# Patient Record
Sex: FEMALE | Race: BLACK OR AFRICAN AMERICAN | NOT HISPANIC OR LATINO | Employment: UNEMPLOYED | URBAN - METROPOLITAN AREA
[De-identification: names, ages, dates, MRNs, and addresses within clinical notes are randomized per-mention and may not be internally consistent; named-entity substitution may affect disease eponyms.]

---

## 2018-02-15 ENCOUNTER — CLINICAL SUPPORT (OUTPATIENT)
Dept: INTERNAL MEDICINE | Facility: CLINIC | Age: 63
End: 2018-02-15
Payer: MEDICAID

## 2018-02-15 PROCEDURE — 90662 IIV NO PRSV INCREASED AG IM: CPT | Mod: PBBFAC,PO

## 2022-09-02 ENCOUNTER — OFFICE VISIT (OUTPATIENT)
Dept: INTERNAL MEDICINE | Facility: CLINIC | Age: 67
End: 2022-09-02
Payer: MEDICARE

## 2022-09-02 ENCOUNTER — HOSPITAL ENCOUNTER (OUTPATIENT)
Dept: CARDIOLOGY | Facility: HOSPITAL | Age: 67
Discharge: HOME | End: 2022-09-02
Attending: FAMILY MEDICINE
Payer: MEDICARE

## 2022-09-02 VITALS
WEIGHT: 166.25 LBS | TEMPERATURE: 98 F | BODY MASS INDEX: 27.7 KG/M2 | OXYGEN SATURATION: 98 % | HEIGHT: 65 IN | DIASTOLIC BLOOD PRESSURE: 72 MMHG | SYSTOLIC BLOOD PRESSURE: 104 MMHG | HEART RATE: 56 BPM

## 2022-09-02 DIAGNOSIS — E78.2 MIXED HYPERLIPIDEMIA: ICD-10-CM

## 2022-09-02 DIAGNOSIS — Z12.11 SCREENING FOR COLORECTAL CANCER: ICD-10-CM

## 2022-09-02 DIAGNOSIS — Z23 NEED FOR PNEUMOCOCCAL VACCINE: ICD-10-CM

## 2022-09-02 DIAGNOSIS — Z00.00 PREVENTATIVE HEALTH CARE: ICD-10-CM

## 2022-09-02 DIAGNOSIS — Z23 NEED FOR COVID-19 VACCINE: ICD-10-CM

## 2022-09-02 DIAGNOSIS — Z11.4 SCREENING FOR HIV WITHOUT PRESENCE OF RISK FACTORS: ICD-10-CM

## 2022-09-02 DIAGNOSIS — Z23 NEED FOR SHINGLES VACCINE: ICD-10-CM

## 2022-09-02 DIAGNOSIS — Z01.419 PAP SMEAR, AS PART OF ROUTINE GYNECOLOGICAL EXAMINATION: ICD-10-CM

## 2022-09-02 DIAGNOSIS — Z01.00 DIABETIC EYE EXAM: ICD-10-CM

## 2022-09-02 DIAGNOSIS — Z11.59 ENCOUNTER FOR HEPATITIS C SCREENING TEST FOR LOW RISK PATIENT: ICD-10-CM

## 2022-09-02 DIAGNOSIS — Z12.12 SCREENING FOR COLORECTAL CANCER: ICD-10-CM

## 2022-09-02 DIAGNOSIS — E11.65 TYPE 2 DIABETES MELLITUS WITH HYPERGLYCEMIA, WITHOUT LONG-TERM CURRENT USE OF INSULIN: Primary | ICD-10-CM

## 2022-09-02 DIAGNOSIS — E89.0 POST-SURGICAL HYPOTHYROIDISM: ICD-10-CM

## 2022-09-02 DIAGNOSIS — I10 ESSENTIAL HYPERTENSION: ICD-10-CM

## 2022-09-02 DIAGNOSIS — M85.89 OSTEOPENIA OF MULTIPLE SITES: ICD-10-CM

## 2022-09-02 DIAGNOSIS — Z78.0 ASYMPTOMATIC MENOPAUSAL STATE: ICD-10-CM

## 2022-09-02 DIAGNOSIS — H40.9 GLAUCOMA, UNSPECIFIED GLAUCOMA TYPE, UNSPECIFIED LATERALITY: ICD-10-CM

## 2022-09-02 DIAGNOSIS — Z91.89 AT RISK FOR KNOWLEDGE DEFICIT: Chronic | ICD-10-CM

## 2022-09-02 DIAGNOSIS — Z12.31 BREAST CANCER SCREENING BY MAMMOGRAM: ICD-10-CM

## 2022-09-02 DIAGNOSIS — E11.9 DIABETIC EYE EXAM: ICD-10-CM

## 2022-09-02 DIAGNOSIS — R22.1 MASS OF LEFT SIDE OF NECK: ICD-10-CM

## 2022-09-02 DIAGNOSIS — Z23 NEED FOR TD VACCINE: ICD-10-CM

## 2022-09-02 PROBLEM — E03.9 ACQUIRED HYPOTHYROIDISM: Status: ACTIVE | Noted: 2021-12-01

## 2022-09-02 PROBLEM — K21.9 GASTROESOPHAGEAL REFLUX DISEASE WITHOUT ESOPHAGITIS: Status: ACTIVE | Noted: 2022-09-02

## 2022-09-02 LAB — GLUCOSE SERPL-MCNC: 112 MG/DL (ref 70–110)

## 2022-09-02 PROCEDURE — 3061F NEG MICROALBUMINURIA REV: CPT | Mod: CPTII,S$GLB,, | Performed by: FAMILY MEDICINE

## 2022-09-02 PROCEDURE — G0008 ADMIN INFLUENZA VIRUS VAC: HCPCS | Mod: S$GLB,,, | Performed by: FAMILY MEDICINE

## 2022-09-02 PROCEDURE — 82962 GLUCOSE BLOOD TEST: CPT | Mod: S$GLB,,, | Performed by: FAMILY MEDICINE

## 2022-09-02 PROCEDURE — 99204 OFFICE O/P NEW MOD 45 MIN: CPT | Mod: S$GLB,,, | Performed by: FAMILY MEDICINE

## 2022-09-02 PROCEDURE — 99999 PR PBB SHADOW E&M-NEW PATIENT-LVL V: ICD-10-PCS | Mod: PBBFAC,,, | Performed by: FAMILY MEDICINE

## 2022-09-02 PROCEDURE — 3288F FALL RISK ASSESSMENT DOCD: CPT | Mod: CPTII,S$GLB,, | Performed by: FAMILY MEDICINE

## 2022-09-02 PROCEDURE — 93005 ELECTROCARDIOGRAM TRACING: CPT

## 2022-09-02 PROCEDURE — 3288F PR FALLS RISK ASSESSMENT DOCUMENTED: ICD-10-PCS | Mod: CPTII,S$GLB,, | Performed by: FAMILY MEDICINE

## 2022-09-02 PROCEDURE — 3074F SYST BP LT 130 MM HG: CPT | Mod: CPTII,S$GLB,, | Performed by: FAMILY MEDICINE

## 2022-09-02 PROCEDURE — 1101F PT FALLS ASSESS-DOCD LE1/YR: CPT | Mod: CPTII,S$GLB,, | Performed by: FAMILY MEDICINE

## 2022-09-02 PROCEDURE — 3044F HG A1C LEVEL LT 7.0%: CPT | Mod: CPTII,S$GLB,, | Performed by: FAMILY MEDICINE

## 2022-09-02 PROCEDURE — 93010 ELECTROCARDIOGRAM REPORT: CPT | Mod: ,,, | Performed by: INTERNAL MEDICINE

## 2022-09-02 PROCEDURE — 90694 VACC AIIV4 NO PRSRV 0.5ML IM: CPT | Mod: S$GLB,,, | Performed by: FAMILY MEDICINE

## 2022-09-02 PROCEDURE — 99204 PR OFFICE/OUTPT VISIT, NEW, LEVL IV, 45-59 MIN: ICD-10-PCS | Mod: S$GLB,,, | Performed by: FAMILY MEDICINE

## 2022-09-02 PROCEDURE — 93010 EKG 12-LEAD: ICD-10-PCS | Mod: ,,, | Performed by: INTERNAL MEDICINE

## 2022-09-02 PROCEDURE — 1126F PR PAIN SEVERITY QUANTIFIED, NO PAIN PRESENT: ICD-10-PCS | Mod: CPTII,S$GLB,, | Performed by: FAMILY MEDICINE

## 2022-09-02 PROCEDURE — 82962 POCT GLUCOSE, HAND-HELD DEVICE: ICD-10-PCS | Mod: S$GLB,,, | Performed by: FAMILY MEDICINE

## 2022-09-02 PROCEDURE — 99999 PR PBB SHADOW E&M-NEW PATIENT-LVL V: CPT | Mod: PBBFAC,,, | Performed by: FAMILY MEDICINE

## 2022-09-02 PROCEDURE — 90694 FLU VACCINE - QUADRIVALENT - ADJUVANTED: ICD-10-PCS | Mod: S$GLB,,, | Performed by: FAMILY MEDICINE

## 2022-09-02 PROCEDURE — 3061F PR NEG MICROALBUMINURIA RESULT DOCUMENTED/REVIEW: ICD-10-PCS | Mod: CPTII,S$GLB,, | Performed by: FAMILY MEDICINE

## 2022-09-02 PROCEDURE — 1126F AMNT PAIN NOTED NONE PRSNT: CPT | Mod: CPTII,S$GLB,, | Performed by: FAMILY MEDICINE

## 2022-09-02 PROCEDURE — 3074F PR MOST RECENT SYSTOLIC BLOOD PRESSURE < 130 MM HG: ICD-10-PCS | Mod: CPTII,S$GLB,, | Performed by: FAMILY MEDICINE

## 2022-09-02 PROCEDURE — 1101F PR PT FALLS ASSESS DOC 0-1 FALLS W/OUT INJ PAST YR: ICD-10-PCS | Mod: CPTII,S$GLB,, | Performed by: FAMILY MEDICINE

## 2022-09-02 PROCEDURE — 3066F PR DOCUMENTATION OF TREATMENT FOR NEPHROPATHY: ICD-10-PCS | Mod: CPTII,S$GLB,, | Performed by: FAMILY MEDICINE

## 2022-09-02 PROCEDURE — 3044F PR MOST RECENT HEMOGLOBIN A1C LEVEL <7.0%: ICD-10-PCS | Mod: CPTII,S$GLB,, | Performed by: FAMILY MEDICINE

## 2022-09-02 PROCEDURE — 3078F DIAST BP <80 MM HG: CPT | Mod: CPTII,S$GLB,, | Performed by: FAMILY MEDICINE

## 2022-09-02 PROCEDURE — G0008 FLU VACCINE - QUADRIVALENT - ADJUVANTED: ICD-10-PCS | Mod: S$GLB,,, | Performed by: FAMILY MEDICINE

## 2022-09-02 PROCEDURE — 3078F PR MOST RECENT DIASTOLIC BLOOD PRESSURE < 80 MM HG: ICD-10-PCS | Mod: CPTII,S$GLB,, | Performed by: FAMILY MEDICINE

## 2022-09-02 PROCEDURE — 3066F NEPHROPATHY DOC TX: CPT | Mod: CPTII,S$GLB,, | Performed by: FAMILY MEDICINE

## 2022-09-02 RX ORDER — LOSARTAN POTASSIUM AND HYDROCHLOROTHIAZIDE 12.5; 1 MG/1; MG/1
TABLET ORAL
COMMUNITY
End: 2023-01-04

## 2022-09-02 NOTE — LETTER
1. Type 2 diabetes mellitus with hyperglycemia, without long-term current use of insulin  - POCT Glucose, Hand-Held Device  - Hemoglobin A1C; Standing  - Comprehensive Metabolic Panel; Standing  - Lipid Panel; Standing  - Microalbumin/Creatinine Ratio, Urine; Standing  - Ambulatory referral/consult to Diabetes Education; Future    2. Post-surgical hypothyroidism  - T4, Free; Future  - TSH; Future    3. Preventative health care  - POCT Glucose, Hand-Held Device    4. Essential hypertension  - Comprehensive Metabolic Panel; Standing  - Lipid Panel; Standing  - SCHEDULED EKG 12-LEAD (to Muse); Future    5. Mixed hyperlipidemia  - Comprehensive Metabolic Panel; Standing  - Lipid Panel; Standing    6. Breast cancer screening by mammogram  - Mammo Digital Screening Bilat w/ Martinez; Future    7. Screening for colorectal cancer  - Ambulatory referral/consult to Endo Procedure ; Future    8. Diabetic eye exam  - Ambulatory referral/consult to Ophthalmology; Future    9. Glaucoma, unspecified glaucoma type, unspecified laterality    10. Pap smear, as part of routine gynecological examination  - Ambulatory referral/consult to Gynecology; Future    11. Asymptomatic menopausal state  - DXA Bone Density Spine And Hip; Future    12. Need for pneumococcal vaccine  - pneumoc 20-jose luis conj-dip cr,PF, (PREVNAR-20, PF,) 0.5 mL Syrg injection; Inject 0.5 mLs into the muscle once. for 1 dose  Dispense: 0.5 mL; Refill: 0    13. Need for shingles vaccine  - varicella-zoster gE-AS01B, PF, (SHINGRIX) 50 mcg/0.5 mL injection; Inject 0.5 mL (one dose) into muscle now; schedule second dose  days later  Dispense: 1 each; Refill: 1    14. Need for COVID-19 vaccine    15. Need for Td vaccine  - tetanus and diphther. tox, PF, (TENIVAC, PF,) 5-2 Lf unit/0.5 mL Syrg; Inject 0.5 mLs into the muscle once. for 1 dose  Dispense: 0.5 mL; Refill: 0    16. Encounter for hepatitis C screening test for low risk patient  - Hepatitis C Antibody;  Future    17. Screening for HIV without presence of risk factors  - HIV 1/2 Ag/Ab (4th Gen); Future

## 2022-09-02 NOTE — PROGRESS NOTES
OFFICE VISIT 9/2/22 11:00 AM CDT    CHIEF COMPLAINT: Establish Care    This is my first time treating Lizz. Any problems addressed today are NEW TO ME.  Lizz is requesting that I assume the role of their primary care provider.     She has limited understanding and recollection of her current medications. She agreed to bring all current meds with her to her next appointment. We discussed strategies to help her better keep track of and adhere to her current treatment regimen.    Diagnoses listed are patient-reported, determined by exam, or gleaned from outside records.    DIAGNOSES SPECIFICALLY EVALUATED AND TREATED THIS ENCOUNTER  1. Type 2 diabetes mellitus with hyperglycemia, without long-term current use of insulin  - POCT Glucose, Hand-Held Device  - Hemoglobin A1C; Standing  - Comprehensive Metabolic Panel; Standing  - Lipid Panel; Standing  - Microalbumin/Creatinine Ratio, Urine; Standing  - Ambulatory referral/consult to Diabetes Education; Future    2. Post-surgical hypothyroidism  - T4, Free; Future  - TSH; Future    3. Preventative health care  - POCT Glucose, Hand-Held Device    4. Essential hypertension  - Comprehensive Metabolic Panel; Standing  - Lipid Panel; Standing  - SCHEDULED EKG 12-LEAD (to Muse); Future    5. Mixed hyperlipidemia  - Comprehensive Metabolic Panel; Standing  - Lipid Panel; Standing    6. Breast cancer screening by mammogram  - Mammo Digital Screening Bilat w/ Martinez; Future    7. Screening for colorectal cancer  - Ambulatory referral/consult to Endo Procedure ; Future    8. Diabetic eye exam  - Ambulatory referral/consult to Ophthalmology; Future    9. Glaucoma, unspecified glaucoma type, unspecified laterality    10. Pap smear, as part of routine gynecological examination  - Ambulatory referral/consult to Gynecology; Future    11. Asymptomatic menopausal state  - DXA Bone Density Spine And Hip; Future    12. Need for pneumococcal vaccine  - pneumoc 20-jose luis conj-dip cr,PF,  (PREVNAR-20, PF,) 0.5 mL Syrg injection; Inject 0.5 mLs into the muscle once. for 1 dose  Dispense: 0.5 mL; Refill: 0    13. Need for shingles vaccine  - varicella-zoster gE-AS01B, PF, (SHINGRIX) 50 mcg/0.5 mL injection; Inject 0.5 mL (one dose) into muscle now; schedule second dose  days later (Patient not taking: Reported on 10/14/2022)  Dispense: 1 each; Refill: 1    14. Need for COVID-19 vaccine    15. Need for Td vaccine  - tetanus and diphther. tox, PF, (TENIVAC, PF,) 5-2 Lf unit/0.5 mL Syrg; Inject 0.5 mLs into the muscle once. for 1 dose  Dispense: 0.5 mL; Refill: 0    16. Encounter for hepatitis C screening test for low risk patient  - Hepatitis C Antibody; Future    17. Screening for HIV without presence of risk factors  - HIV 1/2 Ag/Ab (4th Gen); Future    18. Mass of left side of neck  - US Soft Tissue Head Neck Thyroid; Future    19. At risk for knowledge deficit    --------------------------------------------------------------------------------   TED MANZANO (MRN [id number], APPT: 9/2/22 11:00 AM CDT)  --------------------------------  Ready to check out. See orders.  Clinic collect labs if Leonie available. Otherwise, send to lab.  Schedule in-office appointment with ALESIA Urbano about 1 month from now.  Schedule in-office appointment with me about 2-3 months from now.  Schedule other appointments as able.  Thanks.  --------------------------------------------------------------------------------   No follow-ups on file.   Future Appointments   Date Time Provider Department Center   10/24/2022  8:00 AM GENARO May MD [facility] IM [facility]   10/24/2022  8:30 AM Belen Masterson RD, CDE [facility] DIBEDU [facility]   10/24/2022  9:45 AM Earnestine Lara DPM [facility] POD [facility]   10/24/2022 10:50 AM [facility] CT1 LIMIT 500 LBS [facility] CT SCAN [facility]   10/25/2022 10:45 AM [facility] US1 [facility] ULSOUND [facility]   11/3/2022  2:00 PM [facility] MRI [facility] MRI [facility]   11/28/2022  8:30 AM iRcco Gonzalez MD [facility] ENT  [facility]   12/19/2022 10:00 AM Sherice Meyer NP [facility] GASTRO [facility]   12/21/2022  9:00 AM GENARO May MD [facility] IM [facility]   1/11/2023  9:00 AM GENARO May MD [facility]     Problem List Items Addressed This Visit       At risk for knowledge deficit (Chronic)    Essential hypertension (Chronic)    Relevant Orders    Comprehensive Metabolic Panel    Lipid Panel    SCHEDULED EKG 12-LEAD (to Muse) (Completed)    Mass of left side of neck (Chronic)    Current Assessment & Plan     Submandibular, irregularly shaped, size of large lemon. Nontender.         Relevant Orders    US Soft Tissue Head Neck Thyroid (Completed)    Mixed hyperlipidemia (Chronic)    Relevant Orders    Comprehensive Metabolic Panel    Lipid Panel    Post-surgical hypothyroidism (Chronic)    Relevant Orders    T4, Free (Completed)    TSH (Completed)    Type 2 diabetes mellitus with stage 4 chronic kidney disease, without long-term current use of insulin - Primary (Chronic)    Asymptomatic menopausal state    Relevant Orders    DXA Bone Density Spine And Hip (Completed)    Glaucoma     Other Visit Diagnoses       Preventative health care        Relevant Orders    POCT Glucose, Hand-Held Device (Completed)    Breast cancer screening by mammogram        Relevant Orders    Mammo Digital Screening Bilat w/ Martinez    Screening for colorectal cancer        Relevant Orders    Ambulatory referral/consult to Endo Procedure     Diabetic eye exam        Relevant Orders    Ambulatory referral/consult to Ophthalmology    Pap smear, as part of routine gynecological examination        Relevant Orders    Ambulatory referral/consult to Gynecology    Need for pneumococcal vaccine        Need for shingles vaccine        Relevant Medications    varicella-zoster gE-AS01B, PF, (SHINGRIX) 50 mcg/0.5 mL injection    Need for COVID-19 vaccine        Need for Td vaccine        Encounter for hepatitis C screening test for low risk patient         Relevant Orders    Hepatitis C Antibody (Completed)    Screening for HIV without presence of risk factors        Relevant Orders    HIV 1/2 Ag/Ab (4th Gen) (Completed)        Unless noted herein, any chronic conditions are represented as and appear compensated/controlled and stable, and no other significant complaints or concerns were reported.    PRESCRIPTION DRUG MANAGEMENT  Outpatient Medications Prior to Visit   Medication Sig Dispense Refill    amlodipine (NORVASC) 10 MG tablet Take 10 mg by mouth once daily.      diazepam (VALIUM) 5 MG tablet Take 10 mg by mouth every 6 (six) hours as needed for Anxiety.      losartan-hydrochlorothiazide 100-12.5 mg (HYZAAR) 100-12.5 mg Tab losartan 100 mg-hydrochlorothiazide 12.5 mg tablet   TAKE 1 TABLET BY MOUTH ONCE A DAY      omeprazole (PRILOSEC) 40 MG capsule Take 40 mg by mouth once daily.      promethazine (PHENERGAN) 25 MG tablet Take 25 mg by mouth every 6 (six) hours as needed for Nausea.      hydrocodone-acetaminophen 10-325mg (NORCO)  mg Tab Take by mouth.      levothyroxine (SYNTHROID) 88 MCG tablet Take 88 mcg by mouth once daily.      losartan (COZAAR) 25 MG tablet Take 25 mg by mouth once daily.       No facility-administered medications prior to visit.   There are no discontinued medications.  Medications Ordered This Encounter   Medications    pneumoc 20-jose luis conj-dip cr,PF, (PREVNAR-20, PF,) 0.5 mL Syrg injection     Sig: Inject 0.5 mLs into the muscle once. for 1 dose     Dispense:  0.5 mL     Refill:  0    tetanus and diphther. tox, PF, (TENIVAC, PF,) 5-2 Lf unit/0.5 mL Syrg     Sig: Inject 0.5 mLs into the muscle once. for 1 dose     Dispense:  0.5 mL     Refill:  0    varicella-zoster gE-AS01B, PF, (SHINGRIX) 50 mcg/0.5 mL injection     Sig: Inject 0.5 mL (one dose) into muscle now; schedule second dose  days later     Dispense:  1 each     Refill:  1   Physical Exam  Vitals reviewed.   Constitutional:       General: She is not in  "acute distress.     Appearance: Normal appearance. She is not ill-appearing or diaphoretic.   Cardiovascular:      Rate and Rhythm: Normal rate and regular rhythm.   Pulmonary:      Effort: Pulmonary effort is normal.      Breath sounds: Normal breath sounds.   Skin:     General: Skin is warm and dry.   Neurological:      Mental Status: She is alert and oriented to person, place, and time. Mental status is at baseline.   Psychiatric:         Mood and Affect: Mood normal.         Behavior: Behavior normal.         Judgment: Judgment normal.      PHYSICAL EXAM  Vitals:    09/02/22 1032   BP: 104/72   BP Location: Left arm   Patient Position: Sitting   BP Method: Medium (Manual)   Pulse: (!) 56   Temp: 98.1 °F (36.7 °C)   TempSrc: Tympanic   SpO2: 98%   Weight: 75.4 kg (166 lb 3.6 oz)   Height: 5' 4.76" (1.645 m)   CONSTITUTIONAL: Vital signs noted. No apparent distress. Does not appear acutely ill or septic. Appears adequately hydrated.  EYE: Sclerae anicteric. Lids and conjunctiva unremarkable.  ENT: External ENT unremarkable. Hearing grossly intact.  NECK: Trachea midline. Thyroid nontender.  PULM: Lungs clear. Breathing unlabored.  CV: Auscultation reveals regular rate and rhythm. Normal heart sounds. No carotid bruit.  GI: Soft and nontender. Bowel sounds normal.  DERM: Skin warm and moist with normal turgor.  NEURO: There are no gross focal motor deficits or gross deficits of cranial nerves III-XII.  PSYCH: Alert and oriented x 3. Mood is grossly neutral. Affect appropriate. Judgment and insight grossly intact.     Documentation entered by me for this encounter may have been done in part using speech-recognition technology. Although I have made an effort to ensure accuracy, "sound like" errors may exist and should be interpreted in context.       "

## 2022-09-06 ENCOUNTER — TELEPHONE (OUTPATIENT)
Dept: INTERNAL MEDICINE | Facility: CLINIC | Age: 67
End: 2022-09-06
Payer: MEDICARE

## 2022-09-06 NOTE — TELEPHONE ENCOUNTER
----- Message from Ashley Wang sent at 9/6/2022  4:07 PM CDT -----  Contact: self/[phone]  Patient is calling regards to unable to stand on her legs she is in a lot of pain..Please call her back [phone].  Thanks/radha

## 2022-09-06 NOTE — TELEPHONE ENCOUNTER
Spoke with patient and informed her that if she is having severe pain in her legs, she should go to the emergency room. She verbalized understanding.

## 2022-09-10 ENCOUNTER — TELEPHONE (OUTPATIENT)
Dept: INTERNAL MEDICINE | Facility: CLINIC | Age: 67
End: 2022-09-10
Payer: MEDICARE

## 2022-09-10 DIAGNOSIS — N18.4 STAGE 4 CHRONIC KIDNEY DISEASE: Primary | ICD-10-CM

## 2022-09-10 PROBLEM — E11.22 TYPE 2 DIABETES MELLITUS WITH STAGE 4 CHRONIC KIDNEY DISEASE, WITHOUT LONG-TERM CURRENT USE OF INSULIN: Chronic | Status: ACTIVE | Noted: 2022-09-02

## 2022-09-10 NOTE — TELEPHONE ENCOUNTER
TO MY TEAM: Contact Lizz and relay message below and schedule appointments.  Thanks.  --------------------------------------------------------------------------------   Your recent labs show that your kidney function is very low.  This needs further evaluation, so I've ordered an ultrasound of your kidneys, and I have entered a referral order for you to be evaluated by one of our excellent nephrologists (a specialist that diagnoses and treats kidney diseases).  --------------------------------------------------------------------------------  Orders Placed This Encounter   Procedures    US Retroperitoneal Complete (Kidney and    Ambulatory referral/consult to Nephrology

## 2022-09-12 ENCOUNTER — TELEPHONE (OUTPATIENT)
Dept: INTERNAL MEDICINE | Facility: CLINIC | Age: 67
End: 2022-09-12
Payer: MEDICARE

## 2022-09-12 NOTE — TELEPHONE ENCOUNTER
----- Message from Samantha Mcarthur sent at 9/9/2022  3:14 PM CDT -----  Contact: [phone]  Pt would like to consult with a nurse in regards to concerns of her big toe on her right foot. She stated that  pain management doctor informed her that she have  a fungi infection on her toe. Pt is requesting an antibiotic.  Please call her back at [phone]. Thanks KB     
Spoke with patient and was able to schedule her an appointment with Yvette Johnson NP on 9/15/22 at 11:00 am to address problem with her toe.  
No cyanosis, clubbing or edema

## 2022-09-14 ENCOUNTER — TELEPHONE (OUTPATIENT)
Dept: DIABETES | Facility: CLINIC | Age: 67
End: 2022-09-14
Payer: MEDICARE

## 2022-09-15 ENCOUNTER — DOCUMENTATION ONLY (OUTPATIENT)
Dept: INTERNAL MEDICINE | Facility: CLINIC | Age: 67
End: 2022-09-15

## 2022-09-15 ENCOUNTER — OFFICE VISIT (OUTPATIENT)
Dept: INTERNAL MEDICINE | Facility: CLINIC | Age: 67
End: 2022-09-15
Payer: MEDICARE

## 2022-09-15 ENCOUNTER — HOSPITAL ENCOUNTER (OUTPATIENT)
Dept: RADIOLOGY | Facility: HOSPITAL | Age: 67
Discharge: HOME | End: 2022-09-15
Attending: FAMILY MEDICINE
Payer: MEDICARE

## 2022-09-15 VITALS
DIASTOLIC BLOOD PRESSURE: 70 MMHG | TEMPERATURE: 97 F | WEIGHT: 163.13 LBS | HEIGHT: 64 IN | BODY MASS INDEX: 27.85 KG/M2 | SYSTOLIC BLOOD PRESSURE: 118 MMHG

## 2022-09-15 DIAGNOSIS — R22.1 MASS OF LEFT SIDE OF NECK: ICD-10-CM

## 2022-09-15 DIAGNOSIS — Z12.11 SCREENING FOR MALIGNANT NEOPLASM OF COLON: ICD-10-CM

## 2022-09-15 DIAGNOSIS — I10 ESSENTIAL HYPERTENSION: ICD-10-CM

## 2022-09-15 DIAGNOSIS — E11.22 TYPE 2 DIABETES MELLITUS WITH STAGE 4 CHRONIC KIDNEY DISEASE, WITHOUT LONG-TERM CURRENT USE OF INSULIN: Chronic | ICD-10-CM

## 2022-09-15 DIAGNOSIS — N18.4 TYPE 2 DIABETES MELLITUS WITH STAGE 4 CHRONIC KIDNEY DISEASE, WITHOUT LONG-TERM CURRENT USE OF INSULIN: Chronic | ICD-10-CM

## 2022-09-15 DIAGNOSIS — R22.1 MASS OF LEFT SIDE OF NECK: Chronic | ICD-10-CM

## 2022-09-15 DIAGNOSIS — N18.4 STAGE 4 CHRONIC KIDNEY DISEASE: ICD-10-CM

## 2022-09-15 DIAGNOSIS — B35.1 ONYCHOMYCOSIS OF RIGHT GREAT TOE: Primary | ICD-10-CM

## 2022-09-15 PROBLEM — L60.8 ONYCHOMADESIS OF TOENAIL: Status: ACTIVE | Noted: 2022-09-15

## 2022-09-15 PROCEDURE — 76536 US SOFT TISSUE HEAD NECK THYROID: ICD-10-PCS | Mod: 26,,, | Performed by: RADIOLOGY

## 2022-09-15 PROCEDURE — 99999 PR PBB SHADOW E&M-EST. PATIENT-LVL IV: CPT | Mod: PBBFAC,,, | Performed by: NURSE PRACTITIONER

## 2022-09-15 PROCEDURE — 99999 PR PBB SHADOW E&M-EST. PATIENT-LVL IV: ICD-10-PCS | Mod: PBBFAC,,, | Performed by: NURSE PRACTITIONER

## 2022-09-15 PROCEDURE — 76536 US EXAM OF HEAD AND NECK: CPT | Mod: TC

## 2022-09-15 PROCEDURE — 3078F PR MOST RECENT DIASTOLIC BLOOD PRESSURE < 80 MM HG: ICD-10-PCS | Mod: CPTII,S$GLB,, | Performed by: NURSE PRACTITIONER

## 2022-09-15 PROCEDURE — 3066F NEPHROPATHY DOC TX: CPT | Mod: CPTII,S$GLB,, | Performed by: NURSE PRACTITIONER

## 2022-09-15 PROCEDURE — 99214 PR OFFICE/OUTPT VISIT, EST, LEVL IV, 30-39 MIN: ICD-10-PCS | Mod: S$GLB,,, | Performed by: NURSE PRACTITIONER

## 2022-09-15 PROCEDURE — 1160F RVW MEDS BY RX/DR IN RCRD: CPT | Mod: CPTII,S$GLB,, | Performed by: NURSE PRACTITIONER

## 2022-09-15 PROCEDURE — 1101F PT FALLS ASSESS-DOCD LE1/YR: CPT | Mod: CPTII,S$GLB,, | Performed by: NURSE PRACTITIONER

## 2022-09-15 PROCEDURE — 3061F PR NEG MICROALBUMINURIA RESULT DOCUMENTED/REVIEW: ICD-10-PCS | Mod: CPTII,S$GLB,, | Performed by: NURSE PRACTITIONER

## 2022-09-15 PROCEDURE — 3044F HG A1C LEVEL LT 7.0%: CPT | Mod: CPTII,S$GLB,, | Performed by: NURSE PRACTITIONER

## 2022-09-15 PROCEDURE — 3008F PR BODY MASS INDEX (BMI) DOCUMENTED: ICD-10-PCS | Mod: CPTII,S$GLB,, | Performed by: NURSE PRACTITIONER

## 2022-09-15 PROCEDURE — 1160F PR REVIEW ALL MEDS BY PRESCRIBER/CLIN PHARMACIST DOCUMENTED: ICD-10-PCS | Mod: CPTII,S$GLB,, | Performed by: NURSE PRACTITIONER

## 2022-09-15 PROCEDURE — 99214 OFFICE O/P EST MOD 30 MIN: CPT | Mod: S$GLB,,, | Performed by: NURSE PRACTITIONER

## 2022-09-15 PROCEDURE — 3078F DIAST BP <80 MM HG: CPT | Mod: CPTII,S$GLB,, | Performed by: NURSE PRACTITIONER

## 2022-09-15 PROCEDURE — 99214 OFFICE O/P EST MOD 30 MIN: CPT | Mod: PBBFAC | Performed by: NURSE PRACTITIONER

## 2022-09-15 PROCEDURE — 1159F PR MEDICATION LIST DOCUMENTED IN MEDICAL RECORD: ICD-10-PCS | Mod: CPTII,S$GLB,, | Performed by: NURSE PRACTITIONER

## 2022-09-15 PROCEDURE — 3044F PR MOST RECENT HEMOGLOBIN A1C LEVEL <7.0%: ICD-10-PCS | Mod: CPTII,S$GLB,, | Performed by: NURSE PRACTITIONER

## 2022-09-15 PROCEDURE — 76536 US EXAM OF HEAD AND NECK: CPT | Mod: 26,,, | Performed by: RADIOLOGY

## 2022-09-15 PROCEDURE — 1125F AMNT PAIN NOTED PAIN PRSNT: CPT | Mod: CPTII,S$GLB,, | Performed by: NURSE PRACTITIONER

## 2022-09-15 PROCEDURE — 3074F SYST BP LT 130 MM HG: CPT | Mod: CPTII,S$GLB,, | Performed by: NURSE PRACTITIONER

## 2022-09-15 PROCEDURE — 3061F NEG MICROALBUMINURIA REV: CPT | Mod: CPTII,S$GLB,, | Performed by: NURSE PRACTITIONER

## 2022-09-15 PROCEDURE — 1159F MED LIST DOCD IN RCRD: CPT | Mod: CPTII,S$GLB,, | Performed by: NURSE PRACTITIONER

## 2022-09-15 PROCEDURE — 1125F PR PAIN SEVERITY QUANTIFIED, PAIN PRESENT: ICD-10-PCS | Mod: CPTII,S$GLB,, | Performed by: NURSE PRACTITIONER

## 2022-09-15 PROCEDURE — 3288F PR FALLS RISK ASSESSMENT DOCUMENTED: ICD-10-PCS | Mod: CPTII,S$GLB,, | Performed by: NURSE PRACTITIONER

## 2022-09-15 PROCEDURE — 3074F PR MOST RECENT SYSTOLIC BLOOD PRESSURE < 130 MM HG: ICD-10-PCS | Mod: CPTII,S$GLB,, | Performed by: NURSE PRACTITIONER

## 2022-09-15 PROCEDURE — 3288F FALL RISK ASSESSMENT DOCD: CPT | Mod: CPTII,S$GLB,, | Performed by: NURSE PRACTITIONER

## 2022-09-15 PROCEDURE — 1101F PR PT FALLS ASSESS DOC 0-1 FALLS W/OUT INJ PAST YR: ICD-10-PCS | Mod: CPTII,S$GLB,, | Performed by: NURSE PRACTITIONER

## 2022-09-15 PROCEDURE — 3066F PR DOCUMENTATION OF TREATMENT FOR NEPHROPATHY: ICD-10-PCS | Mod: CPTII,S$GLB,, | Performed by: NURSE PRACTITIONER

## 2022-09-15 PROCEDURE — 3008F BODY MASS INDEX DOCD: CPT | Mod: CPTII,S$GLB,, | Performed by: NURSE PRACTITIONER

## 2022-09-15 NOTE — ASSESSMENT & PLAN NOTE
Diabetes Management Status    Statin: Not taking  ACE/ARB: Taking    Screening or Prevention Patient's value Goal Complete/Controlled?   HgA1C Testing and Control   Lab Results   Component Value Date    HGBA1C 6.1 (H) 09/02/2022      Annually/Less than 8% Yes   Lipid profile : 09/02/2022 Annually Yes   LDL control Lab Results   Component Value Date    LDLCALC 78.8 09/02/2022    Annually/Less than 100 mg/dl  Yes   Nephropathy screening Lab Results   Component Value Date    LABMICR <5.0 09/02/2022     Lab Results   Component Value Date    PROTEINUA Negative 11/28/2014    Annually Yes   Blood pressure BP Readings from Last 1 Encounters:   09/15/22 118/70    Less than 140/90 Yes   Dilated retinal exam Most Recent Eye Exam Date: Not Found Annually Yes   Foot exam   Most Recent Foot Exam Date: Not Found Annually No     Lab Results   Component Value Date    HGBA1C 6.1 (H) 09/02/2022    EGFRNORACEVR 23.7 (A) 09/02/2022    MICALBCREAT Unable to calculate 09/02/2022    LDLCALC 78.8 09/02/2022     No results found for: GLUTAMICACID, CPEPTIDE   Last 6 Patient Entered Readings                                          Most Recent A1c:     There is no flowsheet data to display.        HEALTH MAINTENANCE: Diabetic health maintenance interventions reviewed and are up to date except for:      Topic    Eye Exam        Lab Results   Component Value Date    CREATININE 2.2 (H) 09/02/2022    BUN 29 (H) 09/02/2022     09/02/2022    K 4.9 09/02/2022     09/02/2022    CO2 23 09/02/2022

## 2022-09-15 NOTE — PROGRESS NOTES
OFFICE VISIT 9/15/22 11:00 AM CDT    CHIEF COMPLAINT: Nail Problem (Toe infection )      HPI:   Patient presents today for evaluation of toenail infection. Patient reports her right great toe was swollen and mildly tender to touch last week x 2-3 days. She did not notice any redness or drainage; she denies injuring the toe or nail. She soaked the toe in warm water and the pain and swelling subsided. She is able to bear weight on the toe without difficulty. On my review, patient has fungal infection of the toenail without evidence of infection of the skin or subcutaneous tissue surrounding the nail. There is no erythema, drainage, tenderness, or warmth to the area.     DIAGNOSES ADDRESSED THIS VISIT   1. Onychomycosis of right great toe  -     Ambulatory referral/consult to Podiatry    2. Mass of left side of neck  Assessment & Plan:  US Soft Tissue Head Neck Thyroid  Narrative: EXAMINATION:  US SOFT TISSUE HEAD NECK THYROID    CLINICAL HISTORY:  Localized swelling, mass and lump, neck    TECHNIQUE:  Focused soft tissue imaging.    COMPARISON:  None.    FINDINGS:  Imaging at the area palpable concern demonstrates a solid 4.5 x 2.5 x 4.0 cm mass splaying the internal and external carotid arteries.  CT soft tissue neck with/without contrast recommended.  This report was flagged in Epic as abnormal.  Impression: As above    Electronically signed by: Ubaldo Estrada MD  Date:    09/15/2022  Time:    09:41      Ordered CT of neck - will schedule this     Orders:  -     CT Soft Tissue Neck W WO Contrast    3. Type 2 diabetes mellitus with stage 4 chronic kidney disease, without long-term current use of insulin  Assessment & Plan:  Diabetes Management Status    Statin: Not taking  ACE/ARB: Taking    Screening or Prevention Patient's value Goal Complete/Controlled?   HgA1C Testing and Control   Lab Results   Component Value Date    HGBA1C 6.1 (H) 09/02/2022      Annually/Less than 8% Yes   Lipid profile : 09/02/2022  Annually Yes   LDL control Lab Results   Component Value Date    LDLCALC 78.8 09/02/2022    Annually/Less than 100 mg/dl  Yes   Nephropathy screening Lab Results   Component Value Date    LABMICR <5.0 09/02/2022     Lab Results   Component Value Date    PROTEINUA Negative 11/28/2014    Annually Yes   Blood pressure BP Readings from Last 1 Encounters:   09/15/22 118/70    Less than 140/90 Yes   Dilated retinal exam Most Recent Eye Exam Date: Not Found Annually Yes   Foot exam   Most Recent Foot Exam Date: Not Found Annually No     Lab Results   Component Value Date    HGBA1C 6.1 (H) 09/02/2022    EGFRNORACEVR 23.7 (A) 09/02/2022    MICALBCREAT Unable to calculate 09/02/2022    LDLCALC 78.8 09/02/2022     No results found for: GLUTAMICACID, CPEPTIDE   Last 6 Patient Entered Readings                                          Most Recent A1c:     There is no flowsheet data to display.        HEALTH MAINTENANCE: Diabetic health maintenance interventions reviewed and are up to date except for:      Topic    Eye Exam        Lab Results   Component Value Date    CREATININE 2.2 (H) 09/02/2022    BUN 29 (H) 09/02/2022     09/02/2022    K 4.9 09/02/2022     09/02/2022    CO2 23 09/02/2022           4. Stage 4 chronic kidney disease    5. Essential hypertension  Assessment & Plan:  This condition is stable. We will continue current treatment regimen.         6. Screening for malignant neoplasm of colon  Assessment & Plan:  Discussed with patient that we will send referral for colonoscopy.     Orders:  -     Ambulatory referral/consult to Gastroenterology       Unless noted herein, any chronic conditions are represented as and appear compensated and stable. No other significant complaints or concerns were reported.     FOLLOW UP  She will RTC 10/3/2022     Future Appointments   Date Time Provider Department Center   9/22/2022  9:00 AM [facility] MAMMO1-SCR [facility] MAMMO [place]   9/29/2022 10:50 AM [facility] CT1 LIMIT 500 LBS  "[facility] CT SCAN [facility]   10/3/2022  8:00 AM Yvette Johnson NP [facility]   10/3/2022  9:40 AM Jin Ruffin, GILMAR [facility] OPHTHAL [facility]   10/14/2022  9:30 AM [facility] BMD1 [facility] DEXABMD [facility]   12/21/2022  9:00 AM GENARO May MD [facility]       REVIEW OF SYSTEMS  Review of Systems   Constitutional:  Negative for chills and fever.   HENT:  Negative for congestion, ear pain, hearing loss and sinus pain.    Eyes:  Negative for blurred vision, discharge and redness.   Respiratory:  Negative for cough, hemoptysis, sputum production, shortness of breath and wheezing.    Cardiovascular:  Negative for chest pain, palpitations, claudication and leg swelling.   Gastrointestinal:  Negative for abdominal pain, constipation, diarrhea, heartburn, nausea and vomiting.   Genitourinary:  Negative for dysuria, frequency and urgency.   Musculoskeletal:  Positive for back pain, joint pain, myalgias and neck pain. Negative for falls.   Skin:  Negative for itching and rash.        Right great toenail thickened and yellow    Neurological:  Negative for dizziness, tingling, sensory change, focal weakness, weakness and headaches.   Psychiatric/Behavioral:  Negative for depression.      PHYSICAL EXAM   Vitals:    09/15/22 1058   BP: 118/70   BP Location: Right arm   Patient Position: Sitting   BP Method: Large (Manual)   Temp: 97 °F (36.1 °C)   Weight: 74 kg (163 lb 2.3 oz)   Height: 5' 4" (1.626 m)       Physical Exam  Vitals reviewed.   Constitutional:       General: She is not in acute distress.     Appearance: Normal appearance. She is not ill-appearing.   Cardiovascular:      Rate and Rhythm: Normal rate.      Pulses:           Dorsalis pedis pulses are 2+ on the right side and 2+ on the left side.        Posterior tibial pulses are 2+ on the right side and 2+ on the left side.   Pulmonary:      Effort: Pulmonary effort is normal.   Musculoskeletal:      Right foot: No deformity.      Left foot: No " "deformity.   Feet:      Right foot:      Protective Sensation: 5 sites tested.  5 sites sensed.      Skin integrity: Dry skin present.      Toenail Condition: Right toenails are abnormally thick. Fungal disease present.     Left foot:      Protective Sensation: 5 sites tested.  5 sites sensed.      Skin integrity: Dry skin present.      Toenail Condition: Left toenails are abnormally thick. Fungal disease present.  Neurological:      Mental Status: She is alert.   Psychiatric:         Behavior: Behavior normal.        PRESCRIPTION DRUG MANAGEMENT  Outpatient Medications Prior to Visit   Medication Sig Dispense Refill    amlodipine (NORVASC) 10 MG tablet Take 10 mg by mouth once daily.      diazepam (VALIUM) 5 MG tablet Take 10 mg by mouth every 6 (six) hours as needed for Anxiety.      hydrocodone-acetaminophen 10-325mg (NORCO)  mg Tab Take by mouth.      levothyroxine (SYNTHROID) 88 MCG tablet Take 88 mcg by mouth once daily.      losartan-hydrochlorothiazide 100-12.5 mg (HYZAAR) 100-12.5 mg Tab losartan 100 mg-hydrochlorothiazide 12.5 mg tablet   TAKE 1 TABLET BY MOUTH ONCE A DAY      omeprazole (PRILOSEC) 40 MG capsule Take 40 mg by mouth once daily.      promethazine (PHENERGAN) 25 MG tablet Take 25 mg by mouth every 6 (six) hours as needed for Nausea.      varicella-zoster gE-AS01B, PF, (SHINGRIX) 50 mcg/0.5 mL injection Inject 0.5 mL (one dose) into muscle now; schedule second dose  days later 1 each 1    losartan (COZAAR) 25 MG tablet Take 25 mg by mouth once daily.       No facility-administered medications prior to visit.     Medications Discontinued During This Encounter   Medication Reason    losartan (COZAAR) 25 MG tablet         Documentation entered by me for this encounter may have been done in part using speech-recognition technology. Although I have made an effort to ensure accuracy, "sound like" errors may exist and should be interpreted in context.        "

## 2022-09-15 NOTE — ASSESSMENT & PLAN NOTE
US Soft Tissue Head Neck Thyroid  Narrative: EXAMINATION:  US SOFT TISSUE HEAD NECK THYROID    CLINICAL HISTORY:  Localized swelling, mass and lump, neck    TECHNIQUE:  Focused soft tissue imaging.    COMPARISON:  None.    FINDINGS:  Imaging at the area palpable concern demonstrates a solid 4.5 x 2.5 x 4.0 cm mass splaying the internal and external carotid arteries.  CT soft tissue neck with/without contrast recommended.  This report was flagged in Epic as abnormal.  Impression: As above    Electronically signed by: Ubaldo Estrada MD  Date:    09/15/2022  Time:    09:41      Ordered CT of neck - will schedule this

## 2022-09-27 ENCOUNTER — TELEPHONE (OUTPATIENT)
Dept: INTERNAL MEDICINE | Facility: CLINIC | Age: 67
End: 2022-09-27
Payer: MEDICARE

## 2022-09-27 DIAGNOSIS — R22.1 MASS OF LEFT SIDE OF NECK: Primary | ICD-10-CM

## 2022-09-27 NOTE — TELEPHONE ENCOUNTER
----- Message from Dagmar Herzog, RT sent at 9/27/2022  8:33 AM CDT -----  Regarding: CT  Ms Crockett is scheduled Thursday for a CT  with and without contrast on Thursday but we cannot inject contrast because of her labs. Her GFR is 23.7, it would have to be >30 for us to be able to inject contrast.  We can either do the scan without contrast only OR you can try a different imaging modality.     Thanks,  Kisha  [phone]

## 2022-09-30 ENCOUNTER — TELEPHONE (OUTPATIENT)
Dept: INTERNAL MEDICINE | Facility: CLINIC | Age: 67
End: 2022-09-30
Payer: MEDICARE

## 2022-09-30 NOTE — TELEPHONE ENCOUNTER
----- Message from Fernanda Herzog sent at 9/30/2022  1:14 PM CDT -----  States she would like to the nurse to give her a call. States she has couple of questions and the pain she is having. Please call pt [phone]. Thank you

## 2022-09-30 NOTE — TELEPHONE ENCOUNTER
Patient stated that she is in a lot of pain with her back and that she had injections from pain management, however, they didn't help her. She wanted to know if Dr. May gives any kind of injections for pain in her back. I informed her that he doesn't do any pain management injections. She has an appointment with Yvette Johnson NP on 10/3/22 and I informed her to speak with her about the pain in her back. She verbalized understanding.

## 2022-10-03 ENCOUNTER — TELEPHONE (OUTPATIENT)
Dept: INTERNAL MEDICINE | Facility: CLINIC | Age: 67
End: 2022-10-03
Payer: MEDICARE

## 2022-10-04 ENCOUNTER — PATIENT MESSAGE (OUTPATIENT)
Dept: ADMINISTRATIVE | Facility: OTHER | Age: 67
End: 2022-10-04
Payer: MEDICARE

## 2022-10-04 ENCOUNTER — OFFICE VISIT (OUTPATIENT)
Dept: INTERNAL MEDICINE | Facility: CLINIC | Age: 67
End: 2022-10-04
Payer: MEDICARE

## 2022-10-04 VITALS
HEART RATE: 56 BPM | BODY MASS INDEX: 28.56 KG/M2 | SYSTOLIC BLOOD PRESSURE: 120 MMHG | HEIGHT: 64 IN | OXYGEN SATURATION: 98 % | TEMPERATURE: 98 F | WEIGHT: 167.31 LBS | DIASTOLIC BLOOD PRESSURE: 80 MMHG

## 2022-10-04 DIAGNOSIS — E89.0 POST-SURGICAL HYPOTHYROIDISM: Chronic | ICD-10-CM

## 2022-10-04 DIAGNOSIS — I10 ESSENTIAL HYPERTENSION: Chronic | ICD-10-CM

## 2022-10-04 DIAGNOSIS — E78.2 MIXED HYPERLIPIDEMIA: Chronic | ICD-10-CM

## 2022-10-04 DIAGNOSIS — N18.4 TYPE 2 DIABETES MELLITUS WITH STAGE 4 CHRONIC KIDNEY DISEASE, WITHOUT LONG-TERM CURRENT USE OF INSULIN: Chronic | ICD-10-CM

## 2022-10-04 DIAGNOSIS — E11.22 TYPE 2 DIABETES MELLITUS WITH STAGE 4 CHRONIC KIDNEY DISEASE, WITHOUT LONG-TERM CURRENT USE OF INSULIN: Chronic | ICD-10-CM

## 2022-10-04 DIAGNOSIS — R22.1 MASS OF LEFT SIDE OF NECK: Primary | Chronic | ICD-10-CM

## 2022-10-04 DIAGNOSIS — Z79.891 CHRONIC USE OF OPIATE FOR THERAPEUTIC PURPOSE: Chronic | ICD-10-CM

## 2022-10-04 DIAGNOSIS — N18.4 STAGE 4 CHRONIC KIDNEY DISEASE: Chronic | ICD-10-CM

## 2022-10-04 PROCEDURE — 3044F HG A1C LEVEL LT 7.0%: CPT | Mod: CPTII,S$GLB,, | Performed by: FAMILY MEDICINE

## 2022-10-04 PROCEDURE — 1101F PT FALLS ASSESS-DOCD LE1/YR: CPT | Mod: CPTII,S$GLB,, | Performed by: FAMILY MEDICINE

## 2022-10-04 PROCEDURE — 3066F NEPHROPATHY DOC TX: CPT | Mod: CPTII,S$GLB,, | Performed by: FAMILY MEDICINE

## 2022-10-04 PROCEDURE — 3008F BODY MASS INDEX DOCD: CPT | Mod: CPTII,S$GLB,, | Performed by: FAMILY MEDICINE

## 2022-10-04 PROCEDURE — 1160F RVW MEDS BY RX/DR IN RCRD: CPT | Mod: CPTII,S$GLB,, | Performed by: FAMILY MEDICINE

## 2022-10-04 PROCEDURE — 1125F PR PAIN SEVERITY QUANTIFIED, PAIN PRESENT: ICD-10-PCS | Mod: CPTII,S$GLB,, | Performed by: FAMILY MEDICINE

## 2022-10-04 PROCEDURE — 1160F PR REVIEW ALL MEDS BY PRESCRIBER/CLIN PHARMACIST DOCUMENTED: ICD-10-PCS | Mod: CPTII,S$GLB,, | Performed by: FAMILY MEDICINE

## 2022-10-04 PROCEDURE — 99999 PR PBB SHADOW E&M-EST. PATIENT-LVL III: CPT | Mod: PBBFAC,,, | Performed by: FAMILY MEDICINE

## 2022-10-04 PROCEDURE — 3079F PR MOST RECENT DIASTOLIC BLOOD PRESSURE 80-89 MM HG: ICD-10-PCS | Mod: CPTII,S$GLB,, | Performed by: FAMILY MEDICINE

## 2022-10-04 PROCEDURE — 3061F NEG MICROALBUMINURIA REV: CPT | Mod: CPTII,S$GLB,, | Performed by: FAMILY MEDICINE

## 2022-10-04 PROCEDURE — 1159F MED LIST DOCD IN RCRD: CPT | Mod: CPTII,S$GLB,, | Performed by: FAMILY MEDICINE

## 2022-10-04 PROCEDURE — 99999 PR PBB SHADOW E&M-EST. PATIENT-LVL III: ICD-10-PCS | Mod: PBBFAC,,, | Performed by: FAMILY MEDICINE

## 2022-10-04 PROCEDURE — 3066F PR DOCUMENTATION OF TREATMENT FOR NEPHROPATHY: ICD-10-PCS | Mod: CPTII,S$GLB,, | Performed by: FAMILY MEDICINE

## 2022-10-04 PROCEDURE — 99214 PR OFFICE/OUTPT VISIT, EST, LEVL IV, 30-39 MIN: ICD-10-PCS | Mod: S$GLB,,, | Performed by: FAMILY MEDICINE

## 2022-10-04 PROCEDURE — 1159F PR MEDICATION LIST DOCUMENTED IN MEDICAL RECORD: ICD-10-PCS | Mod: CPTII,S$GLB,, | Performed by: FAMILY MEDICINE

## 2022-10-04 PROCEDURE — 3074F PR MOST RECENT SYSTOLIC BLOOD PRESSURE < 130 MM HG: ICD-10-PCS | Mod: CPTII,S$GLB,, | Performed by: FAMILY MEDICINE

## 2022-10-04 PROCEDURE — 3074F SYST BP LT 130 MM HG: CPT | Mod: CPTII,S$GLB,, | Performed by: FAMILY MEDICINE

## 2022-10-04 PROCEDURE — 3288F FALL RISK ASSESSMENT DOCD: CPT | Mod: CPTII,S$GLB,, | Performed by: FAMILY MEDICINE

## 2022-10-04 PROCEDURE — 99214 OFFICE O/P EST MOD 30 MIN: CPT | Mod: S$GLB,,, | Performed by: FAMILY MEDICINE

## 2022-10-04 PROCEDURE — 3044F PR MOST RECENT HEMOGLOBIN A1C LEVEL <7.0%: ICD-10-PCS | Mod: CPTII,S$GLB,, | Performed by: FAMILY MEDICINE

## 2022-10-04 PROCEDURE — 3008F PR BODY MASS INDEX (BMI) DOCUMENTED: ICD-10-PCS | Mod: CPTII,S$GLB,, | Performed by: FAMILY MEDICINE

## 2022-10-04 PROCEDURE — 1125F AMNT PAIN NOTED PAIN PRSNT: CPT | Mod: CPTII,S$GLB,, | Performed by: FAMILY MEDICINE

## 2022-10-04 PROCEDURE — 1101F PR PT FALLS ASSESS DOC 0-1 FALLS W/OUT INJ PAST YR: ICD-10-PCS | Mod: CPTII,S$GLB,, | Performed by: FAMILY MEDICINE

## 2022-10-04 PROCEDURE — 3079F DIAST BP 80-89 MM HG: CPT | Mod: CPTII,S$GLB,, | Performed by: FAMILY MEDICINE

## 2022-10-04 PROCEDURE — 3061F PR NEG MICROALBUMINURIA RESULT DOCUMENTED/REVIEW: ICD-10-PCS | Mod: CPTII,S$GLB,, | Performed by: FAMILY MEDICINE

## 2022-10-04 PROCEDURE — 3288F PR FALLS RISK ASSESSMENT DOCUMENTED: ICD-10-PCS | Mod: CPTII,S$GLB,, | Performed by: FAMILY MEDICINE

## 2022-10-04 RX ORDER — LEVOTHYROXINE SODIUM 100 UG/1
100 TABLET ORAL
COMMUNITY
End: 2022-10-04 | Stop reason: SDUPTHER

## 2022-10-04 RX ORDER — LEVOTHYROXINE SODIUM 100 UG/1
100 TABLET ORAL
Qty: 90 TABLET | Refills: 1 | Status: SHIPPED | OUTPATIENT
Start: 2022-10-04 | End: 2022-12-21 | Stop reason: SDUPTHER

## 2022-10-04 NOTE — PROGRESS NOTES
HEALTH MAINTENANCE INTERVENTIONS - UP TO DATE  Health Maintenance Topics with due status: Not Due       Topic Last Completion Date    Diabetes Urine Screening 09/02/2022    Lipid Panel 09/02/2022    Hemoglobin A1c 09/02/2022    Foot Exam 09/15/2022       HEALTH MAINTENANCE INTERVENTIONS - DUE OR DUE SOON  Health Maintenance Due   Topic Date Due    Eye Exam  Never done    TETANUS VACCINE  Never done    Mammogram  Never done    Low Dose Statin  Never done    DEXA Scan  Never done    Colorectal Cancer Screening  Never done    Shingles Vaccine (1 of 2) Never done    COVID-19 Vaccine (3 - Booster for Pfizer series) 06/24/2021      ORDERED PREVIOUSLY AND DUE OR OVERDUE  ORDER ORDERED/DUE    Ambulatory referral/consult to Diabetes Education 09/09/2022   Ambulatory referral/consult to Ophthalmology 09/09/2022   Mammo Digital Screening Bilat w/ Martinez 09/02/2022   Ambulatory referral/consult to Endo Procedure  09/03/2022   US Retroperitoneal Complete (Kidneys) 09/10/2022   Ambulatory referral/consult to Nephrology 09/17/2022   Ambulatory referral/consult to Podiatry 09/22/2022   Ambulatory referral/consult to Gastroenterology 09/22/2022   CT Soft Tissue Neck WO Contrast 09/27/2022       OFFICE VISIT 10/4/22  8:00 AM CDT    CHIEF COMPLAINT: Follow-up    I reviewed all her test results with her, in particular her neck ultrasound results with her. We discussed differential diagnosis, including concern for possible malignancy. She agreed to schedule CT scan and see ENT for further evaluation. She did not bring her medications with her today, except for a bottle of levothyroxine 100 µg. She says that she has adequate refills of her other medication's to last until she can return for her next appointment and bring her medication's with her then. She appears to be unhappy with her current pain management consisting primarily of opioid analgesics. She agreed to return to see her current pain management specialist to discuss  other treatment options or referral to another pain management specialist for second opinion.    DIAGNOSES SPECIFICALLY EVALUATED AND TREATED THIS ENCOUNTER  1. Type 2 diabetes mellitus with stage 4 chronic kidney disease, without long-term current use of insulin  - Diabetes Digital Medicine (DDMP) Enrollment Order  - Diabetes Digital Medicine (DDMP): Assign Onboarding Questionnaires  - NURSING COMMUNICATION: Create [employer] Account  - NURSING COMMUNICATION: Create [employer] Account    2. Mass of left side of neck  - Ambulatory referral/consult to ENT; Future    3. Stage 4 chronic kidney disease    4. Essential hypertension  - Hypertension Digital Medicine (HDMP) Enrollment Order  - Hypertension Digital Medicine (HDMP): Assign Onboarding Questionnaires  - NURSING COMMUNICATION: Create [employer] Account    5. Mixed hyperlipidemia  - Hyperlipidemia Digital Medicine (LDMP) Enrollment Order  - Hyperlipidemia Digital Medicine (LDMP): Assign Onboarding Questionnaires    6. Post-surgical hypothyroidism  - levothyroxine (SYNTHROID) 100 MCG tablet; Take 1 tablet (100 mcg total) by mouth before breakfast.  Dispense: 90 tablet; Refill: 1     --------------------------------------------------------------------------------   TED MANZANO (MRN [id number], APPT: 10/4/22  8:00 AM CDT)  --------------------------------  Ready to check out. See orders.  Schedule referrals and tests previously ordered.   -Ambulatory referral/consult to Diabetes Education   -Ambulatory referral/consult to Ophthalmology   -Mammo Digital Screening Bilat w/ Martinez   -Ambulatory referral/consult to Endo Procedure    -US Retroperitoneal Complete (Kidneys)   -Ambulatory referral/consult to Nephrology   -Ambulatory referral/consult to Podiatry   -Ambulatory referral/consult to Gastroenterology   -CT Soft Tissue Neck WO Contrast  Schedule ENT appointment ordered today.  Send to [employer] to enroll in Digital Medicine Programs.  Schedule in-office  appointment with ALESIA Urbano in 1-2 weeks.  Schedule Lizz for next available regular appointment with me.  Highlight patient instructions on AVS.   Thanks.  --------------------------------------------------------------------------------      Follow up in about 2 weeks (around 10/18/2022) for review test results, discuss treatment plan, re-evaluate problem(s) discussed today.   Future Appointments   Date Time Provider Department Center   10/14/2022  9:30 AM [facility]   10/14/2022 10:30 AM Phyllis Dietz NP [facility]   12/21/2022  9:00 AM GENARO Villeda MD [facility]     Problem List Items Addressed This Visit       Essential hypertension (Chronic)    Current Assessment & Plan     BP Readings from Last 6 Encounters:   10/04/22 120/80   09/15/22 118/70   09/02/22 104/72   11/28/14 118/82     Lab Results   Component Value Date    EGFRNORACEVR 23.7 (A) 09/02/2022    CREATININE 2.2 (H) 09/02/2022    BUN 29 (H) 09/02/2022    K 4.9 09/02/2022     09/02/2022     09/02/2022     Results for orders placed or performed during the hospital encounter of 09/02/22   SCHEDULED EKG 12-LEAD (to Muse)    Collection Time: 09/02/22  1:06 PM    Narrative    Test Reason : I10    Vent. Rate : 048 BPM     Atrial Rate : 048 BPM     P-R Int : 142 ms          QRS Dur : 080 ms      QT Int : 462 ms       P-R-T Axes : 039 -04 015 degrees     QTc Int : 412 ms    Sinus bradycardia  Otherwise normal ECG  No previous ECGs available  Confirmed by MALLY GOODMAN, SIM TEIXEIRA ([id number]) on 9/2/2022 10:29:09 PM    Referred By: GENARO VILLEDA           Confirmed By:SIM BAL MD             Relevant Orders    Hypertension Digital Medicine (HDMP) Enrollment Order (Completed)    Hypertension Digital Medicine (HDMP): Assign Onboarding Questionnaires (Completed)    NURSING COMMUNICATION: Create [facility] Account    Mass of left side of neck - Primary (Chronic)    Relevant Orders    Ambulatory  referral/consult to ENT    Mixed hyperlipidemia (Chronic)    Current Assessment & Plan     Lab Results   Component Value Date    CHOL 147 09/02/2022    TRIG 146 09/02/2022    HDL 39 (L) 09/02/2022    LDLCALC 78.8 09/02/2022    NONHDLCHOL 108 09/02/2022    AST 16 09/02/2022    ALT 13 09/02/2022   The 10-year ASCVD risk score (Alber MONTIEL, et al., 2019) is: 17.1%    Values used to calculate the score:      Age: 67 years      Sex: Female      Is Non- : Yes      Diabetic: Yes      Tobacco smoker: No      Systolic Blood Pressure: 120 mmHg      Is BP treated: Yes      HDL Cholesterol: 39 mg/dL      Total Cholesterol: 147 mg/dL          Relevant Orders    Hyperlipidemia Digital Medicine (LDMP) Enrollment Order (Completed)    Hyperlipidemia Digital Medicine (LDMP): Assign Onboarding Questionnaires (Completed)    Post-surgical hypothyroidism (Chronic)    Current Assessment & Plan     Lab Results   Component Value Date    TSH 2.602 09/02/2022    FREET4 0.97 09/02/2022   No results found for: THYROIDSTIMI, THYROPEROXID, THYGLBTUM, THGABSCRN, THYRGINTERP          Relevant Medications    levothyroxine (SYNTHROID) 100 MCG tablet    Stage 4 chronic kidney disease (Chronic)    Current Assessment & Plan     Lab Results   Component Value Date    EGFRNORACEVR 23.7 (A) 09/02/2022    CREATININE 2.2 (H) 09/02/2022    CREATININE 1.5 (H) 11/28/2014    BUN 29 (H) 09/02/2022    BUN 17 11/28/2014             Type 2 diabetes mellitus with stage 4 chronic kidney disease, without long-term current use of insulin (Chronic)    Current Assessment & Plan     Diabetes Management Status    Statin: Not taking  ACE/ARB: Taking    Screening or Prevention Patient's value Goal Complete/Controlled?   HgA1C Testing and Control   Lab Results   Component Value Date    HGBA1C 6.1 (H) 09/02/2022      Annually/Less than 8% Yes   Lipid profile : 09/02/2022 Annually Yes   LDL control Lab Results   Component Value Date    LDLCALC 78.8  09/02/2022    Annually/Less than 100 mg/dl  Yes   Nephropathy screening Lab Results   Component Value Date    LABMICR <5.0 09/02/2022     Lab Results   Component Value Date    PROTEINUA Negative 11/28/2014    Annually Yes   Blood pressure BP Readings from Last 1 Encounters:   10/04/22 120/80    Less than 140/90 Yes   Dilated retinal exam Most Recent Eye Exam Date: Not Found Annually Yes   Foot exam   : 09/15/2022 Annually Yes     Lab Results   Component Value Date    HGBA1C 6.1 (H) 09/02/2022    EGFRNORACEVR 23.7 (A) 09/02/2022    MICALBCREAT Unable to calculate 09/02/2022    LDLCALC 78.8 09/02/2022   No results found for: GLUTAMICACID, CPEPTIDE   Last 6 Patient Entered Readings                                          Most Recent A1c:     There is no flowsheet data to display.        HEALTH MAINTENANCE: Diabetic health maintenance interventions reviewed and are up to date except for:      Topic    Eye Exam              Relevant Orders    Diabetes Digital Medicine (DDMP) Enrollment Order (Completed)    Diabetes Digital Medicine (DDMP): Assign Onboarding Questionnaires (Completed)    NURSING COMMUNICATION: Create [facility] Account    NURSING COMMUNICATION: Create [facility] Account   Unless noted herein, any chronic conditions are represented as and appear compensated/controlled and stable, and no other significant complaints or concerns were reported.    PRESCRIPTION DRUG MANAGEMENT  Outpatient Medications Prior to Visit   Medication Sig Dispense Refill    levothyroxine (SYNTHROID) 100 MCG tablet Take 100 mcg by mouth before breakfast.      amlodipine (NORVASC) 10 MG tablet Take 10 mg by mouth once daily.      diazepam (VALIUM) 5 MG tablet Take 10 mg by mouth every 6 (six) hours as needed for Anxiety.      losartan-hydrochlorothiazide 100-12.5 mg (HYZAAR) 100-12.5 mg Tab losartan 100 mg-hydrochlorothiazide 12.5 mg tablet   TAKE 1 TABLET BY MOUTH ONCE A DAY      omeprazole (PRILOSEC) 40 MG capsule Take 40 mg by mouth  "once daily.      oxyCODONE-acetaminophen (PERCOCET) 7.5-325 mg per tablet Take 1 tablet by mouth every 8 hours as needed for pain 90 tablet 0    promethazine (PHENERGAN) 25 MG tablet Take 25 mg by mouth every 6 (six) hours as needed for Nausea.      varicella-zoster gE-AS01B, PF, (SHINGRIX) 50 mcg/0.5 mL injection Inject 0.5 mL (one dose) into muscle now; schedule second dose  days later 1 each 1    levothyroxine (SYNTHROID) 88 MCG tablet Take 88 mcg by mouth once daily.       No facility-administered medications prior to visit.     Medications Discontinued During This Encounter   Medication Reason    levothyroxine (SYNTHROID) 88 MCG tablet Patient no longer taking    levothyroxine (SYNTHROID) 100 MCG tablet Reorder     Medications Ordered This Encounter   Medications    levothyroxine (SYNTHROID) 100 MCG tablet     Sig: Take 1 tablet (100 mcg total) by mouth before breakfast.     Dispense:  90 tablet     Refill:  1     PHYSICAL EXAM  Vitals:    10/04/22 0818   BP: 120/80   BP Location: Right arm   Patient Position: Sitting   BP Method: Large (Manual)   Pulse: (!) 56   Temp: 98.3 °F (36.8 °C)   TempSrc: Tympanic   SpO2: 98%   Weight: 75.9 kg (167 lb 5.3 oz)   Height: 5' 4" (1.626 m)   CONSTITUTIONAL: Vital signs noted. Appears well.  PSYCH: Alert and conversant. Mood is grossly neutral. Affect appropriate.  PULM: Breathing unlabored.  HEART: Regular.     Documentation entered by me for this encounter may have been done in part using speech-recognition technology. Although I have made an effort to ensure accuracy, "sound like" errors may exist and should be interpreted in context.    NEXT VISIT  Review test results; ensure that she has scheduled all her tests and appointments with specialists..    "

## 2022-10-04 NOTE — ASSESSMENT & PLAN NOTE
Lab Results   Component Value Date    CHOL 147 09/02/2022    TRIG 146 09/02/2022    HDL 39 (L) 09/02/2022    LDLCALC 78.8 09/02/2022    NONHDLCHOL 108 09/02/2022    AST 16 09/02/2022    ALT 13 09/02/2022     The 10-year ASCVD risk score (Alber MONTIEL, et al., 2019) is: 17.1%    Values used to calculate the score:      Age: 67 years      Sex: Female      Is Non- : Yes      Diabetic: Yes      Tobacco smoker: No      Systolic Blood Pressure: 120 mmHg      Is BP treated: Yes      HDL Cholesterol: 39 mg/dL      Total Cholesterol: 147 mg/dL

## 2022-10-04 NOTE — LETTER
REFERRAL TO PAIN MANAGEMENT SPECIALIST / PAIN MANAGMENT CLINIC    WHAT IS A PAIN MANAGEMENT SPECIALIST? A PAIN MANAGEMENT SPECIALIST is a doctor who diagnoses and treats chronic pain syndromes.     WHAT IS A PAIN MANAGEMENT CLINIC? A PAIN MANAGEMENT CLINIC is a medical practice (doctors office) that is primarily engaged in the treatment of pain by prescribing medications such as narcotics. Pain management clinics in [place] must be licensed by the [place] Department of Health and [facility].    According to most recent information I have been given from [facility], there are eight licensed Pain Management Clinics in [place]. They are (in alphabetical order):    [facility]  [address] [place] [address]  Phone: [phone]    [facility]  [address] [place] [address] [address], [place] [address]  Phone: [phone]    [facility]  [address] [place] [address], [place] [address]  Phone: [phone]    [facility]  [address], [address], [place] [address]  Phone: [phone]    [facility]  [address], [address], [place] [address]  Phone: [phone]    [facility] [place]  [address], [place] [address]  Phone: [phone]    [facility] [place]  [address] , [address], [place] [address]  Phone: [phone]    [facility] [place]  [address], [address], [place] [address]  Phone: [phone]    NOTE: THE ABOVE INFORMATION MAY NOT BE ACCURATE OR UP TO DATE. YOU SHOULD CONTACT [facility] OR THE INDIVIDUAL OFFICES TO VERIFY THE CORRECT INFORMATION.        PAIN MANAGEMENT SPECIALIST THAT ARE NOT AT [facility] PAIN MANAGEMENT CLINICS INCLUDE:    Dr. Nina Watkins  [facility]  [address]  [place], [place] [id number] [phone]  [email or web address]    [facility]  [address] [place] [address]  [address]  [phone]  [email or web address]    [facility]  [address]  [place] [facility] [address]  [phone]  [email or web address]     [facility]  [address] [place] [address]  (Located inside [facility] off [address])  [phone]  [email or web address]     NOTE: THE ABOVE INFORMATION MAY NOT BE ACCURATE OR UP TO DATE. YOU SHOULD CONTACT THE INDIVIDUAL OFFICES TO VERIFY THE CORRECT INFORMATION.    IMPORTANT INFORMATION:  It is medically necessary for you to see a pain management specialist for proper diagnosis and treatment, because I am unable to meet your pain management needs.    WHAT TO DO NOW:  Call the pain management specialist or pain management clinic of your choice to schedule an appointment as soon as possible.

## 2022-10-04 NOTE — ASSESSMENT & PLAN NOTE
Diabetes Management Status    Statin: Not taking  ACE/ARB: Taking    Screening or Prevention Patient's value Goal Complete/Controlled?   HgA1C Testing and Control   Lab Results   Component Value Date    HGBA1C 6.1 (H) 09/02/2022      Annually/Less than 8% Yes   Lipid profile : 09/02/2022 Annually Yes   LDL control Lab Results   Component Value Date    LDLCALC 78.8 09/02/2022    Annually/Less than 100 mg/dl  Yes   Nephropathy screening Lab Results   Component Value Date    LABMICR <5.0 09/02/2022     Lab Results   Component Value Date    PROTEINUA Negative 11/28/2014    Annually Yes   Blood pressure BP Readings from Last 1 Encounters:   10/04/22 120/80    Less than 140/90 Yes   Dilated retinal exam Most Recent Eye Exam Date: Not Found Annually Yes   Foot exam   : 09/15/2022 Annually Yes     Lab Results   Component Value Date    HGBA1C 6.1 (H) 09/02/2022    EGFRNORACEVR 23.7 (A) 09/02/2022    MICALBCREAT Unable to calculate 09/02/2022    LDLCALC 78.8 09/02/2022     No results found for: GLUTAMICACID, CPEPTIDE   Last 6 Patient Entered Readings                                          Most Recent A1c:     There is no flowsheet data to display.        HEALTH MAINTENANCE: Diabetic health maintenance interventions reviewed and are up to date except for:      Topic    Eye Exam

## 2022-10-04 NOTE — ASSESSMENT & PLAN NOTE
Lab Results   Component Value Date    TSH 2.602 09/02/2022    FREET4 0.97 09/02/2022     No results found for: THYROIDSTIMI, THYROPEROXID, THYGLBTUM, THGABSCRN, THYRGINTERP

## 2022-10-04 NOTE — PATIENT INSTRUCTIONS
Bring all your current prescriptions (pill bottles, creams, inhalers, etc.) to your next appointment so we can check to see that your current medication list is accurate.

## 2022-10-04 NOTE — ASSESSMENT & PLAN NOTE
BP Readings from Last 6 Encounters:   10/04/22 120/80   09/15/22 118/70   09/02/22 104/72   11/28/14 118/82     Lab Results   Component Value Date    EGFRNORACEVR 23.7 (A) 09/02/2022    CREATININE 2.2 (H) 09/02/2022    BUN 29 (H) 09/02/2022    K 4.9 09/02/2022     09/02/2022     09/02/2022     Results for orders placed or performed during the hospital encounter of 09/02/22   SCHEDULED EKG 12-LEAD (to Muse)    Collection Time: 09/02/22  1:06 PM    Narrative    Test Reason : I10    Vent. Rate : 048 BPM     Atrial Rate : 048 BPM     P-R Int : 142 ms          QRS Dur : 080 ms      QT Int : 462 ms       P-R-T Axes : 039 -04 015 degrees     QTc Int : 412 ms    Sinus bradycardia  Otherwise normal ECG  No previous ECGs available  Confirmed by MALLY GOODMAN, SIM TEIXEIRA ([id number]) on 9/2/2022 10:29:09 PM    Referred By: GENARO VILLEDA           Confirmed By:SIM BAL MD

## 2022-10-04 NOTE — ASSESSMENT & PLAN NOTE
Lab Results   Component Value Date    EGFRNORACEVR 23.7 (A) 09/02/2022    CREATININE 2.2 (H) 09/02/2022    CREATININE 1.5 (H) 11/28/2014    BUN 29 (H) 09/02/2022    BUN 17 11/28/2014

## 2022-10-05 ENCOUNTER — OFFICE VISIT (OUTPATIENT)
Dept: OTOLARYNGOLOGY | Facility: CLINIC | Age: 67
End: 2022-10-05
Payer: MEDICARE

## 2022-10-05 VITALS
TEMPERATURE: 97 F | HEART RATE: 62 BPM | WEIGHT: 163.81 LBS | DIASTOLIC BLOOD PRESSURE: 69 MMHG | BODY MASS INDEX: 28.12 KG/M2 | SYSTOLIC BLOOD PRESSURE: 99 MMHG

## 2022-10-05 DIAGNOSIS — R22.1 MASS OF LEFT SIDE OF NECK: Chronic | ICD-10-CM

## 2022-10-05 DIAGNOSIS — K21.9 LARYNGOPHARYNGEAL REFLUX (LPR): Primary | ICD-10-CM

## 2022-10-05 DIAGNOSIS — R49.0 DYSPHONIA: ICD-10-CM

## 2022-10-05 PROCEDURE — 3288F PR FALLS RISK ASSESSMENT DOCUMENTED: ICD-10-PCS | Mod: CPTII,S$GLB,, | Performed by: OTOLARYNGOLOGY

## 2022-10-05 PROCEDURE — 3008F PR BODY MASS INDEX (BMI) DOCUMENTED: ICD-10-PCS | Mod: CPTII,S$GLB,, | Performed by: OTOLARYNGOLOGY

## 2022-10-05 PROCEDURE — 3066F NEPHROPATHY DOC TX: CPT | Mod: CPTII,S$GLB,, | Performed by: OTOLARYNGOLOGY

## 2022-10-05 PROCEDURE — 3044F PR MOST RECENT HEMOGLOBIN A1C LEVEL <7.0%: ICD-10-PCS | Mod: CPTII,S$GLB,, | Performed by: OTOLARYNGOLOGY

## 2022-10-05 PROCEDURE — 99999 PR PBB SHADOW E&M-EST. PATIENT-LVL IV: CPT | Mod: PBBFAC,,, | Performed by: OTOLARYNGOLOGY

## 2022-10-05 PROCEDURE — 1159F MED LIST DOCD IN RCRD: CPT | Mod: CPTII,S$GLB,, | Performed by: OTOLARYNGOLOGY

## 2022-10-05 PROCEDURE — 31575 DIAGNOSTIC LARYNGOSCOPY: CPT | Mod: S$GLB,,, | Performed by: OTOLARYNGOLOGY

## 2022-10-05 PROCEDURE — 99999 PR PBB SHADOW E&M-EST. PATIENT-LVL IV: ICD-10-PCS | Mod: PBBFAC,,, | Performed by: OTOLARYNGOLOGY

## 2022-10-05 PROCEDURE — 1101F PT FALLS ASSESS-DOCD LE1/YR: CPT | Mod: CPTII,S$GLB,, | Performed by: OTOLARYNGOLOGY

## 2022-10-05 PROCEDURE — 1126F PR PAIN SEVERITY QUANTIFIED, NO PAIN PRESENT: ICD-10-PCS | Mod: CPTII,S$GLB,, | Performed by: OTOLARYNGOLOGY

## 2022-10-05 PROCEDURE — 3008F BODY MASS INDEX DOCD: CPT | Mod: CPTII,S$GLB,, | Performed by: OTOLARYNGOLOGY

## 2022-10-05 PROCEDURE — 3074F PR MOST RECENT SYSTOLIC BLOOD PRESSURE < 130 MM HG: ICD-10-PCS | Mod: CPTII,S$GLB,, | Performed by: OTOLARYNGOLOGY

## 2022-10-05 PROCEDURE — 1159F PR MEDICATION LIST DOCUMENTED IN MEDICAL RECORD: ICD-10-PCS | Mod: CPTII,S$GLB,, | Performed by: OTOLARYNGOLOGY

## 2022-10-05 PROCEDURE — 1101F PR PT FALLS ASSESS DOC 0-1 FALLS W/OUT INJ PAST YR: ICD-10-PCS | Mod: CPTII,S$GLB,, | Performed by: OTOLARYNGOLOGY

## 2022-10-05 PROCEDURE — 3074F SYST BP LT 130 MM HG: CPT | Mod: CPTII,S$GLB,, | Performed by: OTOLARYNGOLOGY

## 2022-10-05 PROCEDURE — 1126F AMNT PAIN NOTED NONE PRSNT: CPT | Mod: CPTII,S$GLB,, | Performed by: OTOLARYNGOLOGY

## 2022-10-05 PROCEDURE — 99204 PR OFFICE/OUTPT VISIT, NEW, LEVL IV, 45-59 MIN: ICD-10-PCS | Mod: 25,S$GLB,, | Performed by: OTOLARYNGOLOGY

## 2022-10-05 PROCEDURE — 99204 OFFICE O/P NEW MOD 45 MIN: CPT | Mod: 25,S$GLB,, | Performed by: OTOLARYNGOLOGY

## 2022-10-05 PROCEDURE — 3061F PR NEG MICROALBUMINURIA RESULT DOCUMENTED/REVIEW: ICD-10-PCS | Mod: CPTII,S$GLB,, | Performed by: OTOLARYNGOLOGY

## 2022-10-05 PROCEDURE — 3066F PR DOCUMENTATION OF TREATMENT FOR NEPHROPATHY: ICD-10-PCS | Mod: CPTII,S$GLB,, | Performed by: OTOLARYNGOLOGY

## 2022-10-05 PROCEDURE — 3078F DIAST BP <80 MM HG: CPT | Mod: CPTII,S$GLB,, | Performed by: OTOLARYNGOLOGY

## 2022-10-05 PROCEDURE — 3078F PR MOST RECENT DIASTOLIC BLOOD PRESSURE < 80 MM HG: ICD-10-PCS | Mod: CPTII,S$GLB,, | Performed by: OTOLARYNGOLOGY

## 2022-10-05 PROCEDURE — 3288F FALL RISK ASSESSMENT DOCD: CPT | Mod: CPTII,S$GLB,, | Performed by: OTOLARYNGOLOGY

## 2022-10-05 PROCEDURE — 31575 PR LARYNGOSCOPY, FLEXIBLE; DIAGNOSTIC: ICD-10-PCS | Mod: S$GLB,,, | Performed by: OTOLARYNGOLOGY

## 2022-10-05 PROCEDURE — 3044F HG A1C LEVEL LT 7.0%: CPT | Mod: CPTII,S$GLB,, | Performed by: OTOLARYNGOLOGY

## 2022-10-05 PROCEDURE — 3061F NEG MICROALBUMINURIA REV: CPT | Mod: CPTII,S$GLB,, | Performed by: OTOLARYNGOLOGY

## 2022-10-05 NOTE — Clinical Note
"Hello -  I saw this patient in clinic today.  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  She needs a imaging study with contrast but has some serious reservations about this secondary to a "reaction" that she had during a CT with contrast many years ago at [facility].  It sounds as if it was really just some nausea and vomiting.  We spent quite some time discussing the need for a contrasted imaging study, but I got the impression that she still was not truly open to it.  I told her I would discuss this with you and I also may involve 1 of our head and neck surgeons since they would be doing the excision if she ever decided she wanted that done.  Let me know if you have any ideas/thoughts and I will touch base with Dr. Diego as well.    Thanks.  Ricco"

## 2022-10-05 NOTE — PROGRESS NOTES
Referring Provider:    GENARO May Md  [id number] [facility]  [place]  [place] [address]  Subjective:   Patient: Lizz Crockett [id number], :1955   Visit date:10/5/2022 9:01 AM    Chief Complaint:  neck mass (Pt states she noticed in )    HPI:    Prior notes reviewed by myself.  Clinical documentation obtained by nursing staff reviewed.     66 y/o female here for evaluation of left sided neck mass.  States that she has had a slowly enlarging left-sided neck mass that she 1st noticed in .  She denies any pain, odynophagia, dysphagia, other new lumps or bumps, night sweats, unintentional weight loss.  She does have hoarseness but she feels that her voice is at her usual baseline.  She had an ultrasound which is below.  She has not had any other more detailed imaging or any other evaluation for this.  She has not had a biopsy      Objective:     Physical Exam:  Vitals:  BP 99/69   Pulse 62   Temp 97 °F (36.1 °C) (Temporal)   Wt 74.3 kg (163 lb 12.8 oz)   BMI 28.12 kg/m²   General appearance:  Well developed, well nourished    Ears:  Otoscopy of external auditory canals and tympanic membranes was normal, clinical speech reception thresholds grossly intact, no mass/lesion of auricle.    Nose:  No masses/lesions of external nose, nasal mucosa, septum, and turbinates were within normal limits.    Mouth:  No mass/lesion of lips, teeth, gums, hard/soft palate, tongue, tonsils, or oropharynx.    Neck & Lymphatics:  No cervical lymphadenopathy, 4.5 cm laterally mobile left level II/III neck mass, nonpulsatile, no audible bruit, trachea is midline, no thyroid enlargement/tenderness/mass.        [x]  Data Reviewed:    Lab Results   Component Value Date    WBC 6.72 2014    HGB 14.0 2014    HCT 41.9 2014    MCV 90 2014    EOSINOPHIL 2.1 2014         [x]  Independent interpretation of test:   Contains abnormal data US Soft Tissue Head Neck Thyroid  Order: [id number]  Status: Final  result     Visible to patient: Yes (not seen)     Next appt: 10/06/2022 at 09:45 AM in Pre-Admission Testing (Pre-Admit, Endo -[facility])     Dx: Mass of left side of neck     0 Result Notes  Details    Reading Physician Reading Date Result Priority   Ubaldo Estrada MD  [phone] 9/15/2022 Routine     Narrative & Impression  EXAMINATION:  US SOFT TISSUE HEAD NECK THYROID     CLINICAL HISTORY:  Localized swelling, mass and lump, neck     TECHNIQUE:  Focused soft tissue imaging.     COMPARISON:  None.     FINDINGS:  Imaging at the area palpable concern demonstrates a solid 4.5 x 2.5 x 4.0 cm mass splaying the internal and external carotid arteries.  CT soft tissue neck with/without contrast recommended.  This report was flagged in Epic as abnormal.     Impression:     As above        Electronically signed by: Ubaldo Estrada MD  Date:                                            09/15/2022  Time:                                           09:41           Exam Ended: 09/15/22 09:24 Last Resulted: 09/15/22 09:41       Order Details     View Encounter     Lab and Collection Details     Routing     Result History     View Encounter Conversation           Result Care Coordination      Patient Communication     Add Comments   Add Notifications  Back to Top             US Soft Tissue Head Neck Thyroid: Patient Communication     Add Comments   Add Notifications      External Result Report    External Result Report     Narrative & Impression    EXAMINATION:  US SOFT TISSUE HEAD NECK THYROID     CLINICAL HISTORY:  Localized swelling, mass and lump, neck     TECHNIQUE:  Focused soft tissue imaging.     COMPARISON:  None.     FINDINGS:  Imaging at the area palpable concern demonstrates a solid 4.5 x 2.5 x 4.0 cm mass splaying the internal and external carotid arteries.  CT soft tissue neck with/without contrast recommended.  This report was flagged in Epic as abnormal.     Impression:     As above         Electronically signed by: Ubaldo Estrada MD  Date:                                            09/15/2022  Time:                                           09:41    Encounter    View Encounter               Due to indication in patient's history, presentation or risk factors,  a fiber optic exam was performed.    SEPARATE PROCEDURE NOTE:    ANESTHESIA:  Topical xylocaine with juan-synephrine  FINDINGS:  Moderate to severe inaterarytenoid erythema and edema    PROCEDURE:  After verbal consent was obtained, the flexible scope was passed through the patient's nasal cavity without difficulty.  The nasopharynx (adenoid pad) and eustachian tube orifices were first visualized and were found to be normal, without masses or irregularity.  The posterior pharyngeal wall and base of tongue were then examined and no mass or irregular tissue was seen.  The scope was then advanced to the larynx, and the epiglottis, valleculae, and piriform sinuses were normal, without masses or mucosal irregularity.  The false vocal folds and true vocal folds were then examined and were found to have normal mobility (full abduction and adduction) and no masses or mucosal irregularity was seen.  The interartyenoid area had moderate to severe edema and erythema consistent with reflux.          Assessment & Plan:   Laryngopharyngeal reflux (LPR)    Mass of left side of neck  -     Ambulatory referral/consult to ENT  -     MRI Soft Tissue Neck With Contrast; Future; Expected date: 10/06/2022    Dysphonia      We would a long discussion about her history, exam and ultrasound findings.  I feel that her ultrasound in particular is strongly suspicious for a carotid body tumor.  I explained that she would need more detailed imaging to further evaluate this and that we would likely recommend excision.  She has some concerns about a prior reaction (nausea/vomiting) to contrast and she is extremely reluctant to have any imaging with contrast.  I am going to  reach out to her primary care physician in 1 of our head and neck surgeons to try and determine the best course of action and then we will try and schedule an imaging study for her.  She has findings of uncontrolled reflux on her flexible laryngoscopy, so I will also discuss this with her PCP.

## 2022-10-05 NOTE — Clinical Note
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  She has some concerns about iodinated contrast based on some reaction she had many years ago.  I assume that a CT with contrast would be your go to imaging study for this?  If you think another modality would be preferable or would work, let me know.  DH

## 2022-10-05 NOTE — Clinical Note
She also has findings of uncontrolled reflux on her flexible laryngoscopy.  I think she should probably go up to omeprazole b.i.d. if you feel comfortable doing that with her kidney issues

## 2022-10-06 ENCOUNTER — HOSPITAL ENCOUNTER (OUTPATIENT)
Dept: PREADMISSION TESTING | Facility: HOSPITAL | Age: 67
Discharge: HOME | End: 2022-10-06
Attending: FAMILY MEDICINE
Payer: MEDICARE

## 2022-10-06 ENCOUNTER — TELEPHONE (OUTPATIENT)
Dept: OTOLARYNGOLOGY | Facility: CLINIC | Age: 67
End: 2022-10-06
Payer: MEDICARE

## 2022-10-06 DIAGNOSIS — Z12.12 SCREENING FOR COLORECTAL CANCER: Primary | ICD-10-CM

## 2022-10-06 DIAGNOSIS — Z12.11 SCREENING FOR COLORECTAL CANCER: Primary | ICD-10-CM

## 2022-10-06 RX ORDER — POLYETHYLENE GLYCOL 3350, SODIUM SULFATE ANHYDROUS, SODIUM BICARBONATE, SODIUM CHLORIDE, POTASSIUM CHLORIDE 236; 22.74; 6.74; 5.86; 2.97 G/4L; G/4L; G/4L; G/4L; G/4L
4 POWDER, FOR SOLUTION ORAL ONCE
Qty: 4000 ML | Refills: 0 | Status: SHIPPED | OUTPATIENT
Start: 2022-10-06 | End: 2022-10-06

## 2022-10-06 NOTE — TELEPHONE ENCOUNTER
Spoke to patient, let her know that it was not probable that the numbing spray would not cause her to be sick to her stomach and spitting up mucus since the spray would be completely out of her system. She said she's been taking lorazepam for her nausea. I told her she could go to her PCP if her symptoms persists. I told her I would ask Dr. Gonzalez about prescribing her some zofran. After consulting with Dr. Gonzalez I told hew would not prescribe zofran because he doesn't want to cover up what could be a bigger issue. But once again I told her if her symptoms persisted she could go to her PCP. I proceeded to schedule her an MRI.

## 2022-10-06 NOTE — TELEPHONE ENCOUNTER
----- Message from Colette Adam sent at 10/6/2022 11:17 AM CDT -----  Contact: Lizz Zamudio would like a call back at [phone], Regards to she feel that the numbness medicine that was use for her nose has cause her to be sick to her stomach and having her spitting up mucus.    Thanks  Td       Group Therapy Note    Date: 6/24/2022    Group Start Time: 1000  Group End Time: 9951  Group Topic: Group Therapy    [facility]    MONSE Spring        Group Therapy Note  Pt declined to attend psychotherapy at 1000 am despite encouragement. Pt offered 1:1 and refused.       Attendees: 8/18             Signature:  MONSE Spring

## 2022-10-07 ENCOUNTER — NURSE TRIAGE (OUTPATIENT)
Dept: ADMINISTRATIVE | Facility: CLINIC | Age: 67
End: 2022-10-07
Payer: MEDICARE

## 2022-10-08 NOTE — TELEPHONE ENCOUNTER
Patient states she is having trouble checking her blood sugar with her new glucometer. She denies any symptoms at this time. States she is not on any medication or insulin for diabetes she just checks her bs 3 times a day. She will try to get strips from the drug store for her old glucometer to use through the weekend. Please contact caller directly to discuss glucometer.    Reason for Disposition   [1] Caller requesting NON-URGENT health information AND [2] PCP's office is the best resource    Protocols used: Information Only Call - No Triage-A-

## 2022-10-14 ENCOUNTER — OFFICE VISIT (OUTPATIENT)
Dept: OBSTETRICS AND GYNECOLOGY | Facility: CLINIC | Age: 67
End: 2022-10-14
Payer: MEDICARE

## 2022-10-14 ENCOUNTER — APPOINTMENT (OUTPATIENT)
Dept: RADIOLOGY | Facility: HOSPITAL | Age: 67
End: 2022-10-14
Attending: FAMILY MEDICINE
Payer: MEDICARE

## 2022-10-14 VITALS — WEIGHT: 165.81 LBS | HEIGHT: 64 IN | BODY MASS INDEX: 28.31 KG/M2

## 2022-10-14 DIAGNOSIS — Z78.9 POOR HISTORIAN: ICD-10-CM

## 2022-10-14 DIAGNOSIS — Z78.0 ASYMPTOMATIC MENOPAUSAL STATE: ICD-10-CM

## 2022-10-14 DIAGNOSIS — Z01.419 WELL WOMAN EXAM WITH ROUTINE GYNECOLOGICAL EXAM: Primary | ICD-10-CM

## 2022-10-14 LAB — BCS RECOMMENDATION EXT: NORMAL

## 2022-10-14 PROCEDURE — 1159F MED LIST DOCD IN RCRD: CPT | Mod: CPTII,S$GLB,,

## 2022-10-14 PROCEDURE — 1101F PT FALLS ASSESS-DOCD LE1/YR: CPT | Mod: CPTII,S$GLB,,

## 2022-10-14 PROCEDURE — G0101 PR CA SCREEN;PELVIC/BREAST EXAM: ICD-10-PCS | Mod: GZ,S$GLB,,

## 2022-10-14 PROCEDURE — 3061F PR NEG MICROALBUMINURIA RESULT DOCUMENTED/REVIEW: ICD-10-PCS | Mod: CPTII,S$GLB,,

## 2022-10-14 PROCEDURE — 77080 DEXA BONE DENSITY SPINE HIP: ICD-10-PCS | Mod: 26,,, | Performed by: RADIOLOGY

## 2022-10-14 PROCEDURE — 3066F NEPHROPATHY DOC TX: CPT | Mod: CPTII,S$GLB,,

## 2022-10-14 PROCEDURE — 87625 HPV TYPES 16 & 18 ONLY: CPT

## 2022-10-14 PROCEDURE — 3288F FALL RISK ASSESSMENT DOCD: CPT | Mod: CPTII,S$GLB,,

## 2022-10-14 PROCEDURE — 1125F AMNT PAIN NOTED PAIN PRSNT: CPT | Mod: CPTII,S$GLB,,

## 2022-10-14 PROCEDURE — 1125F PR PAIN SEVERITY QUANTIFIED, PAIN PRESENT: ICD-10-PCS | Mod: CPTII,S$GLB,,

## 2022-10-14 PROCEDURE — 3061F NEG MICROALBUMINURIA REV: CPT | Mod: CPTII,S$GLB,,

## 2022-10-14 PROCEDURE — 88175 CYTOPATH C/V AUTO FLUID REDO: CPT

## 2022-10-14 PROCEDURE — G0101 CA SCREEN;PELVIC/BREAST EXAM: HCPCS | Mod: GZ,S$GLB,,

## 2022-10-14 PROCEDURE — 3288F PR FALLS RISK ASSESSMENT DOCUMENTED: ICD-10-PCS | Mod: CPTII,S$GLB,,

## 2022-10-14 PROCEDURE — 3044F HG A1C LEVEL LT 7.0%: CPT | Mod: CPTII,S$GLB,,

## 2022-10-14 PROCEDURE — 3044F PR MOST RECENT HEMOGLOBIN A1C LEVEL <7.0%: ICD-10-PCS | Mod: CPTII,S$GLB,,

## 2022-10-14 PROCEDURE — 3066F PR DOCUMENTATION OF TREATMENT FOR NEPHROPATHY: ICD-10-PCS | Mod: CPTII,S$GLB,,

## 2022-10-14 PROCEDURE — 99999 PR PBB SHADOW E&M-EST. PATIENT-LVL III: ICD-10-PCS | Mod: PBBFAC,,,

## 2022-10-14 PROCEDURE — 99999 PR PBB SHADOW E&M-EST. PATIENT-LVL III: CPT | Mod: PBBFAC,,,

## 2022-10-14 PROCEDURE — 87624 HPV HI-RISK TYP POOLED RSLT: CPT

## 2022-10-14 PROCEDURE — 77080 DXA BONE DENSITY AXIAL: CPT | Mod: TC

## 2022-10-14 PROCEDURE — 1101F PR PT FALLS ASSESS DOC 0-1 FALLS W/OUT INJ PAST YR: ICD-10-PCS | Mod: CPTII,S$GLB,,

## 2022-10-14 PROCEDURE — 77080 DXA BONE DENSITY AXIAL: CPT | Mod: 26,,, | Performed by: RADIOLOGY

## 2022-10-14 PROCEDURE — 1159F PR MEDICATION LIST DOCUMENTED IN MEDICAL RECORD: ICD-10-PCS | Mod: CPTII,S$GLB,,

## 2022-10-14 RX ORDER — METFORMIN HYDROCHLORIDE 500 MG/1
500 TABLET, EXTENDED RELEASE ORAL DAILY
COMMUNITY
Start: 2022-10-04 | End: 2022-10-24

## 2022-10-14 RX ORDER — NALOXONE HYDROCHLORIDE 4 MG/.1ML
SPRAY NASAL
COMMUNITY
Start: 2022-10-11

## 2022-10-14 RX ORDER — DIAZEPAM 10 MG/1
10 TABLET ORAL 2 TIMES DAILY PRN
COMMUNITY
Start: 2022-09-22 | End: 2022-12-08 | Stop reason: SDUPTHER

## 2022-10-14 RX ORDER — PRAVASTATIN SODIUM 40 MG/1
40 TABLET ORAL NIGHTLY
COMMUNITY
Start: 2022-10-04 | End: 2022-10-24 | Stop reason: ALTCHOICE

## 2022-10-14 NOTE — PROGRESS NOTES
"  Subjective:       Patient ID: Lizz Crockett is a 67 y.o. female.    Chief Complaint:  No chief complaint on file.      History of Present Illness  HPI  Annual Exam-Postmenopausal  GP unknown, patient has at least 2 children per her narrative. Patient presents for annual exam. The patient has no complaints today. The patient is not sexually active. GYN screening history: last pap: approximate date apptox 7 years ago and was abnormal: required biopsy . The patient is not taking hormone replacement therapy. Patient denies post-menopausal vaginal bleeding. The patient wears seatbelts: yes. The patient participates in regular exercise: not asked. Has the patient ever been transfused or tattooed?: no. The patient reports that there is not domestic violence in her life.    Last pap/annual was 7 years ago. Pt had a bad experience with a biopsy and didn't go back, never followed up with results. Asked to sign Medical Release for [facility] but patient left it un-signed in room after visit. For this reason, pap smear was done today to follow up on previous care, even though she is over age 65.    Pt stated that she did not want to see a "funny man" as a dr, elaborating that she meant homosexual. Patient went on to say that she didn't like the experience with her biopsy. Stated that she went to a community care center to pull a bad tooth but she was upset that the facility was multi-speciality. Stated that the patients on the behavior health/addiction side were employees in the dental department and she did not like that.     Patient displays tangential speech and delusions. Does not seem to be a harm to self or others, no reported or documented mental illness, but it made the patient interview challenging.     Patient also states that she has a rash on her pubic mons that bothers her sometimes.     Requested paper order to go have [facility] at [place], Dr May already had on ordered. Printed and given to patient.     GYN & OB " History  No LMP recorded. Patient is postmenopausal.   Date of Last Pap: No result found    OB History   No obstetric history on file.       Review of Systems  Review of Systems   Constitutional:  Negative for activity change, appetite change, chills, fatigue and fever.   HENT:  Negative for nasal congestion and mouth sores.    Respiratory:  Negative for cough, shortness of breath and wheezing.    Cardiovascular:  Negative for chest pain.   Gastrointestinal:  Negative for abdominal pain, constipation, diarrhea, nausea and vomiting.   Endocrine: Negative for hair loss and hot flashes.   Genitourinary:  Negative for bladder incontinence, decreased libido, dysmenorrhea, dyspareunia, dysuria, frequency, genital sores, menorrhagia, menstrual problem, pelvic pain, urgency, vaginal discharge, vaginal pain, urinary incontinence, postcoital bleeding and vaginal odor.   Musculoskeletal:  Negative for back pain.   Integumentary:  Negative for breast mass, nipple discharge, breast skin changes and breast tenderness.   Neurological:  Negative for headaches.   Hematological:  Negative for adenopathy.   Psychiatric/Behavioral:  Negative for depression. The patient is not nervous/anxious.    All other systems reviewed and are negative.  Breast: Negative for breast self exam, lump, mass, mastodynia, nipple discharge, skin changes and tenderness        Objective:    Physical Exam:   Constitutional: She is oriented to person, place, and time. She appears well-developed and well-nourished. No distress.    HENT:   Head: Normocephalic and atraumatic.   Nose: Nose normal.    Eyes: Pupils are equal, round, and reactive to light. Conjunctivae and EOM are normal. Right eye exhibits no discharge. Left eye exhibits no discharge.     Cardiovascular:  Normal rate, regular rhythm and normal heart sounds.      Exam reveals no gallop, no friction rub, no clubbing, no cyanosis and no edema.       No murmur heard.   Pulmonary/Chest: Effort normal and  breath sounds normal. No respiratory distress. She has no decreased breath sounds. She has no wheezes. She has no rhonchi. She has no rales. She exhibits no tenderness. Right breast exhibits no inverted nipple, no mass, no nipple discharge, no skin change, no tenderness, no bleeding and no swelling. Left breast exhibits no inverted nipple, no mass, no nipple discharge, no skin change, no tenderness, no bleeding and no swelling. Breasts are symmetrical.        Abdominal: Soft. Bowel sounds are normal. She exhibits no distension. There is no abdominal tenderness. There is no rebound and no guarding. Hernia confirmed negative in the right inguinal area and confirmed negative in the left inguinal area.     Genitourinary:    Inguinal canal, vagina, uterus, right adnexa and left adnexa normal.   Rectum:      No external hemorrhoid.      Pelvic exam was performed with patient supine.   The external female genitalia was normal.   No external genitalia lesions identified,Genitalia hair distrobution normal .   Labial bartholins normal.There is no rash, tenderness, lesion or injury on the right labia. There is no rash, tenderness, lesion or injury on the left labia. Cervix is normal. Right adnexum displays no mass, no tenderness and no fullness. Left adnexum displays no mass, no tenderness and no fullness. No erythema,  no vaginal discharge, tenderness or bleeding in the vagina.    No foreign body in the vagina.      No signs of injury in the vagina.   Vagina was moist.Vaginal atrophy noted. Cervix exhibits no motion tenderness, no lesion, no discharge, no friability, no lesion, no tenderness and no polyp.    pap smear completedUerus contour normal  Uterus is not enlarged and not tender. Normal urethral meatus.Bladder findings: no bladder distention and no bladder tenderness          Musculoskeletal: Normal range of motion and moves all extremeties.      Lymphadenopathy: No inguinal adenopathy noted on the right or left side.     Neurological: She is alert and oriented to person, place, and time.    Skin: Skin is warm and dry. No rash noted. She is not diaphoretic. No cyanosis or erythema. No pallor. Nails show no clubbing.    Psychiatric: She has a normal mood and affect. Her speech is normal and behavior is normal. Judgment and thought content normal.        Assessment:        1. Well woman exam with routine gynecological exam    2. Poor historian                Plan:      Well woman exam with routine gynecological exam  - Liquid-Based Pap Smear, Screening  - HPV High Risk Genotypes, PCR  -Counseled pt on recommendations for Calcium and Vit D supplementation.  Pt may no longer needs pap smears.  Needs a pelvic exam q 2 years.  Advised on annual mammogram.      Poor historian    Other orders  -     glycerin-mineral oil-lanolin Crea; Apply 1 application topically daily as needed (to soothe itching).  Dispense: 192 g; Refill: 0          Follow up in about 2 years (around 10/14/2024) for Well woman.

## 2022-10-15 PROBLEM — M85.89 OSTEOPENIA OF MULTIPLE SITES: Status: ACTIVE | Noted: 2022-10-15

## 2022-10-15 NOTE — PROGRESS NOTES
Lizz Davidson.    I'm proud of you for getting your bone density test done.    Your bone density test shows that you have osteopenia.    Osteopenia is early thinning of the bones, but not severe enough to be classified as osteoporosis.    Your osteopenia does not require treatment with prescription drugs. The treatment is...    1. Eat healthy.    2. Take calcium and Vitamin D supplements. (Show this message to your pharmacist, and she can help you choose the right supplement.)    3. Minimize alcohol, caffeine, and salt in your diet.    4. Get in the habit of exercising regularly, especially weight-bearing exercises.    5. Don't smoke.    6. Take precautions to lower your risk of falling.    We'll discuss all this further at your next appointment. I look forward to seeing you then.    Sincerely,    GENARO May MD

## 2022-10-18 ENCOUNTER — TELEPHONE (OUTPATIENT)
Dept: INTERNAL MEDICINE | Facility: CLINIC | Age: 67
End: 2022-10-18
Payer: MEDICARE

## 2022-10-18 ENCOUNTER — PATIENT MESSAGE (OUTPATIENT)
Dept: ADMINISTRATIVE | Facility: HOSPITAL | Age: 67
End: 2022-10-18
Payer: MEDICARE

## 2022-10-18 NOTE — TELEPHONE ENCOUNTER
----- Message from Mayelin Adam sent at 10/18/2022 12:34 PM CDT -----  [facility] in regards to getting authorization for CT Soft Tissue Neck WO Contrast switch to there location so that she can get pt schedule. Caller can be reached at  [phone]

## 2022-10-18 NOTE — TELEPHONE ENCOUNTER
----- Message from Janis Coleman sent at 10/18/2022  8:54 AM CDT -----  Contact: [phone]  Patient called, requested a courtesy call from Dr May, in regards all the test that she has have. Please call and advise. Thank you

## 2022-10-18 NOTE — TELEPHONE ENCOUNTER
Spoke with patient and she stated that she was surprised to hear that she had kidney disease when she was here last time. She stated that she was here in the clinic this morning and wanted to see if she could speak with Dr. May regarding this. I informed her that she has an appointment scheduled for 10/24/22 with him and could discuss the issue then. Patient verbalized understanding.

## 2022-10-20 ENCOUNTER — TELEPHONE (OUTPATIENT)
Dept: INTERNAL MEDICINE | Facility: CLINIC | Age: 67
End: 2022-10-20
Payer: MEDICARE

## 2022-10-20 LAB
CLINICAL INFO: NORMAL
CYTO CVX: NORMAL
CYTOLOGIST CVX/VAG CYTO: NORMAL
CYTOLOGIST CVX/VAG CYTO: NORMAL
CYTOLOGY CMNT CVX/VAG CYTO-IMP: NORMAL
CYTOLOGY PAP THIN PREP EXPLANATION: NORMAL
DATE OF PREVIOUS PAP: NORMAL
DATE PREVIOUS BX: NO
GEN CATEG CVX/VAG CYTO-IMP: NORMAL
HPV I/H RISK 4 DNA CVX QL NAA+PROBE: DETECTED
HPV16 DNA CVX QL PROBE+SIG AMP: NORMAL
HPV18 DNA CVX QL PROBE+SIG AMP: NORMAL
LMP START DATE: NORMAL
MICROORGANISM CVX/VAG CYTO: NORMAL
PATHOLOGIST CVX/VAG CYTO: NORMAL
SERVICE CMNT-IMP: NORMAL
SPECIMEN SOURCE CVX/VAG CYTO: NORMAL
STAT OF ADQ CVX/VAG CYTO-IMP: NORMAL

## 2022-10-20 NOTE — TELEPHONE ENCOUNTER
----- Message from Sully Wilson sent at 10/20/2022 11:45 AM CDT -----  Type:  Patient Returning Call    Who Called:patient  Who Left Message for Patient:nurse  Does the patient know what this is regarding?:na  Would the patient rather a call back or a response via [facility]? Call back  Best Call Back Number:[phone]  Additional Information: na

## 2022-10-20 NOTE — TELEPHONE ENCOUNTER
Spoke with patient and she stated that she had missed a call from this office and wanted to know what we called about. I informed her that we didn't call her. She verbalized understanding.

## 2022-10-24 ENCOUNTER — OFFICE VISIT (OUTPATIENT)
Dept: INTERNAL MEDICINE | Facility: CLINIC | Age: 67
End: 2022-10-24
Payer: MEDICARE

## 2022-10-24 ENCOUNTER — OFFICE VISIT (OUTPATIENT)
Dept: PODIATRY | Facility: CLINIC | Age: 67
End: 2022-10-24
Payer: MEDICARE

## 2022-10-24 ENCOUNTER — LAB VISIT (OUTPATIENT)
Dept: LAB | Facility: HOSPITAL | Age: 67
End: 2022-10-24
Attending: FAMILY MEDICINE
Payer: MEDICARE

## 2022-10-24 ENCOUNTER — PATIENT MESSAGE (OUTPATIENT)
Dept: DIABETES | Facility: CLINIC | Age: 67
End: 2022-10-24
Payer: MEDICARE

## 2022-10-24 VITALS
SYSTOLIC BLOOD PRESSURE: 130 MMHG | WEIGHT: 163 LBS | HEART RATE: 62 BPM | BODY MASS INDEX: 27.83 KG/M2 | DIASTOLIC BLOOD PRESSURE: 80 MMHG | HEIGHT: 64 IN

## 2022-10-24 VITALS
HEART RATE: 59 BPM | OXYGEN SATURATION: 96 % | WEIGHT: 163.56 LBS | SYSTOLIC BLOOD PRESSURE: 130 MMHG | TEMPERATURE: 98 F | DIASTOLIC BLOOD PRESSURE: 80 MMHG | BODY MASS INDEX: 27.92 KG/M2 | HEIGHT: 64 IN

## 2022-10-24 DIAGNOSIS — N18.4 STAGE 4 CHRONIC KIDNEY DISEASE: Chronic | ICD-10-CM

## 2022-10-24 DIAGNOSIS — E78.2 MIXED HYPERLIPIDEMIA: Chronic | ICD-10-CM

## 2022-10-24 DIAGNOSIS — E11.22 TYPE 2 DIABETES MELLITUS WITH STAGE 4 CHRONIC KIDNEY DISEASE, WITHOUT LONG-TERM CURRENT USE OF INSULIN: Chronic | ICD-10-CM

## 2022-10-24 DIAGNOSIS — N18.4 TYPE 2 DIABETES MELLITUS WITH STAGE 4 CHRONIC KIDNEY DISEASE, WITHOUT LONG-TERM CURRENT USE OF INSULIN: Chronic | ICD-10-CM

## 2022-10-24 DIAGNOSIS — E11.9 ENCOUNTER FOR COMPREHENSIVE DIABETIC FOOT EXAMINATION, TYPE 2 DIABETES MELLITUS: Primary | ICD-10-CM

## 2022-10-24 DIAGNOSIS — Z23 NEED FOR COVID-19 VACCINE: ICD-10-CM

## 2022-10-24 DIAGNOSIS — Z79.891 CHRONIC USE OF OPIATE FOR THERAPEUTIC PURPOSE: Chronic | ICD-10-CM

## 2022-10-24 DIAGNOSIS — L84 CORN OR CALLUS: ICD-10-CM

## 2022-10-24 DIAGNOSIS — M19.91 PRIMARY LOCALIZED OSTEOARTHROSIS OF MULTIPLE SITES: Chronic | ICD-10-CM

## 2022-10-24 DIAGNOSIS — I10 ESSENTIAL HYPERTENSION: Chronic | ICD-10-CM

## 2022-10-24 DIAGNOSIS — Z01.00 DIABETIC EYE EXAM: ICD-10-CM

## 2022-10-24 DIAGNOSIS — B35.1 DERMATOPHYTOSIS OF NAIL: ICD-10-CM

## 2022-10-24 DIAGNOSIS — R22.1 MASS OF LEFT SIDE OF NECK: Primary | Chronic | ICD-10-CM

## 2022-10-24 DIAGNOSIS — E11.9 DIABETIC EYE EXAM: ICD-10-CM

## 2022-10-24 DIAGNOSIS — E89.0 POST-SURGICAL HYPOTHYROIDISM: Chronic | ICD-10-CM

## 2022-10-24 DIAGNOSIS — Z12.31 BREAST CANCER SCREENING BY MAMMOGRAM: ICD-10-CM

## 2022-10-24 LAB
ALBUMIN SERPL BCP-MCNC: 3.8 G/DL (ref 3.5–5.2)
ANION GAP SERPL CALC-SCNC: 7 MMOL/L (ref 8–16)
BUN SERPL-MCNC: 27 MG/DL (ref 8–23)
CALCIUM SERPL-MCNC: 9.9 MG/DL (ref 8.7–10.5)
CHLORIDE SERPL-SCNC: 105 MMOL/L (ref 95–110)
CO2 SERPL-SCNC: 25 MMOL/L (ref 23–29)
CREAT SERPL-MCNC: 1.9 MG/DL (ref 0.5–1.4)
EST. GFR  (NO RACE VARIABLE): 28.2 ML/MIN/1.73 M^2
GLUCOSE SERPL-MCNC: 100 MG/DL (ref 70–110)
PHOSPHATE SERPL-MCNC: 3.1 MG/DL (ref 2.7–4.5)
POTASSIUM SERPL-SCNC: 4.4 MMOL/L (ref 3.5–5.1)
SODIUM SERPL-SCNC: 137 MMOL/L (ref 136–145)

## 2022-10-24 PROCEDURE — 3075F PR MOST RECENT SYSTOLIC BLOOD PRESS GE 130-139MM HG: ICD-10-PCS | Mod: CPTII,S$GLB,, | Performed by: FAMILY MEDICINE

## 2022-10-24 PROCEDURE — 1159F PR MEDICATION LIST DOCUMENTED IN MEDICAL RECORD: ICD-10-PCS | Mod: CPTII,S$GLB,, | Performed by: PODIATRIST

## 2022-10-24 PROCEDURE — 1160F RVW MEDS BY RX/DR IN RCRD: CPT | Mod: CPTII,S$GLB,, | Performed by: PODIATRIST

## 2022-10-24 PROCEDURE — 3061F PR NEG MICROALBUMINURIA RESULT DOCUMENTED/REVIEW: ICD-10-PCS | Mod: CPTII,S$GLB,, | Performed by: PODIATRIST

## 2022-10-24 PROCEDURE — 1101F PR PT FALLS ASSESS DOC 0-1 FALLS W/OUT INJ PAST YR: ICD-10-PCS | Mod: CPTII,S$GLB,, | Performed by: FAMILY MEDICINE

## 2022-10-24 PROCEDURE — 1159F PR MEDICATION LIST DOCUMENTED IN MEDICAL RECORD: ICD-10-PCS | Mod: CPTII,S$GLB,, | Performed by: FAMILY MEDICINE

## 2022-10-24 PROCEDURE — 3061F PR NEG MICROALBUMINURIA RESULT DOCUMENTED/REVIEW: ICD-10-PCS | Mod: CPTII,S$GLB,, | Performed by: FAMILY MEDICINE

## 2022-10-24 PROCEDURE — 1125F PR PAIN SEVERITY QUANTIFIED, PAIN PRESENT: ICD-10-PCS | Mod: CPTII,S$GLB,, | Performed by: FAMILY MEDICINE

## 2022-10-24 PROCEDURE — 1101F PT FALLS ASSESS-DOCD LE1/YR: CPT | Mod: CPTII,S$GLB,, | Performed by: PODIATRIST

## 2022-10-24 PROCEDURE — 3066F NEPHROPATHY DOC TX: CPT | Mod: CPTII,S$GLB,, | Performed by: FAMILY MEDICINE

## 2022-10-24 PROCEDURE — 1159F MED LIST DOCD IN RCRD: CPT | Mod: CPTII,S$GLB,, | Performed by: PODIATRIST

## 2022-10-24 PROCEDURE — 3061F NEG MICROALBUMINURIA REV: CPT | Mod: CPTII,S$GLB,, | Performed by: FAMILY MEDICINE

## 2022-10-24 PROCEDURE — 99203 OFFICE O/P NEW LOW 30 MIN: CPT | Mod: 25,S$GLB,, | Performed by: PODIATRIST

## 2022-10-24 PROCEDURE — 3075F PR MOST RECENT SYSTOLIC BLOOD PRESS GE 130-139MM HG: ICD-10-PCS | Mod: CPTII,S$GLB,, | Performed by: PODIATRIST

## 2022-10-24 PROCEDURE — 99999 PR PBB SHADOW E&M-EST. PATIENT-LVL IV: ICD-10-PCS | Mod: PBBFAC,,, | Performed by: FAMILY MEDICINE

## 2022-10-24 PROCEDURE — 99999 PR PBB SHADOW E&M-EST. PATIENT-LVL III: ICD-10-PCS | Mod: PBBFAC,,, | Performed by: PODIATRIST

## 2022-10-24 PROCEDURE — 99999 PR PBB SHADOW E&M-EST. PATIENT-LVL IV: CPT | Mod: PBBFAC,,, | Performed by: FAMILY MEDICINE

## 2022-10-24 PROCEDURE — 3288F PR FALLS RISK ASSESSMENT DOCUMENTED: ICD-10-PCS | Mod: CPTII,S$GLB,, | Performed by: PODIATRIST

## 2022-10-24 PROCEDURE — 3079F PR MOST RECENT DIASTOLIC BLOOD PRESSURE 80-89 MM HG: ICD-10-PCS | Mod: CPTII,S$GLB,, | Performed by: FAMILY MEDICINE

## 2022-10-24 PROCEDURE — 3288F PR FALLS RISK ASSESSMENT DOCUMENTED: ICD-10-PCS | Mod: CPTII,S$GLB,, | Performed by: FAMILY MEDICINE

## 2022-10-24 PROCEDURE — 1125F AMNT PAIN NOTED PAIN PRSNT: CPT | Mod: CPTII,S$GLB,, | Performed by: FAMILY MEDICINE

## 2022-10-24 PROCEDURE — 3044F HG A1C LEVEL LT 7.0%: CPT | Mod: CPTII,S$GLB,, | Performed by: PODIATRIST

## 2022-10-24 PROCEDURE — 1101F PR PT FALLS ASSESS DOC 0-1 FALLS W/OUT INJ PAST YR: ICD-10-PCS | Mod: CPTII,S$GLB,, | Performed by: PODIATRIST

## 2022-10-24 PROCEDURE — 3066F NEPHROPATHY DOC TX: CPT | Mod: CPTII,S$GLB,, | Performed by: PODIATRIST

## 2022-10-24 PROCEDURE — 3288F FALL RISK ASSESSMENT DOCD: CPT | Mod: CPTII,S$GLB,, | Performed by: PODIATRIST

## 2022-10-24 PROCEDURE — 3044F HG A1C LEVEL LT 7.0%: CPT | Mod: CPTII,S$GLB,, | Performed by: FAMILY MEDICINE

## 2022-10-24 PROCEDURE — 3066F PR DOCUMENTATION OF TREATMENT FOR NEPHROPATHY: ICD-10-PCS | Mod: CPTII,S$GLB,, | Performed by: PODIATRIST

## 2022-10-24 PROCEDURE — 3044F PR MOST RECENT HEMOGLOBIN A1C LEVEL <7.0%: ICD-10-PCS | Mod: CPTII,S$GLB,, | Performed by: PODIATRIST

## 2022-10-24 PROCEDURE — 1160F PR REVIEW ALL MEDS BY PRESCRIBER/CLIN PHARMACIST DOCUMENTED: ICD-10-PCS | Mod: CPTII,S$GLB,, | Performed by: PODIATRIST

## 2022-10-24 PROCEDURE — 3288F FALL RISK ASSESSMENT DOCD: CPT | Mod: CPTII,S$GLB,, | Performed by: FAMILY MEDICINE

## 2022-10-24 PROCEDURE — 3079F PR MOST RECENT DIASTOLIC BLOOD PRESSURE 80-89 MM HG: ICD-10-PCS | Mod: CPTII,S$GLB,, | Performed by: PODIATRIST

## 2022-10-24 PROCEDURE — 99999 PR PBB SHADOW E&M-EST. PATIENT-LVL III: CPT | Mod: PBBFAC,,, | Performed by: PODIATRIST

## 2022-10-24 PROCEDURE — 3075F SYST BP GE 130 - 139MM HG: CPT | Mod: CPTII,S$GLB,, | Performed by: FAMILY MEDICINE

## 2022-10-24 PROCEDURE — 1101F PT FALLS ASSESS-DOCD LE1/YR: CPT | Mod: CPTII,S$GLB,, | Performed by: FAMILY MEDICINE

## 2022-10-24 PROCEDURE — 99215 PR OFFICE/OUTPT VISIT, EST, LEVL V, 40-54 MIN: ICD-10-PCS | Mod: S$GLB,,, | Performed by: FAMILY MEDICINE

## 2022-10-24 PROCEDURE — 80069 RENAL FUNCTION PANEL: CPT | Performed by: FAMILY MEDICINE

## 2022-10-24 PROCEDURE — 99203 PR OFFICE/OUTPT VISIT, NEW, LEVL III, 30-44 MIN: ICD-10-PCS | Mod: 25,S$GLB,, | Performed by: PODIATRIST

## 2022-10-24 PROCEDURE — 36415 COLL VENOUS BLD VENIPUNCTURE: CPT | Performed by: FAMILY MEDICINE

## 2022-10-24 PROCEDURE — 3079F DIAST BP 80-89 MM HG: CPT | Mod: CPTII,S$GLB,, | Performed by: FAMILY MEDICINE

## 2022-10-24 PROCEDURE — 3066F PR DOCUMENTATION OF TREATMENT FOR NEPHROPATHY: ICD-10-PCS | Mod: CPTII,S$GLB,, | Performed by: FAMILY MEDICINE

## 2022-10-24 PROCEDURE — 1159F MED LIST DOCD IN RCRD: CPT | Mod: CPTII,S$GLB,, | Performed by: FAMILY MEDICINE

## 2022-10-24 PROCEDURE — 3075F SYST BP GE 130 - 139MM HG: CPT | Mod: CPTII,S$GLB,, | Performed by: PODIATRIST

## 2022-10-24 PROCEDURE — 3044F PR MOST RECENT HEMOGLOBIN A1C LEVEL <7.0%: ICD-10-PCS | Mod: CPTII,S$GLB,, | Performed by: FAMILY MEDICINE

## 2022-10-24 PROCEDURE — 3061F NEG MICROALBUMINURIA REV: CPT | Mod: CPTII,S$GLB,, | Performed by: PODIATRIST

## 2022-10-24 PROCEDURE — 3079F DIAST BP 80-89 MM HG: CPT | Mod: CPTII,S$GLB,, | Performed by: PODIATRIST

## 2022-10-24 PROCEDURE — 99215 OFFICE O/P EST HI 40 MIN: CPT | Mod: S$GLB,,, | Performed by: FAMILY MEDICINE

## 2022-10-24 RX ORDER — ATORVASTATIN CALCIUM 20 MG/1
20 TABLET, FILM COATED ORAL NIGHTLY
Qty: 90 TABLET | Refills: 3 | Status: SHIPPED | OUTPATIENT
Start: 2022-10-24 | End: 2023-03-31 | Stop reason: SDUPTHER

## 2022-10-24 RX ORDER — DICLOFENAC SODIUM 10 MG/G
GEL TOPICAL
Qty: 100 G | Refills: 5 | Status: SHIPPED | OUTPATIENT
Start: 2022-10-24 | End: 2022-12-12

## 2022-10-24 NOTE — ASSESSMENT & PLAN NOTE
Lab Results   Component Value Date    EGFRNORACEVR 23.7 (A) 09/02/2022    CREATININE 2.2 (H) 09/02/2022    CREATININE 1.5 (H) 11/28/2014    BUN 29 (H) 09/02/2022    BUN 17 11/28/2014     Future Appointments   Date Time Provider Department Center   10/24/2022  8:30 AM Belen Masterson RD, CDE [facility] DIBEDU [facility]   10/24/2022  9:45 AM Earnestine Lara DPM [facility] POD [facility]   10/25/2022 10:45 AM [facility] US1 [facility] ULSOUND [facility]   11/3/2022  2:00 PM [facility] MRI [facility] MRI [facility]   11/28/2022  8:30 AM Ricco Gonzalez MD [facility] ENT [facility]   12/19/2022 10:00 AM Sherice Meyer NP [facility] GASTRO [facility]   12/21/2022  9:00 AM GENARO May MD [facility] IM [facility]   1/11/2023  9:00 AM GENARO May MD [facility]

## 2022-10-24 NOTE — PATIENT INSTRUCTIONS
I recommend that you get the new bi-valent COVID-19 vaccine booster that protects against the Omicron sub-variants of COVID-19.    People who are fully vaccinated are much better protected from severe illness (hospitalization and death) from COVID-19 than people who are unvaccinated or not fully vaccinated.    You can learn more about the COVID-19 vaccine and booster shot options at [email or web address]://[email or web address].[email or web address]/vaccine-faqs.    The quickest and easiest way to schedule an appointment for your COVID-19 booster vaccination is by using [facility] or by calling [phone].    Please take care of yourself. You are important to me.

## 2022-10-24 NOTE — PROGRESS NOTES
OFFICE VISIT 10/24/22  8:00 AM CDT    CHIEF COMPLAINT: Follow-up    Orders from last visit not yet scheduled:  Ambulatory referral/consult to Nephrology  Ambulatory referral/consult to Ophthalmology (she cancelled)  Mammo Digital Screening Bilat w/ Martinez    Orders from last visit scheduled but not yet done:  US Retroperitoneal (Kidneys) Complete  Ambulatory referral/consult to Endo Procedure (Colonoscopy)  Ambulatory referral/consult to Diabetes Education    PLANS:  Switch from metformin to Jardiance.  Switch from pravastatin to atorvastatin.  Send to lab today for renal panel.  Schedule orders from last visit not scheduled:  ·Ambulatory referral/consult to Nephrology  ·Ambulatory referral/consult to Ophthalmology  ·Mammo Digital Screening Bilat w/ Martinez  Keep 12/21/22 appointment with me.    DIAGNOSES SPECIFICALLY EVALUATED AND TREATED THIS ENCOUNTER  1. Mass of left side of neck    2. Mixed hyperlipidemia  - empagliflozin (JARDIANCE) 10 mg tablet; Take 1 tablet (10 mg total) by mouth once daily.  Dispense: 90 tablet; Refill: 0  - atorvastatin (LIPITOR) 20 MG tablet; Take 1 tablet (20 mg total) by mouth every evening.  Dispense: 90 tablet; Refill: 3    3. Essential hypertension    4. Stage 4 chronic kidney disease  - Renal Function Panel; Future    5. Type 2 diabetes mellitus with stage 4 chronic kidney disease, without long-term current use of insulin  - empagliflozin (JARDIANCE) 10 mg tablet; Take 1 tablet (10 mg total) by mouth once daily.  Dispense: 90 tablet; Refill: 0    6. Need for COVID-19 vaccine    7. Diabetic eye exam    8. Breast cancer screening by mammogram    9. Post-surgical hypothyroidism    10. Primary localized osteoarthrosis of multiple sites  - diclofenac sodium (VOLTAREN) 1 % Gel; Apply small amount (2-4 g max) to painful joint areas four times a day as needed  Dispense: 100 g; Refill: 5    11. Chronic use of opiate for therapeutic purpose         Follow up in about 8 weeks (around 12/21/2022).    Future Appointments   Date Time Provider Department Center   10/24/2022  9:45 AM Earnestine Lara DPM [facility] POD [facility]   10/24/2022 12:05 PM LABORATORY, [facility] LAB [facility]   10/25/2022 10:45 AM HG US1 HG ULSOUND [facility]   11/3/2022  2:00 PM HG MRI [facility] MRI [facility]   11/28/2022  8:30 AM Ricco Gonzalez MD HG ENT [facility]   12/19/2022 10:00 AM Sherice Meyer NP [facility] GASTRO [facility]   12/21/2022  9:00 AM GENARO May MD [facility] IM [facility]   1/11/2023  9:00 AM GENARO May MD [facility]     Problem List Items Addressed This Visit       Chronic use of opiate for therapeutic purpose (Chronic)    Overview     PAIN MANAGEMENT: Shaquille Martinez MD         Current Assessment & Plan     She reports that he is tolerating her current medications well and perceives no significant sedation. She understands that combining prescribed pain medications with other sedating medications carries risk of potentially life-threatening over-sedation, and she certainly should avoid alcohol while on such medications. She says she has naloxone (Narcan).   Review of [place] Board of Pharmacy Controlled Prescription Drug Monitoring database shows the following prescriptions (sorted by date filled).    09/27/2022 - Oxycodone-Acetaminophn7.5-325 Mg, QTY 90 (QS for 30 days), originally written 09/09/2022 by Rasta Arnett.    09/22/2022 - Diazepam 10 Mg Tablet, QTY 60 (QS for 30 days), originally written 06/07/2022 by Er Gre.    08/19/2022 - Diazepam 10 Mg Tablet, QTY 60 (QS for 30 days), originally written 06/07/2022 by Er Gre.    08/08/2022 - Percocet 7.5-325 Mg Tablet, QTY 90 (QS for 30 days), originally written 08/03/2022 by Al Pat.    07/22/2022 - Diazepam 10 Mg Tablet, QTY 60 (QS for 30 days), originally written 06/07/2022 by Er Gre.    07/08/2022 - Percocet 7.5-325 Mg Tablet, QTY 90 (QS for 30 days), originally written 04/29/2022 by Rasta Arnett.         Essential hypertension (Chronic)    Mass of left side  of neck - Primary (Chronic)    Current Assessment & Plan     Clinically stable. ENT notes reviewed.  Future Appointments   Date Time Provider Department Center   11/3/2022  2:00 PM [facility] MRI [facility] MRI [facility]   11/28/2022  8:30 AM Ricco Gonzalez MD [facility] ENT [facility]             Mixed hyperlipidemia (Chronic)    Current Assessment & Plan     Lab Results   Component Value Date    CHOL 147 09/02/2022    TRIG 146 09/02/2022    HDL 39 (L) 09/02/2022    LDLCALC 78.8 09/02/2022    NONHDLCHOL 108 09/02/2022    AST 16 09/02/2022    ALT 13 09/02/2022   The 10-year ASCVD risk score (Alber MONTIEL, et al., 2019) is: 11.9%    Values used to calculate the score:      Age: 67 years      Sex: Female      Is Non- : Yes      Diabetic: Yes      Tobacco smoker: No      Systolic Blood Pressure: 99 mmHg      Is BP treated: Yes      HDL Cholesterol: 39 mg/dL      Total Cholesterol: 147 mg/dL          Relevant Medications    empagliflozin (JARDIANCE) 10 mg tablet    atorvastatin (LIPITOR) 20 MG tablet    Post-surgical hypothyroidism (Chronic)    Current Assessment & Plan     Lab Results   Component Value Date    TSH 2.602 09/02/2022    FREET4 0.97 09/02/2022   No results found for: THYROIDSTIMI, THYROPEROXID, THYGLBTUM, THGABSCRN, THYRGINTERP          Primary localized osteoarthrosis of multiple sites (Chronic)    Relevant Medications    diclofenac sodium (VOLTAREN) 1 % Gel    Stage 4 chronic kidney disease (Chronic)    Current Assessment & Plan     Lab Results   Component Value Date    EGFRNORACEVR 23.7 (A) 09/02/2022    CREATININE 2.2 (H) 09/02/2022    CREATININE 1.5 (H) 11/28/2014    BUN 29 (H) 09/02/2022    BUN 17 11/28/2014     Future Appointments   Date Time Provider Department Center   10/24/2022  8:30 AM Belen Masterson RD, CDE [facility] DIBEDU [facility]   10/24/2022  9:45 AM Earnestine Lara DPM [facility] POD [facility]   10/25/2022 10:45 AM [facility] US1 [facility] ULSOUND [facility]   11/3/2022  2:00 PM [facility] MRI [facility]   11/28/2022  8:30 AM Ricco Gonzalez MD [facility] ENT [facility]   12/19/2022 10:00 AM Sherice Meyer NP [facility] GASTRO [facility]   12/21/2022  9:00 AM GENARO May MD [facility] IM [facility] [place]   1/11/2023  9:00 AM GENARO May MD [facility]              Relevant Orders    Renal Function Panel    Type 2 diabetes mellitus with stage 4 chronic kidney disease, without long-term current use of insulin (Chronic)    Current Assessment & Plan     Diabetes Management Status    Statin: Taking  ACE/ARB: Taking    Screening or Prevention Patient's value Goal Complete/Controlled?   HgA1C Testing and Control   Lab Results   Component Value Date    HGBA1C 6.1 (H) 09/02/2022      Annually/Less than 8% Yes   Lipid profile : 09/02/2022 Annually Yes   LDL control Lab Results   Component Value Date    LDLCALC 78.8 09/02/2022    Annually/Less than 100 mg/dl  Yes   Nephropathy screening Lab Results   Component Value Date    LABMICR <5.0 09/02/2022     Lab Results   Component Value Date    PROTEINUA Negative 11/28/2014    Annually Yes   Blood pressure BP Readings from Last 1 Encounters:   10/05/22 99/69    Less than 140/90 Yes   Dilated retinal exam Most Recent Eye Exam Date: Not Found Annually Yes   Foot exam   : 09/15/2022 Annually Yes     Lab Results   Component Value Date    HGBA1C 6.1 (H) 09/02/2022    EGFRNORACEVR 23.7 (A) 09/02/2022    MICALBCREAT Unable to calculate 09/02/2022    LDLCALC 78.8 09/02/2022   No results found for: GLUTAMICACID, CPEPTIDE   Last 6 Patient Entered Readings                                          Most Recent A1c:        Recent Readings 10/22/2022 10/22/2022 10/21/2022 10/20/2022 10/13/2022    Blood Glucose (mg/dL) 174 185 137 181 242      HEALTH MAINTENANCE: Diabetic health maintenance interventions reviewed and are up to date except for:      Topic    Eye Exam              Relevant Medications    empagliflozin (JARDIANCE) 10 mg tablet     Other Visit Diagnoses       Need for COVID-19  vaccine        Diabetic eye exam        Breast cancer screening by mammogram            Unless noted herein, any chronic conditions are represented as and appear compensated/controlled and stable, and no other significant complaints or concerns were reported.    PRESCRIPTION DRUG MANAGEMENT  Outpatient Medications Prior to Visit   Medication Sig Dispense Refill    amlodipine (NORVASC) 10 MG tablet Take 10 mg by mouth once daily.      diazePAM (VALIUM) 10 MG Tab Take 10 mg by mouth 2 (two) times daily as needed.      glycerin-mineral oil-lanolin Crea Apply 1 application topically daily as needed (to soothe itching). 192 g 0    levothyroxine (SYNTHROID) 100 MCG tablet Take 1 tablet (100 mcg total) by mouth before breakfast. 90 tablet 1    losartan-hydrochlorothiazide 100-12.5 mg (HYZAAR) 100-12.5 mg Tab losartan 100 mg-hydrochlorothiazide 12.5 mg tablet   TAKE 1 TABLET BY MOUTH ONCE A DAY      naloxone (NARCAN) 4 mg/actuation Spry       omeprazole (PRILOSEC) 40 MG capsule Take 40 mg by mouth once daily.      oxyCODONE-acetaminophen (PERCOCET) 7.5-325 mg per tablet Take 1 tablet by mouth every 8 hours as needed for pain 90 tablet 0    varicella-zoster gE-AS01B, PF, (SHINGRIX) 50 mcg/0.5 mL injection Inject 0.5 mL (one dose) into muscle now; schedule second dose  days later 1 each 1    diazepam (VALIUM) 5 MG tablet Take 10 mg by mouth every 6 (six) hours as needed for Anxiety.      metFORMIN (GLUCOPHAGE-XR) 500 MG ER 24hr tablet Take 500 mg by mouth once daily.      pravastatin (PRAVACHOL) 40 MG tablet Take 40 mg by mouth every evening.      promethazine (PHENERGAN) 25 MG tablet Take 25 mg by mouth every 6 (six) hours as needed for Nausea.       No facility-administered medications prior to visit.     Medications Discontinued During This Encounter   Medication Reason    metFORMIN (GLUCOPHAGE-XR) 500 MG ER 24hr tablet Other - Commment Required    pravastatin (PRAVACHOL) 40 MG tablet Alternate therapy    diazepam  "(VALIUM) 5 MG tablet Patient no longer taking    promethazine (PHENERGAN) 25 MG tablet Patient no longer taking     Medications Ordered This Encounter   Medications    atorvastatin (LIPITOR) 20 MG tablet     Sig: Take 1 tablet (20 mg total) by mouth every evening.     Dispense:  90 tablet     Refill:  3     This REPLACES Pravastatin. DISCONTINUE any existing prescription for Pravastatin.    diclofenac sodium (VOLTAREN) 1 % Gel     Sig: Apply small amount (2-4 g max) to painful joint areas four times a day as needed     Dispense:  100 g     Refill:  5    empagliflozin (JARDIANCE) 10 mg tablet     Sig: Take 1 tablet (10 mg total) by mouth once daily.     Dispense:  90 tablet     Refill:  0     PHYSICAL EXAM  Vitals:    10/24/22 0829   BP: 130/80   BP Location: Left arm   Patient Position: Sitting   BP Method: Large (Manual)   Pulse: (!) 59   Temp: 97.5 °F (36.4 °C)   TempSrc: Tympanic   SpO2: 96%   Weight: 74.2 kg (163 lb 9.3 oz)   Height: 5' 4" (1.626 m)   CONSTITUTIONAL: Vital signs noted. Appears well.  PSYCH: Alert and conversant. Mood is grossly neutral. Affect appropriate.  PULM: Breathing unlabored.  HEART: Regular.     Orders Placed This Encounter    Renal Function Panel    empagliflozin (JARDIANCE) 10 mg tablet    atorvastatin (LIPITOR) 20 MG tablet    diclofenac sodium (VOLTAREN) 1 % Gel     TOTAL TIME evaluating and managing this patient for this encounter was greater than or equal to 43 minutes. This time was spent personally by me on the following activities: review of nurse's notes, pre-charting, review of patient's past medical history, assessing age-appropriate health maintenance needs, review of any interval history, medication reconciliation, reconciling/updating problem list, obtaining history from the patient, examination of the patient, review and interpretation of lab results, answering patient's questions about lab results, review and interpretation of imaging test results, answering patient's " "questions about imaging test results, reviewing consulting specialist notes, review of procedure reports, evaluation of the patient's response to treatment, review of [place] [facility] Controlled Prescription Drug Monitoring database records for the patient, managing and/or ordering prescription medications, explaining rationale for medication change(s) and answering patient's questions, ordering labs, ordering imaging tests, ordering referral to subspecialty provider(s), discussing differential diagnosis with patient, educating patient and answering their questions about treatment plan, goals of treatment, and follow-up, educating patient about needed immunizations, educating patient about needed cancer screenings, counseling on appropriate therapeutic lifestyle changes, and final documentation of the visit. This time was exclusive of any separately billable procedures for this patient and exclusive of time spent treating any other patients.   Documentation entered by me for this encounter may have been done in part using speech-recognition technology. Although I have made an effort to ensure accuracy, "sound like" errors may exist and should be interpreted in context.  "

## 2022-10-24 NOTE — ASSESSMENT & PLAN NOTE
Clinically stable. ENT notes reviewed.  Future Appointments   Date Time Provider Department Center   11/3/2022  2:00 PM [facility] MRI [facility]   11/28/2022  8:30 AM Ricco Gonzalez MD [facility] ENT [facility]

## 2022-10-24 NOTE — ASSESSMENT & PLAN NOTE
Lab Results   Component Value Date    CHOL 147 09/02/2022    TRIG 146 09/02/2022    HDL 39 (L) 09/02/2022    LDLCALC 78.8 09/02/2022    NONHDLCHOL 108 09/02/2022    AST 16 09/02/2022    ALT 13 09/02/2022     The 10-year ASCVD risk score (Alber MONTIEL, et al., 2019) is: 11.9%    Values used to calculate the score:      Age: 67 years      Sex: Female      Is Non- : Yes      Diabetic: Yes      Tobacco smoker: No      Systolic Blood Pressure: 99 mmHg      Is BP treated: Yes      HDL Cholesterol: 39 mg/dL      Total Cholesterol: 147 mg/dL

## 2022-10-24 NOTE — PROGRESS NOTES
Reviewed imaging again with pt and personally reviewed medical records, imaging, and office visits. Discussed with pt and all questions/concerns addressed. At this time completed PT. Reviewed pain management appt from [facility] back from 4/2019 and no specific recommendations made. Pt feels like at this time she is used to the pain and will deal with it and does not want to f/u with pain management further at this time. Completed PT this past month with some improvement, but due to having ablation surgery planned for the end of the month, she would like to wait before ordering further PT. Declines aqua therapy because she initially tried it and did not notice any improvement. Doing well on the medications of tizanidine, gabapentin, and ibuprofen 800mg and denies need for changes at this time. Refills of medication given.    Subjective:     Patient ID: Lizz Crockett is a 67 y.o. female.    Chief Complaint: Nail Care (C/o thick and discolored nails, Diabetic Pt, wears tennis shoes with socks, last seen on 10/24/22 with Dr. May)    Lizz is a 67 y.o. female who presents to the clinic for evaluation and treatment of high risk feet. Lizz has a past medical history of Allergy, Anxiety, Arthritis, Cataract, Diabetes mellitus, type 2, GERD (gastroesophageal reflux disease), Glaucoma, Hyperlipidemia, Hypertension, Sarcoidosis, unspecified, Thyroid disease, and Type 2 diabetes mellitus with stage 4 chronic kidney disease, without long-term current use of insulin (9/2/2022). The patient's chief complaint is long, thick toenails. This patient has documented high risk feet requiring routine maintenance secondary to diabetes mellitis and those secondary complications of diabetes, as mentioned..    PCP: KARAN May MD    Date Last Seen by PCP: 10/24/2022    Current shoe gear:  Affected Foot: Tennis shoes     Unaffected Foot: Tennis shoes    Hemoglobin A1C   Date Value Ref Range Status   09/02/2022 6.1 (H) 4.0 - 5.6 % Final     Comment:     ADA Screening Guidelines:  5.7-6.4%  Consistent with prediabetes  >or=6.5%  Consistent with diabetes    High levels of fetal hemoglobin interfere with the HbA1C  assay. Heterozygous hemoglobin variants (HbS, HgC, etc)do  not significantly interfere with this assay.   However, presence of multiple variants may affect accuracy.         Patient Active Problem List   Diagnosis    Post-surgical hypothyroidism    Mixed hyperlipidemia    Glaucoma    Gastroesophageal reflux disease without esophagitis    Asymptomatic menopausal state    Essential hypertension    Type 2 diabetes mellitus with stage 4 chronic kidney disease, without long-term current use of insulin    Mass of left side of neck    At risk for knowledge deficit    Stage 4 chronic kidney disease    Onychomadesis of toenail    Screening for  malignant neoplasm of colon    Chronic use of opiate for therapeutic purpose    Osteopenia of multiple sites    Primary localized osteoarthrosis of multiple sites    Fatty liver       Medication List with Changes/Refills   Current Medications    AMLODIPINE (NORVASC) 10 MG TABLET    Take 10 mg by mouth once daily.    ATORVASTATIN (LIPITOR) 20 MG TABLET    Take 1 tablet (20 mg total) by mouth every evening.    DIAZEPAM (VALIUM) 10 MG TAB    Take 10 mg by mouth 2 (two) times daily as needed.    DICLOFENAC SODIUM (VOLTAREN) 1 % GEL    Apply small amount (2-4 g max) to painful joint areas four times a day as needed    EMPAGLIFLOZIN (JARDIANCE) 10 MG TABLET    Take 1 tablet (10 mg total) by mouth once daily.    GLYCERIN-MINERAL OIL-LANOLIN CREA    Apply 1 application topically daily as needed (to soothe itching).    LEVOTHYROXINE (SYNTHROID) 100 MCG TABLET    Take 1 tablet (100 mcg total) by mouth before breakfast.    LOSARTAN-HYDROCHLOROTHIAZIDE 100-12.5 MG (HYZAAR) 100-12.5 MG TAB    losartan 100 mg-hydrochlorothiazide 12.5 mg tablet   TAKE 1 TABLET BY MOUTH ONCE A DAY    NALOXONE (NARCAN) 4 MG/ACTUATION SPRY        OMEPRAZOLE (PRILOSEC) 40 MG CAPSULE    Take 40 mg by mouth once daily.    OXYCODONE-ACETAMINOPHEN (PERCOCET) 7.5-325 MG PER TABLET    Take 1 tablet by mouth every 8 hours as needed for pain    VARICELLA-ZOSTER GE-AS01B, PF, (SHINGRIX) 50 MCG/0.5 ML INJECTION    Inject 0.5 mL (one dose) into muscle now; schedule second dose  days later       Review of patient's allergies indicates:   Allergen Reactions    Iodine     Ivp dye [iodinated contrast media]     Prednisone     Codeine Anxiety       Past Surgical History:   Procedure Laterality Date    BREAST SURGERY      CATARACT EXTRACTION, BILATERAL Bilateral     THYROIDECTOMY      for multinodular goiter    TUBAL LIGATION         Family History   Problem Relation Age of Onset    Heart disease Mother     Hypertension Mother     Hypertension Father     Heart  "disease Father        Social History     Socioeconomic History    Marital status: Single   Tobacco Use    Smoking status: Former     Types: Cigarettes    Smokeless tobacco: Never   Substance and Sexual Activity    Alcohol use: No    Drug use: No    Sexual activity: Yes       Vitals:    10/24/22 0949   BP: 130/80   Pulse: 62   Weight: 73.9 kg (163 lb)   Height: 5' 4" (1.626 m)       Hemoglobin A1C   Date Value Ref Range Status   09/02/2022 6.1 (H) 4.0 - 5.6 % Final     Comment:     ADA Screening Guidelines:  5.7-6.4%  Consistent with prediabetes  >or=6.5%  Consistent with diabetes    High levels of fetal hemoglobin interfere with the HbA1C  assay. Heterozygous hemoglobin variants (HbS, HgC, etc)do  not significantly interfere with this assay.   However, presence of multiple variants may affect accuracy.         Review of Systems   Constitutional:  Negative for chills and fever.   Respiratory:  Negative for shortness of breath.    Cardiovascular:  Negative for chest pain, palpitations, orthopnea, claudication and leg swelling.   Gastrointestinal:  Negative for diarrhea, nausea and vomiting.   Musculoskeletal:  Negative for joint pain.   Skin:  Negative for rash.   Neurological:  Negative for dizziness, tingling, sensory change, focal weakness and weakness.   Psychiatric/Behavioral: Negative.             Objective:      PHYSICAL EXAM: Apperance: Alert and orient in no distress,well developed, and with good attention to grooming and body habits  Patient presents ambulating in tennis shoes.   LOWER EXTREMITY EXAM:  VASCULAR: Dorsalis pedis pulses 2/4 bilateral and Posterior Tibial pulses 2/4 bilateral. Capillary fill time <4 seconds bilateral. No edema observed bilateral. Varicosities absent bilateral. Skin temperature of the lower extremities is warm to warm, proximal to distal. Hair growth dim bilateral.  DERMATOLOGICAL: No skin rashes, subcutaneous nodules, lesions, or ulcers observed bilateral. Nails 1,2,3,4,5 left " and 2,3,4,5 right normal length and thickness. Right hallux nail at the distal edge elongated, thickened, and discolored with subungual debris. Webspaces 1,2,3,4 bilateral clean, dry and without evidence of break in skin integrity. Mild hyperkeratoti tissue noted to right plantar 5th submetatarsal.   NEUROLOGICAL: Light touch, sharp-dull, proprioception all present and equal bilaterally.  Vibratory sensation intact at bilateral hallux. Protective sensation intact at all 10 sites as tested with a Nutrioso-Tony 5.07 monofilament.   MUSCULOSKELETAL: Muscle strength is 5/5 for foot inverters, everters, plantarflexors, and dorsiflexors. Muscle tone is normal.         Assessment:       ICD-10-CM ICD-9-CM   1. Encounter for comprehensive diabetic foot examination, type 2 diabetes mellitus  E11.9 250.00   2. Corn or callus - Right Foot  L84 700   3. Dermatophytosis of nail - Right Foot  B35.1 110.1       Plan:   Encounter for comprehensive diabetic foot examination, type 2 diabetes mellitus    Corn or callus - Right Foot    Dermatophytosis of nail - Right Foot    I counseled the patient on her conditions, regarding findings of my examination, my impressions, and usual treatment plan.   Greater than 50% of this visit spent on counseling and coordination of care.  Greater than 12 minutes of a 15 minute appointment spent on education about the diabetic foot, neuropathy, and prevention of limb loss.  Shoe inspection. Diabetic Foot Education. Patient reminded of the importance of good nutrition and blood sugar control to help prevent podiatric complications of diabetes. Patient instructed on proper foot hygeine. We discussed wearing proper shoe gear, daily foot inspections, never walking without protective shoe gear, never putting sharp instruments to feet.    Patient  will continue to monitor the areas daily, inspect feet, wear protective shoe gear when ambulatory, moisturizer to maintain skin integrity. Patient reminded of  the importance of good nutrition and blood sugar control to help prevent podiatric complications of diabetes.  Patient to return 6 months or sooner if needed.        Earnestine Lara DPM  [facility]

## 2022-10-24 NOTE — ASSESSMENT & PLAN NOTE
She reports that he is tolerating her current medications well and perceives no significant sedation. She understands that combining prescribed pain medications with other sedating medications carries risk of potentially life-threatening over-sedation, and she certainly should avoid alcohol while on such medications. She says she has naloxone (Narcan).   Review of [place] Board  Pharmacy Controlled Prescription Drug Monitoring database shows the following prescriptions (sorted by date filled).    09/27/2022 - Oxycodone-Acetaminophn7.5-325 Mg, QTY 90 (QS for 30 days), originally written 09/09/2022 by Rasta Arnett.    09/22/2022 - Diazepam 10 Mg Tablet, QTY 60 (QS for 30 days), originally written 06/07/2022 by Er Gre.    08/19/2022 - Diazepam 10 Mg Tablet, QTY 60 (QS for 30 days), originally written 06/07/2022 by Er Gre.    08/08/2022 - Percocet 7.5-325 Mg Tablet, QTY 90 (QS for 30 days), originally written 08/03/2022 by Rasta Arnett.    07/22/2022 - Diazepam 10 Mg Tablet, QTY 60 (QS for 30 days), originally written 06/07/2022 by Er Gre.    07/08/2022 - Percocet 7.5-325 Mg Tablet, QTY 90 (QS for 30 days), originally written 04/29/2022 by Rasta Arnett.

## 2022-10-24 NOTE — LETTER
ATTN: CARE COORDINATION   PATIENT IDENTIFYING INFORMATION:  TED LOCO NATACHATRICIA Lindquist [address] [place] [address] YOB: 1955  OUR MRN: [id number]   Home Phone [phone]   Work Phone Not on file.   Mobile [phone]   Home Phone [phone]        RECORDS REQUESTED FROM:   NAME OF HOSPITAL PHYSICIAN OR OTHER ENTITY    Austin House MD  [facility]   ADDRESS PHONE         SPECIFIC RECORDS REQUESTED:  mammograms and other breast imaging reports within the last 3 years    DATES OF SERVICE REQUESTED: 5 years prior to date signed through the present date (unless specified differently above)    AUTHORIZATION:  For the purpose of continuity of care, I, Ted ADAN Natacha, hereby authorize the hospital, physician, or entity named above to release my medical records for the dates of service specified above to: GENARO May MD; [facility]; [address] [facility]; [place] [id number]; PH: [phone]    REQUESTED METHOD OF DELIVERY: Fax ([phone]) or secure Email ([email or web address]    In authorizing the release of the confidential information identified above, I hereby waive all restrictions or privileges imposed by law and release [facility] and Its affiliates and their staff from any restriction or privilege Imposed by law in connection with the disclosure or release of any professional record, observation or communication. I do understand that the information that is being released may be subject to re-disclosure by the recipient and may no longer be protected. I understand that my treatment, payment, enrollment or eligibility for benefits may not be conditioned on signing this authorization.  This authorization may be revoked in writing at any time, except to the extent that [facility] and its affiliates have already taken action in reliance on it. Letters to revoke this authorization should be addressed to [facility], Release of Information Department, [address] [place] [id number].     If not previously revoked in writing, this authorization will terminate or  36 months after the date signed below.                   Signature of Patient    Date Signed

## 2022-10-24 NOTE — ASSESSMENT & PLAN NOTE
Diabetes Management Status    Statin: Taking  ACE/ARB: Taking    Screening or Prevention Patient's value Goal Complete/Controlled?   HgA1C Testing and Control   Lab Results   Component Value Date    HGBA1C 6.1 (H) 09/02/2022      Annually/Less than 8% Yes   Lipid profile : 09/02/2022 Annually Yes   LDL control Lab Results   Component Value Date    LDLCALC 78.8 09/02/2022    Annually/Less than 100 mg/dl  Yes   Nephropathy screening Lab Results   Component Value Date    LABMICR <5.0 09/02/2022     Lab Results   Component Value Date    PROTEINUA Negative 11/28/2014    Annually Yes   Blood pressure BP Readings from Last 1 Encounters:   10/05/22 99/69    Less than 140/90 Yes   Dilated retinal exam Most Recent Eye Exam Date: Not Found Annually Yes   Foot exam   : 09/15/2022 Annually Yes     Lab Results   Component Value Date    HGBA1C 6.1 (H) 09/02/2022    EGFRNORACEVR 23.7 (A) 09/02/2022    MICALBCREAT Unable to calculate 09/02/2022    LDLCALC 78.8 09/02/2022     No results found for: GLUTAMICACID, CPEPTIDE   Last 6 Patient Entered Readings                                          Most Recent A1c:      Recent Readings 10/22/2022 10/22/2022 10/21/2022 10/20/2022 10/13/2022    Blood Glucose (mg/dL) 174 185 137 181 242         HEALTH MAINTENANCE: Diabetic health maintenance interventions reviewed and are up to date except for:      Topic    Eye Exam

## 2022-10-25 ENCOUNTER — HOSPITAL ENCOUNTER (OUTPATIENT)
Dept: RADIOLOGY | Facility: HOSPITAL | Age: 67
Discharge: HOME | End: 2022-10-25
Attending: FAMILY MEDICINE
Payer: MEDICARE

## 2022-10-25 DIAGNOSIS — N18.4 STAGE 4 CHRONIC KIDNEY DISEASE: ICD-10-CM

## 2022-10-25 PROCEDURE — 76770 US EXAM ABDO BACK WALL COMP: CPT | Mod: TC

## 2022-10-25 PROCEDURE — 76770 US EXAM ABDO BACK WALL COMP: CPT | Mod: 26,,, | Performed by: RADIOLOGY

## 2022-10-25 PROCEDURE — 76770 US RETROPERITONEAL COMPLETE: ICD-10-PCS | Mod: 26,,, | Performed by: RADIOLOGY

## 2022-10-26 ENCOUNTER — TELEPHONE (OUTPATIENT)
Dept: OTOLARYNGOLOGY | Facility: CLINIC | Age: 67
End: 2022-10-26
Payer: MEDICARE

## 2022-10-26 DIAGNOSIS — R22.1 MASS OF LEFT SIDE OF NECK: Primary | ICD-10-CM

## 2022-10-26 NOTE — TELEPHONE ENCOUNTER
----- Message from RT Ashvin sent at 10/26/2022 10:16 AM CDT -----  Regarding: ASAP Creatinine  Please order and schedule ASAP Creat one hour prior to her MRI on 11/03 at 2 pm.    Thank you

## 2022-10-30 ENCOUNTER — PATIENT MESSAGE (OUTPATIENT)
Dept: INTERNAL MEDICINE | Facility: CLINIC | Age: 67
End: 2022-10-30
Payer: MEDICARE

## 2022-10-30 DIAGNOSIS — K76.0 FATTY LIVER: Primary | ICD-10-CM

## 2022-10-30 NOTE — PROGRESS NOTES
Lizz Davidson.    I'm happy to report that your kidney ultrasound did not show problems with your kidneys.    However, it did show evidence of fatty liver. This is a medical condition that is characterized by the buildup of fat (called fatty infiltration) in the liver. This can be caused by metabolic problems or excessive drinking of alcohol. I recommend you learn more at the website of the [employer].    https://liverfoundation.org/for-patients/about-the-liver/diseases-of-the-liver/non-alcoholic-fatty-liver-disease    To evaluate this problem, I've ordered an ultrasound of your liver. Someone from [facility] should be contacting you soon to help schedule your appointment. If you haven't been contacted within the next 3-4 business days, call [facility] [place] appointment desk ([phone]).     Thanks for letting me care for you, and thanks for trusting [facility] with your healthcare needs.    Sincerely,    GENARO May MD

## 2022-10-31 ENCOUNTER — PATIENT MESSAGE (OUTPATIENT)
Dept: OBSTETRICS AND GYNECOLOGY | Facility: CLINIC | Age: 67
End: 2022-10-31
Payer: MEDICARE

## 2022-10-31 ENCOUNTER — PATIENT MESSAGE (OUTPATIENT)
Dept: INTERNAL MEDICINE | Facility: CLINIC | Age: 67
End: 2022-10-31
Payer: MEDICARE

## 2022-11-03 ENCOUNTER — HOSPITAL ENCOUNTER (OUTPATIENT)
Dept: RADIOLOGY | Facility: HOSPITAL | Age: 67
Discharge: HOME | End: 2022-11-03
Attending: OTOLARYNGOLOGY
Payer: MEDICARE

## 2022-11-03 ENCOUNTER — TELEPHONE (OUTPATIENT)
Dept: INTERNAL MEDICINE | Facility: CLINIC | Age: 67
End: 2022-11-03

## 2022-11-03 DIAGNOSIS — R22.1 MASS OF LEFT SIDE OF NECK: ICD-10-CM

## 2022-11-03 PROCEDURE — 70540 MRI SOFT TISSUE NECK WITHOUT CONTRAST: ICD-10-PCS | Mod: 26,,, | Performed by: RADIOLOGY

## 2022-11-03 PROCEDURE — 70540 MRI ORBIT/FACE/NECK W/O DYE: CPT | Mod: 26,,, | Performed by: RADIOLOGY

## 2022-11-03 PROCEDURE — 70540 MRI ORBIT/FACE/NECK W/O DYE: CPT | Mod: TC

## 2022-11-03 NOTE — TELEPHONE ENCOUNTER
I CC'd you on her outside CT Neck (uploaded to chart media).  This is just FYI only. No action required unless you need to contact the patient.    Future Appointments   Date Time Provider Department Center   11/28/2022  8:30 AM Ricco Gonzalez MD [facility] ENT [facility]

## 2022-11-03 NOTE — TELEPHONE ENCOUNTER
Hello!  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  I am going to try and arrange for her to see one of our head and neck surgeons that deal with this type of issue.  I have Cc'd our nurses so that they can work on this.  Thank you!

## 2022-11-04 ENCOUNTER — TELEPHONE (OUTPATIENT)
Dept: OTOLARYNGOLOGY | Facility: CLINIC | Age: 67
End: 2022-11-04
Payer: MEDICARE

## 2022-11-04 NOTE — PROGRESS NOTES
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

## 2022-11-05 ENCOUNTER — PATIENT MESSAGE (OUTPATIENT)
Dept: INTERNAL MEDICINE | Facility: CLINIC | Age: 67
End: 2022-11-05
Payer: MEDICARE

## 2022-11-07 ENCOUNTER — PATIENT OUTREACH (OUTPATIENT)
Dept: ADMINISTRATIVE | Facility: HOSPITAL | Age: 67
End: 2022-11-07
Payer: MEDICARE

## 2022-11-07 ENCOUNTER — CLINICAL SUPPORT (OUTPATIENT)
Dept: DIABETES | Facility: CLINIC | Age: 67
End: 2022-11-07
Payer: MEDICARE

## 2022-11-07 DIAGNOSIS — E11.65 TYPE 2 DIABETES MELLITUS WITH HYPERGLYCEMIA, WITHOUT LONG-TERM CURRENT USE OF INSULIN: Primary | ICD-10-CM

## 2022-11-07 PROCEDURE — 99999 PR PBB SHADOW E&M-EST. PATIENT-LVL II: CPT | Mod: PBBFAC,,, | Performed by: DIETITIAN, REGISTERED

## 2022-11-07 PROCEDURE — G0108 DIAB MANAGE TRN  PER INDIV: HCPCS | Mod: S$GLB,,, | Performed by: DIETITIAN, REGISTERED

## 2022-11-07 PROCEDURE — 99999 PR PBB SHADOW E&M-EST. PATIENT-LVL II: ICD-10-PCS | Mod: PBBFAC,,, | Performed by: DIETITIAN, REGISTERED

## 2022-11-07 PROCEDURE — G0108 PR DIAB MANAGE TRN  PER INDIV: ICD-10-PCS | Mod: S$GLB,,, | Performed by: DIETITIAN, REGISTERED

## 2022-11-07 NOTE — PROGRESS NOTES
Diabetes Care Specialist Follow-up Note  Author: Belen Masterson RD, CDE  Date: 11/7/2022    Program Intake  Reason for Diabetes Program Visit:: Initial Diabetes Assessment  Current diabetes risk level:: low  In the last 12 months, have you:: used emergency room services  Was the ER or hospital admission related to diabetes?: No  Permission to speak with others about care:: no    Patient was diagnosed with Diabetes about 1.5 years ago. She has never had diabetes education in the past.     Lab Results   Component Value Date    HGBA1C 6.1 (H) 11/03/2022         Clinical    Patient Health Rating  Compared to other people your age, how would you rate your health?: Good    Problem Review  Reviewed Problem List with Patient: yes  Active comorbidities affecting diabetes self-care.: yes  Comorbidities: Chronic Kidney Disease, Hypertension, Other (comment) (back pain)  Reviewed health maintenance: yes    Clinical Assessment  Current Diabetes Treatment: Diet, Oral Medication  Have you ever experienced hypoglycemia (low blood sugar)?: no  Have you ever experienced hyperglycemia (high blood sugar)?: no    Medication Information  How do you obtain your medications?: Patient drives  How many days a week do you miss your medications?: Never  Do you use a pill box or medication chart to help you manage your medications?: No  Do you sometimes have difficulty refilling your medications?: No  Medication adherence impacting ability to self-manage diabetes?: No    Labs  Do you have regular lab work to monitor your medications?: Yes  Type of Regular Lab Work: A1c, Cholesterol, Microalbumin, CBC  Where do you get your labs drawn?: [facility]  Lab Compliance Barriers: No    Nutritional Status  Diet: Regular  Meal Plan 24 Hour Recall: Breakfast, Dinner, Lunch  Meal Plan 24 Hour Recall - Breakfast: nothing  Meal Plan 24 Hour Recall - Lunch: gumbo, 1 cup of brown rice, water  Meal Plan 24 Hour Recall - Dinner: can of coke over ice, sips on  it  Change in appetite?: No  Current nutritional status an area of need that is impacting patient's ability to self-manage diabetes?: No    Additional Social History    Support  Does anyone support you with your diabetes care?: yes  Who supports you?: self  Who takes you to your medical appointments?: self  Does the current support meet the patient's needs?: Yes  Is Support an area impacting ability to self-manage diabetes?: No    Access to Mass Media & Technology  Does the patient have access to any of the following devices or technologies?: Smart phone  Media or technology needs impacting ability to self-manage diabetes?: No    Cognitive/Behavioral Health  Alert and Oriented: Yes  Difficulty Thinking: No  Requires Prompting: No  Requires assistance for routine expression?: No  Cognitive or behavioral barriers impacting ability to self-manage diabetes?: No    Culture/Nondenominational  Culture or Episcopal beliefs that may impact ability to access healthcare: No    Communication  Language preference: English  Hearing Problems: No  Vision Problems: No  Communication needs impacting ability to self-manage diabetes?: No    Health Literacy  Preferred Learning Method: Face to Face, Reading Materials  Health literacy needs impacting ability to self-manage diabetes?: No      Diabetes Self-Management Skills Assessment     Diabetes Disease Process/Treatment Options  Patient/caregiver able to state what happens when someone has diabetes.: yes  Patient/caregiver knows what type of diabetes they have.: yes  Diabetes Type : Type II  Patient/caregiver able to identify at least three signs and symptoms of diabetes.: no  Patient able to identify at least three risk factors for diabetes.: no  Diabetes Disease Process/Treatment Options: Skills Assessment Completed: Yes  Assessment indicates:: Instruction Needed, Knowledge deficit  Area of need?: Yes    Nutrition/Healthy Eating  Challenges to healthy eating:: portion control  Method of  carbohydrate measurement:: no method  Patient can identify foods that impact blood sugar.: no (see comments)  Nutrition/Healthy Eating Skills Assessment Completed:: Yes  Assessment indicates:: Instruction Needed, Knowledge deficit  Area of need?: Yes    Physical Activity/Exercise  Patient's daily activity level:: sedentary  Physical Activity/Exercise Skills Assessment Completed: : Yes  Assessment indicates:: Adequate understanding  Area of need?: No    Medications  Patient is able to describe current diabetes management routine.: yes  Diabetes management routine:: diet, oral medications  Patient is able to identify current diabetes medications, dosages, and appropriate timing of medications.: yes (Jardiance 10mg QD)  Patient understands the purpose of the medications taken for diabetes.: no  Patient reports problems or concerns with current medication regimen.: no  Medication Skills Assessment Completed:: Yes  Assessment indicates:: Instruction Needed  Area of need?: Yes    Home Blood Glucose Monitoring  Patient states that blood sugar is checked at home daily.: yes  Monitoring Method:: home glucometer  Home glucometer meter type:: [employer], Digital Diabetes Program  How often do you check your blood sugar?: Twice a day  When do you check your blood sugar?: Before breakfast  When you check what is your typical blood sugar range? : [id number]  Blood glucose logs:: no  Home Blood Glucose Monitoring Skills Assessment Completed: : Yes  Assessment indicates:: Instruction Needed  Area of need?: Yes    Acute Complications  Patient is able to identify types of acute complications: No  Acute Complications Skills Assessment Completed: : Yes  Assessment indicates:: Instruction Needed, Knowledge deficit  Area of need?: Yes    Chronic Complications  Patient can identify major chronic complications of diabetes.: yes  Stated chronic complications:: kidney disease  Patient can identify ways to prevent or delay diabetes  complications.: yes  Stated ways to prevent complications:: controlling blood sugar  Patient is taking statin?: Yes  Chronic Complications Skills Assessment Completed: : Yes  Assessment indicates:: Instruction Needed, Knowledge deficit  Area of need?: Yes    Psychosocial/Coping  Psychosocial/Coping Skills Assessment Completed: : No  Deffered due to:: Time      During today's follow-up visit,  the following areas required further assessment and content was provided/reviewed.    Based on today's diabetes care assessment, the following areas of need were identified:      Social 11/7/2022   Support No   Access to Mass Media/Tech No   Cognitive/Behavioral Health No   Culture/Gnosticism No   Communication No   Health Literacy No        Clinical 11/7/2022   Medication Adherence No   Lab Compliance No   Nutritional Status No        Diabetes Self-Management Skills 11/7/2022   Diabetes Disease Process/Treatment Options Yes- Reviewed pathophysiology of type 2 diabetes, complications related to the disease, importance of annual dilated eye exam, and daily foot exam.     Nutrition/Healthy Eating Yes- see care plan.   Patient will focus on drinking more water. Will practice portion control at meals.    Physical Activity/Exercise No   Medication Yes- educated on MOA of Jardiance and importance of staying hydrated.    Home Blood Glucose Monitoring Yes- educated on goals for FBG and PPBG. Provided patient with a lancing device. She did not have one at home.   Patient participates in Digital Diabetes Program.    Acute Complications Yes- Reviewed blood glucose goals, prevention, detection, signs and symptoms, and treatment of hypoglycemia and hyperglycemia, and when to contact the clinic.       Chronic Complications Yes- Discussed importance of A1c less than 7 to reduce risk of micro and macro complications, including nephropathy, neuropathy, retinopathy, heart attack and stroke. Reviewed the importance of controlled Blood Pressure and  Cholesterol Lab Values in preventing disease.   Health maintenance reviewed          Today's interventions were provided through individual discussion, instruction, and written materials were provided.    Patient verbalized understanding of instruction and written materials.  Pt was able to return back demonstration of instructions today. Patient understood key points, needs reinforcement and further instruction.     Diabetes Self-Management Care Plan Review:During today's follow-up Connor Diabetes Self-Management Care Plan progress was reviewed and progress was evaluated including his/her input. Lizz has agreed to continue his/her journey to improve/maintain overall diabetes control by continuing to set health goals. See care plan progress below.      Care Plan: Diabetes Management   Updates made since 10/8/2022 12:00 AM        Problem: Healthy Eating         Goal: Eat 3 meals daily with 30-45g/2-3 servings of Carbohydrate per meal and 15-30g Carbohydrate per snack.    Start Date: 11/7/2022   Expected End Date: 5/7/2023   Priority: High   Barriers: Knowledge deficit   Note:    11/7/22: Patient will increase water intake and decrease intake of coke.        Task: Reviewed the sources and role of Carbohydrate, Protein, and Fat and how each nutrient impacts blood sugar. Completed 11/7/2022        Task: Provided visual examples using dry measuring cups, food models, and other familiar objects such as computer mouse, deck or cards, tennis ball etc. to help with visualization of portions. Completed 11/7/2022        Task: Recommended replacing beverages containing high sugar content with noncaloric/sugar free options and/or water. Completed 11/7/2022        Task: Review the importance of balancing carbohydrates with each meal using portion control techniques to count servings of carbohydrate and label reading to identify serving size and amount of total carbs per serving. Completed 11/7/2022        Task: Provided Sample  plate method and reviewed the use of the plate to estimate amounts of carbohydrate per meal. Completed 11/7/2022          Follow Up Plan     Follow up in about 6 weeks (around 12/21/2022) for E11.65.    Today's care plan and follow up schedule was discussed with patient.  Lizz verbalized understanding of the care plan, goals, and agrees to follow up plan.        The patient was encouraged to communicate with his/her health care provider/physician and care team regarding his/her condition(s) and treatment.  I provided the patient with my contact information today and encouraged to contact me via phone or [facility] Patient Portal as needed.

## 2022-11-11 ENCOUNTER — NURSE TRIAGE (OUTPATIENT)
Dept: ADMINISTRATIVE | Facility: CLINIC | Age: 67
End: 2022-11-11
Payer: MEDICARE

## 2022-11-11 NOTE — TELEPHONE ENCOUNTER
Pt c/o chest pain 8/10 x 1 week, that worsen today, pt also c/o L foot pain s/p being tripped up.  Pt advised per protocol and verbalized understanding.   Reason for Disposition   [1] Chest pain lasts > 5 minutes AND [2] age > 44    Additional Information   Negative: SEVERE difficulty breathing (e.g., struggling for each breath, speaks in single words)   Negative: Difficult to awaken or acting confused (e.g., disoriented, slurred speech)   Negative: Shock suspected (e.g., cold/pale/clammy skin, too weak to stand, low BP, rapid pulse)   Negative: Passed out (i.e., lost consciousness, collapsed and was not responding)    Protocols used: Chest Pain-A-AH

## 2022-11-16 ENCOUNTER — LAB VISIT (OUTPATIENT)
Dept: LAB | Facility: HOSPITAL | Age: 67
End: 2022-11-16
Attending: FAMILY MEDICINE
Payer: MEDICARE

## 2022-11-16 DIAGNOSIS — N18.4 TYPE 2 DIABETES MELLITUS WITH STAGE 4 CHRONIC KIDNEY DISEASE, WITHOUT LONG-TERM CURRENT USE OF INSULIN: Chronic | ICD-10-CM

## 2022-11-16 DIAGNOSIS — E11.22 TYPE 2 DIABETES MELLITUS WITH STAGE 4 CHRONIC KIDNEY DISEASE, WITHOUT LONG-TERM CURRENT USE OF INSULIN: Chronic | ICD-10-CM

## 2022-11-16 LAB
ANION GAP SERPL CALC-SCNC: 10 MMOL/L (ref 8–16)
BUN SERPL-MCNC: 16 MG/DL (ref 8–23)
CALCIUM SERPL-MCNC: 10.3 MG/DL (ref 8.7–10.5)
CHLORIDE SERPL-SCNC: 108 MMOL/L (ref 95–110)
CO2 SERPL-SCNC: 23 MMOL/L (ref 23–29)
CREAT SERPL-MCNC: 1.8 MG/DL (ref 0.5–1.4)
EST. GFR  (NO RACE VARIABLE): 30.1 ML/MIN/1.73 M^2
GLUCOSE SERPL-MCNC: 113 MG/DL (ref 70–110)
POTASSIUM SERPL-SCNC: 4.2 MMOL/L (ref 3.5–5.1)
SODIUM SERPL-SCNC: 141 MMOL/L (ref 136–145)

## 2022-11-16 PROCEDURE — 80048 BASIC METABOLIC PNL TOTAL CA: CPT | Performed by: FAMILY MEDICINE

## 2022-11-16 PROCEDURE — 36415 COLL VENOUS BLD VENIPUNCTURE: CPT | Performed by: FAMILY MEDICINE

## 2022-12-01 ENCOUNTER — HOSPITAL ENCOUNTER (OUTPATIENT)
Dept: RADIOLOGY | Facility: HOSPITAL | Age: 67
Discharge: HOME | End: 2022-12-01
Attending: PODIATRIST
Payer: MEDICARE

## 2022-12-01 ENCOUNTER — OFFICE VISIT (OUTPATIENT)
Dept: PODIATRY | Facility: CLINIC | Age: 67
End: 2022-12-01
Payer: MEDICARE

## 2022-12-01 VITALS — HEIGHT: 64 IN | WEIGHT: 163 LBS | BODY MASS INDEX: 27.83 KG/M2

## 2022-12-01 DIAGNOSIS — M19.91 PRIMARY LOCALIZED OSTEOARTHROSIS OF MULTIPLE SITES: Chronic | ICD-10-CM

## 2022-12-01 DIAGNOSIS — N18.4 STAGE 4 CHRONIC KIDNEY DISEASE: Chronic | ICD-10-CM

## 2022-12-01 DIAGNOSIS — M79.672 LEFT FOOT PAIN: ICD-10-CM

## 2022-12-01 DIAGNOSIS — E11.22 TYPE 2 DIABETES MELLITUS WITH STAGE 4 CHRONIC KIDNEY DISEASE, WITHOUT LONG-TERM CURRENT USE OF INSULIN: Chronic | ICD-10-CM

## 2022-12-01 DIAGNOSIS — M10.9 GOUTY ARTHRITIS OF LEFT GREAT TOE: Primary | ICD-10-CM

## 2022-12-01 DIAGNOSIS — Z79.891 CHRONIC USE OF OPIATE FOR THERAPEUTIC PURPOSE: Chronic | ICD-10-CM

## 2022-12-01 DIAGNOSIS — N18.4 TYPE 2 DIABETES MELLITUS WITH STAGE 4 CHRONIC KIDNEY DISEASE, WITHOUT LONG-TERM CURRENT USE OF INSULIN: Chronic | ICD-10-CM

## 2022-12-01 PROCEDURE — 1125F AMNT PAIN NOTED PAIN PRSNT: CPT | Mod: HCNC,CPTII,S$GLB, | Performed by: PODIATRIST

## 2022-12-01 PROCEDURE — 3061F PR NEG MICROALBUMINURIA RESULT DOCUMENTED/REVIEW: ICD-10-PCS | Mod: HCNC,CPTII,S$GLB, | Performed by: PODIATRIST

## 2022-12-01 PROCEDURE — 73630 XR FOOT COMPLETE 3 VIEW LEFT: ICD-10-PCS | Mod: 26,LT,, | Performed by: RADIOLOGY

## 2022-12-01 PROCEDURE — 99999 PR PBB SHADOW E&M-EST. PATIENT-LVL III: CPT | Mod: PBBFAC,HCNC,, | Performed by: PODIATRIST

## 2022-12-01 PROCEDURE — 3008F BODY MASS INDEX DOCD: CPT | Mod: HCNC,CPTII,S$GLB, | Performed by: PODIATRIST

## 2022-12-01 PROCEDURE — 1101F PT FALLS ASSESS-DOCD LE1/YR: CPT | Mod: HCNC,CPTII,S$GLB, | Performed by: PODIATRIST

## 2022-12-01 PROCEDURE — 3288F FALL RISK ASSESSMENT DOCD: CPT | Mod: HCNC,CPTII,S$GLB, | Performed by: PODIATRIST

## 2022-12-01 PROCEDURE — 73630 X-RAY EXAM OF FOOT: CPT | Mod: 26,LT,, | Performed by: RADIOLOGY

## 2022-12-01 PROCEDURE — 3066F NEPHROPATHY DOC TX: CPT | Mod: HCNC,CPTII,S$GLB, | Performed by: PODIATRIST

## 2022-12-01 PROCEDURE — 3044F HG A1C LEVEL LT 7.0%: CPT | Mod: HCNC,CPTII,S$GLB, | Performed by: PODIATRIST

## 2022-12-01 PROCEDURE — 1125F PR PAIN SEVERITY QUANTIFIED, PAIN PRESENT: ICD-10-PCS | Mod: HCNC,CPTII,S$GLB, | Performed by: PODIATRIST

## 2022-12-01 PROCEDURE — 99214 PR OFFICE/OUTPT VISIT, EST, LEVL IV, 30-39 MIN: ICD-10-PCS | Mod: HCNC,S$GLB,, | Performed by: PODIATRIST

## 2022-12-01 PROCEDURE — 99999 PR PBB SHADOW E&M-EST. PATIENT-LVL III: ICD-10-PCS | Mod: PBBFAC,HCNC,, | Performed by: PODIATRIST

## 2022-12-01 PROCEDURE — 3066F PR DOCUMENTATION OF TREATMENT FOR NEPHROPATHY: ICD-10-PCS | Mod: HCNC,CPTII,S$GLB, | Performed by: PODIATRIST

## 2022-12-01 PROCEDURE — 99214 OFFICE O/P EST MOD 30 MIN: CPT | Mod: HCNC,S$GLB,, | Performed by: PODIATRIST

## 2022-12-01 PROCEDURE — 3288F PR FALLS RISK ASSESSMENT DOCUMENTED: ICD-10-PCS | Mod: HCNC,CPTII,S$GLB, | Performed by: PODIATRIST

## 2022-12-01 PROCEDURE — 3044F PR MOST RECENT HEMOGLOBIN A1C LEVEL <7.0%: ICD-10-PCS | Mod: HCNC,CPTII,S$GLB, | Performed by: PODIATRIST

## 2022-12-01 PROCEDURE — 1101F PR PT FALLS ASSESS DOC 0-1 FALLS W/OUT INJ PAST YR: ICD-10-PCS | Mod: HCNC,CPTII,S$GLB, | Performed by: PODIATRIST

## 2022-12-01 PROCEDURE — 3008F PR BODY MASS INDEX (BMI) DOCUMENTED: ICD-10-PCS | Mod: HCNC,CPTII,S$GLB, | Performed by: PODIATRIST

## 2022-12-01 PROCEDURE — 73630 X-RAY EXAM OF FOOT: CPT | Mod: TC,LT

## 2022-12-01 PROCEDURE — 3061F NEG MICROALBUMINURIA REV: CPT | Mod: HCNC,CPTII,S$GLB, | Performed by: PODIATRIST

## 2022-12-01 RX ORDER — INDOMETHACIN 50 MG/1
50 CAPSULE ORAL 2 TIMES DAILY WITH MEALS
Qty: 20 CAPSULE | Refills: 0 | Status: SHIPPED | OUTPATIENT
Start: 2022-12-01 | End: 2022-12-11

## 2022-12-01 RX ORDER — COLCHICINE 0.6 MG/1
TABLET ORAL
Qty: 6 TABLET | Refills: 0 | Status: SHIPPED | OUTPATIENT
Start: 2022-12-01 | End: 2022-12-27 | Stop reason: SDUPTHER

## 2022-12-01 NOTE — PROGRESS NOTES
PODIATRIC MEDICINE AND SURGERY  12/9/2022    PCP: Dr. Pete May MD    CHIEF COMPLAINT   Chief Complaint   Patient presents with    Foot Pain     C/o pain and swelling, at the medial 1st MPJ, rates pain 10/10, x 2 weeks, 0 injury, prev bunion surgery, diabetic, wears boots and socks, last seen PCP Dr. May on 10/24/22       HPI:    Lizz Crockett is a 67 y.o. female who has a past medical history of Allergy, Anxiety, Arthritis, Cataract, Diabetes mellitus, type 2, GERD (gastroesophageal reflux disease), Glaucoma, Hyperlipidemia, Hypertension, Sarcoidosis, unspecified, Thyroid disease, and Type 2 diabetes mellitus with stage 4 chronic kidney disease, without long-term current use of insulin (9/2/2022).   Lizz presents to clinic today complaining of abrupt onset of left foot pain 1st MTPJ, podagra. Pt denies any trauma. Pt states pain started two weeks ago. Pain is 10/10. She has antalgic gait. She is unable to walk without assistive device. She denies history of gout. She has not tried any treatment for pain.       Patient denies other pedal complaints at this time.     Hemoglobin A1C   Date Value Ref Range Status   11/03/2022 6.1 (H) 4.0 - 5.6 % Final     Comment:     ADA Screening Guidelines:  5.7-6.4%  Consistent with prediabetes  >or=6.5%  Consistent with diabetes    High levels of fetal hemoglobin interfere with the HbA1C  assay. Heterozygous hemoglobin variants (HbS, HgC, etc)do  not significantly interfere with this assay.   However, presence of multiple variants may affect accuracy.     09/02/2022 6.1 (H) 4.0 - 5.6 % Final     Comment:     ADA Screening Guidelines:  5.7-6.4%  Consistent with prediabetes  >or=6.5%  Consistent with diabetes    High levels of fetal hemoglobin interfere with the HbA1C  assay. Heterozygous hemoglobin variants (HbS, HgC, etc)do  not significantly interfere with this assay.   However, presence of multiple variants may affect accuracy.         PMH  Past Medical  History:   Diagnosis Date    Allergy     Anxiety     Arthritis     Cataract     Diabetes mellitus, type 2     GERD (gastroesophageal reflux disease)     Glaucoma     Hyperlipidemia     Hypertension     Sarcoidosis, unspecified     Thyroid disease     Type 2 diabetes mellitus with stage 4 chronic kidney disease, without long-term current use of insulin 9/2/2022       PROBLEM LIST  Patient Active Problem List    Diagnosis Date Noted    Fatty liver 10/30/2022    Primary localized osteoarthrosis of multiple sites 10/24/2022    Osteopenia of multiple sites 10/15/2022    Chronic use of opiate for therapeutic purpose 10/04/2022    Onychomadesis of toenail 09/15/2022    Screening for malignant neoplasm of colon 09/15/2022    Stage 4 chronic kidney disease 09/10/2022    Gastroesophageal reflux disease without esophagitis 09/02/2022    Asymptomatic menopausal state 09/02/2022    Essential hypertension 09/02/2022    Type 2 diabetes mellitus with stage 4 chronic kidney disease, without long-term current use of insulin 09/02/2022    Mass of left side of neck 09/02/2022    At risk for knowledge deficit 09/02/2022    Post-surgical hypothyroidism 12/01/2021    Mixed hyperlipidemia 12/01/2021    Glaucoma 12/01/2021       MEDS  Current Outpatient Medications on File Prior to Visit   Medication Sig Dispense Refill    amLODIPine (NORVASC) 10 MG tablet Take 1 tablet (10 mg total) by mouth once daily. 90 tablet 1    atorvastatin (LIPITOR) 20 MG tablet Take 1 tablet (20 mg total) by mouth every evening. 90 tablet 3    diazePAM (VALIUM) 10 MG Tab Take 10 mg by mouth 2 (two) times daily as needed.      diclofenac sodium (VOLTAREN) 1 % Gel Apply small amount (2-4 g max) to painful joint areas four times a day as needed 100 g 5    empagliflozin (JARDIANCE) 10 mg tablet Take 1 tablet (10 mg total) by mouth once daily. 90 tablet 0    glycerin-mineral oil-lanolin Crea Apply 1 application topically daily as needed (to soothe itching). 192 g 0     levothyroxine (SYNTHROID) 100 MCG tablet Take 1 tablet (100 mcg total) by mouth before breakfast. 90 tablet 1    losartan-hydrochlorothiazide 100-12.5 mg (HYZAAR) 100-12.5 mg Tab losartan 100 mg-hydrochlorothiazide 12.5 mg tablet   TAKE 1 TABLET BY MOUTH ONCE A DAY      naloxone (NARCAN) 4 mg/actuation Spry       omeprazole (PRILOSEC) 40 MG capsule Take 40 mg by mouth once daily.      oxyCODONE-acetaminophen (PERCOCET) 7.5-325 mg per tablet Take 1 tablet by mouth every 8 hours as needed for pain 90 tablet 0    varicella-zoster gE-AS01B, PF, (SHINGRIX) 50 mcg/0.5 mL injection Inject 0.5 mL (one dose) into muscle now; schedule second dose  days later 1 each 1     No current facility-administered medications on file prior to visit.       Medication List with Changes/Refills   New Medications    COLCHICINE (COLCRYS) 0.6 MG TABLET    Take 2 pills for first hour and then 1 pill one hour later. Then take 1 pill once daily for three days.    INDOMETHACIN (INDOCIN) 50 MG CAPSULE    Take 1 capsule (50 mg total) by mouth 2 (two) times daily with meals. for 10 days   Current Medications    AMLODIPINE (NORVASC) 10 MG TABLET    Take 1 tablet (10 mg total) by mouth once daily.    ATORVASTATIN (LIPITOR) 20 MG TABLET    Take 1 tablet (20 mg total) by mouth every evening.    DIAZEPAM (VALIUM) 10 MG TAB    Take 10 mg by mouth 2 (two) times daily as needed.    DICLOFENAC SODIUM (VOLTAREN) 1 % GEL    Apply small amount (2-4 g max) to painful joint areas four times a day as needed    EMPAGLIFLOZIN (JARDIANCE) 10 MG TABLET    Take 1 tablet (10 mg total) by mouth once daily.    GLYCERIN-MINERAL OIL-LANOLIN CREA    Apply 1 application topically daily as needed (to soothe itching).    LEVOTHYROXINE (SYNTHROID) 100 MCG TABLET    Take 1 tablet (100 mcg total) by mouth before breakfast.    LOSARTAN-HYDROCHLOROTHIAZIDE 100-12.5 MG (HYZAAR) 100-12.5 MG TAB    losartan 100 mg-hydrochlorothiazide 12.5 mg tablet   TAKE 1 TABLET BY MOUTH ONCE A  "DAY    NALOXONE (NARCAN) 4 MG/ACTUATION SPRY        OMEPRAZOLE (PRILOSEC) 40 MG CAPSULE    Take 40 mg by mouth once daily.    OXYCODONE-ACETAMINOPHEN (PERCOCET) 7.5-325 MG PER TABLET    Take 1 tablet by mouth every 8 hours as needed for pain    VARICELLA-ZOSTER GE-AS01B, PF, (SHINGRIX) 50 MCG/0.5 ML INJECTION    Inject 0.5 mL (one dose) into muscle now; schedule second dose  days later       PSH     Past Surgical History:   Procedure Laterality Date    BREAST SURGERY      CATARACT EXTRACTION, BILATERAL Bilateral     THYROIDECTOMY      for multinodular goiter    TUBAL LIGATION          ALL  Review of patient's allergies indicates:   Allergen Reactions    Iodine     Ivp dye [iodinated contrast media]     Prednisone     Codeine Anxiety       SOC     Social History     Tobacco Use    Smoking status: Former     Types: Cigarettes    Smokeless tobacco: Never   Substance Use Topics    Alcohol use: No    Drug use: No         FAMILY HX    Family History   Problem Relation Age of Onset    Heart disease Mother     Hypertension Mother     Hypertension Father     Heart disease Father             REVIEW OF SYSTEMS  General: This patient is well-developed, well-nourished and appears stated age, well-oriented to person, place and time, and cooperative and pleasant on today's visit   Constitutional: Negative for chills and fever.   Respiratory: Negative for shortness of breath.    Cardiovascular: Negative for chest pain, palpitations, orthopnea  Gastrointestinal: Negative for diarrhea, nausea and vomiting.   Musculoskeletal: Positive for above noted in HPI  Skin: positive  for skin and/or nail changes   Neurological: negative  for tingling and sensory changes  Peripheral Vascular: no claudication or cyanosis  Psychiatric/Behavioral: Negative for altered mental status     PHYSICAL EXAM:      Vitals:    12/01/22 1005   Weight: 73.9 kg (163 lb)   Height: 5' 4" (1.626 m)   PainSc: 10-Worst pain ever         LOWER EXTREMITY PHYSICAL " EXAM  VASCULAR  Dorsalis pedis and posterior tibial pulses palpable 2/4 bilaterally. Capillary refill time immediate to the toes. Feet are warm to the touch. Skin temperature warm to warm from proximally to distally There are no varicosities, telangiectasias noted to bilateral foot and ankle regions. There are no ecchymoses noted to bilateral foot and ankle regions. There is moderate left foot gross lower extremity edema.    DERMATOLOGIC  Skin moist with healthy texture and turgor.There are no open ulcerations, lacerations, or fissures to bilateral foot and ankle regions. There are no signs of infection as there is no erythema, no proximal-extending lymphangiitis, no fluctuance, or crepitus noted on palpation to bilateral foot and ankle regions. There is no interdigital maceration.   There are hyperkeratotic lesions noted to feet. Nails are well-trimmed.    NEUROLOGIC  Epicritic sensation is intact as the patient is able to sense light touch to bilateral foot and ankle regions. Achilles and patellar deep tendon reflexes intact.  Babinski reflex absent    ORTHOPEDIC/BIOMECHANICAL  Pain out of proportion to skin findings LEFT 1st MTPJ. Unable to assess ROM due to pain.  Muscle strength AT/EHL/EDL/PT: 5/5; Achilles/Gastroc/Soleus: 5/5; PB/PL: 5/5 Muscle tone is normal.     IMAGING   Reviewed by me and I agree with radiologist findings, 3 views of foot/ankle, reveal:.          ASSESSMENT     Encounter Diagnoses   Name Primary?    Left foot pain     Gouty arthritis of left great toe Yes    Stage 4 chronic kidney disease     Chronic use of opiate for therapeutic purpose     Primary localized osteoarthrosis of multiple sites     Type 2 diabetes mellitus with stage 4 chronic kidney disease, without long-term current use of insulin          PLAN  Patient was educated about clinical and imaging findings, and verbalizes understanding of above.     Diagnoses and all orders for this visit:  Gouty arthritis of left great  toe    Left foot pain  -     X-Ray Foot Complete Right; Future; Expected date: 12/01/2022  -     Uric acid; Future; Expected date: 12/01/2022  -     X-Ray Foot Complete Left; Future; Expected date: 12/01/2022    Stage 4 chronic kidney disease    Chronic use of opiate for therapeutic purpose    Primary localized osteoarthrosis of multiple sites    Type 2 diabetes mellitus with stage 4 chronic kidney disease, without long-term current use of insulin    Other orders  -     indomethacin (INDOCIN) 50 MG capsule; Take 1 capsule (50 mg total) by mouth 2 (two) times daily with meals. for 10 days  Dispense: 20 capsule; Refill: 0  -     colchicine (COLCRYS) 0.6 mg tablet; Take 2 pills for first hour and then 1 pill one hour later. Then take 1 pill once daily for three days.  Dispense: 6 tablet; Refill: 0        Patient was educated and counseled regarding likely gout. I explained to the patient that a combination of diet and or metabolism has triggered a flare up of gout crystal deposits in the joint. Recommendations were given to the patient on diet selection.    Will order baseline xray to rule out occult fx, OA, and/or other pathology/diff dx.  The patient was given a post op shoe/Camwalker was also dispensed to facility ambulation. We also discussed the possibility of committing to medications for metabolic control if the frequency of gouty attack increase. Labs were ordered for serum uric acid levels.    Discussed Colcrys and Nsaid- Indomethacin. Will need to discuss with PCP/Nephro given CKD. Pt states she was unaware of diagnosis. She has tolerated NSAIDs in the past. Will fu with PCP.  Discussed steroid alternative however patient has allergy. Will f/u pending UA and CMP levels to discuss acute medication management for likely inflammatory arthropathy.     Disclaimer:  This note may have been prepared using voice recognition software, it may have not been extensively proofed, as such there could be errors within the  text such as sound alike errors.         Future Appointments   Date Time Provider Department Center   12/19/2022 10:00 AM Sherice Meyer NP [facility] GASTRO [facility]   12/21/2022  9:00 AM Pete May MD [facility] IM [facility]   12/21/2022  9:30 AM Britney Villarreal RN [facility] DIBEDU [facility]   1/11/2023  9:00 AM Pete May MD [facility] IM [facility]   1/11/2023 10:25 AM LABORATORY, [facility] HGV LAB [facility]   1/11/2023 10:30 AM SPECIMEN, [facility] SPECLAB [facility]   1/19/2023  8:30 AM Cam Meza MD HG NEPHRO [facility]   1/24/2023  8:30 AM LABORATORY, [facility] LAB [facility]       Report Electronically Signed By:     Abby Alcantara DPM   Podiatry  [facility]  12/9/2022

## 2022-12-05 ENCOUNTER — PATIENT OUTREACH (OUTPATIENT)
Dept: ADMINISTRATIVE | Facility: HOSPITAL | Age: 67
End: 2022-12-05
Payer: MEDICARE

## 2022-12-05 ENCOUNTER — PATIENT MESSAGE (OUTPATIENT)
Dept: INTERNAL MEDICINE | Facility: CLINIC | Age: 67
End: 2022-12-05
Payer: MEDICARE

## 2022-12-05 NOTE — PROGRESS NOTES
TO MY TEAM: Please contact Lizz and relay message below and help schedule nephrology appointment.  --------------------------------------------------------------------------------  Lizz Davidson.    On 9/10/22 I have entered a referral order (Referral # [id number]) for you to be evaluated by one of our excellent nephrologists (a specialist that diagnoses and treats kidney diseases) to evaluate your stage 4 chronic kidney disease.    It appears that you don't have that appointment scheduled.    Please take the time now to schedule that appointment. You should be able to schedule the appointment using [facility] or by calling our appointment desk at [phone].    All the best,    GENARO May MD

## 2022-12-05 NOTE — PROGRESS NOTES
I'm happy that your kidney function is improved.  Please call our appointment desk [phone] to schedule your nephrology appointment soon.

## 2022-12-06 ENCOUNTER — TELEPHONE (OUTPATIENT)
Dept: PODIATRY | Facility: CLINIC | Age: 67
End: 2022-12-06
Payer: MEDICARE

## 2022-12-06 NOTE — TELEPHONE ENCOUNTER
Attempted to call patient in regards to her question for history of gout.  Unfortunately, she did not answer the phone.  There is concerned about prescribing patient anti-inflammatory medication given her decrease in kidney function.  Unfortunately, she also has an allergy to steroids.  She likely will need Uloric versus allopurinol.  As patient is unable to answer the phone, will refer patient to Internal Medicine for this question.

## 2022-12-07 ENCOUNTER — TELEPHONE (OUTPATIENT)
Dept: INTERNAL MEDICINE | Facility: CLINIC | Age: 67
End: 2022-12-07
Payer: MEDICARE

## 2022-12-07 ENCOUNTER — TELEPHONE (OUTPATIENT)
Dept: NEPHROLOGY | Facility: CLINIC | Age: 67
End: 2022-12-07
Payer: MEDICARE

## 2022-12-07 DIAGNOSIS — N28.9 RENAL INSUFFICIENCY: Primary | ICD-10-CM

## 2022-12-07 NOTE — TELEPHONE ENCOUNTER
----- Message from Pete May MD sent at 12/5/2022  1:58 PM CST -----  TO MY TEAM: Please contact Lizz and relay message below and help schedule nephrology appointment.  --------------------------------------------------------------------------------  Lizz Davidson.    On 9/10/22 I have entered a referral order (Referral # [id number]) for you to be evaluated by one of our excellent nephrologists (a specialist that diagnoses and treats kidney diseases) to evaluate your stage 4 chronic kidney disease.    It appears that you don't have that appointment scheduled.    Please take the time now to schedule that appointment. You should be able to schedule the appointment using [facility] or by calling our appointment desk at [phone].    All the best,    GENARO May MD

## 2022-12-07 NOTE — TELEPHONE ENCOUNTER
Spoke with patient and informed her that she still needs to schedule an appointment to see a nephrologist for her stage 4 kidney disease per Dr. May. I told her that since I couldn't find her a soon appointment, I would message that department to give her a call. She verbalized understanding.

## 2022-12-19 ENCOUNTER — PATIENT MESSAGE (OUTPATIENT)
Dept: OTHER | Facility: OTHER | Age: 67
End: 2022-12-19
Payer: MEDICARE

## 2022-12-21 ENCOUNTER — OFFICE VISIT (OUTPATIENT)
Dept: INTERNAL MEDICINE | Facility: CLINIC | Age: 67
End: 2022-12-21
Payer: MEDICARE

## 2022-12-21 VITALS
WEIGHT: 165.13 LBS | HEART RATE: 67 BPM | OXYGEN SATURATION: 100 % | DIASTOLIC BLOOD PRESSURE: 88 MMHG | SYSTOLIC BLOOD PRESSURE: 130 MMHG | BODY MASS INDEX: 28.19 KG/M2 | HEIGHT: 64 IN | TEMPERATURE: 96 F

## 2022-12-21 DIAGNOSIS — F20.81 SCHIZOPHRENIFORM DISORDER: ICD-10-CM

## 2022-12-21 DIAGNOSIS — D44.6 CAROTID BODY TUMOR: Primary | ICD-10-CM

## 2022-12-21 DIAGNOSIS — Z79.891 CHRONIC USE OF OPIATE FOR THERAPEUTIC PURPOSE: Chronic | ICD-10-CM

## 2022-12-21 DIAGNOSIS — Z09 NEED FOR CASE MANAGEMENT FOLLOW-UP: ICD-10-CM

## 2022-12-21 DIAGNOSIS — N18.4 TYPE 2 DIABETES MELLITUS WITH STAGE 4 CHRONIC KIDNEY DISEASE, WITHOUT LONG-TERM CURRENT USE OF INSULIN: Chronic | ICD-10-CM

## 2022-12-21 DIAGNOSIS — R91.1 NODULE OF UPPER LOBE OF RIGHT LUNG: ICD-10-CM

## 2022-12-21 DIAGNOSIS — E11.9 DIABETIC EYE EXAM: ICD-10-CM

## 2022-12-21 DIAGNOSIS — Z12.11 SCREENING FOR MALIGNANT NEOPLASM OF COLON: ICD-10-CM

## 2022-12-21 DIAGNOSIS — K76.0 FATTY LIVER: ICD-10-CM

## 2022-12-21 DIAGNOSIS — Z01.00 DIABETIC EYE EXAM: ICD-10-CM

## 2022-12-21 DIAGNOSIS — E89.0 POST-SURGICAL HYPOTHYROIDISM: Chronic | ICD-10-CM

## 2022-12-21 DIAGNOSIS — N18.4 STAGE 4 CHRONIC KIDNEY DISEASE: Chronic | ICD-10-CM

## 2022-12-21 DIAGNOSIS — E11.22 TYPE 2 DIABETES MELLITUS WITH STAGE 4 CHRONIC KIDNEY DISEASE, WITHOUT LONG-TERM CURRENT USE OF INSULIN: Chronic | ICD-10-CM

## 2022-12-21 DIAGNOSIS — E78.2 MIXED HYPERLIPIDEMIA: Chronic | ICD-10-CM

## 2022-12-21 PROCEDURE — 1160F RVW MEDS BY RX/DR IN RCRD: CPT | Mod: HCNC,CPTII,S$GLB, | Performed by: FAMILY MEDICINE

## 2022-12-21 PROCEDURE — 3044F PR MOST RECENT HEMOGLOBIN A1C LEVEL <7.0%: ICD-10-PCS | Mod: HCNC,CPTII,S$GLB, | Performed by: FAMILY MEDICINE

## 2022-12-21 PROCEDURE — 3008F BODY MASS INDEX DOCD: CPT | Mod: HCNC,CPTII,S$GLB, | Performed by: FAMILY MEDICINE

## 2022-12-21 PROCEDURE — 3075F SYST BP GE 130 - 139MM HG: CPT | Mod: HCNC,CPTII,S$GLB, | Performed by: FAMILY MEDICINE

## 2022-12-21 PROCEDURE — 99215 PR OFFICE/OUTPT VISIT, EST, LEVL V, 40-54 MIN: ICD-10-PCS | Mod: HCNC,S$GLB,, | Performed by: FAMILY MEDICINE

## 2022-12-21 PROCEDURE — 3075F PR MOST RECENT SYSTOLIC BLOOD PRESS GE 130-139MM HG: ICD-10-PCS | Mod: HCNC,CPTII,S$GLB, | Performed by: FAMILY MEDICINE

## 2022-12-21 PROCEDURE — 3288F FALL RISK ASSESSMENT DOCD: CPT | Mod: HCNC,CPTII,S$GLB, | Performed by: FAMILY MEDICINE

## 2022-12-21 PROCEDURE — 1101F PT FALLS ASSESS-DOCD LE1/YR: CPT | Mod: HCNC,CPTII,S$GLB, | Performed by: FAMILY MEDICINE

## 2022-12-21 PROCEDURE — 1159F PR MEDICATION LIST DOCUMENTED IN MEDICAL RECORD: ICD-10-PCS | Mod: HCNC,CPTII,S$GLB, | Performed by: FAMILY MEDICINE

## 2022-12-21 PROCEDURE — 1159F MED LIST DOCD IN RCRD: CPT | Mod: HCNC,CPTII,S$GLB, | Performed by: FAMILY MEDICINE

## 2022-12-21 PROCEDURE — 3008F PR BODY MASS INDEX (BMI) DOCUMENTED: ICD-10-PCS | Mod: HCNC,CPTII,S$GLB, | Performed by: FAMILY MEDICINE

## 2022-12-21 PROCEDURE — 3066F PR DOCUMENTATION OF TREATMENT FOR NEPHROPATHY: ICD-10-PCS | Mod: HCNC,CPTII,S$GLB, | Performed by: FAMILY MEDICINE

## 2022-12-21 PROCEDURE — 1101F PR PT FALLS ASSESS DOC 0-1 FALLS W/OUT INJ PAST YR: ICD-10-PCS | Mod: HCNC,CPTII,S$GLB, | Performed by: FAMILY MEDICINE

## 2022-12-21 PROCEDURE — 3044F HG A1C LEVEL LT 7.0%: CPT | Mod: HCNC,CPTII,S$GLB, | Performed by: FAMILY MEDICINE

## 2022-12-21 PROCEDURE — 99215 OFFICE O/P EST HI 40 MIN: CPT | Mod: HCNC,S$GLB,, | Performed by: FAMILY MEDICINE

## 2022-12-21 PROCEDURE — 3079F PR MOST RECENT DIASTOLIC BLOOD PRESSURE 80-89 MM HG: ICD-10-PCS | Mod: HCNC,CPTII,S$GLB, | Performed by: FAMILY MEDICINE

## 2022-12-21 PROCEDURE — 99999 PR PBB SHADOW E&M-EST. PATIENT-LVL V: ICD-10-PCS | Mod: PBBFAC,HCNC,, | Performed by: FAMILY MEDICINE

## 2022-12-21 PROCEDURE — 3079F DIAST BP 80-89 MM HG: CPT | Mod: HCNC,CPTII,S$GLB, | Performed by: FAMILY MEDICINE

## 2022-12-21 PROCEDURE — 3061F PR NEG MICROALBUMINURIA RESULT DOCUMENTED/REVIEW: ICD-10-PCS | Mod: HCNC,CPTII,S$GLB, | Performed by: FAMILY MEDICINE

## 2022-12-21 PROCEDURE — 99999 PR PBB SHADOW E&M-EST. PATIENT-LVL V: CPT | Mod: PBBFAC,HCNC,, | Performed by: FAMILY MEDICINE

## 2022-12-21 PROCEDURE — 3061F NEG MICROALBUMINURIA REV: CPT | Mod: HCNC,CPTII,S$GLB, | Performed by: FAMILY MEDICINE

## 2022-12-21 PROCEDURE — 1160F PR REVIEW ALL MEDS BY PRESCRIBER/CLIN PHARMACIST DOCUMENTED: ICD-10-PCS | Mod: HCNC,CPTII,S$GLB, | Performed by: FAMILY MEDICINE

## 2022-12-21 PROCEDURE — 1126F PR PAIN SEVERITY QUANTIFIED, NO PAIN PRESENT: ICD-10-PCS | Mod: HCNC,CPTII,S$GLB, | Performed by: FAMILY MEDICINE

## 2022-12-21 PROCEDURE — 1126F AMNT PAIN NOTED NONE PRSNT: CPT | Mod: HCNC,CPTII,S$GLB, | Performed by: FAMILY MEDICINE

## 2022-12-21 PROCEDURE — 3066F NEPHROPATHY DOC TX: CPT | Mod: HCNC,CPTII,S$GLB, | Performed by: FAMILY MEDICINE

## 2022-12-21 PROCEDURE — 3288F PR FALLS RISK ASSESSMENT DOCUMENTED: ICD-10-PCS | Mod: HCNC,CPTII,S$GLB, | Performed by: FAMILY MEDICINE

## 2022-12-21 RX ORDER — LEVOTHYROXINE SODIUM 100 UG/1
100 TABLET ORAL
Qty: 90 TABLET | Refills: 1 | Status: SHIPPED | OUTPATIENT
Start: 2022-12-21 | End: 2023-03-31 | Stop reason: SDUPTHER

## 2022-12-21 NOTE — LETTER
" GENARO May MD  [facility] [address] [facility]    [place] [id number]  PH: [phone]    FX: [phone]          2022        JONATHAN PEREZ NP  [address]  [place] [address]  Via Mail       RE:  Lizz Crockett,  1955   (our MRN [id number])      Dear HENRIETTA Perez:    I recently saw Lizz Crockett. She identified you as her healthcare provider, and she gave me permission to discuss her care with you.    I've included a copy of my progress note for today. Please note diagnoses #1 and #2, in particular.    I told her I would not be continuing her diazepam, so she may return to you to discuss this.    Please call me if you have any questions or if I can be of help.    Thank you for your help caring for our mutual patient.    Kind regards,     GENARO May MD    Enclosure          OFFICE VISIT 22  9:00 AM CST  Lizz Crockett ( 1955; MRN [id number])    CHIEF COMPLAINT: Follow-up    ASSESSMENT & PLAN  1. 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  She agreed to proceed with neck surgeon consultation for further evaluation and treatment .    Orders:  -     Ambulatory referral/consult to Endocrine/ENT Surgery for ENT/Head and Neck Surgeons; Future; Expected date: 2022    2. Schizophreniform disorder  Assessment & Plan:  She denies ajay or depression symptoms, and she denies history of bipolar disorder. She appears to have delusions of persecution, describing how "people have stolen everything she owns" and continue to "steal everything that she gets." She appears to be alienated from any family. Her speech is disorganized, with frequent derailment and lack of coherence. She demonstrates no visual or auditory hallucinations. Her thoughts are very disorganized. This appears " to be a chronic, stable state for her, at least for the last several years.     She is unwilling to accept psychiatric referral. She says that she has never seen a psychiatrist and has never been hospitalized for a mental health condition. She does not appear to be an eminent threat to herself or others and does not meet criteria for PEC. (Obtained second opinion from colleague, who concurred.)    She is unwilling to consider any prescription medicine other than diazepam for treatment of this condition, and I am unaware of compelling indication for continuation of diazepam, especially given her chronic opioid use. She gave me permission to communicate about her care with Sandy Loaiza NP, whom she has been seeing for the diazepam at [facility]; [facility]; [address]; [place] [address].      3. Chronic use of opiate for therapeutic purpose  Overview:  PAIN MANAGEMENT: Shaquille Martinez MD    Assessment & Plan:  Review of [place] Board of Pharmacy Controlled Prescription Drug Monitoring database reveals:    Narcotics (excluding Buprenorphine)   -30 Day Avg. MME 16.88   -90 Day Avg. MME 26.25    Buprenorphine   -30 Day Avg. mg/day 0.00   -90 Day Avg. mg/day 0.00    Sedatives   -30 Day Avg. LME 0.67   -90 Day Avg. LME 0.87    Prescriptions (sorted by date filled).    12/12/2022 - Oxycodone-Acetaminophn 7.5-325 Mg, QTY 90 (QS for 30 days), originally written 11/08/2022 by Rasta Arnett.    12/12/2022 - Diazepam 10 Mg Tablet, QTY 20 (QS for 10 days), originally written 12/12/2022 by Elmira Mast.    10/28/2022 - Percocet 7.5-325 Mg Tablet, QTY 90 (QS for 30 days), originally written 10/11/2022 by Rasta Arnett.    09/27/2022 - Oxycodone-Acetaminophn 7.5-325 Mg, QTY 90 (QS for 30 days), originally written 09/09/2022 by Rasta Arnett.    09/22/2022 - Diazepam 10 Mg Tablet, QTY 60 (QS for 30 days), originally written 06/07/2022 by Eliseo Carey.    08/19/2022 - Diazepam 10 Mg Tablet, QTY 60 (QS for 30 days), originally  written 06/07/2022 by Er Cherry.      4. Nodule of upper lobe of right lung  Assessment & Plan:  We discussed differential diagnosis. It was agreed to proceed with evaluation with chest CT.    Orders:  -     CT Chest Without Contrast; Future; Expected date: 12/21/2022    5. Type 2 diabetes mellitus with stage 4 chronic kidney disease, without long-term current use of insulin  Assessment & Plan:  Diabetes Management Status    Statin: Taking  ACE/ARB: Taking    Screening or Prevention Patient's value Goal Complete/Controlled?   HgA1C Testing and Control   Lab Results   Component Value Date    HGBA1C 6.1 (H) 11/03/2022      Annually/Less than 8% Yes   Lipid profile : 09/02/2022 Annually Yes   LDL control Lab Results   Component Value Date    LDLCALC 78.8 09/02/2022    Annually/Less than 100 mg/dl  Yes   Nephropathy screening Lab Results   Component Value Date    LABMICR <5.0 09/02/2022     Lab Results   Component Value Date    PROTEINUA Negative 11/28/2014    Annually Yes   Blood pressure BP Readings from Last 1 Encounters:   12/21/22 130/88    Less than 140/90 Yes   Dilated retinal exam Most Recent Eye Exam Date: Not Found Annually Yes   Foot exam   : 10/24/2022 Annually Yes     Lab Results   Component Value Date    HGBA1C 6.1 (H) 11/03/2022    HGBA1C 6.1 (H) 09/02/2022    EGFRNORACEVR 30.1 (A) 11/16/2022    MICALBCREAT Unable to calculate 09/02/2022    LDLCALC 78.8 09/02/2022     No results found for: GLUTAMICACID, CPEPTIDE   Last 5 Patient Entered Readings                                          Most Recent A1c: 6.1% on 11/3/2022  (Goal: 8%)       Recent Readings 12/19/2022 12/17/2022 12/15/2022 12/12/2022 12/8/2022    Blood Glucose (mg/dL) 141 126 171 148 197           HEALTH MAINTENANCE: Diabetic health maintenance interventions reviewed and are up to date except for:      Topic    Eye Exam         Orders:  -     Ambulatory referral/consult to Ophthalmology; Future; Expected date: 12/28/2022  -     empagliflozin  (JARDIANCE) 10 mg tablet; Take 1 tablet (10 mg total) by mouth once daily.  Dispense: 90 tablet; Refill: 0    6. Stage 4 chronic kidney disease  Assessment & Plan:  Lab Results   Component Value Date    EGFRNORACEVR 30.1 (A) 11/16/2022    EGFRNORACEVR 21 (A) 11/03/2022    EGFRNORACEVR 28.2 (A) 10/24/2022    CREATININE 1.8 (H) 11/16/2022    CREATININE 2.4 (H) 11/03/2022    CREATININE 1.9 (H) 10/24/2022    BUN 16 11/16/2022    BUN 29 (H) 11/03/2022    BUN 27 (H) 10/24/2022     Future Appointments   Date Time Provider Department Center   1/11/2023 10:25 AM LABORATORY, [facility] LAB [facility]   1/11/2023 10:30 AM SPECIMEN, [facility] SPECLAB [facility]   1/19/2023  8:30 AM Cam Meza MD [facility] NEPHRO [facility]          7. Post-surgical hypothyroidism  Assessment & Plan:  Lab Results   Component Value Date    TSH 2.602 09/02/2022    FREET4 0.97 09/02/2022       Orders:  -     levothyroxine (SYNTHROID) 100 MCG tablet; Take 1 tablet (100 mcg total) by mouth before breakfast.  Dispense: 90 tablet; Refill: 1    8. Fatty liver  -     US Abdomen Complete; Future; Expected date: 12/21/2022    9. Diabetic eye exam    10. Mixed hyperlipidemia  Assessment & Plan:  Lab Results   Component Value Date    CHOL 147 09/02/2022    TRIG 146 09/02/2022    HDL 39 (L) 09/02/2022    LDLCALC 78.8 09/02/2022    NONHDLCHOL 108 09/02/2022    AST 16 11/03/2022    ALT 17 11/03/2022     The 10-year ASCVD risk score (Alber MONTIEL, et al., 2019) is: 19.9%    Values used to calculate the score:      Age: 67 years      Sex: Female      Is Non- : Yes      Diabetic: Yes      Tobacco smoker: No      Systolic Blood Pressure: 130 mmHg      Is BP treated: Yes      HDL Cholesterol: 39 mg/dL      Total Cholesterol: 147 mg/dL       11. Need for case management follow-up  -     Ambulatory referral/consult to Outpatient Case Management    12. Screening for malignant neoplasm of colon  Assessment & Plan:  She declined  colonoscopy.    Orders:  -     Cologuard Screening (Multitarget Stool DNA); Future; Expected date: 12/21/2022    Unless noted herein, any chronic conditions are represented as and appear compensated/controlled and stable, and no other significant complaints or concerns were reported.    Follow up in about 3 months (around 3/21/2023) for review test results, discuss treatment plan, re-evaluate problem(s) discussed today.   Future Appointments   Date Time Provider Department Center   12/23/2022  8:00 AM [facility] US3 [facility] ULSOUND [facility]   12/23/2022  3:30 PM Jin Ruffin OD [facility] OPHTHAL [facility]   12/29/2022  7:30 AM [facility] CT1 LIMIT 500 LBS [facility] CT SCAN [facility]   1/11/2023 10:25 AM LABORATORY, [facility] LAB [facility]   1/11/2023 10:30 AM SPECIMEN, [facility] SPECLAB [facility]   1/19/2023  8:30 AM Cam Meza MD [facility] NEPHRO [facility]   3/23/2023  9:00 AM KARAN May MD [facility] IM [facility]        PRESCRIPTION DRUG MANAGEMENT  Outpatient Medications Prior to Visit   Medication Sig    amLODIPine (NORVASC) 10 MG tablet Take 1 tablet (10 mg total) by mouth once daily.    atorvastatin (LIPITOR) 20 MG tablet Take 1 tablet (20 mg total) by mouth every evening.    oxyCODONE-acetaminophen (PERCOCET) 7.5-325 mg per tablet Take 1 tablet by mouth every 8 hours as needed for pain    varicella-zoster [id number], PF, (SHINGRIX) 50 mcg/0.5 mL injection Inject 0.5 mL (one dose) into muscle now; schedule second dose  days later    empagliflozin (JARDIANCE) 10 mg tablet Take 1 tablet (10 mg total) by mouth once daily.    colchicine (COLCRYS) 0.6 mg tablet Take 2 pills for first hour and then 1 pill one hour later. Then take 1 pill once daily for three days.    diazePAM (VALIUM) 10 MG Tab Take 1 tablet (10 mg total) by mouth 2 (two) times daily as needed (anxiety).    diclofenac sodium (VOLTAREN) 1 % Gel APPLY A SMALL AMOUNT( 2 TO 4 GRAM MAX) TOPICALLY TO PAINFUL JOINT AREAS FOUR TIMES DAILY AS NEEDED.     "glycerin-mineral oil-lanolin Crea Apply 1 application topically daily as needed (to soothe itching).    losartan-hydrochlorothiazide 100-12.5 mg (HYZAAR) 100-12.5 mg Tab losartan 100 mg-hydrochlorothiazide 12.5 mg tablet   TAKE 1 TABLET BY MOUTH ONCE A DAY    naloxone (NARCAN) 4 mg/actuation Spry     omeprazole (PRILOSEC) 40 MG capsule Take 40 mg by mouth once daily.    levothyroxine (SYNTHROID) 100 MCG tablet Take 1 tablet (100 mcg total) by mouth before breakfast.     No facility-administered medications prior to visit.     Medications Discontinued During This Encounter   Medication Reason    levothyroxine (SYNTHROID) 100 MCG tablet Reorder    empagliflozin (JARDIANCE) 10 mg tablet Reorder     Medications Ordered This Encounter   Medications    empagliflozin (JARDIANCE) 10 mg tablet     Sig: Take 1 tablet (10 mg total) by mouth once daily.     Dispense:  90 tablet     Refill:  0    levothyroxine (SYNTHROID) 100 MCG tablet     Sig: Take 1 tablet (100 mcg total) by mouth before breakfast.     Dispense:  90 tablet     Refill:  1     PHYSICAL EXAM  Vitals:    12/21/22 0839   BP: 130/88   BP Location: Left arm   Patient Position: Sitting   BP Method: Large (Manual)   Pulse: 67   Temp: 96 °F (35.6 °C)   TempSrc: Tympanic   SpO2: 100%   Weight: 74.9 kg (165 lb 2 oz)   Height: 5' 4" (1.626 m)   Physical Exam  Vitals reviewed.   Constitutional:       General: She is not in acute distress.     Appearance: Normal appearance. She is not ill-appearing or diaphoretic.   Neck:      Comments: Nontender left neck mass, unchanged.  Cardiovascular:      Rate and Rhythm: Normal rate and regular rhythm.   Pulmonary:      Effort: Pulmonary effort is normal.      Breath sounds: Normal breath sounds.   Skin:     General: Skin is warm and dry.   Neurological:      Mental Status: She is alert and oriented to person, place, and time. Mental status is at baseline.   Psychiatric:         Attention and Perception: Attention normal. She does " not perceive auditory or visual hallucinations.         Mood and Affect: Mood normal. Affect is not inappropriate.         Speech: Speech is tangential.         Behavior: Behavior is not agitated, slowed, aggressive, withdrawn, hyperactive or combative. Behavior is cooperative.         Thought Content: Thought content is delusional. Thought content is not paranoid. Thought content does not include homicidal or suicidal ideation.      Comments: See description in HPI above.      Orders Placed This Encounter    Cologuard Screening (Multitarget Stool DNA)    US Abdomen Complete    CT Chest Without Contrast    Ambulatory referral/consult to Outpatient Case Management    Ambulatory referral/consult to Endocrine/ENT Surgery for ENT/Head and Neck Surgeons    Ambulatory referral/consult to Ophthalmology    levothyroxine (SYNTHROID) 100 MCG tablet    empagliflozin (JARDIANCE) 10 mg tablet     TOTAL TIME evaluating and managing this patient for this encounter was greater than or equal to 60 minutes. This time was spent personally by me on the following activities: pre-charting, review of patient's past medical history, assessing age-appropriate health maintenance needs, review of any interval history, medication reconciliation, reconciling/updating problem list, obtaining history from the patient, examination of the patient, review and interpretation of lab results, answering patient's questions about lab results, review and interpretation of imaging test results, answering patient's questions about imaging test results, reviewing consulting specialist notes, evaluation of the patient's response to treatment, review of [place] Board of Pharmacy Controlled Prescription Drug Monitoring database records for the patient, managing and/or ordering prescription medications, ordering labs, ordering imaging tests, discussing differential diagnosis with patient, educating patient and answering their questions about risks and benefits  "of treatment options, educating patient and answering their questions about treatment plan, goals of treatment, and follow-up, educating patient about needed cancer screenings, discussing strategies to help overcome any barriers to adherence to treatment plan, communicating with collaborating provider(s), and final documentation of the visit. This time was exclusive of any separately billable procedures for this patient and exclusive of time spent treating any other patients.   Documentation entered by me for this encounter may have been done in part using speech-recognition technology. Although I have made an effort to ensure accuracy, "sound like" errors may exist and should be interpreted in context.      "

## 2022-12-21 NOTE — ASSESSMENT & PLAN NOTE
We had lengthy discussion about differential diagnosis and risks and benefits of treatment options.  She agreed to proceed with neck surgeon consultation for further evaluation and treatment.

## 2022-12-21 NOTE — ASSESSMENT & PLAN NOTE
Lab Results   Component Value Date    EGFRNORACEVR 30.1 (A) 11/16/2022    EGFRNORACEVR 21 (A) 11/03/2022    EGFRNORACEVR 28.2 (A) 10/24/2022    CREATININE 1.8 (H) 11/16/2022    CREATININE 2.4 (H) 11/03/2022    CREATININE 1.9 (H) 10/24/2022    BUN 16 11/16/2022    BUN 29 (H) 11/03/2022    BUN 27 (H) 10/24/2022     Future Appointments   Date Time Provider Department Center   1/11/2023 10:25 AM LABORATORY, [facility] LAB [facility]   1/11/2023 10:30 AM SPECIMEN, [facility] SPECLAB [facility]   1/19/2023  8:30 AM Cam Meza MD [facility] NEPHRO [facility]

## 2022-12-21 NOTE — ASSESSMENT & PLAN NOTE
"She denies ajay or depression symptoms, and she denies history of bipolar disorder. She appears to have delusions of persecution, describing how "people have stolen everything she owns" and continue to "steal everything that she gets." She appears to be alienated from any family. Her speech is disorganized, with frequent derailment and lack of coherence. She demonstrates no visual or auditory hallucinations. Her thoughts are very disorganized. This appears to be a chronic, stable state for her, at least for the last several years.     She is unwilling to accept psychiatric referral. She says that she has never seen a psychiatrist and has never been hospitalized for a mental health condition. She does not appear to be an eminent threat to herself or others and does not meet criteria for PEC. (Obtained second opinion from colleague, who concurred.)    She is unwilling to consider any prescription medicine other than diazepam for treatment of this condition, and I am unaware of compelling indication for continuation of diazepam, especially given her chronic opioid use. She gave me permission to communicate about her care with Sandy Loaiza NP, whom she has been seeing for the diazepam at [facility]; [facility]; [address]; [place] [address].  "

## 2022-12-21 NOTE — ASSESSMENT & PLAN NOTE
Review of [place] Board of Pharmacy Controlled Prescription Drug Monitoring database reveals:    Narcotics (excluding Buprenorphine)   -30 Day Avg. MME 16.88   -90 Day Avg. MME 26.25    Buprenorphine   -30 Day Avg. mg/day 0.00   -90 Day Avg. mg/day 0.00    Sedatives   -30 Day Avg. LME 0.67   -90 Day Avg. LME 0.87    Prescriptions (sorted by date filled).    12/12/2022 - Oxycodone-Acetaminophn 7.5-325 Mg, QTY 90 (QS for 30 days), originally written 11/08/2022 by Rasta Arnett.    12/12/2022 - Diazepam 10 Mg Tablet, QTY 20 (QS for 10 days), originally written 12/12/2022 by Elmira Mast.    10/28/2022 - Percocet 7.5-325 Mg Tablet, QTY 90 (QS for 30 days), originally written 10/11/2022 by Rasta Arnett.    09/27/2022 - Oxycodone-Acetaminophn 7.5-325 Mg, QTY 90 (QS for 30 days), originally written 09/09/2022 by Rasta Arnett.    09/22/2022 - Diazepam 10 Mg Tablet, QTY 60 (QS for 30 days), originally written 06/07/2022 by Eliseo Carey.    08/19/2022 - Diazepam 10 Mg Tablet, QTY 60 (QS for 30 days), originally written 06/07/2022 by Eliseo Carey.

## 2022-12-21 NOTE — PROGRESS NOTES
"OFFICE VISIT 12/21/22  9:00 AM CST    CHIEF COMPLAINT: Follow-up    ASSESSMENT & PLAN  1. 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  She agreed to proceed with neck surgeon consultation for further evaluation and treatment .    Orders:  -     Ambulatory referral/consult to Endocrine/ENT Surgery for ENT/Head and Neck Surgeons; Future; Expected date: 12/28/2022    2. Schizophreniform disorder  Assessment & Plan:  She denies ajay or depression symptoms, and she denies history of bipolar disorder. She appears to have delusions of persecution, describing how "people have stolen everything she owns" and continue to "steal everything that she gets." She appears to be alienated from any family. Her speech is disorganized, with frequent derailment and lack of coherence. She demonstrates no visual or auditory hallucinations. Her thoughts are very disorganized. This appears to be a chronic, stable state for her, at least for the last several years.     She is unwilling to accept psychiatric referral. She says that she has never seen a psychiatrist and has never been hospitalized for a mental health condition. She does not appear to be an eminent threat to herself or others and does not meet criteria for PEC. (Obtained second opinion from colleague, who concurred.)    She is unwilling to consider any prescription medicine other than diazepam for treatment of this condition, and I am unaware of compelling indication for continuation of diazepam, especially given her chronic opioid use. She gave me permission to communicate about her care with Sandy oLaiza NP, whom she has been seeing for the diazepam at [facility]; [address] [place] [address]; [address]; [place] [address].      3. Chronic use of opiate for therapeutic purpose  Overview:  PAIN MANAGEMENT: Shaquille Martinez MD    Assessment & " Plan:  Review of [place] Board of Pharmacy Controlled Prescription Drug Monitoring database reveals:    Narcotics (excluding Buprenorphine)   -30 Day Avg. MME 16.88   -90 Day Avg. MME 26.25    Buprenorphine   -30 Day Avg. mg/day 0.00   -90 Day Avg. mg/day 0.00    Sedatives   -30 Day Avg. LME 0.67   -90 Day Avg. LME 0.87    Prescriptions (sorted by date filled).    12/12/2022 - Oxycodone-Acetaminophn 7.5-325 Mg, QTY 90 (QS for 30 days), originally written 11/08/2022 by Rasta Arnett.    12/12/2022 - Diazepam 10 Mg Tablet, QTY 20 (QS for 10 days), originally written 12/12/2022 by Elmira Mast.    10/28/2022 - Percocet 7.5-325 Mg Tablet, QTY 90 (QS for 30 days), originally written 10/11/2022 by Rasta Arnett.    09/27/2022 - Oxycodone-Acetaminophn 7.5-325 Mg, QTY 90 (QS for 30 days), originally written 09/09/2022 by Al Hope.    09/22/2022 - Diazepam 10 Mg Tablet, QTY 60 (QS for 30 days), originally written 06/07/2022 by Er Gre.    08/19/2022 - Diazepam 10 Mg Tablet, QTY 60 (QS for 30 days), originally written 06/07/2022 by Er Gre.      4. Nodule of upper lobe of right lung  Assessment & Plan:  We discussed differential diagnosis. It was agreed to proceed with evaluation with chest CT.    Orders:  -     CT Chest Without Contrast; Future; Expected date: 12/21/2022    5. Type 2 diabetes mellitus with stage 4 chronic kidney disease, without long-term current use of insulin  Assessment & Plan:  Diabetes Management Status    Statin: Taking  ACE/ARB: Taking    Screening or Prevention Patient's value Goal Complete/Controlled?   HgA1C Testing and Control   Lab Results   Component Value Date    HGBA1C 6.1 (H) 11/03/2022      Annually/Less than 8% Yes   Lipid profile : 09/02/2022 Annually Yes   LDL control Lab Results   Component Value Date    LDLCALC 78.8 09/02/2022    Annually/Less than 100 mg/dl  Yes   Nephropathy screening Lab Results   Component Value Date    LABMICR <5.0 09/02/2022     Lab Results   Component Value Date    PROTEINUA Negative  11/28/2014    Annually Yes   Blood pressure BP Readings from Last 1 Encounters:   12/21/22 130/88    Less than 140/90 Yes   Dilated retinal exam Most Recent Eye Exam Date: Not Found Annually Yes   Foot exam   : 10/24/2022 Annually Yes     Lab Results   Component Value Date    HGBA1C 6.1 (H) 11/03/2022    HGBA1C 6.1 (H) 09/02/2022    EGFRNORACEVR 30.1 (A) 11/16/2022    MICALBCREAT Unable to calculate 09/02/2022    LDLCALC 78.8 09/02/2022     No results found for: GLUTAMICACID, CPEPTIDE   Last 5 Patient Entered Readings                                          Most Recent A1c: 6.1% on 11/3/2022  (Goal: 8%)       Recent Readings 12/19/2022 12/17/2022 12/15/2022 12/12/2022 12/8/2022    Blood Glucose (mg/dL) 141 126 171 148 197           HEALTH MAINTENANCE: Diabetic health maintenance interventions reviewed and are up to date except for:      Topic    Eye Exam         Orders:  -     Ambulatory referral/consult to Ophthalmology; Future; Expected date: 12/28/2022  -     empagliflozin (JARDIANCE) 10 mg tablet; Take 1 tablet (10 mg total) by mouth once daily.  Dispense: 90 tablet; Refill: 0    6. Stage 4 chronic kidney disease  Assessment & Plan:  Lab Results   Component Value Date    EGFRNORACEVR 30.1 (A) 11/16/2022    EGFRNORACEVR 21 (A) 11/03/2022    EGFRNORACEVR 28.2 (A) 10/24/2022    CREATININE 1.8 (H) 11/16/2022    CREATININE 2.4 (H) 11/03/2022    CREATININE 1.9 (H) 10/24/2022    BUN 16 11/16/2022    BUN 29 (H) 11/03/2022    BUN 27 (H) 10/24/2022     Future Appointments   Date Time Provider Department Center   1/11/2023 10:25 AM LABORATORY, [facility] LAB [facility]   1/11/2023 10:30 AM SPECIMEN, [facility] SPECLAB [facility]   1/19/2023  8:30 AM Cam Meza MD [facility] NEPHRO [facility]          7. Post-surgical hypothyroidism  Assessment & Plan:  Lab Results   Component Value Date    TSH 2.602 09/02/2022    FREET4 0.97 09/02/2022       Orders:  -     levothyroxine (SYNTHROID) 100 MCG tablet; Take 1 tablet (100 mcg  total) by mouth before breakfast.  Dispense: 90 tablet; Refill: 1    8. Fatty liver  -     US Abdomen Complete; Future; Expected date: 12/21/2022    9. Diabetic eye exam    10. Mixed hyperlipidemia  Assessment & Plan:  Lab Results   Component Value Date    CHOL 147 09/02/2022    TRIG 146 09/02/2022    HDL 39 (L) 09/02/2022    LDLCALC 78.8 09/02/2022    NONHDLCHOL 108 09/02/2022    AST 16 11/03/2022    ALT 17 11/03/2022     The 10-year ASCVD risk score (Alber MONTIEL, et al., 2019) is: 19.9%    Values used to calculate the score:      Age: 67 years      Sex: Female      Is Non- : Yes      Diabetic: Yes      Tobacco smoker: No      Systolic Blood Pressure: 130 mmHg      Is BP treated: Yes      HDL Cholesterol: 39 mg/dL      Total Cholesterol: 147 mg/dL       11. Need for case management follow-up  -     Ambulatory referral/consult to Outpatient Case Management    12. Screening for malignant neoplasm of colon  Assessment & Plan:  She declined colonoscopy.    Orders:  -     Cologuard Screening (Multitarget Stool DNA); Future; Expected date: 12/21/2022    Unless noted herein, any chronic conditions are represented as and appear compensated/controlled and stable, and no other significant complaints or concerns were reported.    Follow up in about 3 months (around 3/21/2023) for review test results, discuss treatment plan, re-evaluate problem(s) discussed today.   Future Appointments   Date Time Provider Department Center   12/23/2022  8:00 AM [facility] US3 [facility] ULSOUND [facility]   12/23/2022  3:30 PM Jin Ruffin OD [facility] OPHTHAL [facility]   12/29/2022  7:30 AM [facility] CT1 LIMIT 500 LBS HGV CT SCAN [facility]   1/11/2023 10:25 AM LABORATORY, [facility] LAB [facility]   1/11/2023 10:30 AM SPECIMEN, [facility] SPECLAB [facility]   1/19/2023  8:30 AM Cam Meza MD HG NEPHRO [facility]   3/23/2023  9:00 AM KARAN aMy MD [facility] IM [facility]        PRESCRIPTION DRUG MANAGEMENT  Outpatient  Medications Prior to Visit   Medication Sig    amLODIPine (NORVASC) 10 MG tablet Take 1 tablet (10 mg total) by mouth once daily.    atorvastatin (LIPITOR) 20 MG tablet Take 1 tablet (20 mg total) by mouth every evening.    oxyCODONE-acetaminophen (PERCOCET) 7.5-325 mg per tablet Take 1 tablet by mouth every 8 hours as needed for pain    varicella-zoster [id number], PF, (SHINGRIX) 50 mcg/0.5 mL injection Inject 0.5 mL (one dose) into muscle now; schedule second dose  days later    empagliflozin (JARDIANCE) 10 mg tablet Take 1 tablet (10 mg total) by mouth once daily.    colchicine (COLCRYS) 0.6 mg tablet Take 2 pills for first hour and then 1 pill one hour later. Then take 1 pill once daily for three days.    diazePAM (VALIUM) 10 MG Tab Take 1 tablet (10 mg total) by mouth 2 (two) times daily as needed (anxiety).    diclofenac sodium (VOLTAREN) 1 % Gel APPLY A SMALL AMOUNT( 2 TO 4 GRAM MAX) TOPICALLY TO PAINFUL JOINT AREAS FOUR TIMES DAILY AS NEEDED.    glycerin-mineral oil-lanolin Crea Apply 1 application topically daily as needed (to soothe itching).    losartan-hydrochlorothiazide 100-12.5 mg (HYZAAR) 100-12.5 mg Tab losartan 100 mg-hydrochlorothiazide 12.5 mg tablet   TAKE 1 TABLET BY MOUTH ONCE A DAY    naloxone (NARCAN) 4 mg/actuation Spry     omeprazole (PRILOSEC) 40 MG capsule Take 40 mg by mouth once daily.    levothyroxine (SYNTHROID) 100 MCG tablet Take 1 tablet (100 mcg total) by mouth before breakfast.     No facility-administered medications prior to visit.     Medications Discontinued During This Encounter   Medication Reason    levothyroxine (SYNTHROID) 100 MCG tablet Reorder    empagliflozin (JARDIANCE) 10 mg tablet Reorder     Medications Ordered This Encounter   Medications    empagliflozin (JARDIANCE) 10 mg tablet     Sig: Take 1 tablet (10 mg total) by mouth once daily.     Dispense:  90 tablet     Refill:  0    levothyroxine (SYNTHROID) 100 MCG tablet     Sig: Take 1 tablet (100 mcg total)  "by mouth before breakfast.     Dispense:  90 tablet     Refill:  1     PHYSICAL EXAM  Vitals:    12/21/22 0839   BP: 130/88   BP Location: Left arm   Patient Position: Sitting   BP Method: Large (Manual)   Pulse: 67   Temp: 96 °F (35.6 °C)   TempSrc: Tympanic   SpO2: 100%   Weight: 74.9 kg (165 lb 2 oz)   Height: 5' 4" (1.626 m)   Physical Exam  Vitals reviewed.   Constitutional:       General: She is not in acute distress.     Appearance: Normal appearance. She is not ill-appearing or diaphoretic.   Neck:      Comments: Nontender left neck mass, unchanged.  Cardiovascular:      Rate and Rhythm: Normal rate and regular rhythm.   Pulmonary:      Effort: Pulmonary effort is normal.      Breath sounds: Normal breath sounds.   Skin:     General: Skin is warm and dry.   Neurological:      Mental Status: She is alert and oriented to person, place, and time. Mental status is at baseline.   Psychiatric:         Attention and Perception: Attention normal. She does not perceive auditory or visual hallucinations.         Mood and Affect: Mood normal. Affect is not inappropriate.         Speech: Speech is tangential.         Behavior: Behavior is not agitated, slowed, aggressive, withdrawn, hyperactive or combative. Behavior is cooperative.         Thought Content: Thought content is delusional. Thought content is not paranoid. Thought content does not include homicidal or suicidal ideation.      Comments: See description in HPI above.      Orders Placed This Encounter    Cologuard Screening (Multitarget Stool DNA)    US Abdomen Complete    CT Chest Without Contrast    Ambulatory referral/consult to Outpatient Case Management    Ambulatory referral/consult to Endocrine/ENT Surgery for ENT/Head and Neck Surgeons    Ambulatory referral/consult to Ophthalmology    levothyroxine (SYNTHROID) 100 MCG tablet    empagliflozin (JARDIANCE) 10 mg tablet     TOTAL TIME evaluating and managing this patient for this encounter was greater " "than or equal to 60 minutes. This time was spent personally by me on the following activities: pre-charting, review of patient's past medical history, assessing age-appropriate health maintenance needs, review of any interval history, medication reconciliation, reconciling/updating problem list, obtaining history from the patient, examination of the patient, review and interpretation of lab results, answering patient's questions about lab results, review and interpretation of imaging test results, answering patient's questions about imaging test results, reviewing consulting specialist notes, evaluation of the patient's response to treatment, review of [place] Board of Pharmacy Controlled Prescription Drug Monitoring database records for the patient, managing and/or ordering prescription medications, ordering labs, ordering imaging tests, discussing differential diagnosis with patient, educating patient and answering their questions about risks and benefits of treatment options, educating patient and answering their questions about treatment plan, goals of treatment, and follow-up, educating patient about needed cancer screenings, discussing strategies to help overcome any barriers to adherence to treatment plan, communicating with collaborating provider(s), and final documentation of the visit. This time was exclusive of any separately billable procedures for this patient and exclusive of time spent treating any other patients.   Documentation entered by me for this encounter may have been done in part using speech-recognition technology. Although I have made an effort to ensure accuracy, "sound like" errors may exist and should be interpreted in context.   "

## 2022-12-21 NOTE — ASSESSMENT & PLAN NOTE
Lab Results   Component Value Date    CHOL 147 09/02/2022    TRIG 146 09/02/2022    HDL 39 (L) 09/02/2022    LDLCALC 78.8 09/02/2022    NONHDLCHOL 108 09/02/2022    AST 16 11/03/2022    ALT 17 11/03/2022     The 10-year ASCVD risk score (Alber MONTIEL, et al., 2019) is: 19.9%    Values used to calculate the score:      Age: 67 years      Sex: Female      Is Non- : Yes      Diabetic: Yes      Tobacco smoker: No      Systolic Blood Pressure: 130 mmHg      Is BP treated: Yes      HDL Cholesterol: 39 mg/dL      Total Cholesterol: 147 mg/dL

## 2022-12-22 ENCOUNTER — PATIENT OUTREACH (OUTPATIENT)
Dept: ADMINISTRATIVE | Facility: HOSPITAL | Age: 67
End: 2022-12-22
Payer: MEDICARE

## 2022-12-23 ENCOUNTER — OFFICE VISIT (OUTPATIENT)
Dept: OPHTHALMOLOGY | Facility: CLINIC | Age: 67
End: 2022-12-23
Payer: MEDICARE

## 2022-12-23 ENCOUNTER — HOSPITAL ENCOUNTER (OUTPATIENT)
Dept: RADIOLOGY | Facility: HOSPITAL | Age: 67
Discharge: HOME | End: 2022-12-23
Attending: FAMILY MEDICINE
Payer: MEDICARE

## 2022-12-23 DIAGNOSIS — D31.31 CHOROIDAL NEVUS OF RIGHT EYE: ICD-10-CM

## 2022-12-23 DIAGNOSIS — H52.13 MYOPIA WITH ASTIGMATISM AND PRESBYOPIA, BILATERAL: ICD-10-CM

## 2022-12-23 DIAGNOSIS — K76.0 FATTY LIVER: ICD-10-CM

## 2022-12-23 DIAGNOSIS — N18.4 TYPE 2 DIABETES MELLITUS WITH STAGE 4 CHRONIC KIDNEY DISEASE, WITHOUT LONG-TERM CURRENT USE OF INSULIN: Chronic | ICD-10-CM

## 2022-12-23 DIAGNOSIS — H40.013 OPEN ANGLE WITH BORDERLINE FINDINGS OF BOTH EYES: ICD-10-CM

## 2022-12-23 DIAGNOSIS — E11.9 TYPE 2 DIABETES MELLITUS WITHOUT RETINOPATHY: Primary | ICD-10-CM

## 2022-12-23 DIAGNOSIS — H52.203 MYOPIA WITH ASTIGMATISM AND PRESBYOPIA, BILATERAL: ICD-10-CM

## 2022-12-23 DIAGNOSIS — E11.22 TYPE 2 DIABETES MELLITUS WITH STAGE 4 CHRONIC KIDNEY DISEASE, WITHOUT LONG-TERM CURRENT USE OF INSULIN: Chronic | ICD-10-CM

## 2022-12-23 DIAGNOSIS — Z96.1 PSEUDOPHAKIA OF BOTH EYES: ICD-10-CM

## 2022-12-23 DIAGNOSIS — H52.4 MYOPIA WITH ASTIGMATISM AND PRESBYOPIA, BILATERAL: ICD-10-CM

## 2022-12-23 PROCEDURE — 92004 PR EYE EXAM, NEW PATIENT,COMPREHESV: ICD-10-PCS | Mod: HCNC,S$GLB,, | Performed by: OPTOMETRIST

## 2022-12-23 PROCEDURE — 1159F MED LIST DOCD IN RCRD: CPT | Mod: HCNC,CPTII,S$GLB, | Performed by: OPTOMETRIST

## 2022-12-23 PROCEDURE — 3044F HG A1C LEVEL LT 7.0%: CPT | Mod: HCNC,CPTII,S$GLB, | Performed by: OPTOMETRIST

## 2022-12-23 PROCEDURE — 76700 US EXAM ABDOM COMPLETE: CPT | Mod: TC,HCNC

## 2022-12-23 PROCEDURE — 3044F PR MOST RECENT HEMOGLOBIN A1C LEVEL <7.0%: ICD-10-PCS | Mod: HCNC,CPTII,S$GLB, | Performed by: OPTOMETRIST

## 2022-12-23 PROCEDURE — 92015 DETERMINE REFRACTIVE STATE: CPT | Mod: HCNC,S$GLB,, | Performed by: OPTOMETRIST

## 2022-12-23 PROCEDURE — 76700 US ABDOMEN COMPLETE: ICD-10-PCS | Mod: 26,HCNC,, | Performed by: RADIOLOGY

## 2022-12-23 PROCEDURE — 92250 COLOR FUNDUS PHOTOGRAPHY - OU - BOTH EYES: ICD-10-PCS | Mod: HCNC,S$GLB,, | Performed by: OPTOMETRIST

## 2022-12-23 PROCEDURE — 1159F PR MEDICATION LIST DOCUMENTED IN MEDICAL RECORD: ICD-10-PCS | Mod: HCNC,CPTII,S$GLB, | Performed by: OPTOMETRIST

## 2022-12-23 PROCEDURE — 76700 US EXAM ABDOM COMPLETE: CPT | Mod: 26,HCNC,, | Performed by: RADIOLOGY

## 2022-12-23 PROCEDURE — 2023F DILAT RTA XM W/O RTNOPTHY: CPT | Mod: HCNC,CPTII,S$GLB, | Performed by: OPTOMETRIST

## 2022-12-23 PROCEDURE — 3066F PR DOCUMENTATION OF TREATMENT FOR NEPHROPATHY: ICD-10-PCS | Mod: HCNC,CPTII,S$GLB, | Performed by: OPTOMETRIST

## 2022-12-23 PROCEDURE — 92004 COMPRE OPH EXAM NEW PT 1/>: CPT | Mod: HCNC,S$GLB,, | Performed by: OPTOMETRIST

## 2022-12-23 PROCEDURE — 2023F PR DILATED RETINAL EXAM W/O EVID OF RETINOPATHY: ICD-10-PCS | Mod: HCNC,CPTII,S$GLB, | Performed by: OPTOMETRIST

## 2022-12-23 PROCEDURE — 3061F NEG MICROALBUMINURIA REV: CPT | Mod: HCNC,CPTII,S$GLB, | Performed by: OPTOMETRIST

## 2022-12-23 PROCEDURE — 3066F NEPHROPATHY DOC TX: CPT | Mod: HCNC,CPTII,S$GLB, | Performed by: OPTOMETRIST

## 2022-12-23 PROCEDURE — 99999 PR PBB SHADOW E&M-EST. PATIENT-LVL III: ICD-10-PCS | Mod: PBBFAC,HCNC,, | Performed by: OPTOMETRIST

## 2022-12-23 PROCEDURE — 1160F PR REVIEW ALL MEDS BY PRESCRIBER/CLIN PHARMACIST DOCUMENTED: ICD-10-PCS | Mod: HCNC,CPTII,S$GLB, | Performed by: OPTOMETRIST

## 2022-12-23 PROCEDURE — 1160F RVW MEDS BY RX/DR IN RCRD: CPT | Mod: HCNC,CPTII,S$GLB, | Performed by: OPTOMETRIST

## 2022-12-23 PROCEDURE — 3061F PR NEG MICROALBUMINURIA RESULT DOCUMENTED/REVIEW: ICD-10-PCS | Mod: HCNC,CPTII,S$GLB, | Performed by: OPTOMETRIST

## 2022-12-23 PROCEDURE — 92015 PR REFRACTION: ICD-10-PCS | Mod: HCNC,S$GLB,, | Performed by: OPTOMETRIST

## 2022-12-23 PROCEDURE — 92250 FUNDUS PHOTOGRAPHY W/I&R: CPT | Mod: HCNC,S$GLB,, | Performed by: OPTOMETRIST

## 2022-12-23 PROCEDURE — 99999 PR PBB SHADOW E&M-EST. PATIENT-LVL III: CPT | Mod: PBBFAC,HCNC,, | Performed by: OPTOMETRIST

## 2022-12-23 NOTE — PROGRESS NOTES
HPI     Annual Exam            Comments: Pt reports for NP annual exam. Denies any pain or irritation.   Va stable, trouble reading. No drops.          Last edited by Fidencio Bob on 12/23/2022  9:44 AM.            Assessment /Plan     For exam results, see Encounter Report.    Type 2 diabetes mellitus without retinopathy  There was no diabetic retinopathy present in either eye today.   Recommended that pt continue care with PCP and/or specialists regarding diabetes.  Follow-up dilated eye exam recommended in 12 months, sooner with any vision changes or new concerns.    Open angle with borderline findings of both eyes  Suspect based on ONH cupping and pt history  Using purple top drop qd OU   Asked her to bring her drops to next visit      Pseudophakia of both eyes  Doing well OU  Monitor 12 months    Choroidal nevus of right eye  -     Color Fundus Photography - OU - Both Eyes  Baseline photos today  Monitor 12 months      Myopia with astigmatism and presbyopia, bilateral  Eyeglass Final Rx       Eyeglass Final Rx         Sphere Cylinder Axis Add    Right -1.50 +1.00 175 +2.50    Left -1.00 +0.50 007 +2.50      Expiration Date: 12/23/2023                  RTC next available for 24-2HVF, gOCT, Pach and IOP check or PRN if any problems.   Discussed above and answered questions.

## 2022-12-27 ENCOUNTER — TELEPHONE (OUTPATIENT)
Dept: PODIATRY | Facility: CLINIC | Age: 67
End: 2022-12-27
Payer: MEDICARE

## 2022-12-27 DIAGNOSIS — M10.9 GOUTY ARTHRITIS OF LEFT GREAT TOE: Primary | ICD-10-CM

## 2022-12-27 DIAGNOSIS — N18.4 STAGE 4 CHRONIC KIDNEY DISEASE: ICD-10-CM

## 2022-12-27 RX ORDER — COLCHICINE 0.6 MG/1
TABLET ORAL
Qty: 6 TABLET | Refills: 0 | Status: SHIPPED | OUTPATIENT
Start: 2022-12-27 | End: 2022-12-28

## 2022-12-27 NOTE — TELEPHONE ENCOUNTER
Patient requesting medication for gout flare up. Advised Dr. Owusu is currently not in clinic this week [facility]    ----- Message from Oliver Patel sent at 12/27/2022 12:04 PM CST -----  Contact: [phone]  Patient would like to consult with a nurse in regards to a prescription request. Please give her call back at [phone]. Thanks abbie

## 2022-12-28 ENCOUNTER — TELEPHONE (OUTPATIENT)
Dept: INTERNAL MEDICINE | Facility: CLINIC | Age: 67
End: 2022-12-28
Payer: MEDICARE

## 2022-12-28 DIAGNOSIS — N18.4 STAGE 4 CHRONIC KIDNEY DISEASE: ICD-10-CM

## 2022-12-28 DIAGNOSIS — M10.9 GOUTY ARTHRITIS OF LEFT GREAT TOE: Primary | ICD-10-CM

## 2022-12-28 RX ORDER — COLCHICINE 0.6 MG/1
CAPSULE ORAL
Qty: 6 CAPSULE | Refills: 1 | Status: SHIPPED | OUTPATIENT
Start: 2022-12-28 | End: 2023-01-19

## 2022-12-28 NOTE — TELEPHONE ENCOUNTER
----- Message from Cris Reyna sent at 12/28/2022 10:11 AM CST -----  Pt is requesting an ultrasound referral for her knee cap be put in to be done on tomorrow when she is having her ultrasound on her chest. Pt is also requesting an order for crutches. Call back number is [phone] [facility]

## 2022-12-28 NOTE — TELEPHONE ENCOUNTER
An in-office appointment is required to address this adequately.  Please assist Lizz with scheduling Lizz a timely appointment with me or one of my MD/SALMA partners.

## 2022-12-28 NOTE — TELEPHONE ENCOUNTER
Pt spoke with the pt regarding pt is requesting an ultrasound referral for her knee cap be put in to be done on tomorrow when she is having her ultrasound on her chest. Pt is also requesting an order for crutches.//LB

## 2022-12-29 ENCOUNTER — HOSPITAL ENCOUNTER (OUTPATIENT)
Dept: RADIOLOGY | Facility: HOSPITAL | Age: 67
Discharge: HOME | End: 2022-12-29
Attending: FAMILY MEDICINE
Payer: MEDICARE

## 2022-12-29 DIAGNOSIS — R91.1 NODULE OF UPPER LOBE OF RIGHT LUNG: ICD-10-CM

## 2022-12-29 PROCEDURE — 71250 CT THORAX DX C-: CPT | Mod: 26,HCNC,, | Performed by: RADIOLOGY

## 2022-12-29 PROCEDURE — 71250 CT THORAX DX C-: CPT | Mod: TC,HCNC

## 2022-12-29 PROCEDURE — 71250 CT CHEST WITHOUT CONTRAST: ICD-10-PCS | Mod: 26,HCNC,, | Performed by: RADIOLOGY

## 2022-12-30 DIAGNOSIS — E11.22 TYPE 2 DIABETES MELLITUS WITH STAGE 4 CHRONIC KIDNEY DISEASE, WITHOUT LONG-TERM CURRENT USE OF INSULIN: Chronic | ICD-10-CM

## 2022-12-30 DIAGNOSIS — I25.10 CORONARY ARTERY DISEASE INVOLVING NATIVE CORONARY ARTERY OF NATIVE HEART WITHOUT ANGINA PECTORIS: Primary | ICD-10-CM

## 2022-12-30 DIAGNOSIS — N18.4 TYPE 2 DIABETES MELLITUS WITH STAGE 4 CHRONIC KIDNEY DISEASE, WITHOUT LONG-TERM CURRENT USE OF INSULIN: Chronic | ICD-10-CM

## 2022-12-30 RX ORDER — NITROGLYCERIN 0.4 MG/1
TABLET SUBLINGUAL
COMMUNITY
End: 2023-01-03 | Stop reason: SDUPTHER

## 2022-12-30 RX ORDER — NITROGLYCERIN 0.4 MG/1
TABLET SUBLINGUAL
Status: CANCELLED | OUTPATIENT
Start: 2022-12-30

## 2022-12-30 NOTE — TELEPHONE ENCOUNTER
Spoke with patient and she stated that she has lost her Jardiance and needs Dr. May to send in a new prescription for it because she has been out for a week. She also said that she is out of her nitroglycerine for when she has chest pains and needs some more.

## 2022-12-30 NOTE — TELEPHONE ENCOUNTER
No new care gaps identified.  [facility] Embedded Care Gaps. Reference number: [id number]. 12/30/2022   2:37:02 PM CST

## 2022-12-30 NOTE — TELEPHONE ENCOUNTER
----- Message from Cris Reyna sent at 12/30/2022  1:20 PM CST -----  Pt is requesting a call back regarding medication. Call back number [phone] x jm

## 2023-01-03 ENCOUNTER — PATIENT MESSAGE (OUTPATIENT)
Dept: INTERNAL MEDICINE | Facility: CLINIC | Age: 68
End: 2023-01-03
Payer: MEDICARE

## 2023-01-03 PROBLEM — I25.10 CORONARY ARTERY DISEASE INVOLVING NATIVE CORONARY ARTERY OF NATIVE HEART WITHOUT ANGINA PECTORIS: Status: ACTIVE | Noted: 2023-01-03

## 2023-01-03 RX ORDER — NITROGLYCERIN 0.4 MG/1
0.4 TABLET SUBLINGUAL EVERY 5 MIN PRN
Qty: 25 TABLET | Refills: 0 | Status: SHIPPED | OUTPATIENT
Start: 2023-01-03 | End: 2024-01-03

## 2023-01-03 NOTE — TELEPHONE ENCOUNTER
Lizz Davidson.    I approved your refill request for your medicines listed below and sent the prescriptions electronically to your pharmacy.    I look forward to seeing you at your next appointment on 3/23/2023.    Thanks for letting me care for you, and thanks for trusting [facility] with your healthcare needs.    All the best,    GENARO May MD    Future Appointments   Date Time Provider Department Center   1/4/2023  9:00 AM Emily Nayak PA-C [facility]   1/11/2023 10:25 AM LABORATORY, [facility] LAB [facility]   1/11/2023 10:30 AM SPECIMEN, [facility] SPECLAB [facility]   1/13/2023  8:00 AM FIELDS, VISUAL-ONE [facility] OPHTHAL [facility]   1/13/2023  9:20 AM Jin Ruffin OD [facility] OPHTHAL [facility]   1/19/2023  8:30 AM Cam Meza MD [facility] NEPHRO [facility]   3/23/2023  9:00 AM KARAN May MD [facility]      Medications Ordered This Encounter   Medications    empagliflozin (JARDIANCE) 10 mg tablet     Sig: Take 1 tablet (10 mg total) by mouth once daily.     Dispense:  90 tablet     Refill:  0    nitroGLYCERIN (NITROSTAT) 0.4 MG SL tablet     Sig: Place 1 tablet (0.4 mg total) under the tongue every 5 (five) minutes as needed for Chest pain (for chest pain/discomfort). Call 911 if chest pain persists after second dose.     Dispense:  25 tablet     Refill:  0

## 2023-01-04 ENCOUNTER — OFFICE VISIT (OUTPATIENT)
Dept: INTERNAL MEDICINE | Facility: CLINIC | Age: 68
End: 2023-01-04
Payer: MEDICARE

## 2023-01-04 VITALS
SYSTOLIC BLOOD PRESSURE: 118 MMHG | OXYGEN SATURATION: 99 % | DIASTOLIC BLOOD PRESSURE: 78 MMHG | TEMPERATURE: 97 F | HEIGHT: 64 IN | WEIGHT: 166 LBS | BODY MASS INDEX: 28.34 KG/M2 | HEART RATE: 97 BPM

## 2023-01-04 DIAGNOSIS — K76.0 FATTY LIVER: Primary | Chronic | ICD-10-CM

## 2023-01-04 DIAGNOSIS — E11.22 TYPE 2 DIABETES MELLITUS WITH STAGE 4 CHRONIC KIDNEY DISEASE, WITHOUT LONG-TERM CURRENT USE OF INSULIN: Chronic | ICD-10-CM

## 2023-01-04 DIAGNOSIS — N18.4 TYPE 2 DIABETES MELLITUS WITH STAGE 4 CHRONIC KIDNEY DISEASE, WITHOUT LONG-TERM CURRENT USE OF INSULIN: Chronic | ICD-10-CM

## 2023-01-04 DIAGNOSIS — R91.1 NODULE OF UPPER LOBE OF RIGHT LUNG: Chronic | ICD-10-CM

## 2023-01-04 DIAGNOSIS — N18.4 STAGE 4 CHRONIC KIDNEY DISEASE: Chronic | ICD-10-CM

## 2023-01-04 PROCEDURE — 1126F AMNT PAIN NOTED NONE PRSNT: CPT | Mod: HCNC,CPTII,S$GLB, | Performed by: PHYSICIAN ASSISTANT

## 2023-01-04 PROCEDURE — 3072F LOW RISK FOR RETINOPATHY: CPT | Mod: HCNC,CPTII,S$GLB, | Performed by: PHYSICIAN ASSISTANT

## 2023-01-04 PROCEDURE — 99213 PR OFFICE/OUTPT VISIT, EST, LEVL III, 20-29 MIN: ICD-10-PCS | Mod: HCNC,S$GLB,, | Performed by: PHYSICIAN ASSISTANT

## 2023-01-04 PROCEDURE — 3074F PR MOST RECENT SYSTOLIC BLOOD PRESSURE < 130 MM HG: ICD-10-PCS | Mod: HCNC,CPTII,S$GLB, | Performed by: PHYSICIAN ASSISTANT

## 2023-01-04 PROCEDURE — 3078F PR MOST RECENT DIASTOLIC BLOOD PRESSURE < 80 MM HG: ICD-10-PCS | Mod: HCNC,CPTII,S$GLB, | Performed by: PHYSICIAN ASSISTANT

## 2023-01-04 PROCEDURE — 99213 OFFICE O/P EST LOW 20 MIN: CPT | Mod: HCNC,S$GLB,, | Performed by: PHYSICIAN ASSISTANT

## 2023-01-04 PROCEDURE — 3078F DIAST BP <80 MM HG: CPT | Mod: HCNC,CPTII,S$GLB, | Performed by: PHYSICIAN ASSISTANT

## 2023-01-04 PROCEDURE — 3288F PR FALLS RISK ASSESSMENT DOCUMENTED: ICD-10-PCS | Mod: HCNC,CPTII,S$GLB, | Performed by: PHYSICIAN ASSISTANT

## 2023-01-04 PROCEDURE — 1101F PT FALLS ASSESS-DOCD LE1/YR: CPT | Mod: HCNC,CPTII,S$GLB, | Performed by: PHYSICIAN ASSISTANT

## 2023-01-04 PROCEDURE — 3074F SYST BP LT 130 MM HG: CPT | Mod: HCNC,CPTII,S$GLB, | Performed by: PHYSICIAN ASSISTANT

## 2023-01-04 PROCEDURE — 3008F PR BODY MASS INDEX (BMI) DOCUMENTED: ICD-10-PCS | Mod: HCNC,CPTII,S$GLB, | Performed by: PHYSICIAN ASSISTANT

## 2023-01-04 PROCEDURE — 99999 PR PBB SHADOW E&M-EST. PATIENT-LVL V: CPT | Mod: PBBFAC,HCNC,, | Performed by: PHYSICIAN ASSISTANT

## 2023-01-04 PROCEDURE — 3008F BODY MASS INDEX DOCD: CPT | Mod: HCNC,CPTII,S$GLB, | Performed by: PHYSICIAN ASSISTANT

## 2023-01-04 PROCEDURE — 1101F PR PT FALLS ASSESS DOC 0-1 FALLS W/OUT INJ PAST YR: ICD-10-PCS | Mod: HCNC,CPTII,S$GLB, | Performed by: PHYSICIAN ASSISTANT

## 2023-01-04 PROCEDURE — 1159F PR MEDICATION LIST DOCUMENTED IN MEDICAL RECORD: ICD-10-PCS | Mod: HCNC,CPTII,S$GLB, | Performed by: PHYSICIAN ASSISTANT

## 2023-01-04 PROCEDURE — 1159F MED LIST DOCD IN RCRD: CPT | Mod: HCNC,CPTII,S$GLB, | Performed by: PHYSICIAN ASSISTANT

## 2023-01-04 PROCEDURE — 1160F PR REVIEW ALL MEDS BY PRESCRIBER/CLIN PHARMACIST DOCUMENTED: ICD-10-PCS | Mod: HCNC,CPTII,S$GLB, | Performed by: PHYSICIAN ASSISTANT

## 2023-01-04 PROCEDURE — 1126F PR PAIN SEVERITY QUANTIFIED, NO PAIN PRESENT: ICD-10-PCS | Mod: HCNC,CPTII,S$GLB, | Performed by: PHYSICIAN ASSISTANT

## 2023-01-04 PROCEDURE — 3072F PR LOW RISK FOR RETINOPATHY: ICD-10-PCS | Mod: HCNC,CPTII,S$GLB, | Performed by: PHYSICIAN ASSISTANT

## 2023-01-04 PROCEDURE — 1160F RVW MEDS BY RX/DR IN RCRD: CPT | Mod: HCNC,CPTII,S$GLB, | Performed by: PHYSICIAN ASSISTANT

## 2023-01-04 PROCEDURE — 99999 PR PBB SHADOW E&M-EST. PATIENT-LVL V: ICD-10-PCS | Mod: PBBFAC,HCNC,, | Performed by: PHYSICIAN ASSISTANT

## 2023-01-04 PROCEDURE — 3288F FALL RISK ASSESSMENT DOCD: CPT | Mod: HCNC,CPTII,S$GLB, | Performed by: PHYSICIAN ASSISTANT

## 2023-01-04 NOTE — PROGRESS NOTES
Subjective:      Patient ID: Lizz Crockett is a 67 y.o. female.    Chief Complaint: Follow-up    HPI  Here today for discussion of her recent lab results. Pt not on mychart and would like to discuss her results.   Scheduled to see nephrology later this month, labs prior. Apts discussed with patient.   Pt states that she stopped the losartan-hctz due to kidney disease. Currently on amlodpine 10mg. Blood pressure stable and controlled on just the amlodipine.     Patient Active Problem List   Diagnosis    Post-surgical hypothyroidism    Mixed hyperlipidemia    Glaucoma    Gastroesophageal reflux disease without esophagitis    Asymptomatic menopausal state    Essential hypertension    Type 2 diabetes mellitus with stage 4 chronic kidney disease, without long-term current use of insulin    Mass of left side of neck    At risk for knowledge deficit    Stage 4 chronic kidney disease    Onychomadesis of toenail    Screening for malignant neoplasm of colon    Chronic use of opiate for therapeutic purpose    Osteopenia of multiple sites    Primary localized osteoarthrosis of multiple sites    Fatty liver    Schizophreniform disorder    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 Then take 1 pill once daily for three days, Disp: 6 capsule, Rfl: 1    diazePAM (VALIUM) 10 MG Tab, Take 1 tablet (10 mg total) by mouth 2 (two) times daily as needed (anxiety)., Disp: 20 tablet, Rfl: 0    diclofenac sodium (VOLTAREN) 1 % Gel, APPLY A  SMALL AMOUNT( 2 TO 4 GRAM MAX) TOPICALLY TO PAINFUL JOINT AREAS FOUR TIMES DAILY AS NEEDED., Disp: 100 g, Rfl: 0    empagliflozin (JARDIANCE) 10 mg tablet, Take 1 tablet (10 mg total) by mouth once daily., Disp: 90 tablet, Rfl: 0    glycerin-mineral oil-lanolin Crea, Apply 1 application topically daily as needed (to soothe itching)., Disp: 192 g, Rfl: 0    levothyroxine (SYNTHROID) 100 MCG tablet, Take 1 tablet (100 mcg total) by mouth before breakfast., Disp: 90 tablet, Rfl: 1    naloxone (NARCAN) 4 mg/actuation [place], , Disp: , Rfl:     nitroGLYCERIN (NITROSTAT) 0.4 MG SL tablet, Place 1 tablet (0.4 mg total) under the tongue every 5 (five) minutes as needed for Chest pain (for chest pain/discomfort). Call 911 if chest pain persists after second dose., Disp: 25 tablet, Rfl: 0    omeprazole (PRILOSEC) 40 MG capsule, Take 40 mg by mouth once daily., Disp: , Rfl:     oxyCODONE-acetaminophen (PERCOCET) 7.5-325 mg per tablet, Take 1 tablet by mouth every 8 hours as needed for pain, Disp: 90 tablet, Rfl: 0    varicella-zoster gE-AS01B, PF, (SHINGRIX) 50 mcg/0.5 mL injection, Inject 0.5 mL (one dose) into muscle now; schedule second dose  days later, Disp: 1 each, Rfl: 1    Review of Systems   Constitutional:  Negative for activity change, appetite change, chills, diaphoresis, fatigue, fever and unexpected weight change.   HENT: Negative.  Negative for congestion, hearing loss, postnasal drip, rhinorrhea, sore throat, trouble swallowing and voice change.    Eyes: Negative.  Negative for visual disturbance.   Respiratory: Negative.  Negative for cough, choking, chest tightness and shortness of breath.    Cardiovascular:  Negative for chest pain, palpitations and leg swelling.   Gastrointestinal:  Negative for abdominal distention, abdominal pain, blood in stool, constipation, diarrhea, nausea and vomiting.   Endocrine: Negative for cold intolerance, heat intolerance, polydipsia and polyuria.   Genitourinary:  "Negative.  Negative for difficulty urinating and frequency.   Musculoskeletal:  Negative for arthralgias, back pain, gait problem, joint swelling and myalgias.   Skin:  Negative for color change, pallor, rash and wound.   Neurological:  Negative for dizziness, tremors, weakness, light-headedness, numbness and headaches.   Hematological:  Negative for adenopathy.   Psychiatric/Behavioral:  Negative for behavioral problems, confusion, self-injury, sleep disturbance and suicidal ideas. The patient is not nervous/anxious.    Objective:   /78 (BP Location: Left arm, Patient Position: Sitting, BP Method: Medium (Manual))   Pulse 97   Temp 96.7 °F (35.9 °C) (Tympanic)   Ht 5' 4" (1.626 m)   Wt 75.3 kg (166 lb 0.1 oz)   SpO2 99%   BMI 28.49 kg/m²     Physical Exam  Vitals and nursing note reviewed.   Constitutional:       General: She is not in acute distress.     Appearance: Normal appearance. She is well-developed. She is not ill-appearing, toxic-appearing or diaphoretic.   HENT:      Head: Normocephalic and atraumatic.   Cardiovascular:      Rate and Rhythm: Normal rate and regular rhythm.      Heart sounds: Normal heart sounds. No murmur heard.    No friction rub. No gallop.   Pulmonary:      Effort: Pulmonary effort is normal. No respiratory distress.      Breath sounds: Normal breath sounds. No wheezing or rales.   Musculoskeletal:         General: Normal range of motion.   Skin:     General: Skin is warm.      Capillary Refill: Capillary refill takes less than 2 seconds.      Findings: No rash.   Neurological:      Mental Status: She is alert and oriented to person, place, and time.      Motor: No weakness.      Gait: Gait normal.   Psychiatric:         Mood and Affect: Mood normal.         Speech: Speech is tangential.         Behavior: Behavior normal.         Thought Content: Thought content normal.         Judgment: Judgment normal.       Assessment:     1. Fatty liver    2. Nodule of upper lobe of " right lung    3. Stage 4 chronic kidney disease    4. Type 2 diabetes mellitus with stage 4 chronic kidney disease, without long-term current use of insulin      Plan:   Fatty liver    Nodule of upper lobe of right lung    Stage 4 chronic kidney disease    Type 2 diabetes mellitus with stage 4 chronic kidney disease, without long-term current use of insulin    -see nephrology as scheduled. Recent scan results discussed with patient. Recommend monitoring changes on CT per recommendations of pcp. Follow up with pcp as scheduled in march.     Follow up if symptoms worsen or fail to improve.

## 2023-01-06 ENCOUNTER — OUTPATIENT CASE MANAGEMENT (OUTPATIENT)
Dept: ADMINISTRATIVE | Facility: OTHER | Age: 68
End: 2023-01-06
Payer: MEDICARE

## 2023-01-06 ENCOUNTER — TELEPHONE (OUTPATIENT)
Dept: INTERNAL MEDICINE | Facility: CLINIC | Age: 68
End: 2023-01-06
Payer: MEDICARE

## 2023-01-06 NOTE — LETTER
January 6, 2023    Lizz Crockett  [address] [place] [address]             [facility] [address] [place] [address] Dear Mrs Zamudio,    I work with [facility]'s Outpatient Case Management Department. We received a referral to call you to discuss your medical history.These services are free of charge and are offered to [facility] patients who have recently been discharged from any of our facilities or who have complex medical conditions that may require the skill of a nurse to assist with management.             I am a Registered Nurse who specializes in connecting patients with available medical and financial resources as well as addressing any educational needs that may be indicated.      I attempted to reach you by telephone, but I was unsuccessful. Please call our department so that we can go over some questions with you regarding your health.    The Outpatient Case Management Department can be reached at [phone] from 8:00AM to 4:30 PM on Monday thru Friday. [facility] also has a program where a nurse is available 24/7 to answer questions or provide medical advice. Their number is [phone].      Thank you,      Josefa Bell RN  Outpatient Case Management  [phone]

## 2023-01-06 NOTE — TELEPHONE ENCOUNTER
----- Message from Josefa Arias RN sent at 1/6/2023  3:47 PM CST -----  Dr May,     Thank you for referring Mrs Zamudio to [facility]. I attempted to screen her today and she sounded like she had some needs we may be able to assist with, including health education and report of financial issues with medical care costs, but she denied need for assistance.     Best Regards,    Josefa Arias RN, [facility]  Outpatient Case Management  [phone]  Ext [phone]  [email or web address]

## 2023-01-06 NOTE — PROGRESS NOTES
Outpatient Care Management  Patient Does Not Consent    Patient: Lizz Crockett  MRN:  [id number]  Date of Service:  1/6/2023  Completed by:  Josefa Arias RN    Chief Complaint   Patient presents with    OPCM RN First Assessment Attempt    Case Closure     Declined       Patient Summary     Program:  OPCM High Risk     Consent Received:  Decline  Decline Reason:  Not interested

## 2023-01-08 ENCOUNTER — PATIENT MESSAGE (OUTPATIENT)
Dept: INTERNAL MEDICINE | Facility: CLINIC | Age: 68
End: 2023-01-08
Payer: MEDICARE

## 2023-01-08 DIAGNOSIS — R91.8 PULMONARY NODULES: Primary | Chronic | ICD-10-CM

## 2023-01-08 PROBLEM — I70.0 AORTIC ATHEROSCLEROSIS: Chronic | Status: ACTIVE | Noted: 2023-01-08

## 2023-01-08 NOTE — PROGRESS NOTES
Results to be addressed at upcoming appointment(s) already scheduled.  Future Appointments  1/11/2023  10:25 AM   LABORATORY, [facility] LAB            [facility]  1/11/2023  10:30 AM   SPECIMEN, [facility] SPECLAB        [facility]  1/13/2023  8:00 AM    FIELDS, VISUAL-ONE         [facility] OPHTHAL        [facility]  1/13/2023  9:20 AM    Jin Ruffin OD     [facility] OPHTHAL        [facility]  1/19/2023  8:30 AM    Cam Meza MD     [facility] NEPHRO         [facility]  3/23/2023  9:00 AM    KARAN May MD       [facility]

## 2023-01-08 NOTE — TELEPHONE ENCOUNTER
TO MY TEAM:   Please contact Lizz to relay message (below) or at least verify that she read and understood the same Patient Portal message I sent on [facility].  Assist with scheduling pulmonology appointment.  --------------------------------------------------------------------------------   Lizz Davidson.    The CT scan of your chest showed lung nodules that need further evaluation.    I have entered a referral order for you to be evaluated by one of our excellent pulmonologists (a specialist that diagnoses and treats lung disorders).    Someone from [facility] should be contacting you soon to help schedule your appointment. If you haven't been contacted within the next 3-4 business days, call [facility] [place] appointment desk ([phone]).     Thanks for letting me care for you, and thanks for trusting [facility] with your healthcare needs.    All the best,    GENARO May MD    Orders Placed This Encounter   Procedures    Ambulatory referral/consult to Pulmonology

## 2023-01-08 NOTE — PROGRESS NOTES
Lizz Davidson.    The CT scan of your chest showed lung nodules that need further evaluation.    I have entered a referral order for you to be evaluated by one of our excellent pulmonologists (a specialist that diagnoses and treats lung disorders).    Someone from [facility] should be contacting you soon to help schedule your appointment. If you haven't been contacted within the next 3-4 business days, call [facility] [place] appointment desk ([phone]).     Thanks for letting me care for you, and thanks for trusting [facility] with your healthcare needs.    All the best,    GENARO May MD

## 2023-01-10 ENCOUNTER — TELEPHONE (OUTPATIENT)
Dept: INTERNAL MEDICINE | Facility: CLINIC | Age: 68
End: 2023-01-10
Payer: MEDICARE

## 2023-01-10 NOTE — TELEPHONE ENCOUNTER
Spoke with patient and she stated that she is in a lot of pain and can barely walk on her crutches. She said that she has an appointment with pain management tomorrow, however, wanted to know if there was any suggestions or injections she could get from Dr. May to help her today. I informed her that since pain management is working with her, Dr. May wouldn't be able to prescribe any pain medication. Informed patient that if her pain is severe, she should go to the emergency room to be evaluated. She verbalized understanding.

## 2023-01-10 NOTE — TELEPHONE ENCOUNTER
----- Message from Burke Dumont sent at 1/10/2023  1:46 PM CST -----  Contact: Lizz  Patient is calling to advise that she is in pain and needs to speak with nurse. Please callback at [phone]        Thanks,  Ak

## 2023-01-11 ENCOUNTER — LAB VISIT (OUTPATIENT)
Dept: LAB | Facility: HOSPITAL | Age: 68
End: 2023-01-11
Attending: INTERNAL MEDICINE
Payer: MEDICARE

## 2023-01-11 DIAGNOSIS — E78.2 MIXED HYPERLIPIDEMIA: ICD-10-CM

## 2023-01-11 DIAGNOSIS — N28.9 RENAL INSUFFICIENCY: ICD-10-CM

## 2023-01-11 DIAGNOSIS — I10 ESSENTIAL HYPERTENSION: ICD-10-CM

## 2023-01-11 DIAGNOSIS — E11.65 TYPE 2 DIABETES MELLITUS WITH HYPERGLYCEMIA, WITHOUT LONG-TERM CURRENT USE OF INSULIN: ICD-10-CM

## 2023-01-11 LAB
ALBUMIN SERPL BCP-MCNC: 3.9 G/DL (ref 3.5–5.2)
ALP SERPL-CCNC: 122 U/L (ref 55–135)
ALT SERPL W/O P-5'-P-CCNC: 12 U/L (ref 10–44)
ANION GAP SERPL CALC-SCNC: 9 MMOL/L (ref 8–16)
AST SERPL-CCNC: 17 U/L (ref 10–40)
BILIRUB SERPL-MCNC: 0.6 MG/DL (ref 0.1–1)
BUN SERPL-MCNC: 19 MG/DL (ref 8–23)
CALCIUM SERPL-MCNC: 9.9 MG/DL (ref 8.7–10.5)
CHLORIDE SERPL-SCNC: 107 MMOL/L (ref 95–110)
CHOLEST SERPL-MCNC: 138 MG/DL (ref 120–199)
CHOLEST/HDLC SERPL: 2.9 {RATIO} (ref 2–5)
CO2 SERPL-SCNC: 23 MMOL/L (ref 23–29)
CREAT SERPL-MCNC: 1.8 MG/DL (ref 0.5–1.4)
EST. GFR  (NO RACE VARIABLE): 30.1 ML/MIN/1.73 M^2
GLUCOSE SERPL-MCNC: 131 MG/DL (ref 70–110)
HDLC SERPL-MCNC: 48 MG/DL (ref 40–75)
HDLC SERPL: 34.8 % (ref 20–50)
LDLC SERPL CALC-MCNC: 72 MG/DL (ref 63–159)
NONHDLC SERPL-MCNC: 90 MG/DL
POTASSIUM SERPL-SCNC: 3.7 MMOL/L (ref 3.5–5.1)
PROT SERPL-MCNC: 7.9 G/DL (ref 6–8.4)
SODIUM SERPL-SCNC: 139 MMOL/L (ref 136–145)
TRIGL SERPL-MCNC: 90 MG/DL (ref 30–150)

## 2023-01-11 PROCEDURE — 80053 COMPREHEN METABOLIC PANEL: CPT | Mod: HCNC | Performed by: INTERNAL MEDICINE

## 2023-01-11 PROCEDURE — 36415 COLL VENOUS BLD VENIPUNCTURE: CPT | Mod: HCNC | Performed by: FAMILY MEDICINE

## 2023-01-11 PROCEDURE — 80061 LIPID PANEL: CPT | Mod: HCNC | Performed by: FAMILY MEDICINE

## 2023-01-13 ENCOUNTER — NURSE TRIAGE (OUTPATIENT)
Dept: ADMINISTRATIVE | Facility: CLINIC | Age: 68
End: 2023-01-13
Payer: MEDICARE

## 2023-01-13 NOTE — TELEPHONE ENCOUNTER
Lizz calling c/o left 4th finger swelling x 3 days. Denies known injury. Pain unrelieved by Percocet which she stated for back pain, IBU and soaking in finger salt water. Advised per triage protocol to go to UC/ED now for physician eval. V/u.   Reason for Disposition   SEVERE pain (e.g., excruciating, unable to use hand at all)    Additional Information   Negative: Followed an injury   Negative: Looks infected (spreading redness, red streak, pus) and severe pain with movement   Negative: Patient sounds very sick or weak to the triager    Protocols used: Finger Pain-A-[place]

## 2023-01-19 ENCOUNTER — OFFICE VISIT (OUTPATIENT)
Dept: NEPHROLOGY | Facility: CLINIC | Age: 68
End: 2023-01-19
Payer: MEDICARE

## 2023-01-19 VITALS
DIASTOLIC BLOOD PRESSURE: 68 MMHG | HEIGHT: 66 IN | WEIGHT: 160.94 LBS | BODY MASS INDEX: 25.86 KG/M2 | SYSTOLIC BLOOD PRESSURE: 136 MMHG | HEART RATE: 90 BPM

## 2023-01-19 DIAGNOSIS — I10 PRIMARY HYPERTENSION: ICD-10-CM

## 2023-01-19 DIAGNOSIS — N18.4 STAGE 4 CHRONIC KIDNEY DISEASE: ICD-10-CM

## 2023-01-19 DIAGNOSIS — E79.0 HYPERURICEMIA: ICD-10-CM

## 2023-01-19 DIAGNOSIS — N18.32 STAGE 3B CHRONIC KIDNEY DISEASE: Primary | ICD-10-CM

## 2023-01-19 PROCEDURE — 3008F PR BODY MASS INDEX (BMI) DOCUMENTED: ICD-10-PCS | Mod: HCNC,CPTII,S$GLB, | Performed by: INTERNAL MEDICINE

## 2023-01-19 PROCEDURE — 99999 PR PBB SHADOW E&M-EST. PATIENT-LVL IV: ICD-10-PCS | Mod: PBBFAC,HCNC,, | Performed by: INTERNAL MEDICINE

## 2023-01-19 PROCEDURE — 3288F FALL RISK ASSESSMENT DOCD: CPT | Mod: HCNC,CPTII,S$GLB, | Performed by: INTERNAL MEDICINE

## 2023-01-19 PROCEDURE — 3075F PR MOST RECENT SYSTOLIC BLOOD PRESS GE 130-139MM HG: ICD-10-PCS | Mod: HCNC,CPTII,S$GLB, | Performed by: INTERNAL MEDICINE

## 2023-01-19 PROCEDURE — 3075F SYST BP GE 130 - 139MM HG: CPT | Mod: HCNC,CPTII,S$GLB, | Performed by: INTERNAL MEDICINE

## 2023-01-19 PROCEDURE — 3072F LOW RISK FOR RETINOPATHY: CPT | Mod: HCNC,CPTII,S$GLB, | Performed by: INTERNAL MEDICINE

## 2023-01-19 PROCEDURE — 1159F MED LIST DOCD IN RCRD: CPT | Mod: HCNC,CPTII,S$GLB, | Performed by: INTERNAL MEDICINE

## 2023-01-19 PROCEDURE — 3066F NEPHROPATHY DOC TX: CPT | Mod: HCNC,CPTII,S$GLB, | Performed by: INTERNAL MEDICINE

## 2023-01-19 PROCEDURE — 99205 PR OFFICE/OUTPT VISIT, NEW, LEVL V, 60-74 MIN: ICD-10-PCS | Mod: HCNC,S$GLB,, | Performed by: INTERNAL MEDICINE

## 2023-01-19 PROCEDURE — 1125F AMNT PAIN NOTED PAIN PRSNT: CPT | Mod: HCNC,CPTII,S$GLB, | Performed by: INTERNAL MEDICINE

## 2023-01-19 PROCEDURE — 3066F PR DOCUMENTATION OF TREATMENT FOR NEPHROPATHY: ICD-10-PCS | Mod: HCNC,CPTII,S$GLB, | Performed by: INTERNAL MEDICINE

## 2023-01-19 PROCEDURE — 1159F PR MEDICATION LIST DOCUMENTED IN MEDICAL RECORD: ICD-10-PCS | Mod: HCNC,CPTII,S$GLB, | Performed by: INTERNAL MEDICINE

## 2023-01-19 PROCEDURE — 1101F PR PT FALLS ASSESS DOC 0-1 FALLS W/OUT INJ PAST YR: ICD-10-PCS | Mod: HCNC,CPTII,S$GLB, | Performed by: INTERNAL MEDICINE

## 2023-01-19 PROCEDURE — 1160F PR REVIEW ALL MEDS BY PRESCRIBER/CLIN PHARMACIST DOCUMENTED: ICD-10-PCS | Mod: HCNC,CPTII,S$GLB, | Performed by: INTERNAL MEDICINE

## 2023-01-19 PROCEDURE — 1125F PR PAIN SEVERITY QUANTIFIED, PAIN PRESENT: ICD-10-PCS | Mod: HCNC,CPTII,S$GLB, | Performed by: INTERNAL MEDICINE

## 2023-01-19 PROCEDURE — 3072F PR LOW RISK FOR RETINOPATHY: ICD-10-PCS | Mod: HCNC,CPTII,S$GLB, | Performed by: INTERNAL MEDICINE

## 2023-01-19 PROCEDURE — 3078F PR MOST RECENT DIASTOLIC BLOOD PRESSURE < 80 MM HG: ICD-10-PCS | Mod: HCNC,CPTII,S$GLB, | Performed by: INTERNAL MEDICINE

## 2023-01-19 PROCEDURE — 3078F DIAST BP <80 MM HG: CPT | Mod: HCNC,CPTII,S$GLB, | Performed by: INTERNAL MEDICINE

## 2023-01-19 PROCEDURE — 1101F PT FALLS ASSESS-DOCD LE1/YR: CPT | Mod: HCNC,CPTII,S$GLB, | Performed by: INTERNAL MEDICINE

## 2023-01-19 PROCEDURE — 1160F RVW MEDS BY RX/DR IN RCRD: CPT | Mod: HCNC,CPTII,S$GLB, | Performed by: INTERNAL MEDICINE

## 2023-01-19 PROCEDURE — 99205 OFFICE O/P NEW HI 60 MIN: CPT | Mod: HCNC,S$GLB,, | Performed by: INTERNAL MEDICINE

## 2023-01-19 PROCEDURE — 3008F BODY MASS INDEX DOCD: CPT | Mod: HCNC,CPTII,S$GLB, | Performed by: INTERNAL MEDICINE

## 2023-01-19 PROCEDURE — 3288F PR FALLS RISK ASSESSMENT DOCUMENTED: ICD-10-PCS | Mod: HCNC,CPTII,S$GLB, | Performed by: INTERNAL MEDICINE

## 2023-01-19 PROCEDURE — 99999 PR PBB SHADOW E&M-EST. PATIENT-LVL IV: CPT | Mod: PBBFAC,HCNC,, | Performed by: INTERNAL MEDICINE

## 2023-01-19 RX ORDER — ALLOPURINOL 100 MG/1
100 TABLET ORAL DAILY
Qty: 90 TABLET | Refills: 3 | Status: SHIPPED | OUTPATIENT
Start: 2023-01-19

## 2023-01-19 NOTE — PROGRESS NOTES
Lizz Crockett is a 67 y.o. female     HPI:    She was noted to have elevated creatinine on routine laboratory studies.  Nephrology has been consulted for evaluation.  In the clinic today she has multiple somatic complaints including low back pain and pain in the tip of the 3rd finger on her left hand.  She also reports a gouty flare in her left foot that has improved over the past several weeks to months.  Her laboratory studies medications were reviewed.  All Nephrology related questions were answered to her satisfaction.  On review of her laboratory studies she is had at least some degree of chronic kidney disease for many years.  There is at least 1 chemistry with a creatinine of 1.5 from 8 years ago.  Over the past several months her creatinine has ranged from 1.8-2.4.  She states that she has been taking a lot of ibuprofen.  She sometimes takes his many as 2-4 ibuprofen daily and has done so for years.  She stopped taking ibuprofen on January 13th.  We reviewed the importance of avoiding nonsteroidal anti-inflammatory agents and Peralta 2 inhibitors.  Additionally she is on an SGLT 2 inhibitor.  We reviewed the importance of maintaining good hydration.    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    PAST SURGICAL HISTORY:  She  has a past surgical history that includes Breast surgery; Cataract extraction, bilateral (Bilateral); Thyroidectomy; and Tubal ligation.    SOCIAL HISTORY:  She  reports that she has quit smoking. Her smoking use included cigarettes. She has never used smokeless tobacco. She  reports that she does not drink alcohol and does not use drugs.      FAMILY MEDICAL HISTORY:  Her family history includes Heart disease in her father and mother; Hypertension in her father and mother.    Review of patient's allergies indicates:   Allergen Reactions    Iodine     Prednisone     Codeine Anxiety    Iodinated contrast media Nausea And Vomiting           Prior to Admission medications    Medication Sig Start Date End Date Taking? Authorizing Provider   amLODIPine (NORVASC) 10 MG tablet Take 1 tablet (10 mg total) by mouth once daily. 11/7/22  Yes KARAN May MD   atorvastatin (LIPITOR) 20 MG tablet Take 1 tablet (20 mg total) by mouth every evening. 10/24/22 10/24/23 Yes KARAN May MD   diazePAM (VALIUM) 10 MG Tab Take 1 tablet (10 mg total) by mouth 2 (two) times daily as needed (anxiety). 12/12/22  Yes KARAN May MD   diclofenac sodium (VOLTAREN) 1 % Gel APPLY A SMALL AMOUNT( 2 TO 4 GRAM MAX) TOPICALLY TO PAINFUL JOINT AREAS FOUR TIMES DAILY AS NEEDED. 12/12/22  Yes KARAN May MD   empagliflozin (JARDIANCE) 10 mg tablet Take 1 tablet (10 mg total) by mouth once daily. 1/3/23 1/3/24 Yes KARAN May MD   glycerin-mineral oil-lanolin Crea Apply 1 application topically daily as needed (to soothe itching). 10/14/22  Yes Phyllis Dietz NP   levothyroxine (SYNTHROID) 100 MCG tablet Take 1 tablet (100 mcg total) by mouth before breakfast. 12/21/22 6/19/23 Yes KARAN May MD   naloxone (NARCAN) 4 mg/actuation [place]  10/11/22  Yes Historical Provider   nitroGLYCERIN (NITROSTAT) 0.4 MG SL tablet Place 1 tablet (0.4 mg total) under the tongue every 5 (five) minutes as needed for Chest pain (for chest pain/discomfort). Call 911 if chest pain persists after second dose. 1/3/23 1/3/24 Yes KARAN May MD   omeprazole (PRILOSEC) 40 MG capsule Take 40 mg by mouth once daily.   Yes Historical Provider   oxyCODONE-acetaminophen (PERCOCET) 7.5-325 mg per tablet Take 1 tablet  by mouth every 8 hours as needed for pain 9/9/22  Yes    varicella-zoster gE-AS01B, PF, (SHINGRIX) 50 mcg/0.5 mL injection Inject 0.5 mL (one dose) into muscle now; schedule second dose  days later 9/2/22  Yes KARAN May MD   allopurinoL (ZYLOPRIM) 100 MG tablet Take 1 tablet (100 mg total) by mouth once daily. 1/19/23   Cam Meza MD   colchicine (MITIGARE) 0.6 mg Cap Take 2 pills for first hour and then 1 pill one hour later. Then take 1 pill once daily for three days  Patient not taking: Reported on 1/19/2023 12/28/22 1/19/23  Jennifer Correa DPM      Sodium   Date Value Ref Range Status   01/11/2023 139 136 - 145 mmol/L Final   11/16/2022 141 136 - 145 mmol/L Final   11/03/2022 142 136 - 145 mmol/L Final     Potassium   Date Value Ref Range Status   01/11/2023 3.7 3.5 - 5.1 mmol/L Final   11/16/2022 4.2 3.5 - 5.1 mmol/L Final   11/03/2022 4.0 3.5 - 5.1 mmol/L Final     Chloride   Date Value Ref Range Status   01/11/2023 107 95 - 110 mmol/L Final   11/16/2022 108 95 - 110 mmol/L Final   11/03/2022 107 95 - 110 mmol/L Final     CO2   Date Value Ref Range Status   01/11/2023 23 23 - 29 mmol/L Final   11/16/2022 23 23 - 29 mmol/L Final   11/03/2022 22 (L) 23 - 29 mmol/L Final     Glucose   Date Value Ref Range Status   01/11/2023 131 (H) 70 - 110 mg/dL Final   11/16/2022 113 (H) 70 - 110 mg/dL Final   11/03/2022 92 70 - 110 mg/dL Final     BUN   Date Value Ref Range Status   01/11/2023 19 8 - 23 mg/dL Final   11/16/2022 16 8 - 23 mg/dL Final   11/03/2022 29 (H) 8 - 23 mg/dL Final     Creatinine   Date Value Ref Range Status   01/11/2023 1.8 (H) 0.5 - 1.4 mg/dL Final   11/16/2022 1.8 (H) 0.5 - 1.4 mg/dL Final   11/03/2022 2.4 (H) 0.5 - 1.4 mg/dL Final     Calcium   Date Value Ref Range Status   01/11/2023 9.9 8.7 - 10.5 mg/dL Final   11/16/2022 10.3 8.7 - 10.5 mg/dL Final   11/03/2022 9.5 8.7 - 10.5 mg/dL Final     Total Protein   Date Value Ref Range Status   01/11/2023 7.9 6.0 - 8.4 g/dL  Final   11/03/2022 8.2 6.0 - 8.4 g/dL Final   09/02/2022 8.0 6.0 - 8.4 g/dL Final     Albumin   Date Value Ref Range Status   01/11/2023 3.9 3.5 - 5.2 g/dL Final   11/03/2022 3.9 3.5 - 5.2 g/dL Final   10/24/2022 3.8 3.5 - 5.2 g/dL Final     Total Bilirubin   Date Value Ref Range Status   01/11/2023 0.6 0.1 - 1.0 mg/dL Final     Comment:     For infants and newborns, interpretation of results should be based  on gestational age, weight and in agreement with clinical  observations.    Premature Infant recommended reference ranges:  Up to 24 hours.............<8.0 mg/dL  Up to 48 hours............<12.0 mg/dL  3-5 days..................<15.0 mg/dL  6-29 days.................<15.0 mg/dL     11/03/2022 0.3 0.1 - 1.0 mg/dL Final     Comment:     For infants and newborns, interpretation of results should be based  on gestational age, weight and in agreement with clinical  observations.    Premature Infant recommended reference ranges:  Up to 24 hours.............<8.0 mg/dL  Up to 48 hours............<12.0 mg/dL  3-5 days..................<15.0 mg/dL  6-29 days.................<15.0 mg/dL     09/02/2022 0.5 0.1 - 1.0 mg/dL Final     Comment:     For infants and newborns, interpretation of results should be based  on gestational age, weight and in agreement with clinical  observations.    Premature Infant recommended reference ranges:  Up to 24 hours.............<8.0 mg/dL  Up to 48 hours............<12.0 mg/dL  3-5 days..................<15.0 mg/dL  6-29 days.................<15.0 mg/dL       Alkaline Phosphatase   Date Value Ref Range Status   01/11/2023 122 55 - 135 U/L Final   11/03/2022 115 55 - 135 U/L Final   09/02/2022 99 55 - 135 U/L Final     AST   Date Value Ref Range Status   01/11/2023 17 10 - 40 U/L Final   11/03/2022 16 10 - 40 U/L Final   09/02/2022 16 10 - 40 U/L Final     ALT   Date Value Ref Range Status   01/11/2023 12 10 - 44 U/L Final   11/03/2022 17 10 - 44 U/L Final   09/02/2022 13 10 - 44 U/L Final  "    Anion Gap   Date Value Ref Range Status   01/11/2023 9 8 - 16 mmol/L Final   11/16/2022 10 8 - 16 mmol/L Final   11/03/2022 13 8 - 16 mmol/L Final     eGFR if    Date Value Ref Range Status   11/28/2014 43.0 (A) >60 mL/min/1.73 m^2 Final     eGFR if non    Date Value Ref Range Status   11/28/2014 37.3 (A) >60 mL/min/1.73 m^2 Final     Comment:     Calculation used to obtain the estimated glomerular filtration  rate (eGFR) is the CKD-EPI equation. Since race is unknown   in our information system, the eGFR values for   -American and Non--American patients are given   for each creatinine result.       WBC, UA   Date Value Ref Range Status   01/11/2023 5 0 - 5 /hpf Final     Bacteria   Date Value Ref Range Status   01/11/2023 Rare None-Occ /hpf Final     Microscopic Comment   Date Value Ref Range Status   01/11/2023 SEE COMMENT  Final     Comment:     Other formed elements not mentioned in the report are not   present in the microscopic examination.        Protein, Urine Random   Date Value Ref Range Status   01/11/2023 9 0 - 15 mg/dL Final     Creatinine, Urine   Date Value Ref Range Status   01/11/2023 76.0 15.0 - 325.0 mg/dL Final   09/02/2022 122.0 15.0 - 325.0 mg/dL Final     Prot/Creat Ratio, Urine   Date Value Ref Range Status   01/11/2023 0.12 0.00 - 0.20 Final       REVIEW OF SYSTEMS:  Patient has no fever, fatigue, visual changes, chest pain, edema, cough, dyspnea, nausea, vomiting, constipation, diarrhea, arthralgias, pruritis, dizziness, weakness, depression, confusion.        PHYSICAL EXAM:   height is 5' 6" (1.676 m) and weight is 73 kg (160 lb 15 oz). Her blood pressure is 136/68 and her pulse is 90.   Gen: WDWN female in no apparent distress  Psych: Normal mood and affect  Skin: No rashes or ulcers  Eyes: Normal conjunctiva and lids, PERRLA  ENT: Normal hearing with no oropharyngeal lesions  Neck: No JVD  Chest: Clear with no rales, rhonchi, wheezing " with normal effort  CV: Regular with no murmurs, gallops or rubs  Abd: Soft, nontender, no distension, positive bowel sounds  Ext: No cyanosis, clubbing or edema          IMPRESSION AND RECOMMENDATIONS:    1. CKD 3: She is late stage IIIB early stage IV CKD.  Creatinine has shown quite a bit of fluctuation over the past several months ranging between 1.8 and 2.4.  This fluctuation was due to extensive NSAID use in the setting of a RAAS inhibitor.  She was taken off of RAAS inhibition several weeks ago by another provider.    As per HPI we reviewed the importance of avoiding nonsteroidal anti-inflammatory agents and Peralta 2 inhibitors.    Urinalysis is bland without evidence of proteinuria.    2. Hypertension:  Blood pressure is adequately controlled on current regimen.      3. Hyperuricemia: She is had at least 1 gouty flare over the past several weeks to months.  Currently she is asymptomatic.  She had 1 uric acid level about a year ago that was greater than 7.  Will start her on allopurinol 100 mg daily.  We discussed that allopurinol can sometimes cause gout to flare briefly.  Additionally we reviewed that allopurinol can cause allergic reactions.    Approximately an hour was spent in face-to-face conversation and chart review.

## 2023-01-20 ENCOUNTER — TELEPHONE (OUTPATIENT)
Dept: NEPHROLOGY | Facility: CLINIC | Age: 68
End: 2023-01-20
Payer: MEDICARE

## 2023-01-20 NOTE — TELEPHONE ENCOUNTER
----- Message from Arsenio Jovel sent at 1/20/2023 10:21 AM CST -----  Contact: Self  Pt is asking for an return call in reference to pain she is experiencing states medication prescribed on yesterday isn't helping ,please call back at .[phone] Thx CJ

## 2023-01-20 NOTE — TELEPHONE ENCOUNTER
Returned call to patient who states that the pain in her finger is hurting, not as bad. She would like to know if she can take the Mitigare 0.6mg with the Allopurinol until the swelling and pain goes away? Please advise

## 2023-01-23 ENCOUNTER — TELEPHONE (OUTPATIENT)
Dept: INTERNAL MEDICINE | Facility: CLINIC | Age: 68
End: 2023-01-23
Payer: MEDICARE

## 2023-01-23 NOTE — TELEPHONE ENCOUNTER
----- Message from Trish Umanzor sent at 1/23/2023  3:54 PM CST -----  Contact: Lizz  Gerson is calling in regards to what she is to do about her finger that is very painful.Please call back at [phone]      Thanks  ELISABETH

## 2023-01-24 ENCOUNTER — TELEPHONE (OUTPATIENT)
Dept: OTOLARYNGOLOGY | Facility: CLINIC | Age: 68
End: 2023-01-24
Payer: MEDICARE

## 2023-01-24 NOTE — TELEPHONE ENCOUNTER
pt state tumor isnt bothering her and she is not going to bother it if its not bothering her. she has kidney diease, nodules in her lungs and place else that is bothering her before she will worry about a throat tumor thats just sitting there. She will have it check out when its bothering her.

## 2023-01-25 ENCOUNTER — OFFICE VISIT (OUTPATIENT)
Dept: DERMATOLOGY | Facility: CLINIC | Age: 68
End: 2023-01-25
Payer: MEDICARE

## 2023-01-25 ENCOUNTER — HOSPITAL ENCOUNTER (OUTPATIENT)
Dept: RADIOLOGY | Facility: HOSPITAL | Age: 68
Discharge: HOME | End: 2023-01-25
Attending: ORTHOPAEDIC SURGERY
Payer: MEDICARE

## 2023-01-25 ENCOUNTER — OFFICE VISIT (OUTPATIENT)
Dept: INTERNAL MEDICINE | Facility: CLINIC | Age: 68
End: 2023-01-25
Payer: MEDICARE

## 2023-01-25 ENCOUNTER — TELEPHONE (OUTPATIENT)
Dept: PREADMISSION TESTING | Facility: HOSPITAL | Age: 68
End: 2023-01-25
Payer: MEDICARE

## 2023-01-25 ENCOUNTER — OFFICE VISIT (OUTPATIENT)
Dept: ORTHOPEDICS | Facility: CLINIC | Age: 68
End: 2023-01-25
Payer: MEDICARE

## 2023-01-25 VITALS
HEART RATE: 94 BPM | RESPIRATION RATE: 16 BRPM | SYSTOLIC BLOOD PRESSURE: 134 MMHG | HEIGHT: 66 IN | WEIGHT: 164.69 LBS | BODY MASS INDEX: 26.47 KG/M2 | TEMPERATURE: 98 F | OXYGEN SATURATION: 97 % | DIASTOLIC BLOOD PRESSURE: 86 MMHG

## 2023-01-25 VITALS — HEIGHT: 66 IN | WEIGHT: 164 LBS | BODY MASS INDEX: 26.36 KG/M2

## 2023-01-25 DIAGNOSIS — L03.012 ABSCESS AROUND NAIL OF LEFT MIDDLE FINGER: Primary | ICD-10-CM

## 2023-01-25 DIAGNOSIS — M79.645 FINGER PAIN, LEFT: ICD-10-CM

## 2023-01-25 DIAGNOSIS — E11.22 TYPE 2 DIABETES MELLITUS WITH STAGE 4 CHRONIC KIDNEY DISEASE, WITHOUT LONG-TERM CURRENT USE OF INSULIN: Chronic | ICD-10-CM

## 2023-01-25 DIAGNOSIS — I10 ESSENTIAL HYPERTENSION: Chronic | ICD-10-CM

## 2023-01-25 DIAGNOSIS — N18.4 STAGE 4 CHRONIC KIDNEY DISEASE: Chronic | ICD-10-CM

## 2023-01-25 DIAGNOSIS — L03.012 ABSCESS AROUND NAIL OF LEFT MIDDLE FINGER: ICD-10-CM

## 2023-01-25 DIAGNOSIS — M79.645 FINGER PAIN, LEFT: Primary | ICD-10-CM

## 2023-01-25 DIAGNOSIS — N18.4 TYPE 2 DIABETES MELLITUS WITH STAGE 4 CHRONIC KIDNEY DISEASE, WITHOUT LONG-TERM CURRENT USE OF INSULIN: Chronic | ICD-10-CM

## 2023-01-25 PROCEDURE — 99203 OFFICE O/P NEW LOW 30 MIN: CPT | Mod: HCNC,S$GLB,, | Performed by: DERMATOLOGY

## 2023-01-25 PROCEDURE — 3079F PR MOST RECENT DIASTOLIC BLOOD PRESSURE 80-89 MM HG: ICD-10-PCS | Mod: HCNC,CPTII,S$GLB, | Performed by: NURSE PRACTITIONER

## 2023-01-25 PROCEDURE — 1159F MED LIST DOCD IN RCRD: CPT | Mod: HCNC,CPTII,S$GLB, | Performed by: DERMATOLOGY

## 2023-01-25 PROCEDURE — 73140 XR FINGER 2 OR MORE VIEWS LEFT: ICD-10-PCS | Mod: 26,HCNC,LT, | Performed by: RADIOLOGY

## 2023-01-25 PROCEDURE — 3075F PR MOST RECENT SYSTOLIC BLOOD PRESS GE 130-139MM HG: ICD-10-PCS | Mod: HCNC,CPTII,S$GLB, | Performed by: NURSE PRACTITIONER

## 2023-01-25 PROCEDURE — 1125F AMNT PAIN NOTED PAIN PRSNT: CPT | Mod: HCNC,CPTII,S$GLB, | Performed by: ORTHOPAEDIC SURGERY

## 2023-01-25 PROCEDURE — 1160F RVW MEDS BY RX/DR IN RCRD: CPT | Mod: HCNC,CPTII,S$GLB, | Performed by: NURSE PRACTITIONER

## 2023-01-25 PROCEDURE — 3066F NEPHROPATHY DOC TX: CPT | Mod: HCNC,CPTII,S$GLB, | Performed by: ORTHOPAEDIC SURGERY

## 2023-01-25 PROCEDURE — 99999 PR PBB SHADOW E&M-EST. PATIENT-LVL III: ICD-10-PCS | Mod: PBBFAC,HCNC,, | Performed by: ORTHOPAEDIC SURGERY

## 2023-01-25 PROCEDURE — 3072F LOW RISK FOR RETINOPATHY: CPT | Mod: HCNC,CPTII,S$GLB, | Performed by: ORTHOPAEDIC SURGERY

## 2023-01-25 PROCEDURE — 3008F PR BODY MASS INDEX (BMI) DOCUMENTED: ICD-10-PCS | Mod: HCNC,CPTII,S$GLB, | Performed by: NURSE PRACTITIONER

## 2023-01-25 PROCEDURE — 1126F AMNT PAIN NOTED NONE PRSNT: CPT | Mod: HCNC,CPTII,S$GLB, | Performed by: DERMATOLOGY

## 2023-01-25 PROCEDURE — 1126F PR PAIN SEVERITY QUANTIFIED, NO PAIN PRESENT: ICD-10-PCS | Mod: HCNC,CPTII,S$GLB, | Performed by: DERMATOLOGY

## 2023-01-25 PROCEDURE — 3072F LOW RISK FOR RETINOPATHY: CPT | Mod: HCNC,CPTII,S$GLB, | Performed by: NURSE PRACTITIONER

## 2023-01-25 PROCEDURE — 99999 PR PBB SHADOW E&M-EST. PATIENT-LVL V: ICD-10-PCS | Mod: PBBFAC,HCNC,, | Performed by: NURSE PRACTITIONER

## 2023-01-25 PROCEDURE — 3008F BODY MASS INDEX DOCD: CPT | Mod: HCNC,CPTII,S$GLB, | Performed by: ORTHOPAEDIC SURGERY

## 2023-01-25 PROCEDURE — 3072F PR LOW RISK FOR RETINOPATHY: ICD-10-PCS | Mod: HCNC,CPTII,S$GLB, | Performed by: NURSE PRACTITIONER

## 2023-01-25 PROCEDURE — 99203 PR OFFICE/OUTPT VISIT, NEW, LEVL III, 30-44 MIN: ICD-10-PCS | Mod: HCNC,S$GLB,, | Performed by: DERMATOLOGY

## 2023-01-25 PROCEDURE — 71046 X-RAY EXAM CHEST 2 VIEWS: CPT | Mod: TC,HCNC

## 2023-01-25 PROCEDURE — 3066F PR DOCUMENTATION OF TREATMENT FOR NEPHROPATHY: ICD-10-PCS | Mod: HCNC,CPTII,S$GLB, | Performed by: NURSE PRACTITIONER

## 2023-01-25 PROCEDURE — 1101F PT FALLS ASSESS-DOCD LE1/YR: CPT | Mod: HCNC,CPTII,S$GLB, | Performed by: NURSE PRACTITIONER

## 2023-01-25 PROCEDURE — 1159F PR MEDICATION LIST DOCUMENTED IN MEDICAL RECORD: ICD-10-PCS | Mod: HCNC,CPTII,S$GLB, | Performed by: ORTHOPAEDIC SURGERY

## 2023-01-25 PROCEDURE — 1159F MED LIST DOCD IN RCRD: CPT | Mod: HCNC,CPTII,S$GLB, | Performed by: ORTHOPAEDIC SURGERY

## 2023-01-25 PROCEDURE — 3072F PR LOW RISK FOR RETINOPATHY: ICD-10-PCS | Mod: HCNC,CPTII,S$GLB, | Performed by: ORTHOPAEDIC SURGERY

## 2023-01-25 PROCEDURE — 1160F PR REVIEW ALL MEDS BY PRESCRIBER/CLIN PHARMACIST DOCUMENTED: ICD-10-PCS | Mod: HCNC,CPTII,S$GLB, | Performed by: DERMATOLOGY

## 2023-01-25 PROCEDURE — 99999 PR PBB SHADOW E&M-EST. PATIENT-LVL IV: ICD-10-PCS | Mod: PBBFAC,HCNC,, | Performed by: DERMATOLOGY

## 2023-01-25 PROCEDURE — 3066F NEPHROPATHY DOC TX: CPT | Mod: HCNC,CPTII,S$GLB, | Performed by: DERMATOLOGY

## 2023-01-25 PROCEDURE — 1101F PT FALLS ASSESS-DOCD LE1/YR: CPT | Mod: HCNC,CPTII,S$GLB, | Performed by: DERMATOLOGY

## 2023-01-25 PROCEDURE — 99999 PR PBB SHADOW E&M-EST. PATIENT-LVL III: CPT | Mod: PBBFAC,HCNC,, | Performed by: ORTHOPAEDIC SURGERY

## 2023-01-25 PROCEDURE — 1101F PT FALLS ASSESS-DOCD LE1/YR: CPT | Mod: HCNC,CPTII,S$GLB, | Performed by: ORTHOPAEDIC SURGERY

## 2023-01-25 PROCEDURE — 3288F FALL RISK ASSESSMENT DOCD: CPT | Mod: HCNC,CPTII,S$GLB, | Performed by: DERMATOLOGY

## 2023-01-25 PROCEDURE — 1160F RVW MEDS BY RX/DR IN RCRD: CPT | Mod: HCNC,CPTII,S$GLB, | Performed by: DERMATOLOGY

## 2023-01-25 PROCEDURE — 1160F RVW MEDS BY RX/DR IN RCRD: CPT | Mod: HCNC,CPTII,S$GLB, | Performed by: ORTHOPAEDIC SURGERY

## 2023-01-25 PROCEDURE — 3075F SYST BP GE 130 - 139MM HG: CPT | Mod: HCNC,CPTII,S$GLB, | Performed by: NURSE PRACTITIONER

## 2023-01-25 PROCEDURE — 1159F PR MEDICATION LIST DOCUMENTED IN MEDICAL RECORD: ICD-10-PCS | Mod: HCNC,CPTII,S$GLB, | Performed by: DERMATOLOGY

## 2023-01-25 PROCEDURE — 99203 PR OFFICE/OUTPT VISIT, NEW, LEVL III, 30-44 MIN: ICD-10-PCS | Mod: HCNC,S$GLB,, | Performed by: ORTHOPAEDIC SURGERY

## 2023-01-25 PROCEDURE — 1101F PR PT FALLS ASSESS DOC 0-1 FALLS W/OUT INJ PAST YR: ICD-10-PCS | Mod: HCNC,CPTII,S$GLB, | Performed by: DERMATOLOGY

## 2023-01-25 PROCEDURE — 1101F PR PT FALLS ASSESS DOC 0-1 FALLS W/OUT INJ PAST YR: ICD-10-PCS | Mod: HCNC,CPTII,S$GLB, | Performed by: NURSE PRACTITIONER

## 2023-01-25 PROCEDURE — 3066F NEPHROPATHY DOC TX: CPT | Mod: HCNC,CPTII,S$GLB, | Performed by: NURSE PRACTITIONER

## 2023-01-25 PROCEDURE — 1125F PR PAIN SEVERITY QUANTIFIED, PAIN PRESENT: ICD-10-PCS | Mod: HCNC,CPTII,S$GLB, | Performed by: ORTHOPAEDIC SURGERY

## 2023-01-25 PROCEDURE — 1159F PR MEDICATION LIST DOCUMENTED IN MEDICAL RECORD: ICD-10-PCS | Mod: HCNC,CPTII,S$GLB, | Performed by: NURSE PRACTITIONER

## 2023-01-25 PROCEDURE — 99999 PR PBB SHADOW E&M-EST. PATIENT-LVL V: CPT | Mod: PBBFAC,HCNC,, | Performed by: NURSE PRACTITIONER

## 2023-01-25 PROCEDURE — 99999 PR PBB SHADOW E&M-EST. PATIENT-LVL IV: CPT | Mod: PBBFAC,HCNC,, | Performed by: DERMATOLOGY

## 2023-01-25 PROCEDURE — 99203 OFFICE O/P NEW LOW 30 MIN: CPT | Mod: HCNC,S$GLB,, | Performed by: ORTHOPAEDIC SURGERY

## 2023-01-25 PROCEDURE — 71046 XR CHEST PA AND LATERAL PRE-OP: ICD-10-PCS | Mod: 26,HCNC,, | Performed by: RADIOLOGY

## 2023-01-25 PROCEDURE — 3008F BODY MASS INDEX DOCD: CPT | Mod: HCNC,CPTII,S$GLB, | Performed by: NURSE PRACTITIONER

## 2023-01-25 PROCEDURE — 1160F PR REVIEW ALL MEDS BY PRESCRIBER/CLIN PHARMACIST DOCUMENTED: ICD-10-PCS | Mod: HCNC,CPTII,S$GLB, | Performed by: ORTHOPAEDIC SURGERY

## 2023-01-25 PROCEDURE — 3288F FALL RISK ASSESSMENT DOCD: CPT | Mod: HCNC,CPTII,S$GLB, | Performed by: NURSE PRACTITIONER

## 2023-01-25 PROCEDURE — 3288F PR FALLS RISK ASSESSMENT DOCUMENTED: ICD-10-PCS | Mod: HCNC,CPTII,S$GLB, | Performed by: DERMATOLOGY

## 2023-01-25 PROCEDURE — 73140 X-RAY EXAM OF FINGER(S): CPT | Mod: TC,HCNC,LT

## 2023-01-25 PROCEDURE — 3066F PR DOCUMENTATION OF TREATMENT FOR NEPHROPATHY: ICD-10-PCS | Mod: HCNC,CPTII,S$GLB, | Performed by: DERMATOLOGY

## 2023-01-25 PROCEDURE — 1160F PR REVIEW ALL MEDS BY PRESCRIBER/CLIN PHARMACIST DOCUMENTED: ICD-10-PCS | Mod: HCNC,CPTII,S$GLB, | Performed by: NURSE PRACTITIONER

## 2023-01-25 PROCEDURE — 3072F PR LOW RISK FOR RETINOPATHY: ICD-10-PCS | Mod: HCNC,CPTII,S$GLB, | Performed by: DERMATOLOGY

## 2023-01-25 PROCEDURE — 3288F PR FALLS RISK ASSESSMENT DOCUMENTED: ICD-10-PCS | Mod: HCNC,CPTII,S$GLB, | Performed by: NURSE PRACTITIONER

## 2023-01-25 PROCEDURE — 1125F AMNT PAIN NOTED PAIN PRSNT: CPT | Mod: HCNC,CPTII,S$GLB, | Performed by: NURSE PRACTITIONER

## 2023-01-25 PROCEDURE — 73140 X-RAY EXAM OF FINGER(S): CPT | Mod: 26,HCNC,LT, | Performed by: RADIOLOGY

## 2023-01-25 PROCEDURE — 3008F PR BODY MASS INDEX (BMI) DOCUMENTED: ICD-10-PCS | Mod: HCNC,CPTII,S$GLB, | Performed by: ORTHOPAEDIC SURGERY

## 2023-01-25 PROCEDURE — 3288F PR FALLS RISK ASSESSMENT DOCUMENTED: ICD-10-PCS | Mod: HCNC,CPTII,S$GLB, | Performed by: ORTHOPAEDIC SURGERY

## 2023-01-25 PROCEDURE — 1101F PR PT FALLS ASSESS DOC 0-1 FALLS W/OUT INJ PAST YR: ICD-10-PCS | Mod: HCNC,CPTII,S$GLB, | Performed by: ORTHOPAEDIC SURGERY

## 2023-01-25 PROCEDURE — 3079F DIAST BP 80-89 MM HG: CPT | Mod: HCNC,CPTII,S$GLB, | Performed by: NURSE PRACTITIONER

## 2023-01-25 PROCEDURE — 71046 X-RAY EXAM CHEST 2 VIEWS: CPT | Mod: 26,HCNC,, | Performed by: RADIOLOGY

## 2023-01-25 PROCEDURE — 1125F PR PAIN SEVERITY QUANTIFIED, PAIN PRESENT: ICD-10-PCS | Mod: HCNC,CPTII,S$GLB, | Performed by: NURSE PRACTITIONER

## 2023-01-25 PROCEDURE — 3288F FALL RISK ASSESSMENT DOCD: CPT | Mod: HCNC,CPTII,S$GLB, | Performed by: ORTHOPAEDIC SURGERY

## 2023-01-25 PROCEDURE — 99214 PR OFFICE/OUTPT VISIT, EST, LEVL IV, 30-39 MIN: ICD-10-PCS | Mod: HCNC,S$GLB,, | Performed by: NURSE PRACTITIONER

## 2023-01-25 PROCEDURE — 99214 OFFICE O/P EST MOD 30 MIN: CPT | Mod: HCNC,S$GLB,, | Performed by: NURSE PRACTITIONER

## 2023-01-25 PROCEDURE — 3066F PR DOCUMENTATION OF TREATMENT FOR NEPHROPATHY: ICD-10-PCS | Mod: HCNC,CPTII,S$GLB, | Performed by: ORTHOPAEDIC SURGERY

## 2023-01-25 PROCEDURE — 1159F MED LIST DOCD IN RCRD: CPT | Mod: HCNC,CPTII,S$GLB, | Performed by: NURSE PRACTITIONER

## 2023-01-25 PROCEDURE — 3072F LOW RISK FOR RETINOPATHY: CPT | Mod: HCNC,CPTII,S$GLB, | Performed by: DERMATOLOGY

## 2023-01-25 RX ORDER — CLINDAMYCIN HYDROCHLORIDE 300 MG/1
300 CAPSULE ORAL EVERY 8 HOURS
Qty: 21 CAPSULE | Refills: 0 | Status: SHIPPED | OUTPATIENT
Start: 2023-01-25 | End: 2023-03-31

## 2023-01-25 RX ORDER — CLINDAMYCIN PHOSPHATE 10 MG/ML
SOLUTION TOPICAL 2 TIMES DAILY
Qty: 60 EACH | Refills: 5 | Status: SHIPPED | OUTPATIENT
Start: 2023-01-25 | End: 2023-12-29

## 2023-01-25 RX ORDER — CEPHALEXIN 500 MG/1
500 CAPSULE ORAL EVERY 12 HOURS
Qty: 14 CAPSULE | Refills: 0 | Status: SHIPPED | OUTPATIENT
Start: 2023-01-25 | End: 2023-01-25

## 2023-01-25 RX ORDER — MUPIROCIN 20 MG/G
OINTMENT TOPICAL
Qty: 30 G | Refills: 1 | Status: SHIPPED | OUTPATIENT
Start: 2023-01-25 | End: 2023-12-29

## 2023-01-25 NOTE — PROGRESS NOTES
Subjective:       Patient ID:  Lizz Crockett is a 67 y.o. female who presents for   Chief Complaint   Patient presents with    Cyst     She was seen by urgent care, Dr. Sandoval this morning, prescribed keflex (has not started)    History of Present Illness: The patient presents with chief complaint of swelling.   Location: left 4th finger  Duration: since 1/13/23  Signs/Symptoms: pain     Prior treatments: vicks vapor rub, toothpaste, hydrogen peroxide, warm salted water    Review of Systems   Constitutional:  Negative for fever and chills.   Gastrointestinal:  Negative for nausea and vomiting.   Skin:  Positive for activity-related sunscreen use. Negative for daily sunscreen use and recent sunburn.   Hematologic/Lymphatic: Does not bruise/bleed easily.      Objective:    Physical Exam   Constitutional: She appears well-developed and well-nourished. No distress.   Neurological: She is alert and oriented to person, place, and time. She is not disoriented.   Psychiatric: She has a normal mood and affect.   Skin:   Areas Examined (abnormalities noted in diagram):   Head / Face Inspection Performed  Neck Inspection Performed  RUE Inspected  LUE Inspection Performed  Nails and Digits Inspection Performed                   Assessment / Plan:        Abscess around nail of left middle finger  -     Ambulatory referral/consult to Dermatology  -     clindamycin (CLEOCIN T) 1 % Swab; Apply topically 2 (two) times daily.  Dispense: 60 each; Refill: 5  -     mupirocin (BACTROBAN) 2 % ointment; AAA bid. Antibiotic ointment  Dispense: 30 g; Refill: 1  -     clindamycin (CLEOCIN) 300 MG capsule; Take 1 capsule (300 mg total) by mouth every 8 (eight) hours.  Dispense: 21 capsule; Refill: 0  -     Ambulatory referral/consult to Orthopedics; Future; Expected date: 02/01/2023  -     will refer to Dr. Oneill.  Concern for swelling of the distal finger, possible DIP joint.  Recommend f/u with ortho to r/o osteomyelitis if warranted. Will  start above meds.  Discussed warm compresses 3-4 times/day.  Will message Dr. Oneill to see if urgent appt available.              Follow up if symptoms worsen or fail to improve.

## 2023-01-25 NOTE — Clinical Note
Hi, Dr. Oneill.  Could you see this patient sometime this week, concern for possible osteo, she has had swelling of the digit x 2 weeks.  Possible ingrown but there is no direct drainage or pus noted, just swelling.   Thanks,  Rosalinda

## 2023-01-25 NOTE — PROGRESS NOTES
Chief Complaint  Chief Complaint   Patient presents with    Follow-up         HPI     HPI  Lizz Crockett is a 67 y.o. female with medical diagnoses as listed in the medical history and problem list that presents for Abscess Around Nail of Left Middle Finger.  This patient is new to me.     Abscess Around Nail of Left Middle Finger: Reports visiting Urgent Care aapprox a week ago where she was treated for gout given her hx and location of swelling. Left middle finger is inflamed and swollen.patient reports there is a throbbing sensation that often wakes her from sleep. She has been taking Mitigare without relief. Reports pain is 10/10 that is increased with palpation. Denies any drainage.       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    Thyroid disease     Type 2 diabetes mellitus with stage 4 chronic kidney disease, without long-term current use of insulin 9/2/2022       PAST SURGICAL HISTORY:  Past Surgical History:   Procedure Laterality Date    BREAST SURGERY      CATARACT EXTRACTION, BILATERAL Bilateral     THYROIDECTOMY      for multinodular goiter    TUBAL LIGATION         SOCIAL HISTORY:  Social History     Socioeconomic History    Marital status: Single   Tobacco Use    Smoking status: Former     Types: Cigarettes    Smokeless tobacco: Never   Substance and Sexual Activity    Alcohol use: No    Drug use: No    Sexual activity: Yes       FAMILY HISTORY:  Family History   Problem Relation Age of Onset    Heart disease Mother     Hypertension Mother     Hypertension Father     Heart disease Father        ALLERGIES AND MEDICATIONS: updated and  reviewed.  Review of patient's allergies indicates:   Allergen Reactions    Iodine     Prednisone     Codeine Anxiety    Iodinated contrast media Nausea And Vomiting     Current Outpatient Medications   Medication Sig Dispense Refill    allopurinoL (ZYLOPRIM) 100 MG tablet Take 1 tablet (100 mg total) by mouth once daily. 90 tablet 3    amLODIPine (NORVASC) 10 MG tablet Take 1 tablet (10 mg total) by mouth once daily. 90 tablet 1    atorvastatin (LIPITOR) 20 MG tablet Take 1 tablet (20 mg total) by mouth every evening. 90 tablet 3    diazePAM (VALIUM) 10 MG Tab Take 1 tablet (10 mg total) by mouth 2 (two) times daily as needed (anxiety). 20 tablet 0    diclofenac sodium (VOLTAREN) 1 % Gel APPLY A SMALL AMOUNT( 2 TO 4 GRAM MAX) TOPICALLY TO PAINFUL JOINT AREAS FOUR TIMES DAILY AS NEEDED. 100 g 0    empagliflozin (JARDIANCE) 10 mg tablet Take 1 tablet (10 mg total) by mouth once daily. 90 tablet 0    glycerin-mineral oil-lanolin Crea Apply 1 application topically daily as needed (to soothe itching). 192 g 0    levothyroxine (SYNTHROID) 100 MCG tablet Take 1 tablet (100 mcg total) by mouth before breakfast. 90 tablet 1    naloxone (NARCAN) 4 mg/actuation Spry       nitroGLYCERIN (NITROSTAT) 0.4 MG SL tablet Place 1 tablet (0.4 mg total) under the tongue every 5 (five) minutes as needed for Chest pain (for chest pain/discomfort). Call 911 if chest pain persists after second dose. 25 tablet 0    omeprazole (PRILOSEC) 40 MG capsule Take 40 mg by mouth once daily.      oxyCODONE-acetaminophen (PERCOCET) 7.5-325 mg per tablet Take 1 tablet by mouth every 8 hours as needed for pain 90 tablet 0    varicella-zoster gE-AS01B, PF, (SHINGRIX) 50 mcg/0.5 mL injection Inject 0.5 mL (one dose) into muscle now; schedule second dose  days later 1 each 1    cephALEXin (KEFLEX) 500 MG capsule Take 1 capsule (500 mg total) by mouth every 12 (twelve) hours. for 7 days 14 capsule 0     No current  "facility-administered medications for this visit.           Exam     ROS  Review of Systems   Constitutional:  Negative for appetite change, chills, fatigue and fever.   HENT:  Negative for congestion, ear pain, postnasal drip, rhinorrhea, sinus pressure, sneezing and sore throat.    Respiratory:  Negative for shortness of breath.    Cardiovascular:  Negative for chest pain and palpitations.   Gastrointestinal:  Negative for abdominal pain, constipation, diarrhea, nausea and vomiting.   Genitourinary:  Negative for dysuria.   Musculoskeletal:  Positive for joint swelling. Negative for arthralgias.   Neurological:  Negative for headaches.   Psychiatric/Behavioral:  Negative for sleep disturbance.          Physical Exam  Vitals:    01/25/23 0844   BP: 134/86   BP Location: Right arm   Patient Position: Sitting   BP Method: Medium (Manual)   Pulse: 94   Resp: 16   Temp: 97.7 °F (36.5 °C)   TempSrc: Tympanic   SpO2: 97%   Weight: 74.7 kg (164 lb 10.9 oz)   Height: 5' 6" (1.676 m)    Body mass index is 26.58 kg/m².  Weight: 74.7 kg (164 lb 10.9 oz)   Height: 5' 6" (167.6 cm)   Physical Exam  Constitutional:       General: She is not in acute distress.     Appearance: Normal appearance.   HENT:      Head: Normocephalic and atraumatic.      Right Ear: External ear normal.      Left Ear: External ear normal.      Nose: Nose normal.   Eyes:      Pupils: Pupils are equal, round, and reactive to light.   Cardiovascular:      Rate and Rhythm: Normal rate and regular rhythm.      Pulses: Normal pulses.   Pulmonary:      Effort: Pulmonary effort is normal. No respiratory distress.      Breath sounds: Normal breath sounds. No wheezing.   Abdominal:      General: Bowel sounds are normal.      Palpations: Abdomen is soft.   Musculoskeletal:      Cervical back: Neck supple.   Lymphadenopathy:      Cervical: No cervical adenopathy.   Skin:     General: Skin is warm and dry.      Capillary Refill: Capillary refill takes less than 2 " seconds.      Findings: Abscess present.          Neurological:      Mental Status: She is alert and oriented to person, place, and time.   Psychiatric:         Mood and Affect: Mood normal.             Health Maintenance         Date Due Completion Date    TETANUS VACCINE Never done ---    Colorectal Cancer Screening Never done ---    Shingles Vaccine (1 of 2) Never done ---    COVID-19 Vaccine (3 - Booster for Pfizer series) 06/24/2021 4/29/2021    Hemoglobin A1c 05/03/2023 11/3/2022    Diabetes Urine Screening 09/02/2023 9/2/2022    Mammogram 10/14/2023 10/14/2022    Foot Exam 10/24/2023 10/24/2022 (Done)    Override on 10/24/2022: Done    Eye Exam 12/23/2023 12/23/2022    Lipid Panel 01/11/2024 1/11/2023    DEXA Scan 10/14/2025 10/14/2022              Assessment & Plan     Assessment & Plan  Problem List Items Addressed This Visit          Cardiac/Vascular    Essential hypertension (Chronic)  -The current medical regimen is effective;  continue present plan and medications.        Renal/    Stage 4 chronic kidney disease (Chronic)  -Stable; Monitor       Endocrine    Type 2 diabetes mellitus with stage 4 chronic kidney disease, without long-term current use of insulin (Chronic)  -The current medical regimen is effective;  continue present plan and medications.      Other Visit Diagnoses       Abscess around nail of left middle finger    -  Primary  -We discussed effective administration of Keflex, adverse effects and side effects with education given for reinforcement    Relevant Medications    cephALEXin (KEFLEX) 500 MG capsule    Other Relevant Orders    Ambulatory referral/consult to Dermatology              Health Maintenance reviewed: Deferred per patient     Follow-up: RTC as needed    30+ minutes of total time spent on the encounter, which includes face to face time and non-face to face time preparing to see the patient (eg, review of tests), Obtaining and/or reviewing separately obtained history,  documenting clinical information in the electronic or other health record, independently interpreting results (not separately reported) and communicating results to the patient/family/caregiver, or Care coordination (not separately reported).

## 2023-01-25 NOTE — PROGRESS NOTES
Subjective:     Patient ID: Lizz Crockett is a 67 y.o. female.    Chief Complaint: Pain of the Left Hand      HPI:  The patient is a 67-year-old female with abscess left long finger distal phalanx ulnar aspect pulp about 2 cm in length.  She was given clindamycin but has not started taking it yet.    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    Thyroid disease     Type 2 diabetes mellitus with stage 4 chronic kidney disease, without long-term current use of insulin 9/2/2022     Past Surgical History:   Procedure Laterality Date    BREAST SURGERY      CATARACT EXTRACTION, BILATERAL Bilateral     THYROIDECTOMY      for multinodular goiter    TUBAL LIGATION       Family History   Problem Relation Age of Onset    Heart disease Mother     Hypertension Mother     Hypertension Father     Heart disease Father      Social History     Socioeconomic History    Marital status: Single   Tobacco Use    Smoking status: Former     Types: Cigarettes    Smokeless tobacco: Never   Substance and Sexual Activity    Alcohol use: No    Drug use: No    Sexual activity: Yes     Medication List with Changes/Refills   Current Medications    ALLOPURINOL (ZYLOPRIM) 100 MG TABLET    Take 1 tablet (100 mg total) by mouth once daily.    AMLODIPINE (NORVASC) 10 MG TABLET    Take 1 tablet (10 mg total) by mouth once daily.    ATORVASTATIN (LIPITOR) 20 MG TABLET    Take 1 tablet (20 mg total) by mouth every evening.    CLINDAMYCIN (CLEOCIN T) 1 % SWAB    Apply topically 2 (two) times daily.    CLINDAMYCIN (CLEOCIN) 300 MG CAPSULE    Take 1 capsule (300 mg total) by mouth every 8 (eight) hours.    DIAZEPAM (VALIUM) 10 MG TAB    Take 1 tablet (10 mg total) by  mouth 2 (two) times daily as needed (anxiety).    DICLOFENAC SODIUM (VOLTAREN) 1 % GEL    APPLY A SMALL AMOUNT( 2 TO 4 GRAM MAX) TOPICALLY TO PAINFUL JOINT AREAS FOUR TIMES DAILY AS NEEDED.    EMPAGLIFLOZIN (JARDIANCE) 10 MG TABLET    Take 1 tablet (10 mg total) by mouth once daily.    GLYCERIN-MINERAL OIL-LANOLIN CREA    Apply 1 application topically daily as needed (to soothe itching).    LEVOTHYROXINE (SYNTHROID) 100 MCG TABLET    Take 1 tablet (100 mcg total) by mouth before breakfast.    MUPIROCIN (BACTROBAN) 2 % OINTMENT    AAA bid. Antibiotic ointment    NALOXONE (NARCAN) 4 MG/ACTUATION SPRY        NITROGLYCERIN (NITROSTAT) 0.4 MG SL TABLET    Place 1 tablet (0.4 mg total) under the tongue every 5 (five) minutes as needed for Chest pain (for chest pain/discomfort). Call 911 if chest pain persists after second dose.    OMEPRAZOLE (PRILOSEC) 40 MG CAPSULE    Take 40 mg by mouth once daily.    OXYCODONE-ACETAMINOPHEN (PERCOCET) 7.5-325 MG PER TABLET    Take 1 tablet by mouth every 8 hours as needed for pain    VARICELLA-ZOSTER GE-AS01B, PF, (SHINGRIX) 50 MCG/0.5 ML INJECTION    Inject 0.5 mL (one dose) into muscle now; schedule second dose  days later     Review of patient's allergies indicates:   Allergen Reactions    Iodine     Prednisone     Codeine Anxiety    Iodinated contrast media Nausea And Vomiting     Review of Systems   Constitutional: Negative for malaise/fatigue.   HENT:  Negative for hearing loss.    Eyes:  Positive for visual disturbance. Negative for double vision.   Cardiovascular:  Negative for chest pain.   Respiratory:  Negative for shortness of breath.    Endocrine: Negative for cold intolerance.   Hematologic/Lymphatic: Does not bruise/bleed easily.   Skin:  Negative for poor wound healing and suspicious lesions.   Musculoskeletal:  Positive for arthritis, joint pain and joint swelling. Negative for gout.   Gastrointestinal:  Positive for heartburn. Negative for nausea and vomiting.    Genitourinary:  Negative for dysuria.   Neurological:  Negative for numbness, paresthesias and sensory change.   Psychiatric/Behavioral:  Positive for altered mental status. Negative for depression, memory loss and substance abuse. The patient is not nervous/anxious.    Allergic/Immunologic: Negative for persistent infections.     Objective:   Body mass index is 26.47 kg/m².  There were no vitals filed for this visit.             General    Constitutional: She is oriented to person, place, and time. She appears well-developed and well-nourished. No distress.   HENT:   Head: Normocephalic.   Mouth/Throat: Oropharynx is clear and moist.   Eyes: EOM are normal.   Cardiovascular:  Normal rate.            Pulmonary/Chest: Effort normal.   Abdominal: Soft.   Neurological: She is alert and oriented to person, place, and time. No cranial nerve deficit.   Psychiatric: She has a normal mood and affect.         Left Hand/Wrist Exam     Inspection   Scars: Wrist - absent Hand -  absent  Effusion: Wrist - absent Hand -  absent    Pain   Hand - The patient exhibits pain of the middle IP.    Other     Sensory Exam  Median Distribution: normal  Ulnar Distribution: normal  Radial Distribution: normal    Comments:  The patient has a 2 cm abscess ulnar aspect left long finger distal phalanx.  There is no lymphangitis lymphadenopathy.          Vascular Exam       Capillary Refill  Left Hand: normal capillary refill        Relevant imaging results reviewed and interpreted by me, discussed with the patient and / or family today radiographs left long finger showed no evidence of osteomyelitis  Assessment:     Encounter Diagnosis   Name Primary?    Abscess around nail of left middle finger         Plan:       The patient was counseled regarding incision and drainage abscess left long finger distal phalanx.  Risk complications and alternatives were discussed including the risk of infection, anesthetic risk, injury to nerves and vessels,  loss of motion, and possible need for additional surgeries were discussed.  She seems to understand and agree that surgery.  All questions were answered.  She has been symptomatic since 01/13/2022              Disclaimer: This note was prepared using a voice recognition system and is likely to have sound alike errors within the text.

## 2023-01-25 NOTE — TELEPHONE ENCOUNTER
To confirm, your doctor has instructed you that surgery is scheduled for 1/26/2023.       Your arrival time is 7:00 AM.    Surgery will be at [facility],  The address is [id number] [place] [address]. [place]  [id number].      IMPORTANT INSTRUCTIONS!    Do not eat or drink after 12 midnight, including water.   Do not smoke or use chewing tobacco after 12 midnight  OK to brush teeth, but no gum, candy, or mints!      Take only these medicines with a small swallow of water-morning of surgery.     Lipitor, amlodipine, omeprazole, & synthroid          ____ Stop Aspirin, Ibuprofen, Motrin and Aleve at least 5-7 days before surgery, unless otherwise instructed by your doctor, or the nurse.   You MAY use Tylenol/acetaminophen until day of surgery.      ____  If you take diabetic medication, do NOT take morning of surgery unless instructed by Doctor. Metformin must be stopped 24 hrs prior to surgery time.       ____ Stop taking any Fish Oil supplements or Vitamins at least 5 days prior to surgery, unless instructed otherwise by your Doctor.       Please notify MD office if you have an active infection, currently taking antibiotics or received a vaccination within the past 7 days.      Bathing Instructions: The night before surgery and the morning prior to coming to the hospital:    - Shower & rinse your body as usual with anti-bacterial Soap (Dial or Ev 2000)   -Hibiclens (if indicated) use AFTER anti-bacterial soap; 1 packet PM/1 packet in AM on surgical site only   -Do not use hibiclens on your head, face, or genitals.    -Do not wash with anti-bacterial soap after you use the hibiclens.    -Do not shave surgical site 5-7 days prior to surgery.    -Pubic hair 7 days prior to surgery (gyn pt's).      Pediatric patients do not need to use anti-bacterial soap or Hibiclens.             After Bathing:   __ No powder, lotions, creams, or body spray to skin     __No deodorant for any breast procedure, PORT, or upper arm  surgery     __ No makeup, mascara, nail polish or artificial nails        **SURGERY WILL BE CANCELLED IF ARTIFICIAL/NAIL POLISH IS PRESENT!!!**    __ Please remove all piercings and leave all jewelry at home.    **SURGERY WILL BE CANCELLED IF PIERCINGS ARE PRESENT!!!**      __ Dentures, Hearing Aids and Contact Lens need to be removed prior to the start of surgery.      __ Wear clean, loose-fitting clothing. Allow for dressings/bandages/surgical equipment     __ You must have transportation, and they MUST stay the entire time.       [facility] Visitor/Ride Policy:   Only 1 adult allowed (over the age of 18) to accompany you into Pre-op/Recovery Surgery Dept and must stay through the entire length of admission.     Must have a ride home from a responsible adult that you know and trust.      Pediatric Patients are allowed 2 adult visitors.     Medical Transport, Uber or Lyft can only be used if patient has a responsible adult to accompany them during ride home.         Post-Op Instructions: You will receive surgery post-op instructions by your Discharge Nurse prior to going home.     Surgical Site Infection:   Prevention of surgical site infections:   -Keep incisions clean and dry.   -Do not soak/submerge incisions in water until completely healed.   -Do not apply lotions, powders, creams, or deodorants to site.   -Always make sure hands are cleaned with antibacterial soap/ alcohol-based   prior to touching the surgical site.       Signs and symptoms:               -Redness and pain around the area where you had surgery               -Drainage of cloudy fluid from your surgical wound               -Fever over 100.4 or chills     >>>Call Surgeon office/on-call Surgeon if you experience any of these signs & symptoms post-surgery.        *Please Call [facility] Pre-Admissions Department with surgery instruction questions at [phone] or [phone].     *Insurance Questions, please call [phone] or [phone]

## 2023-01-25 NOTE — TELEPHONE ENCOUNTER
Spoke with the pt regarding pain in finger informed pt that  recommends for her to go to Urgent Care stated that she is having surgery on tomorrow .//LB

## 2023-01-26 ENCOUNTER — ANESTHESIA EVENT (OUTPATIENT)
Dept: SURGERY | Facility: HOSPITAL | Age: 68
End: 2023-01-26
Payer: MEDICARE

## 2023-01-26 ENCOUNTER — HOSPITAL ENCOUNTER (OUTPATIENT)
Facility: HOSPITAL | Age: 68
Discharge: HOME | End: 2023-01-26
Attending: ORTHOPAEDIC SURGERY | Admitting: ORTHOPAEDIC SURGERY
Payer: MEDICARE

## 2023-01-26 ENCOUNTER — NURSE TRIAGE (OUTPATIENT)
Dept: ADMINISTRATIVE | Facility: CLINIC | Age: 68
End: 2023-01-26
Payer: MEDICARE

## 2023-01-26 ENCOUNTER — ANESTHESIA (OUTPATIENT)
Dept: SURGERY | Facility: HOSPITAL | Age: 68
End: 2023-01-26
Payer: MEDICARE

## 2023-01-26 VITALS
OXYGEN SATURATION: 100 % | BODY MASS INDEX: 25.51 KG/M2 | TEMPERATURE: 98 F | HEART RATE: 70 BPM | WEIGHT: 158.75 LBS | DIASTOLIC BLOOD PRESSURE: 87 MMHG | SYSTOLIC BLOOD PRESSURE: 130 MMHG | RESPIRATION RATE: 20 BRPM | HEIGHT: 66 IN

## 2023-01-26 DIAGNOSIS — L03.012 ABSCESS AROUND NAIL OF LEFT MIDDLE FINGER: Primary | ICD-10-CM

## 2023-01-26 LAB
POCT GLUCOSE: 114 MG/DL (ref 70–110)
POCT GLUCOSE: 97 MG/DL (ref 70–110)

## 2023-01-26 PROCEDURE — 82962 GLUCOSE BLOOD TEST: CPT | Mod: HCNC | Performed by: ORTHOPAEDIC SURGERY

## 2023-01-26 PROCEDURE — 63600175 PHARM REV CODE 636 W HCPCS: Mod: HCNC | Performed by: ANESTHESIOLOGY

## 2023-01-26 PROCEDURE — D9220A PRA ANESTHESIA: Mod: HCNC,ANES,, | Performed by: ANESTHESIOLOGY

## 2023-01-26 PROCEDURE — 71000015 HC POSTOP RECOV 1ST HR: Mod: HCNC | Performed by: ORTHOPAEDIC SURGERY

## 2023-01-26 PROCEDURE — D9220A PRA ANESTHESIA: ICD-10-PCS | Mod: HCNC,ANES,, | Performed by: ANESTHESIOLOGY

## 2023-01-26 PROCEDURE — 71000033 HC RECOVERY, INTIAL HOUR: Mod: HCNC | Performed by: ORTHOPAEDIC SURGERY

## 2023-01-26 PROCEDURE — 87070 CULTURE OTHR SPECIMN AEROBIC: CPT | Mod: HCNC | Performed by: ORTHOPAEDIC SURGERY

## 2023-01-26 PROCEDURE — 36000705 HC OR TIME LEV I EA ADD 15 MIN: Mod: HCNC | Performed by: ORTHOPAEDIC SURGERY

## 2023-01-26 PROCEDURE — 26011 PR DRAIN FINGER ABSCESS,COMPLICATED: ICD-10-PCS | Mod: F3,HCNC,, | Performed by: ORTHOPAEDIC SURGERY

## 2023-01-26 PROCEDURE — D9220A PRA ANESTHESIA: Mod: HCNC,CRNA,, | Performed by: NURSE ANESTHETIST, CERTIFIED REGISTERED

## 2023-01-26 PROCEDURE — 87075 CULTR BACTERIA EXCEPT BLOOD: CPT | Mod: HCNC | Performed by: ORTHOPAEDIC SURGERY

## 2023-01-26 PROCEDURE — 37000008 HC ANESTHESIA 1ST 15 MINUTES: Mod: HCNC | Performed by: ORTHOPAEDIC SURGERY

## 2023-01-26 PROCEDURE — 25000003 PHARM REV CODE 250: Mod: HCNC | Performed by: ORTHOPAEDIC SURGERY

## 2023-01-26 PROCEDURE — 87186 SC STD MICRODIL/AGAR DIL: CPT | Mod: HCNC | Performed by: ORTHOPAEDIC SURGERY

## 2023-01-26 PROCEDURE — 25000003 PHARM REV CODE 250: Mod: HCNC

## 2023-01-26 PROCEDURE — 26011 DRAINAGE OF FINGER ABSCESS: CPT | Mod: F3,HCNC,, | Performed by: ORTHOPAEDIC SURGERY

## 2023-01-26 PROCEDURE — 36000704 HC OR TIME LEV I 1ST 15 MIN: Mod: HCNC | Performed by: ORTHOPAEDIC SURGERY

## 2023-01-26 PROCEDURE — 87077 CULTURE AEROBIC IDENTIFY: CPT | Mod: HCNC | Performed by: ORTHOPAEDIC SURGERY

## 2023-01-26 PROCEDURE — 37000009 HC ANESTHESIA EA ADD 15 MINS: Mod: HCNC | Performed by: ORTHOPAEDIC SURGERY

## 2023-01-26 PROCEDURE — 25000003 PHARM REV CODE 250: Mod: HCNC | Performed by: ANESTHESIOLOGY

## 2023-01-26 PROCEDURE — D9220A PRA ANESTHESIA: ICD-10-PCS | Mod: HCNC,CRNA,, | Performed by: NURSE ANESTHETIST, CERTIFIED REGISTERED

## 2023-01-26 RX ORDER — DIPHENHYDRAMINE HYDROCHLORIDE 50 MG/ML
25 INJECTION INTRAMUSCULAR; INTRAVENOUS EVERY 6 HOURS PRN
Status: DISCONTINUED | OUTPATIENT
Start: 2023-01-26 | End: 2023-01-26 | Stop reason: HOSPADM

## 2023-01-26 RX ORDER — MEPERIDINE HYDROCHLORIDE 25 MG/ML
12.5 INJECTION INTRAMUSCULAR; INTRAVENOUS; SUBCUTANEOUS ONCE
Status: DISCONTINUED | OUTPATIENT
Start: 2023-01-26 | End: 2023-01-26 | Stop reason: HOSPADM

## 2023-01-26 RX ORDER — HYDROCODONE BITARTRATE AND ACETAMINOPHEN 5; 325 MG/1; MG/1
1 TABLET ORAL EVERY 6 HOURS PRN
Qty: 15 TABLET | Refills: 0 | Status: SHIPPED | OUTPATIENT
Start: 2023-01-26 | End: 2023-01-31 | Stop reason: SDUPTHER

## 2023-01-26 RX ORDER — HYDROCODONE BITARTRATE AND ACETAMINOPHEN 5; 325 MG/1; MG/1
1 TABLET ORAL
Status: DISCONTINUED | OUTPATIENT
Start: 2023-01-26 | End: 2023-01-26 | Stop reason: HOSPADM

## 2023-01-26 RX ORDER — LIDOCAINE HYDROCHLORIDE 20 MG/ML
INJECTION, SOLUTION INFILTRATION; PERINEURAL
Status: DISCONTINUED | OUTPATIENT
Start: 2023-01-26 | End: 2023-01-26 | Stop reason: HOSPADM

## 2023-01-26 RX ORDER — FENTANYL CITRATE 50 UG/ML
25 INJECTION, SOLUTION INTRAMUSCULAR; INTRAVENOUS EVERY 5 MIN PRN
Status: DISCONTINUED | OUTPATIENT
Start: 2023-01-26 | End: 2023-01-26 | Stop reason: HOSPADM

## 2023-01-26 RX ORDER — PROPOFOL 10 MG/ML
INJECTION, EMULSION INTRAVENOUS
Status: DISCONTINUED | OUTPATIENT
Start: 2023-01-26 | End: 2023-01-26

## 2023-01-26 RX ORDER — ONDANSETRON 2 MG/ML
4 INJECTION INTRAMUSCULAR; INTRAVENOUS ONCE AS NEEDED
Status: DISCONTINUED | OUTPATIENT
Start: 2023-01-26 | End: 2023-01-26 | Stop reason: HOSPADM

## 2023-01-26 RX ORDER — FENTANYL CITRATE 50 UG/ML
INJECTION, SOLUTION INTRAMUSCULAR; INTRAVENOUS
Status: DISCONTINUED | OUTPATIENT
Start: 2023-01-26 | End: 2023-01-26

## 2023-01-26 RX ORDER — SULFAMETHOXAZOLE AND TRIMETHOPRIM 800; 160 MG/1; MG/1
1 TABLET ORAL 2 TIMES DAILY
Qty: 20 TABLET | Refills: 0 | Status: SHIPPED | OUTPATIENT
Start: 2023-01-26 | End: 2023-02-05

## 2023-01-26 RX ORDER — LIDOCAINE HCL/PF 100 MG/5ML
SYRINGE (ML) INTRAVENOUS
Status: DISCONTINUED | OUTPATIENT
Start: 2023-01-26 | End: 2023-01-26

## 2023-01-26 RX ORDER — MIDAZOLAM HYDROCHLORIDE 1 MG/ML
INJECTION INTRAMUSCULAR; INTRAVENOUS
Status: DISCONTINUED | OUTPATIENT
Start: 2023-01-26 | End: 2023-01-26

## 2023-01-26 RX ORDER — HYDROCODONE BITARTRATE AND ACETAMINOPHEN 5; 325 MG/1; MG/1
1 TABLET ORAL EVERY 4 HOURS PRN
Status: DISCONTINUED | OUTPATIENT
Start: 2023-01-26 | End: 2023-01-26 | Stop reason: HOSPADM

## 2023-01-26 RX ORDER — CHLORHEXIDINE GLUCONATE ORAL RINSE 1.2 MG/ML
10 SOLUTION DENTAL
Status: DISCONTINUED | OUTPATIENT
Start: 2023-01-26 | End: 2023-01-26 | Stop reason: HOSPADM

## 2023-01-26 RX ORDER — ONDANSETRON 2 MG/ML
INJECTION INTRAMUSCULAR; INTRAVENOUS
Status: DISCONTINUED | OUTPATIENT
Start: 2023-01-26 | End: 2023-01-26

## 2023-01-26 RX ORDER — SODIUM CHLORIDE, SODIUM LACTATE, POTASSIUM CHLORIDE, CALCIUM CHLORIDE 600; 310; 30; 20 MG/100ML; MG/100ML; MG/100ML; MG/100ML
INJECTION, SOLUTION INTRAVENOUS CONTINUOUS
Status: DISCONTINUED | OUTPATIENT
Start: 2023-01-26 | End: 2023-01-26 | Stop reason: HOSPADM

## 2023-01-26 RX ORDER — CHLORHEXIDINE GLUCONATE ORAL RINSE 1.2 MG/ML
10 SOLUTION DENTAL 2 TIMES DAILY
Status: DISCONTINUED | OUTPATIENT
Start: 2023-01-26 | End: 2023-01-26 | Stop reason: HOSPADM

## 2023-01-26 RX ORDER — SODIUM CHLORIDE 0.9 % (FLUSH) 0.9 %
10 SYRINGE (ML) INJECTION
Status: DISCONTINUED | OUTPATIENT
Start: 2023-01-26 | End: 2023-01-26 | Stop reason: HOSPADM

## 2023-01-26 RX ORDER — CEFAZOLIN SODIUM 2 G/50ML
2 SOLUTION INTRAVENOUS
Status: DISCONTINUED | OUTPATIENT
Start: 2023-01-26 | End: 2023-01-26 | Stop reason: HOSPADM

## 2023-01-26 RX ORDER — SODIUM CHLORIDE, SODIUM LACTATE, POTASSIUM CHLORIDE, CALCIUM CHLORIDE 600; 310; 30; 20 MG/100ML; MG/100ML; MG/100ML; MG/100ML
INJECTION, SOLUTION INTRAVENOUS CONTINUOUS PRN
Status: DISCONTINUED | OUTPATIENT
Start: 2023-01-26 | End: 2023-01-26

## 2023-01-26 RX ADMIN — FENTANYL CITRATE 50 MCG: 50 INJECTION, SOLUTION INTRAMUSCULAR; INTRAVENOUS at 10:01

## 2023-01-26 RX ADMIN — ONDANSETRON 4 MG: 2 INJECTION, SOLUTION INTRAMUSCULAR; INTRAVENOUS at 10:01

## 2023-01-26 RX ADMIN — DEXTROSE 2 G: 50 INJECTION, SOLUTION INTRAVENOUS at 11:01

## 2023-01-26 RX ADMIN — CHLORHEXIDINE GLUCONATE 10 ML: 1.2 RINSE ORAL at 07:01

## 2023-01-26 RX ADMIN — MIDAZOLAM HYDROCHLORIDE 2 MG: 1 INJECTION INTRAMUSCULAR; INTRAVENOUS at 10:01

## 2023-01-26 RX ADMIN — Medication 30 MG: at 10:01

## 2023-01-26 RX ADMIN — SODIUM CHLORIDE, SODIUM LACTATE, POTASSIUM CHLORIDE, AND CALCIUM CHLORIDE: 600; 310; 30; 20 INJECTION, SOLUTION INTRAVENOUS at 10:01

## 2023-01-26 RX ADMIN — PROPOFOL 100 MG: 10 INJECTION, EMULSION INTRAVENOUS at 10:01

## 2023-01-26 NOTE — TRANSFER OF CARE
"Anesthesia Transfer of Care Note    Patient: Lizz Crockett    Procedure(s) Performed: Procedure(s) (LRB):  INCISION AND DRAINAGE, UPPER EXTREMITY (Left)    Patient location: PACU    Anesthesia Type: general    Transport from OR: Transported from OR on room air with adequate spontaneous ventilation    Post pain: adequate analgesia    Post assessment: no apparent anesthetic complications and tolerated procedure well    Post vital signs: stable    Level of consciousness: awake, alert and oriented    Nausea/Vomiting: no nausea/vomiting    Complications: none    Transfer of care protocol was followed      Last vitals:   Visit Vitals  BP (!) 139/92 (BP Location: Right arm, Patient Position: Sitting)   Pulse 85   Temp 36.3 °C (97.3 °F) (Temporal)   Resp 18   Ht 5' 6" (1.676 m)   Wt 72 kg (158 lb 11.7 oz)   SpO2 98%   Breastfeeding No   BMI 25.62 kg/m²     "

## 2023-01-26 NOTE — DISCHARGE INSTRUCTIONS
Nozin Instructions  Goal: the goal of Nozin is to reduce the risk of post-procedural infections by bacteria in the nasal cavity. Think of it as hand  for your nose.    How to use:    1. Shake Nozin bottle well    2. Take a cotton swab and apply 4 drops to one tip    3. Insert cotton tip into one nostril, being sure not to go deeper into nose than tip of the swab.    4. Swab nostril 6 times counterclockwise and 6 times clockwise. Make sure to swab the inside front pocket of the nostril.    5. Take swab out and apply 2 drops to the same cotton tip. Repeat steps 3 and 4 in the other nostril.        Do steps 1-5 twice a day for 7 days.        After Hand Surgery  After surgery, the better you take care of yourself--especially your hand--the sooner it will heal. Follow your surgeons instructions. Try not to bump your hand, and dont move or lift anything while youre still wearing bandages, a splint, or a cast.  Care for your hand    Keep your hand elevated above heart level as much as possible for the first several days after surgery. This helps reduce swelling and pain.  To help prevent infection and speed healing, take care not to get your cast or bandages wet.  Keep dressing clean, dry, & intact until your follow up appointment.   Relieve pain as directed  Your surgeon may prescribe pain medicine or suggest you take an anti-inflammatory medicine. You might also be instructed to apply ice (or another cold source) to your hand. If you use ice cubes, put them in a plastic bag and rest it on top of your bandages. Leave the cold source on your hand for as long as its comfortable. Do this several times a day for the first few days after surgery. It may take several minutes before you can feel the cold through the cast or bandages.  Follow up with your surgeon  During a follow-up visit after surgery, your surgeon will check your progress. The stitches, bandages, splint, or cast may be removed. A new cast or splint  may be placed. If your hand has healed enough, your surgeon may prescribe exercises.  Do prescribed hand exercises  Your surgeon may recommend that you do exercises. These may be done under the guidance of a physical or occupational therapist. The exercises strengthen your hand, help you regain flexibility, and restore proper function. Do the exercises as advised.  Call your surgeon if you have...  A fever higher than 100.4°F (38.0°C) taken by mouth  Side effects from your medicine, such as prolonged nausea  A wet or loose dressing, or a dressing that is too tight  Excessive bleeding  Increased, ongoing pain or numbness  Signs of infection (such as drainage, warmth, or redness) at the incision site   Date Last Reviewed: 11/11/2015  © 4410-9579 [employer] [address] [place] [address], [place] [address]. All rights reserved. This information is not intended as a substitute for professional medical care. Always follow your healthcare professional's instructions.

## 2023-01-26 NOTE — TELEPHONE ENCOUNTER
Pt called in - States had procedure today- states pain not improved after one dose of pain meds- Last dose 230p. No relief. Rates pain 10/10. Care advice per protocol. Advised would route high priority to provider asking for out reach with care directives. Pt advised to call back if does not hear back from provider in 1 hour- pt disconnected prior to end of call.   Reason for Disposition   SEVERE post-op pain (e.g., excruciating, pain scale 8-10) that is not controlled with pain medications    Additional Information   Negative: Sounds like a life-threatening emergency to the triager   Negative: Bright red, wide-spread, sunburn-like rash   Negative: SEVERE headache and after spinal (epidural) anesthesia   Negative: Vomiting and persists > 4 hours   Negative: Vomiting and abdomen looks much more swollen than usual   Negative: Drinking very little and dehydration suspected (e.g., no urine > 12 hours, very dry mouth, very lightheaded)   Negative: Patient sounds very sick or weak to the triager   Negative: Sounds like a serious complication to the triager   Negative: Fever > 100.4 F (38.0 C)   Negative: Caller has URGENT question and triager unable to answer question    Protocols used: Post-Op Symptoms and [id number]-AP

## 2023-01-26 NOTE — OP NOTE
[facility]  General Surgery  Operative Note    SUMMARY     Date of Procedure: 1/26/2023     Procedure: Procedure(s) (LRB):  INCISION AND DRAINAGE, UPPER EXTREMITY (Left)   ring finger abscess distal phalanx    Surgeon(s) and Role:     * Cam Oneill MD - Primary    Assisting Surgeon: None    Pre-Operative Diagnosis:  Left ring finger distal phalanx abscess      Post-Operative Diagnosis:  Left ring finger distal phalanx abscess    Anesthesia:  Local plus sedation    Description:  The patient developed an abscess distal phalanx left ring finger 01/13/2022.  She declined incision and drainage in the clinic in his taken to the operating room for incision and drainage under sedation.  The patient was taken to the operating room where 10 cc of 2% plain lidocaine use local anesthetic about the base of the left ring finger.  Satisfactory anesthesia had been achieved the left hand was prepped and draped usual sterile fashion.  No tourniquet was used.  The abscess was about 2 cm x 1 cm in the radial aspect distal phalanx.  It was fluctuant.  An incision was performed pus was encountered cultures were taken for anaerobic and aerobic cultures.  After cultures were obtained the patient did receive 2 g of Ancef intravenously intraoperatively.  The wound was copious irrigated normal saline solution.  The wound was left open Xeroform gauze 4x4s and 2 in gauze dressing was applied.  The patient tolerated the procedure well was transferred recovery room in satisfactory condition.    Significant Surgical Tasks Conducted by the Assistant(s), if Applicable:  No assistant    Complications:  None     Estimated Blood Loss (EBL):  5 mL           Implants: None    Specimens:  Cultures for anaerobic and aerobic cultures           Condition: Good    Disposition: PACU - hemodynamically stable.    Attestation: I was present and scrubbed for the entire procedure.

## 2023-01-26 NOTE — DISCHARGE SUMMARY
[facility]  Discharge Note  Short Stay    Procedure(s) (LRB):  INCISION AND DRAINAGE, UPPER EXTREMITY (Left) ring finger abscess distal phalanx      OUTCOME: Patient tolerated treatment/procedure well without complication and is now ready for discharge.    DISPOSITION: Home or Self Care    FINAL DIAGNOSIS:  Left ring finger abscess distal phalanx    FOLLOWUP: In clinic    DISCHARGE INSTRUCTIONS:    Discharge Procedure Orders   Diet general     Call MD for:  temperature >100.4     Call MD for:  persistent nausea and vomiting     Call MD for:  severe uncontrolled pain     Call MD for:  difficulty breathing, headache or visual disturbances     Call MD for:  redness, tenderness, or signs of infection (pain, swelling, redness, odor or green/yellow discharge around incision site)     Call MD for:  hives     Call MD for:  persistent dizziness or light-headedness     Call MD for:  extreme fatigue        TIME SPENT ON DISCHARGE:  20 minutes

## 2023-01-26 NOTE — ANESTHESIA PREPROCEDURE EVALUATION
01/26/2023  Lizz Crockett is a 67 y.o., female.      Pre-op Assessment    I have reviewed the Patient Summary Reports.     I have reviewed the Nursing Notes. I have reviewed the NPO Status.   I have reviewed the Medications.     Review of Systems  Anesthesia Hx:  No problems with previous Anesthesia  Denies Family Hx of Anesthesia complications.   Denies Personal Hx of Anesthesia complications.   Social:  Non-Smoker    Hematology/Oncology:  Hematology Normal        Cardiovascular:   Hypertension CAD   Carotid artery body paraglioma Hypertension    Pulmonary:  Pulmonary Normal    Renal/:   Chronic Renal Disease    Hepatic/GI:   GERD Liver Disease,    Neurological:  Neurology Normal    Endocrine:   Diabetes Hypothyroidism    Psych:  Psychiatric Normal              Anesthesia Plan  Type of Anesthesia, risks & benefits discussed:    Anesthesia Type: Gen Supraglottic Airway  Post Op Pain Control Plan: multimodal analgesia and IV/PO Opioids PRN  Induction:  IV  Informed Consent: Informed consent signed with the Patient and all parties understand the risks and agree with anesthesia plan.  All questions answered.   ASA Score: 2  Day of Surgery Review of History & Physical: H&P Update referred to the surgeon/provider.    Ready For Surgery From Anesthesia Perspective.     .

## 2023-01-26 NOTE — ANESTHESIA POSTPROCEDURE EVALUATION
Anesthesia Post Evaluation    Patient: Lizz Crockett    Procedure(s) Performed: Procedure(s) (LRB):  INCISION AND DRAINAGE, UPPER EXTREMITY (Left)    [facility] Anesthesia Post Op Evaluation      Vitals Value Taken Time   /80 01/26/23 1114   Temp  01/26/23 1114   Pulse 77 01/26/23 1114   Resp 13 01/26/23 1114   SpO2 99 % 01/26/23 1114   Vitals shown include unvalidated device data.      No case tracking events are documented in the log.      Pain/Reddy Score: No data recorded

## 2023-01-26 NOTE — ANESTHESIA POSTPROCEDURE EVALUATION
Anesthesia Post Evaluation    Patient: Lizz Crockett    Procedure(s) Performed: Procedure(s) (LRB):  INCISION AND DRAINAGE, UPPER EXTREMITY (Left)    Final Anesthesia Type: general      Patient location during evaluation: PACU  Patient participation: Yes- Able to Participate  Level of consciousness: awake and alert, awake and oriented  Post-procedure vital signs: reviewed and stable  Pain management: adequate  Airway patency: patent    PONV status at discharge: No PONV  Anesthetic complications: no      Cardiovascular status: blood pressure returned to baseline  Respiratory status: unassisted, spontaneous ventilation and room air  Hydration status: euvolemic  Follow-up not needed.          Vitals Value Taken Time   /80 01/26/23 1114   Temp  01/26/23 1116   Pulse 82 01/26/23 1115   Resp 19 01/26/23 1115   SpO2 100 % 01/26/23 1115   Vitals shown include unvalidated device data.      No case tracking events are documented in the log.      Pain/Reddy Score: No data recorded

## 2023-01-30 LAB — BACTERIA SPEC AEROBE CULT: ABNORMAL

## 2023-01-30 NOTE — PROGRESS NOTES
SUBJECTIVE:      Patient ID: Lizz Crockett is a 67 y.o. female.    HPI: Ms. Crockett is here today for post-operative visit #1.  She is 5 days status post INCISION AND DRAINAGE, UPPER EXTREMITY (Left) ring finger abscess distal phalanx by Dr. Oneill on 1/26/23.  Pain is 5/10, throbbing.  She is taking Norco for pain relief and requests a refill today.  She has been taking the Bactrim as prescribed.  She has been compliant with postop instructions and keeping the extremity dry. She denies fever, chills, and sweats since the time of the surgery.     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    Thyroid disease     Type 2 diabetes mellitus with stage 4 chronic kidney disease, without long-term current use of insulin 9/2/2022     Past Surgical History:   Procedure Laterality Date    BREAST SURGERY      CATARACT EXTRACTION, BILATERAL Bilateral     INCISION AND DRAINAGE, UPPER EXTREMITY Left 1/26/2023    Procedure: INCISION AND DRAINAGE, UPPER EXTREMITY;  Surgeon: Cam Oneill MD;  Location: [facility];  Service: Orthopedics;  Laterality: Left;    THYROIDECTOMY      for multinodular goiter    TUBAL LIGATION       Family History   Problem Relation Age of Onset    Heart disease Mother     Hypertension Mother     Hypertension Father     Heart disease Father      Social History     Socioeconomic History    Marital status: Single   Tobacco Use    Smoking status: Former     Types: Cigarettes    Smokeless tobacco: Never   Substance and Sexual Activity    Alcohol use: No    Drug use: No    Sexual activity: Yes     Medication List with Changes/Refills   Current Medications    ALLOPURINOL (ZYLOPRIM) 100 MG TABLET    Take 1 tablet (100 mg  total) by mouth once daily.    AMLODIPINE (NORVASC) 10 MG TABLET    Take 1 tablet (10 mg total) by mouth once daily.    ATORVASTATIN (LIPITOR) 20 MG TABLET    Take 1 tablet (20 mg total) by mouth every evening.    CLINDAMYCIN (CLEOCIN T) 1 % SWAB    Apply topically 2 (two) times daily.    CLINDAMYCIN (CLEOCIN) 300 MG CAPSULE    Take 1 capsule (300 mg total) by mouth every 8 (eight) hours.    DIAZEPAM (VALIUM) 10 MG TAB    Take 1 tablet (10 mg total) by mouth 2 (two) times daily as needed (anxiety).    DICLOFENAC SODIUM (VOLTAREN) 1 % GEL    APPLY A SMALL AMOUNT( 2 TO 4 GRAM MAX) TOPICALLY TO PAINFUL JOINT AREAS FOUR TIMES DAILY AS NEEDED.    EMPAGLIFLOZIN (JARDIANCE) 10 MG TABLET    Take 1 tablet (10 mg total) by mouth once daily.    GLYCERIN-MINERAL OIL-LANOLIN CREA    Apply 1 application topically daily as needed (to soothe itching).    LEVOTHYROXINE (SYNTHROID) 100 MCG TABLET    Take 1 tablet (100 mcg total) by mouth before breakfast.    MUPIROCIN (BACTROBAN) 2 % OINTMENT    AAA bid. Antibiotic ointment    NALOXONE (NARCAN) 4 MG/ACTUATION SPRY        NITROGLYCERIN (NITROSTAT) 0.4 MG SL TABLET    Place 1 tablet (0.4 mg total) under the tongue every 5 (five) minutes as needed for Chest pain (for chest pain/discomfort). Call 911 if chest pain persists after second dose.    OMEPRAZOLE (PRILOSEC) 40 MG CAPSULE    Take 40 mg by mouth once daily.    OXYCODONE-ACETAMINOPHEN (PERCOCET) 7.5-325 MG PER TABLET    Take 1 tablet by mouth every 8 hours as needed for pain    SULFAMETHOXAZOLE-TRIMETHOPRIM 800-160MG (BACTRIM DS) 800-160 MG TAB    Take 1 tablet by mouth 2 (two) times daily. for 20 doses    VARICELLA-ZOSTER GE-AS01B, PF, (SHINGRIX) 50 MCG/0.5 ML INJECTION    Inject 0.5 mL (one dose) into muscle now; schedule second dose  days later   Changed and/or Refilled Medications    Modified Medication Previous Medication    HYDROCODONE-ACETAMINOPHEN (NORCO) 5-325 MG PER TABLET HYDROcodone-acetaminophen (NORCO) 5-325 mg  "per tablet       Take 1 tablet by mouth every 8 (eight) hours as needed for Pain.    Take 1 tablet by mouth every 6 (six) hours as needed for Pain.     Review of patient's allergies indicates:   Allergen Reactions    Iodine     Prednisone     Codeine Anxiety    Iodinated contrast media Nausea And Vomiting       OBJECTIVE:     Physical exam:    Vitals:    01/31/23 0820   Weight: 72 kg (158 lb 11.7 oz)   Height: 5' 6" (1.676 m)   PainSc:   5   PainLoc: Hand     Vital signs are stable, patient is afebrile.  Patient is well dressed and well groomed, no acute distress.  Alert and oriented to person, place, and time.    Left UE:  Post op dressing taken down.   Incision is clean, dry, and intact.   There is no erythema or exudate. There is no sign of any infection.   Mild swelling to ring finger  She is NVI.   2+ pulses noted.  Cap refill <2 seconds  Motor intact to hand    ASSESSMENT         Encounter Diagnosis   Name Primary?    Status post incision and drainage Yes            5 days status post INCISION AND DRAINAGE, UPPER EXTREMITY (Left) ring finger abscess distal phalanx    PLAN:           Lizz was seen today for post-op evaluation.    Diagnoses and all orders for this visit:    Status post incision and drainage  -     HYDROcodone-acetaminophen (NORCO) 5-325 mg per tablet; Take 1 tablet by mouth every 8 (eight) hours as needed for Pain.        - PO instruction reviewed and provided to patient  - The incision was cleaned with hydrogen peroxide and normal saline. A sterile Band-Aid was applied.  Splint provided today as well.  - Patient may clean the incision daily as above.   - Continue Bactrim as prescribed  - Patient may use brace/splint as needed for symptomatic relief.    - [place] refill sent to pharmacy, weaned from q.6 to Q 8 hours, encouraged to alternate with ibuprofen  - Patient should notify the office of any signs or symptoms of infection including fevers, erythema, purulent drainage, increasing pain.    - " Follow up for wound check        Randi Seneca, PA-C [facility]

## 2023-01-31 ENCOUNTER — OFFICE VISIT (OUTPATIENT)
Dept: ORTHOPEDICS | Facility: CLINIC | Age: 68
End: 2023-01-31
Payer: MEDICARE

## 2023-01-31 VITALS — HEIGHT: 66 IN | WEIGHT: 158.75 LBS | BODY MASS INDEX: 25.51 KG/M2

## 2023-01-31 DIAGNOSIS — Z98.890 STATUS POST INCISION AND DRAINAGE: Primary | ICD-10-CM

## 2023-01-31 LAB — BACTERIA SPEC ANAEROBE CULT: NORMAL

## 2023-01-31 PROCEDURE — 3066F PR DOCUMENTATION OF TREATMENT FOR NEPHROPATHY: ICD-10-PCS | Mod: HCNC,CPTII,S$GLB,

## 2023-01-31 PROCEDURE — 3288F PR FALLS RISK ASSESSMENT DOCUMENTED: ICD-10-PCS | Mod: HCNC,CPTII,S$GLB,

## 2023-01-31 PROCEDURE — 99024 PR POST-OP FOLLOW-UP VISIT: ICD-10-PCS | Mod: HCNC,S$GLB,,

## 2023-01-31 PROCEDURE — 1101F PR PT FALLS ASSESS DOC 0-1 FALLS W/OUT INJ PAST YR: ICD-10-PCS | Mod: HCNC,CPTII,S$GLB,

## 2023-01-31 PROCEDURE — 3072F PR LOW RISK FOR RETINOPATHY: ICD-10-PCS | Mod: HCNC,CPTII,S$GLB,

## 2023-01-31 PROCEDURE — 3288F FALL RISK ASSESSMENT DOCD: CPT | Mod: HCNC,CPTII,S$GLB,

## 2023-01-31 PROCEDURE — 1101F PT FALLS ASSESS-DOCD LE1/YR: CPT | Mod: HCNC,CPTII,S$GLB,

## 2023-01-31 PROCEDURE — 99999 PR PBB SHADOW E&M-EST. PATIENT-LVL III: CPT | Mod: PBBFAC,HCNC,,

## 2023-01-31 PROCEDURE — 1125F AMNT PAIN NOTED PAIN PRSNT: CPT | Mod: HCNC,CPTII,S$GLB,

## 2023-01-31 PROCEDURE — 1159F MED LIST DOCD IN RCRD: CPT | Mod: HCNC,CPTII,S$GLB,

## 2023-01-31 PROCEDURE — 3072F LOW RISK FOR RETINOPATHY: CPT | Mod: HCNC,CPTII,S$GLB,

## 2023-01-31 PROCEDURE — 3008F PR BODY MASS INDEX (BMI) DOCUMENTED: ICD-10-PCS | Mod: HCNC,CPTII,S$GLB,

## 2023-01-31 PROCEDURE — 99024 POSTOP FOLLOW-UP VISIT: CPT | Mod: HCNC,S$GLB,,

## 2023-01-31 PROCEDURE — 3008F BODY MASS INDEX DOCD: CPT | Mod: HCNC,CPTII,S$GLB,

## 2023-01-31 PROCEDURE — 1159F PR MEDICATION LIST DOCUMENTED IN MEDICAL RECORD: ICD-10-PCS | Mod: HCNC,CPTII,S$GLB,

## 2023-01-31 PROCEDURE — 1125F PR PAIN SEVERITY QUANTIFIED, PAIN PRESENT: ICD-10-PCS | Mod: HCNC,CPTII,S$GLB,

## 2023-01-31 PROCEDURE — 3066F NEPHROPATHY DOC TX: CPT | Mod: HCNC,CPTII,S$GLB,

## 2023-01-31 PROCEDURE — 99999 PR PBB SHADOW E&M-EST. PATIENT-LVL III: ICD-10-PCS | Mod: PBBFAC,HCNC,,

## 2023-01-31 RX ORDER — HYDROCODONE BITARTRATE AND ACETAMINOPHEN 5; 325 MG/1; MG/1
1 TABLET ORAL EVERY 8 HOURS PRN
Qty: 12 TABLET | Refills: 0 | Status: SHIPPED | OUTPATIENT
Start: 2023-01-31

## 2023-02-03 ENCOUNTER — TELEPHONE (OUTPATIENT)
Dept: ORTHOPEDICS | Facility: CLINIC | Age: 68
End: 2023-02-03
Payer: MEDICARE

## 2023-02-03 NOTE — TELEPHONE ENCOUNTER
Returned the patient's phone call in regards to their message. Informed the patient that I spoke with Italia Oneill's PA-C and she states to stop taking their hydrocodone because it may be causing the allergic reaction. Informed the patient that they can take Tylenol and Ibuprofen to help with the pain. Patient verbalized understanding.                ----- Message from Dustin Castillo sent at 2/3/2023 10:12 AM CST -----  Contact: daxa Nieto states that she is experiencing an allergic reaction (itching)from item that was used on body before surgery. Please call her back at [phone].        Thanks  DD

## 2023-02-07 DIAGNOSIS — Z00.00 ENCOUNTER FOR MEDICARE ANNUAL WELLNESS EXAM: ICD-10-CM

## 2023-02-07 NOTE — PROGRESS NOTES
SUBJECTIVE:      Patient ID: Lizz Crockett is a 67 y.o. female.    HPI: Ms. Crockett is here today for post-operative visit #2. She is 13 days status post INCISION AND DRAINAGE, UPPER EXTREMITY (Left) ring finger abscess distal phalanx by Dr. Oneill on 1/26/23. She reports that she is sill experiencing some pain and throbbing to the finger.  Pain is 6/10. She completed her Bactrim as prescribed.  She has been compliant with postop instructions and keeping the extremity dry. She denies fever, chills, and sweats since the time of the surgery.     Interval hx 1/31/23: Ms. Crockett is here today for post-operative visit #1.  She is 5 days status post INCISION AND DRAINAGE, UPPER EXTREMITY (Left) ring finger abscess distal phalanx by Dr. Oneill on 1/26/23.  Pain is 5/10, throbbing.  She is taking Norco for pain relief and requests a refill today.  She has been taking the Bactrim as prescribed.  She has been compliant with postop instructions and keeping the extremity dry. She denies fever, chills, and sweats since the time of the surgery.     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    Thyroid disease     Type 2 diabetes mellitus with stage 4 chronic kidney disease, without long-term current use of insulin 9/2/2022     Past Surgical History:   Procedure Laterality Date    BREAST SURGERY      CATARACT EXTRACTION, BILATERAL Bilateral     INCISION AND DRAINAGE, UPPER EXTREMITY Left 1/26/2023    Procedure: INCISION AND DRAINAGE, UPPER EXTREMITY;  Surgeon: Cam Oneill MD;  Location: [facility];  Service: Orthopedics;  Laterality: Left;    THYROIDECTOMY      for multinodular goiter    TUBAL LIGATION       Family History   Problem  Relation Age of Onset    Heart disease Mother     Hypertension Mother     Hypertension Father     Heart disease Father      Social History     Socioeconomic History    Marital status: Single   Tobacco Use    Smoking status: Former     Types: Cigarettes    Smokeless tobacco: Never   Substance and Sexual Activity    Alcohol use: No    Drug use: No    Sexual activity: Yes     Medication List with Changes/Refills   Current Medications    ALLOPURINOL (ZYLOPRIM) 100 MG TABLET    Take 1 tablet (100 mg total) by mouth once daily.    AMLODIPINE (NORVASC) 10 MG TABLET    Take 1 tablet (10 mg total) by mouth once daily.    ATORVASTATIN (LIPITOR) 20 MG TABLET    Take 1 tablet (20 mg total) by mouth every evening.    CLINDAMYCIN (CLEOCIN T) 1 % SWAB    Apply topically 2 (two) times daily.    CLINDAMYCIN (CLEOCIN) 300 MG CAPSULE    Take 1 capsule (300 mg total) by mouth every 8 (eight) hours.    DIAZEPAM (VALIUM) 10 MG TAB    Take 1 tablet (10 mg total) by mouth 2 (two) times daily as needed (anxiety).    DICLOFENAC SODIUM (VOLTAREN) 1 % GEL    APPLY A SMALL AMOUNT( 2 TO 4 GRAM MAX) TOPICALLY TO PAINFUL JOINT AREAS FOUR TIMES DAILY AS NEEDED.    EMPAGLIFLOZIN (JARDIANCE) 10 MG TABLET    Take 1 tablet (10 mg total) by mouth once daily.    GLYCERIN-MINERAL OIL-LANOLIN CREA    Apply 1 application topically daily as needed (to soothe itching).    HYDROCODONE-ACETAMINOPHEN (NORCO) 5-325 MG PER TABLET    Take 1 tablet by mouth every 8 (eight) hours as needed for Pain.    LEVOTHYROXINE (SYNTHROID) 100 MCG TABLET    Take 1 tablet (100 mcg total) by mouth before breakfast.    MUPIROCIN (BACTROBAN) 2 % OINTMENT    AAA bid. Antibiotic ointment    NALOXONE (NARCAN) 4 MG/ACTUATION SPRY        NITROGLYCERIN (NITROSTAT) 0.4 MG SL TABLET    Place 1 tablet (0.4 mg total) under the tongue every 5 (five) minutes as needed for Chest pain (for chest pain/discomfort). Call 911 if chest pain persists after second dose.    OMEPRAZOLE (PRILOSEC) 40 MG  "CAPSULE    Take 40 mg by mouth once daily.    OXYCODONE-ACETAMINOPHEN (PERCOCET) 7.5-325 MG PER TABLET    Take 1 tablet by mouth every 8 hours as needed for pain    VARICELLA-ZOSTER GE-AS01B, PF, (SHINGRIX) 50 MCG/0.5 ML INJECTION    Inject 0.5 mL (one dose) into muscle now; schedule second dose  days later     Review of patient's allergies indicates:   Allergen Reactions    Iodine     Prednisone     Codeine Anxiety    Iodinated contrast media Nausea And Vomiting       OBJECTIVE:     Physical exam:    Vitals:    02/08/23 0817   Weight: 72 kg (158 lb 11.7 oz)   Height: 5' 6" (1.676 m)   PainSc:   6   PainLoc: Finger     Vital signs are stable, patient is afebrile.  Patient is well dressed and well groomed, no acute distress.  Alert and oriented to person, place, and time.    Left UE:  Incision is clean, dry, and intact. Healing well.  There is no erythema or exudate. There is no sign of any infection.   Mild swelling to distal finger  She is NVI.   2+ pulses noted.  Cap refill <2 seconds  Motor intact to hand    ASSESSMENT         Encounter Diagnosis   Name Primary?    Status post incision and drainage Yes            13 days status post INCISION AND DRAINAGE, UPPER EXTREMITY (Left) ring finger abscess distal phalanx    PLAN:           Lizz was seen today for post-op evaluation.    Diagnoses and all orders for this visit:    Status post incision and drainage        - PO instruction reviewed and provided to patient  - The incision was cleaned with hydrogen peroxide. Band-aid applied.  - Patient may use splint as needed for symptomatic relief.    - OK to use thin layer of antibiotic ointment on incision  - Patient should notify the office of any signs or symptoms of infection including fevers, erythema, purulent drainage, increasing pain.    - Follow up PRN         Italia Guzman PA-C   [facility]     "

## 2023-02-08 ENCOUNTER — OFFICE VISIT (OUTPATIENT)
Dept: ORTHOPEDICS | Facility: CLINIC | Age: 68
End: 2023-02-08
Payer: MEDICARE

## 2023-02-08 VITALS — BODY MASS INDEX: 25.51 KG/M2 | HEIGHT: 66 IN | WEIGHT: 158.75 LBS

## 2023-02-08 DIAGNOSIS — Z98.890 STATUS POST INCISION AND DRAINAGE: Primary | ICD-10-CM

## 2023-02-08 PROCEDURE — 99024 PR POST-OP FOLLOW-UP VISIT: ICD-10-PCS | Mod: HCNC,S$GLB,,

## 2023-02-08 PROCEDURE — 3072F LOW RISK FOR RETINOPATHY: CPT | Mod: HCNC,CPTII,S$GLB,

## 2023-02-08 PROCEDURE — 3066F PR DOCUMENTATION OF TREATMENT FOR NEPHROPATHY: ICD-10-PCS | Mod: HCNC,CPTII,S$GLB,

## 2023-02-08 PROCEDURE — 99999 PR PBB SHADOW E&M-EST. PATIENT-LVL III: ICD-10-PCS | Mod: PBBFAC,HCNC,,

## 2023-02-08 PROCEDURE — 1159F PR MEDICATION LIST DOCUMENTED IN MEDICAL RECORD: ICD-10-PCS | Mod: HCNC,CPTII,S$GLB,

## 2023-02-08 PROCEDURE — 1159F MED LIST DOCD IN RCRD: CPT | Mod: HCNC,CPTII,S$GLB,

## 2023-02-08 PROCEDURE — 1125F PR PAIN SEVERITY QUANTIFIED, PAIN PRESENT: ICD-10-PCS | Mod: HCNC,CPTII,S$GLB,

## 2023-02-08 PROCEDURE — 99024 POSTOP FOLLOW-UP VISIT: CPT | Mod: HCNC,S$GLB,,

## 2023-02-08 PROCEDURE — 3008F BODY MASS INDEX DOCD: CPT | Mod: HCNC,CPTII,S$GLB,

## 2023-02-08 PROCEDURE — 3072F PR LOW RISK FOR RETINOPATHY: ICD-10-PCS | Mod: HCNC,CPTII,S$GLB,

## 2023-02-08 PROCEDURE — 3008F PR BODY MASS INDEX (BMI) DOCUMENTED: ICD-10-PCS | Mod: HCNC,CPTII,S$GLB,

## 2023-02-08 PROCEDURE — 99999 PR PBB SHADOW E&M-EST. PATIENT-LVL III: CPT | Mod: PBBFAC,HCNC,,

## 2023-02-08 PROCEDURE — 3066F NEPHROPATHY DOC TX: CPT | Mod: HCNC,CPTII,S$GLB,

## 2023-02-08 PROCEDURE — 1125F AMNT PAIN NOTED PAIN PRSNT: CPT | Mod: HCNC,CPTII,S$GLB,

## 2023-02-09 DIAGNOSIS — Z00.00 ENCOUNTER FOR MEDICARE ANNUAL WELLNESS EXAM: ICD-10-CM

## 2023-02-15 ENCOUNTER — TELEPHONE (OUTPATIENT)
Dept: INTERNAL MEDICINE | Facility: CLINIC | Age: 68
End: 2023-02-15
Payer: MEDICARE

## 2023-02-15 NOTE — TELEPHONE ENCOUNTER
----- Message from Samantha Mcarthur sent at 2/15/2023 10:28 AM CST -----  Contact: [phone]  Pt would like to consult with a nurse in regards to her kidney medication. Pt stated that she is still having constipation and has to use laxative. Pt is wanting to know what medication was prescribe. Please call pt back at [phone]. Thanks KB

## 2023-02-22 ENCOUNTER — TELEPHONE (OUTPATIENT)
Dept: ORTHOPEDICS | Facility: CLINIC | Age: 68
End: 2023-02-22
Payer: MEDICARE

## 2023-02-22 DIAGNOSIS — M19.91 PRIMARY LOCALIZED OSTEOARTHROSIS OF MULTIPLE SITES: Chronic | ICD-10-CM

## 2023-02-22 DIAGNOSIS — Z98.890 STATUS POST INCISION AND DRAINAGE: Primary | ICD-10-CM

## 2023-02-22 RX ORDER — DICLOFENAC SODIUM 10 MG/G
GEL TOPICAL
Qty: 100 G | Refills: 0 | Status: SHIPPED | OUTPATIENT
Start: 2023-02-22 | End: 2023-12-29

## 2023-02-22 NOTE — TELEPHONE ENCOUNTER
----- Message from Amisha Gamble sent at 2/22/2023 12:11 PM CST -----  Contact: self/[phone]  Patient is calling to consult with nurse regarding pain from her surgery. Please call her back [phone]. Thanks/ar

## 2023-02-22 NOTE — TELEPHONE ENCOUNTER
Returned patient call regarding post-op pain. Information relayed to Italia Guzman PA-C, in which she advised. Pt TEE

## 2023-03-15 ENCOUNTER — OFFICE VISIT (OUTPATIENT)
Dept: PODIATRY | Facility: CLINIC | Age: 68
End: 2023-03-15
Payer: MEDICARE

## 2023-03-15 VITALS — HEIGHT: 66 IN | BODY MASS INDEX: 25.39 KG/M2 | WEIGHT: 158 LBS

## 2023-03-15 DIAGNOSIS — M1A.3710 CHRONIC GOUT DUE TO RENAL IMPAIRMENT INVOLVING TOE OF RIGHT FOOT WITHOUT TOPHUS: Primary | ICD-10-CM

## 2023-03-15 PROCEDURE — 99999 PR PBB SHADOW E&M-EST. PATIENT-LVL III: CPT | Mod: PBBFAC,HCNC,, | Performed by: PODIATRIST

## 2023-03-15 PROCEDURE — 99999 PR PBB SHADOW E&M-EST. PATIENT-LVL III: ICD-10-PCS | Mod: PBBFAC,HCNC,, | Performed by: PODIATRIST

## 2023-03-15 PROCEDURE — 3072F LOW RISK FOR RETINOPATHY: CPT | Mod: HCNC,CPTII,S$GLB, | Performed by: PODIATRIST

## 2023-03-15 PROCEDURE — 3008F PR BODY MASS INDEX (BMI) DOCUMENTED: ICD-10-PCS | Mod: HCNC,CPTII,S$GLB, | Performed by: PODIATRIST

## 2023-03-15 PROCEDURE — 3072F PR LOW RISK FOR RETINOPATHY: ICD-10-PCS | Mod: HCNC,CPTII,S$GLB, | Performed by: PODIATRIST

## 2023-03-15 PROCEDURE — 1125F AMNT PAIN NOTED PAIN PRSNT: CPT | Mod: HCNC,CPTII,S$GLB, | Performed by: PODIATRIST

## 2023-03-15 PROCEDURE — 99213 PR OFFICE/OUTPT VISIT, EST, LEVL III, 20-29 MIN: ICD-10-PCS | Mod: HCNC,S$GLB,, | Performed by: PODIATRIST

## 2023-03-15 PROCEDURE — 1125F PR PAIN SEVERITY QUANTIFIED, PAIN PRESENT: ICD-10-PCS | Mod: HCNC,CPTII,S$GLB, | Performed by: PODIATRIST

## 2023-03-15 PROCEDURE — 3066F PR DOCUMENTATION OF TREATMENT FOR NEPHROPATHY: ICD-10-PCS | Mod: HCNC,CPTII,S$GLB, | Performed by: PODIATRIST

## 2023-03-15 PROCEDURE — 3008F BODY MASS INDEX DOCD: CPT | Mod: HCNC,CPTII,S$GLB, | Performed by: PODIATRIST

## 2023-03-15 PROCEDURE — 1101F PT FALLS ASSESS-DOCD LE1/YR: CPT | Mod: HCNC,CPTII,S$GLB, | Performed by: PODIATRIST

## 2023-03-15 PROCEDURE — 3288F FALL RISK ASSESSMENT DOCD: CPT | Mod: HCNC,CPTII,S$GLB, | Performed by: PODIATRIST

## 2023-03-15 PROCEDURE — 3066F NEPHROPATHY DOC TX: CPT | Mod: HCNC,CPTII,S$GLB, | Performed by: PODIATRIST

## 2023-03-15 PROCEDURE — 1159F MED LIST DOCD IN RCRD: CPT | Mod: HCNC,CPTII,S$GLB, | Performed by: PODIATRIST

## 2023-03-15 PROCEDURE — 99213 OFFICE O/P EST LOW 20 MIN: CPT | Mod: HCNC,S$GLB,, | Performed by: PODIATRIST

## 2023-03-15 PROCEDURE — 1159F PR MEDICATION LIST DOCUMENTED IN MEDICAL RECORD: ICD-10-PCS | Mod: HCNC,CPTII,S$GLB, | Performed by: PODIATRIST

## 2023-03-15 PROCEDURE — 1101F PR PT FALLS ASSESS DOC 0-1 FALLS W/OUT INJ PAST YR: ICD-10-PCS | Mod: HCNC,CPTII,S$GLB, | Performed by: PODIATRIST

## 2023-03-15 PROCEDURE — 3288F PR FALLS RISK ASSESSMENT DOCUMENTED: ICD-10-PCS | Mod: HCNC,CPTII,S$GLB, | Performed by: PODIATRIST

## 2023-03-15 NOTE — PROGRESS NOTES
PODIATRIC MEDICINE AND SURGERY  3/15/2023    PCP: Dr. KARAN May MD    CHIEF COMPLAINT   Chief Complaint   Patient presents with    Toe Pain     C/o b/l great toe pain, medial aspect of the 1st MPJ, prev dx w/ gout in December 2022, rates pain 5/10, diabetic, wears tennis and socks, last seen PCP Dr. May on 12/21/22       HPI:    Lizz Crockett is a 68 y.o. 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   Lizz presents to clinic today complaining of abrupt onset of right 1st MTPJ pain. She has had gout flareup on left in the past. Symptoms were present 3 weeks ago but eventually has now resolved.     Uric acid in 12/2022 was 7.1     Patient denies other pedal complaints at this time.     Hemoglobin A1C   Date Value Ref Range Status   11/03/2022 6.1 (H) 4.0 - 5.6 % Final     Comment:     ADA Screening Guidelines:  5.7-6.4%  Consistent with prediabetes  >or=6.5%  Consistent with diabetes    High levels of fetal hemoglobin interfere with the HbA1C  assay. Heterozygous hemoglobin variants (HbS, HgC, etc)do  not significantly interfere with this assay.   However, presence of multiple variants may affect accuracy.     09/02/2022 6.1 (H) 4.0 - 5.6 % Final     Comment:     ADA Screening Guidelines:  5.7-6.4%  Consistent with prediabetes  >or=6.5%  Consistent with diabetes    High levels of fetal hemoglobin interfere with the HbA1C  assay. Heterozygous hemoglobin variants (HbS, HgC, etc)do  not significantly interfere with this assay.   However, presence of multiple variants may affect accuracy.         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) 12/21/2022    Cataract     Diabetes mellitus, type 2     GERD (gastroesophageal reflux disease)     Glaucoma     Hyperlipidemia     Hypertension     Sarcoidosis, unspecified     Schizophreniform disorder 12/21/2022    Thyroid disease     Type 2 diabetes mellitus with stage 4 chronic kidney disease, without long-term current use of insulin 9/2/2022       PROBLEM LIST  Patient Active Problem List    Diagnosis Date Noted    Aortic atherosclerosis 01/08/2023    Coronary artery disease involving native coronary artery of native heart without angina pectoris 01/03/2023    Schizophreniform disorder 12/21/2022    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) 12/21/2022    Pulmonary nodules 12/21/2022    Fatty liver 10/30/2022    Primary localized osteoarthrosis of multiple sites 10/24/2022    Osteopenia of multiple sites 10/15/2022    Chronic use of opiate for therapeutic purpose 10/04/2022    Onychomadesis of toenail 09/15/2022    Screening for malignant neoplasm of colon 09/15/2022    Stage 4 chronic kidney disease 09/10/2022    Gastroesophageal reflux disease without esophagitis 09/02/2022    Asymptomatic menopausal state 09/02/2022    Essential hypertension 09/02/2022    Type 2 diabetes mellitus with stage 4 chronic kidney disease, without long-term current use of insulin 09/02/2022    Mass of left side of neck 09/02/2022    At risk for knowledge deficit 09/02/2022    Post-surgical hypothyroidism 12/01/2021    Mixed hyperlipidemia 12/01/2021    Glaucoma 12/01/2021       MEDS  Current Outpatient Medications on File Prior to Visit   Medication Sig Dispense Refill    allopurinoL (ZYLOPRIM) 100 MG tablet Take 1 tablet (100 mg total) by mouth once daily. 90 tablet 3    amLODIPine (NORVASC) 10 MG tablet Take 1 tablet (10 mg total) by mouth once daily. 90 tablet 1    atorvastatin (LIPITOR) 20 MG tablet Take 1  tablet (20 mg total) by mouth every evening. 90 tablet 3    clindamycin (CLEOCIN T) 1 % Swab Apply topically 2 (two) times daily. 60 each 5    clindamycin (CLEOCIN) 300 MG capsule Take 1 capsule (300 mg total) by mouth every 8 (eight) hours. 21 capsule 0    diazePAM (VALIUM) 10 MG Tab Take 1 tablet (10 mg total) by mouth 2 (two) times daily as needed (anxiety). 20 tablet 0    diclofenac sodium (VOLTAREN) 1 % Gel APPLY A SMALL AMOUNT( 2 TO 4 GRAM MAX) TOPICALLY TO PAINFUL JOINT AREAS FOUR TIMES DAILY AS NEEDED. 100 g 0    empagliflozin (JARDIANCE) 10 mg tablet Take 1 tablet (10 mg total) by mouth once daily. 90 tablet 0    glycerin-mineral oil-lanolin Crea Apply 1 application topically daily as needed (to soothe itching). 192 g 0    HYDROcodone-acetaminophen (NORCO) 5-325 mg per tablet Take 1 tablet by mouth every 8 (eight) hours as needed for Pain. 12 tablet 0    levothyroxine (SYNTHROID) 100 MCG tablet Take 1 tablet (100 mcg total) by mouth before breakfast. 90 tablet 1    mupirocin (BACTROBAN) 2 % ointment AAA bid. Antibiotic ointment 30 g 1    naloxone (NARCAN) 4 mg/actuation Spry       nitroGLYCERIN (NITROSTAT) 0.4 MG SL tablet Place 1 tablet (0.4 mg total) under the tongue every 5 (five) minutes as needed for Chest pain (for chest pain/discomfort). Call 911 if chest pain persists after second dose. 25 tablet 0    omeprazole (PRILOSEC) 40 MG capsule Take 40 mg by mouth once daily.      oxyCODONE-acetaminophen (PERCOCET) 7.5-325 mg per tablet Take 1 tablet by mouth every 8 hours as needed for pain 90 tablet 0    varicella-zoster gE-AS01B, PF, (SHINGRIX) 50 mcg/0.5 mL injection Inject 0.5 mL (one dose) into muscle now; schedule second dose  days later 1 each 1     No current facility-administered medications on file prior to visit.       Medication List with Changes/Refills   Current Medications    ALLOPURINOL (ZYLOPRIM) 100 MG TABLET    Take 1 tablet (100 mg total) by mouth once daily.    AMLODIPINE  (NORVASC) 10 MG TABLET    Take 1 tablet (10 mg total) by mouth once daily.    ATORVASTATIN (LIPITOR) 20 MG TABLET    Take 1 tablet (20 mg total) by mouth every evening.    CLINDAMYCIN (CLEOCIN T) 1 % SWAB    Apply topically 2 (two) times daily.    CLINDAMYCIN (CLEOCIN) 300 MG CAPSULE    Take 1 capsule (300 mg total) by mouth every 8 (eight) hours.    DIAZEPAM (VALIUM) 10 MG TAB    Take 1 tablet (10 mg total) by mouth 2 (two) times daily as needed (anxiety).    DICLOFENAC SODIUM (VOLTAREN) 1 % GEL    APPLY A SMALL AMOUNT( 2 TO 4 GRAM MAX) TOPICALLY TO PAINFUL JOINT AREAS FOUR TIMES DAILY AS NEEDED.    EMPAGLIFLOZIN (JARDIANCE) 10 MG TABLET    Take 1 tablet (10 mg total) by mouth once daily.    GLYCERIN-MINERAL OIL-LANOLIN CREA    Apply 1 application topically daily as needed (to soothe itching).    HYDROCODONE-ACETAMINOPHEN (NORCO) 5-325 MG PER TABLET    Take 1 tablet by mouth every 8 (eight) hours as needed for Pain.    LEVOTHYROXINE (SYNTHROID) 100 MCG TABLET    Take 1 tablet (100 mcg total) by mouth before breakfast.    MUPIROCIN (BACTROBAN) 2 % OINTMENT    AAA bid. Antibiotic ointment    NALOXONE (NARCAN) 4 MG/ACTUATION SPRY        NITROGLYCERIN (NITROSTAT) 0.4 MG SL TABLET    Place 1 tablet (0.4 mg total) under the tongue every 5 (five) minutes as needed for Chest pain (for chest pain/discomfort). Call 911 if chest pain persists after second dose.    OMEPRAZOLE (PRILOSEC) 40 MG CAPSULE    Take 40 mg by mouth once daily.    OXYCODONE-ACETAMINOPHEN (PERCOCET) 7.5-325 MG PER TABLET    Take 1 tablet by mouth every 8 hours as needed for pain    VARICELLA-ZOSTER GE-AS01B, PF, (SHINGRIX) 50 MCG/0.5 ML INJECTION    Inject 0.5 mL (one dose) into muscle now; schedule second dose  days later       PSH     Past Surgical History:   Procedure Laterality Date    BREAST SURGERY      CATARACT EXTRACTION, BILATERAL Bilateral     INCISION AND DRAINAGE, UPPER EXTREMITY Left 1/26/2023    Procedure: INCISION AND DRAINAGE, UPPER  "EXTREMITY;  Surgeon: Cam Oneill MD;  Location: [facility];  Service: Orthopedics;  Laterality: Left;    THYROIDECTOMY      for multinodular goiter    TUBAL LIGATION          ALL  Review of patient's allergies indicates:   Allergen Reactions    Iodine     Prednisone     Codeine Anxiety    Iodinated contrast media Nausea And Vomiting       SOC     Social History     Tobacco Use    Smoking status: Former     Types: Cigarettes    Smokeless tobacco: Never   Substance Use Topics    Alcohol use: No    Drug use: No         FAMILY HX    Family History   Problem Relation Age of Onset    Heart disease Mother     Hypertension Mother     Hypertension Father     Heart disease Father             REVIEW OF SYSTEMS  General: This patient is well-developed, well-nourished and appears stated age, well-oriented to person, place and time, and cooperative and pleasant on today's visit   Constitutional: Negative for chills and fever.   Respiratory: Negative for shortness of breath.    Cardiovascular: Negative for chest pain, palpitations, orthopnea  Gastrointestinal: Negative for diarrhea, nausea and vomiting.   Musculoskeletal: Positive for above noted in HPI  Skin: positive  for skin and/or nail changes   Neurological: negative  for tingling and sensory changes  Peripheral Vascular: no claudication or cyanosis  Psychiatric/Behavioral: Negative for altered mental status     PHYSICAL EXAM:      Vitals:    03/15/23 0857   Weight: 71.7 kg (158 lb)   Height: 5' 6" (1.676 m)   PainSc:   5         LOWER EXTREMITY PHYSICAL EXAM  VASCULAR  Dorsalis pedis and posterior tibial pulses palpable 2/4 bilaterally. Capillary refill time immediate to the toes. Feet are warm to the touch. Skin temperature warm to warm from proximally to distally There are no varicosities, telangiectasias noted to bilateral foot and ankle regions. There are no ecchymoses noted to bilateral foot and ankle regions. There is moderate left foot gross lower extremity " edema.    DERMATOLOGIC  Skin moist with healthy texture and turgor.There are no open ulcerations, lacerations, or fissures to bilateral foot and ankle regions. There are no signs of infection as there is no erythema, no proximal-extending lymphangiitis, no fluctuance, or crepitus noted on palpation to bilateral foot and ankle regions. There is no interdigital maceration.   There are hyperkeratotic lesions noted to feet. Nails are well-trimmed.    NEUROLOGIC  Epicritic sensation is intact as the patient is able to sense light touch to bilateral foot and ankle regions. Achilles and patellar deep tendon reflexes intact.  Babinski reflex absent    ORTHOPEDIC/BIOMECHANICAL  Pain out of proportion to skin findings LEFT 1st MTPJ. Unable to assess ROM due to pain.  Muscle strength AT/EHL/EDL/PT: 5/5; Achilles/Gastroc/Soleus: 5/5; PB/PL: 5/5 Muscle tone is normal.     IMAGING   Reviewed by me and I agree with radiologist findings, 3 views of foot/ankle, reveal:.          ASSESSMENT     Encounter Diagnosis   Name Primary?    Chronic gout due to renal impairment involving toe of right foot without tophus Yes           PLAN  Patient was educated about clinical and imaging findings, and verbalizes understanding of above.     Diagnoses and all orders for this visit:  Chronic gout due to renal impairment involving toe of right foot without tophus        Patient was educated and counseled regarding likely gout. I explained to the patient that a combination of diet and or metabolism has triggered a flare up of gout crystal deposits in the joint. Recommendations were given to the patient on diet selection.    Disclaimer:  This note may have been prepared using voice recognition software, it may have not been extensively proofed, as such there could be errors within the text such as sound alike errors.         Future Appointments   Date Time Provider Department Center   3/23/2023  9:00 AM KARAN May MD [facility]    3/24/2023 10:25 AM LABORATORY, [facility]   3/24/2023 10:30 AM SPECIMEN, [facility]CL[facility]   3/24/2023 11:00 AM JAMES, VISUAL-ONE [facility] OPHTHAL [facility]   3/24/2023  2:30 PM Jin Ruffin, OD [facility]   4/4/2023 10:00 AM Cam Meza MD [facility] NEPHRO [facility]       Report Electronically Signed By:     Abby Alcantara DPM   Podiatry  [facility]  3/15/2023

## 2023-03-15 NOTE — PATIENT INSTRUCTIONS
GOUT      What Is Gout?  Gout is a disorder that results from the build-up of uric acid in the tissues or a joint. It most often affects the joint of the big toe.  Causes  Gout attacks are caused by deposits of crystallized uric acid in the joint. Uric acid is present in the blood and eliminated in the urine, but in people who have gout, uric acid accumulates and crystallizes in the joints. Uric acid is the result of the breakdown of purines, chemicals that are found naturally in our bodies and in food. Some people develop gout because their kidneys have difficulty eliminating normal amounts of uric acid, while others produce too much uric acid.  Gout occurs most commonly in the big toe because uric acid is sensitive to temperature changes. At cooler temperatures, uric acid turns into crystals. Since the toe is the part of the body that is farthest from the heart, its also the coolest part of the body - and, thus, the most likely target of gout. However, gout can affect any joint in the body.  The tendency to accumulate uric acid is often inherited. Other factors that put a person at risk for developing gout include: high blood pressure, diabetes, obesity, surgery, chemotherapy, stress, and certain medications and vitamins. For example, the bodys ability to remove uric acid can be negatively affected by taking aspirin, some diuretic medications (water pills), and the vitamin niacin (also called nicotinic acid). While gout is more common in men aged 40 to 60 years, it can occur in younger men as well as in women.  Consuming foods and beverages that contain high levels of purines can trigger an attack of gout. Some foods contain more purines than others and have been associated with an increase of uric acid, which leads to gout. You may be able to reduce your chances of getting a gout attack by limiting or avoiding shellfish, organ meats (kidney, liver, etc.), red wine, beer, and red meat.  Symptoms  An attack of  gout can be miserable, marked by the following symptoms:  Intense pain that comes on suddenly - often in the middle of the night or upon arising   Signs of inflammation such as redness, swelling, and warmth over the joint.   Diagnosis  To diagnose gout, the foot and ankle surgeon will ask questions about your personal and family medical history, followed by an examination of the affected joint. Laboratory tests and x-rays are sometimes ordered to determine if the inflammation is caused by something other than gout.  Treatment  Initial treatment of an attack of gout typically includes the following:  Medications. Prescription medications or injections are used to treat the pain, swelling, and inflammation.   Dietary restrictions. Foods and beverages that are high in purines should be avoided, since purines are converted in the body to uric acid.   Fluids. Drink plenty of water and other fluids each day, while also avoiding alcoholic beverages, which cause dehydration.   Immobilize and elevate the foot. Avoid standing and walking to give your foot a rest. Also, elevate your foot (level with or slightly above the heart) to help reduce swelling.   The symptoms of gout and the inflammatory process usually resolve in three to ten days with treatment. If gout symptoms continue despite the initial treatment, or if repeated attacks occur, see your primary care physician for maintenance treatment that may involve daily medication. In cases of repeated episodes, the underlying problem must be addressed, as the build-up of uric acid over time can cause arthritic damage to the joint.  Once diagnosed with gout, your doctor will recommend that you begin following a uric acid diet. Consuming a well-balanced diet, containing moderate amounts of all the main food groups, means you wont experience high uric acid levels, unless your kidneys are incapable of filtering the blood properly.  Receiving around 600 to 1000 milligrams of  puric acid from foods is considered a healthy amount for most people. However, overconsumption of certain foods may produce hyperuricemia without you being aware of it.  Foods high in puric acid that you should not include in a low purine diet are:     Anchovies, bakers and castrejons yeast, animal organs, sardines, Boletus mushrooms and gravies--more than 800 milligrams per three ounces  Foods containing up to 200 milligrams of puric acid per 3.5 ounces:  Turkey   Ham   Chicken   Cifuentes   Oatmeal   Spinach   Asparagus   Cauliflower   Navy and kidney beans   Pork   Amasa   Oysters   Sunflower seeds   Brown shrimp   Sausage   Those who do not experience problems metabolizing uric acid or with kidney filtering functioning generally do not have to worry about the amount of high-purine foods they consume (up to a point, of course). However, those afflicted with extreme hyperuricemia need to adhere to a strict uric acid diet.  Foods containing amino acids (glycine, leucine, alanine, aspartic acid and glutamic acid) facilitate excretion of uric acid through the kidneys. Dairy foods and molasses, both part of a low purine diet, contain aspartic acid. Glutamic acid, produced by the body, assists in potassium transportation across the blood-brain barrier as well as to other parts of the body.  Low purine diet foods provide chemicals necessary in manufacturing glutamic acid within the body. Beans, nuts and whole wheat, all foods found in low purine foods, contain leucine, another essential amino acid contributing to decreased attacks of gouts by enhancing bone and muscle mass.  Low Purine Diet for Gout  All low-fat dairy products--although recent research discovered that including larger than normal amounts of low-fat dairy products in a diet decreases instances of gout by more than half, scientists are not certain why these foods produce this effect.  A list of other low-fat, low purine diet foods include:  Pudding   Peanut  butter   Low-fiber breads   Rice   Pasta   Low-fat cheese and ice cream   Soups containing no broth or meat extract   Sweet items in limited amounts   Fruits and vegetables   Red and white cabbage   Luncheon meat   Green olives   Sauerkraut   Tofu   Nogal and hazelnuts   Figs   Be aware that breads, cereals and crackers advertising as being 100% wheat, stone-ground or multi-grain are generally not whole grain foods and may contain ingredients such as yeast, which increases uric acid levels and promotes formation of urate crystals.  Purchase only lean cuts of meat containing as little fillers or fat as possible when creating your low purine diet meal plan. In addition, poultry should be cooked and eaten without the skin, since the fattiest part of chicken or turkey is the skin. Instead of frying meat, bake, poach, broil or grill meat. If you prefer, meat substitutes are acceptable to include in a gout diet, such as those made with soybeans, egg substitutes, vegetable-protein tofu or legumes.    Importance of Cherries in a Low Purine Diet     Cherry juice and tart cherries (the [place] and [place] kind that are grown in [place]), contain potassium, an essential mineral necessary for optimal kidney functioning and regulating blood pressure. Because potassium is essential, this indicates that the body is incapable of chemically altering other substances to create potassium. Therefore, humans must then obtain potassium from other sources, such as cherries, to maintain good health.  Tart cherries and cherry juice keep urine pH at a slightly alkaline level, which inhibits accumulation of uric acid in the blood and consequential formation of uric acid crystals in joints. Cherries should definitely be a part of a low purine diet.  In addition, drinking cherry juice for gout reduces uric acid levels due to the presence of anthocyanins in the cherries. Extracted from berries and grapes, anthocyanin is an  antioxidant and bioflavonoid that contains antimicrobial, anti-inflammatory properties enhancing blood vessel and platelet functioning.   Gout sufferers should eat eight ounces or more of tart cherries each day to relieve pain and swelling of gout. Drinking the same amount of cherry juice instead of eating cherries will also significantly reduce inflammation.  Mulberries     Mulberries should also be included in a low purine diet because they contain healthy amounts of Hogan, a flavonoid attributed to relieving pain from inflammation. Although mulberries do not directly reduce the high levels of uric acid in a gout sufferers blood stream, they do alleviate pain as incidences of gout flare-ups are being treated.  In addition, mulberries also enhance capillary strength, which is often compromised when joints are inflamed. According to the book Vitamins and Minerals Demystified by Dr. Porter Chatman since ascorbic acid slightly increases capillary fragility, this action of the bioflavonoids offsets this tendency. Consuming mulberries while on a low purine diet will counteract this issue precipitated by fruits being such an integral part of gout diet.  Other Flavonoid-Rich Foods  Gout sufferers who are sensitive to over the counter pain relievers such as ibuprofen or aspirin may find natural relief from flavonoid-rich, low purine diet foods such as:  Blueberries   Blackberries   Terry and limes   Dark-colored beans   Cranberries   Green and red vegetables   Red and green onions   Effects of Coffee and Tea on Gout     Drinking coffee but not tea seems to correlate with a subsequent reduction of blood uric acid according to a report published in the June 2007 issue of Journal of Arthritis Care and Research. Researchers interviewed over 14,000 subjects regarding coffee and tea drinking habits and later evaluated their uric acid levels according to whether they were predominantly coffee or tea drinkers, or drank neither  one.  Those who drank four to five cups of coffee each day as part of a low purine diet indicated a decrease of 0.26 mg/dl serum uric acid in contrast to those who did not drink coffee at all.   Those who drank six cups of coffee per day experienced uric acid level reductions of 0.43 mg/dl.   Interestingly, researchers also investigated the effects of caffeine contained in beverages other than coffee and found drinks such as soda did not influence uric acid levels one way or the other. This may indicate that coffee contains a unique ingredient or property responsible for decreasing uric acid and alleviating gout symptoms.  Fish Oil as Part of a Low Purine Diet       A substantial amount of research has been accomplished regarding the strong anti-inflammatory properties of fish oil, or omega 3 fatty acids. By facilitating the production of prostanglandins, hormones responsible for reducing inflammation and regulating calcium dispersion, fish oil is a necessary part of a successful low purine diet intended to combat the pain of gout.  In addition, prostanglandins inhibit particular pro-inflammatory hormones that are part of the prostanglandin family. While fish oil wont relieve pain as quickly as NSAIDs (non-steroidal anti-inflammatory drugs such as aspirin or ibuprofen), they effectively work to reduce the presence of prostanglandins over longer periods of time, eliminating the need to take NSAIDs which often cause side effects.  Frequently, insulin resistance (metabolic syndrome), heart disease and kidney stones are co-morbidly occurring diseases with gout. Fish oil supplements taken in conjunction with a low purine diet can benefit these diseases as well by decreasing bad fats (triglycerides) and preventing platelet aggregation, which is a common cause of blocked vessels and heart attacks.   A few foods other than fish contain omega 3, which are also foods that can be included in a gout diet. Walnuts, flaxseeds,  flaxseed oil and eggs are all sources of omega-3 that do not contribute to uric acid levels in the body.   Folic Acid   Folic acid effectively neutralizes an enzyme called xanthine oxidase which is directly responsible for producing uric acid. By also regulating any uric acid already present in the blood, gout attacks are usually shorter and less painful. However, the recommended daily amounts of folic acid fall short of the 80 milligrams a day that is required to decrease blood uric acid. In addition, epileptics who take medication may find that folic acid interferes with the efficacy of that medication. Little research has been done regarding the effect of high doses of folic acid so including folic acid supplements in a low purine diet should be implemented under the supervision of a doctor.  When following a low purine diet for gout, remember to increase liquids to at least ten, eight ounce servings of water or juice daily. Liquids facilitate the elimination of uric acid by assisting the kidneys in the process of filtering and disposing of wastes.  Suggested Low Purine Diet Menus for Gout Diet  Diets to reduce uric acid are not as restrictive as diabetic or even weight management diets. However, if you are accustomed to eating high purine foods or drinking alcohol on a regular basis, this diet may represent a drastic change in your eating habits. However, to avoid painful attacks of gout that could eventually result in irreversible damage to joints and lifelong impairment of movement, reducing uric acid levels should be a priority in your life.  Low Purine Diet for Gout  This type of diet should include daily servings of:  Grain products--six to ten servings   Fruits--two to four servings   Vegetables--three servings   Low-fat dairy products--two servings   Protein-rich foods-one egg, one ounce of cheese, three ounces of cooked, low-purine meat and two tablespoons of peanut butter   When consuming foods included  in a uric acid diet you may eat as much as you like, as long as this does not contribute to weight gain. This poses a problem since many low purine foods such as white breads, pasta and cereals contain high levels of sugar and carbohydrates. A good meal plan to follow to reduce uric acid in the body would be:   Breakfast:  Bowl of cereal such as cornflakes or crisp rice   White bread toast, buttered with olive oil spread   Glass of skim milk   Cup of tea or coffee or small glass of cherry juice   Snack:   Low-fat cheese and saltines or grapes   Lunch:   Sliced meat sandwich (ham, chicken or turkey) on white bread or   Peanut butter sandwich on white bread   Fruit salad   Coffee, tea, water or cherry juice   Small slice of white cake or two peanut butter or sugar cookies   Dinner:   Grilled chicken breast   Pasta or rice   Carrots, cauliflower or asparagus   Water or cherry juice   Pudding made with low-fat milk   Snack:   Fruit chunks   Fresh vegetable mixture   Fresh berries   Many variations are possible with this uric acid diet. However, even though gout symptoms decrease, you need to follow a low purine diet as closely as possible to maintain successful control over uric acid levels.  How Much Fat in a Low Purine Diet?  While eating moderate to excessive amounts of saturated fat is detrimental to everyones health, it is especially damaging to gout sufferers because fat is directly correlated with weight gain, heart disease and exacerbated gout symptoms. However, small quantities of fat are necessary for optimal functioning of the nervous system as myelin, a form of fat, is essential for optimal signal transduction in the brain enhancing cognition abilities.  For this reason, gout sufferers should have one serving of one of the following per day:  2 tablespoons of half and half cream   1 tablespoon of cream cheese   1 teaspoon of butter   1 tablespoon of reduced fat mayonnaise   1 tablespoon of whole sesame  seeds   1 slice of hung   1 tablespoon of sunflower or pumpkin seeds   1 teaspoon of trans fat-free margarine   Benefits of Dark Chocolate and Cocoa Powder  Research has discovered that chocolate and cocoa powder, especially dark chocolate, contains high amounts of flavonoids when compared to other foods rich in antioxidants. For this reason, an occasional small piece or two of dark chocolate or cup of cocoa should be included in a low purine diet. In fact, cocoa powder possesses the highest amount as a member of the chocolate family, containing ten times the amount of flavonoids found in cranberries and strawberries.  Chocolate is low in purines as well, but dont eat it excessively as it is high in empty calories and promotes weight gain unless you engage in regular exercise.   Prescription Treatments for Gout and Uric Acid Conditions  While a low purine diet for gout is one of the best defensive measures which those suffering from gout can pursue, physicians provide drugs to assist in alleviating gout symptoms. The principle medication used in treating gout is colchicine, a recently FDA approved medication naturally found in plants belong to the Colchicum family.  However, colchicine is known to produce side effects such as gastrointestinal disturbances and neutropenia, which is a reduction in a vital type of white blood cell. Doses must be closely monitored, as too high of a dose can profoundly weaken bone marrow and induce anemia. Colchicine toxicity is similar to arsenic poisoning, resulting in damage and failure of internal organs within 72 hours of ingestion.  Prognosis and Summary  A gout attack or flare-up generally diminishes within five to seven days without treatment, depending on severity of the attack and amount of uric acid in the blood. More than half of these will suffer another attack within twelve months of experiencing the first attack, unless treatment is pursued and a low purine diet is  adopted.  It is also important to note that developing gout also puts you at risk for diabetes mellitus, metabolic syndrome, hypertension, renal failure and cardiovascular disease. While some of these disorders may partly result from obesity or insulin resistance issues, the majority are a direct consequence of untreated hyperuricemia.  Destruction of joints is another consequence of elevated uric acid levels and continued accumulation of urate acid crystals within joints. Implementing gout prevention measures as quickly as possible is vital to eliminating uric acid from the blood and reducing the pain and swelling of gout.

## 2023-03-16 DIAGNOSIS — M19.91 PRIMARY LOCALIZED OSTEOARTHROSIS OF MULTIPLE SITES: Chronic | ICD-10-CM

## 2023-03-16 DIAGNOSIS — Z98.890 STATUS POST INCISION AND DRAINAGE: ICD-10-CM

## 2023-03-16 DIAGNOSIS — F41.9 ANXIETY: ICD-10-CM

## 2023-03-16 NOTE — TELEPHONE ENCOUNTER
Care Due:                  Date            Visit Type   Department     Provider  --------------------------------------------------------------------------------                                EP -                              PRIMARY      [facility] INTER[facility]  Last Visit: 12-      CARE ([facility])   GIA May                              EP -                              PRIMARY      [facility] INTER[facility]  Next Visit: 03-      CARE ([facility])   GIA May                                                            Last  Test          Frequency    Reason                     Performed    Due Date  --------------------------------------------------------------------------------    HBA1C.......  6 months...  empagliflozin............  11- 05-    [facility] Embedded Care Gaps. Reference number: [id number]. 3/16/2023   2:04:25 PM CDT

## 2023-03-16 NOTE — TELEPHONE ENCOUNTER
----- Message from Phoebe Betancourt sent at 3/16/2023 11:34 AM CDT -----  Pt would like to know if she can get a Cortizone injection. Also she need a refill omeprazole. Call back number is [phone]. Thx. EL

## 2023-03-17 NOTE — TELEPHONE ENCOUNTER
Refill Routing Note   Medication(s) are not appropriate for processing by [facility] for the following reason(s):         Medication outside of protocol  Due for refill >6 months ago  No active prescription written by PCP    [facility] action(s):  Defer  Route   Requires labs : Yes         Appointments  past 12m or future 3m with PCP    Date Provider   Last Visit   12/21/2022 KARAN May MD   Next Visit   3/23/2023 KARAN May MD   ED visits in past 90 days: 0        Note composed:8:57 PM 03/16/2023

## 2023-03-19 RX ORDER — OMEPRAZOLE 40 MG/1
40 CAPSULE, DELAYED RELEASE ORAL DAILY
OUTPATIENT
Start: 2023-03-19

## 2023-03-19 RX ORDER — DICLOFENAC SODIUM 10 MG/G
GEL TOPICAL
Qty: 100 G | Refills: 0 | OUTPATIENT
Start: 2023-03-19

## 2023-03-19 RX ORDER — DIAZEPAM 10 MG/1
10 TABLET ORAL 2 TIMES DAILY PRN
Qty: 20 TABLET | Refills: 0 | OUTPATIENT
Start: 2023-03-19

## 2023-03-20 NOTE — TELEPHONE ENCOUNTER
REFILL NOT APPROVED  REASON: She has upcoming appointment soon. We can address this and any other refills at that appointment.  Requested Prescriptions   Pending Prescriptions Disp Refills    diazePAM (VALIUM) 10 MG Tab 20 tablet 0     Sig: Take 1 tablet (10 mg total) by mouth 2 (two) times daily as needed (anxiety).    omeprazole (PRILOSEC) 40 MG capsule       Sig: Take 1 capsule (40 mg total) by mouth once daily.    diclofenac sodium (VOLTAREN) 1 % Gel 100 g 0     Sig: APPLY A SMALL AMOUNT( 2 TO 4 GRAM MAX) TOPICALLY TO PAINFUL JOINT AREAS FOUR TIMES DAILY AS NEEDED.      #LMRX   Future Appointments   Date Time Provider Department Center   3/23/2023  9:00 AM KARAN May MD [facility]   3/24/2023 10:25 AM LABORATORY, [facility] ER LAB [facility]   3/24/2023 10:30 AM SPECIMEN, [facility] ER SPECLAB [facility]   3/24/2023 11:00 AM JAMES, VISUAL-ONE [facility] OPHTHAL [facility]   3/24/2023  2:30 PM Jin Ruffin, GILMAR [facility] OPHTHAL [facility]   4/4/2023 10:00 AM Cam Meza MD [facility] NEPHRO [facility]

## 2023-03-20 NOTE — TELEPHONE ENCOUNTER
Informed pt of medication denial and the provider wants to address medication refills at upcoming visit this coming Thursday.

## 2023-03-23 ENCOUNTER — TELEPHONE (OUTPATIENT)
Dept: INTERNAL MEDICINE | Facility: CLINIC | Age: 68
End: 2023-03-23
Payer: MEDICARE

## 2023-03-24 ENCOUNTER — OFFICE VISIT (OUTPATIENT)
Dept: OPHTHALMOLOGY | Facility: CLINIC | Age: 68
End: 2023-03-24
Payer: MEDICARE

## 2023-03-24 ENCOUNTER — LAB VISIT (OUTPATIENT)
Dept: LAB | Facility: HOSPITAL | Age: 68
End: 2023-03-24
Attending: INTERNAL MEDICINE
Payer: MEDICARE

## 2023-03-24 DIAGNOSIS — H40.1223 LOW TENSION GLAUCOMA OF LEFT EYE, SEVERE STAGE: ICD-10-CM

## 2023-03-24 DIAGNOSIS — H40.1211 LOW TENSION GLAUCOMA OF RIGHT EYE, MILD STAGE: ICD-10-CM

## 2023-03-24 DIAGNOSIS — N18.32 STAGE 3B CHRONIC KIDNEY DISEASE: ICD-10-CM

## 2023-03-24 DIAGNOSIS — H40.1111 PRIMARY OPEN ANGLE GLAUCOMA (POAG) OF RIGHT EYE, MILD STAGE: Primary | ICD-10-CM

## 2023-03-24 DIAGNOSIS — E79.0 HYPERURICEMIA: ICD-10-CM

## 2023-03-24 DIAGNOSIS — I10 PRIMARY HYPERTENSION: ICD-10-CM

## 2023-03-24 LAB
25(OH)D3+25(OH)D2 SERPL-MCNC: 17 NG/ML (ref 30–96)
ALBUMIN SERPL BCP-MCNC: 3.8 G/DL (ref 3.5–5.2)
ANION GAP SERPL CALC-SCNC: 12 MMOL/L (ref 8–16)
BUN SERPL-MCNC: 24 MG/DL (ref 8–23)
CALCIUM SERPL-MCNC: 9.7 MG/DL (ref 8.7–10.5)
CHLORIDE SERPL-SCNC: 107 MMOL/L (ref 95–110)
CO2 SERPL-SCNC: 21 MMOL/L (ref 23–29)
CREAT SERPL-MCNC: 1.5 MG/DL (ref 0.5–1.4)
EST. GFR  (NO RACE VARIABLE): 37.3 ML/MIN/1.73 M^2
GLUCOSE SERPL-MCNC: 87 MG/DL (ref 70–110)
PHOSPHATE SERPL-MCNC: 3.1 MG/DL (ref 2.7–4.5)
POTASSIUM SERPL-SCNC: 3.8 MMOL/L (ref 3.5–5.1)
PTH-INTACT SERPL-MCNC: 313.8 PG/ML (ref 9–77)
SODIUM SERPL-SCNC: 140 MMOL/L (ref 136–145)
URATE SERPL-MCNC: 5.8 MG/DL (ref 2.4–5.7)

## 2023-03-24 PROCEDURE — 36415 COLL VENOUS BLD VENIPUNCTURE: CPT | Mod: HCNC | Performed by: INTERNAL MEDICINE

## 2023-03-24 PROCEDURE — 92083 HUMPHREY VISUAL FIELD - OU - BOTH EYES: ICD-10-PCS | Mod: HCNC,S$GLB,, | Performed by: OPTOMETRIST

## 2023-03-24 PROCEDURE — 92133 CPTRZD OPH DX IMG PST SGM ON: CPT | Mod: HCNC,S$GLB,, | Performed by: OPTOMETRIST

## 2023-03-24 PROCEDURE — 92083 EXTENDED VISUAL FIELD XM: CPT | Mod: HCNC,S$GLB,, | Performed by: OPTOMETRIST

## 2023-03-24 PROCEDURE — 1160F PR REVIEW ALL MEDS BY PRESCRIBER/CLIN PHARMACIST DOCUMENTED: ICD-10-PCS | Mod: HCNC,CPTII,S$GLB, | Performed by: OPTOMETRIST

## 2023-03-24 PROCEDURE — 92012 INTRM OPH EXAM EST PATIENT: CPT | Mod: HCNC,S$GLB,, | Performed by: OPTOMETRIST

## 2023-03-24 PROCEDURE — 1160F RVW MEDS BY RX/DR IN RCRD: CPT | Mod: HCNC,CPTII,S$GLB, | Performed by: OPTOMETRIST

## 2023-03-24 PROCEDURE — 3066F NEPHROPATHY DOC TX: CPT | Mod: HCNC,CPTII,S$GLB, | Performed by: OPTOMETRIST

## 2023-03-24 PROCEDURE — 3066F PR DOCUMENTATION OF TREATMENT FOR NEPHROPATHY: ICD-10-PCS | Mod: HCNC,CPTII,S$GLB, | Performed by: OPTOMETRIST

## 2023-03-24 PROCEDURE — 99999 PR PBB SHADOW E&M-EST. PATIENT-LVL III: ICD-10-PCS | Mod: PBBFAC,HCNC,, | Performed by: OPTOMETRIST

## 2023-03-24 PROCEDURE — 99999 PR PBB SHADOW E&M-EST. PATIENT-LVL III: CPT | Mod: PBBFAC,HCNC,, | Performed by: OPTOMETRIST

## 2023-03-24 PROCEDURE — 84550 ASSAY OF BLOOD/URIC ACID: CPT | Mod: HCNC | Performed by: INTERNAL MEDICINE

## 2023-03-24 PROCEDURE — 80069 RENAL FUNCTION PANEL: CPT | Mod: HCNC | Performed by: INTERNAL MEDICINE

## 2023-03-24 PROCEDURE — 83970 ASSAY OF PARATHORMONE: CPT | Mod: HCNC | Performed by: INTERNAL MEDICINE

## 2023-03-24 PROCEDURE — 82306 VITAMIN D 25 HYDROXY: CPT | Mod: HCNC | Performed by: INTERNAL MEDICINE

## 2023-03-24 PROCEDURE — 1159F MED LIST DOCD IN RCRD: CPT | Mod: HCNC,CPTII,S$GLB, | Performed by: OPTOMETRIST

## 2023-03-24 PROCEDURE — 92012 PR EYE EXAM, EST PATIENT,INTERMED: ICD-10-PCS | Mod: HCNC,S$GLB,, | Performed by: OPTOMETRIST

## 2023-03-24 PROCEDURE — 1159F PR MEDICATION LIST DOCUMENTED IN MEDICAL RECORD: ICD-10-PCS | Mod: HCNC,CPTII,S$GLB, | Performed by: OPTOMETRIST

## 2023-03-24 PROCEDURE — 92133 POSTERIOR SEGMENT OCT OPTIC NERVE(OCULAR COHERENCE TOMOGRAPHY) - OU - BOTH EYES: ICD-10-PCS | Mod: HCNC,S$GLB,, | Performed by: OPTOMETRIST

## 2023-03-24 RX ORDER — LATANOPROST 50 UG/ML
1 SOLUTION/ DROPS OPHTHALMIC NIGHTLY
Qty: 2.5 ML | Refills: 11 | Status: SHIPPED | OUTPATIENT
Start: 2023-03-24 | End: 2023-05-19

## 2023-03-24 NOTE — PROGRESS NOTES
HPI    RTC next available for 24-2HVF, gOCT, Pach and IOP check  Last edited by Hilary Plata on 3/24/2023 11:39 AM.            Assessment /Plan     For exam results, see Encounter Report.    Low tension glaucoma of left eye, severe stage  Low tension glaucoma of right eye, mild stage  -     Robert Visual Field - OU - Extended - Both Eyes  -     Posterior Segment OCT Optic Nerve- Both eyes  -     latanoprost 0.005 % ophthalmic solution; Place 1 drop into both eyes every evening.  Dispense: 2.5 mL; Refill: 11  Vf defects and gOCT thinning correspond with clinical findings at last exam  Start Latanoprost qhs OU  Monitor 1 month      RTC 1 month for IOP check or PRN  Discussed above and all questions were answered.

## 2023-03-31 ENCOUNTER — OFFICE VISIT (OUTPATIENT)
Dept: INTERNAL MEDICINE | Facility: CLINIC | Age: 68
End: 2023-03-31
Payer: MEDICARE

## 2023-03-31 ENCOUNTER — LAB VISIT (OUTPATIENT)
Dept: LAB | Facility: HOSPITAL | Age: 68
End: 2023-03-31
Attending: FAMILY MEDICINE
Payer: MEDICARE

## 2023-03-31 VITALS
WEIGHT: 155.19 LBS | SYSTOLIC BLOOD PRESSURE: 116 MMHG | DIASTOLIC BLOOD PRESSURE: 76 MMHG | BODY MASS INDEX: 25.05 KG/M2 | TEMPERATURE: 97 F | OXYGEN SATURATION: 99 % | HEART RATE: 67 BPM

## 2023-03-31 DIAGNOSIS — I25.10 CORONARY ARTERY DISEASE INVOLVING NATIVE CORONARY ARTERY OF NATIVE HEART WITHOUT ANGINA PECTORIS: Chronic | ICD-10-CM

## 2023-03-31 DIAGNOSIS — E89.0 POST-SURGICAL HYPOTHYROIDISM: Chronic | ICD-10-CM

## 2023-03-31 DIAGNOSIS — D44.6 CAROTID BODY TUMOR: Chronic | ICD-10-CM

## 2023-03-31 DIAGNOSIS — E11.22 TYPE 2 DIABETES MELLITUS WITH STAGE 4 CHRONIC KIDNEY DISEASE, WITHOUT LONG-TERM CURRENT USE OF INSULIN: Chronic | ICD-10-CM

## 2023-03-31 DIAGNOSIS — N18.4 STAGE 4 CHRONIC KIDNEY DISEASE: Chronic | ICD-10-CM

## 2023-03-31 DIAGNOSIS — R91.8 PULMONARY NODULES: Primary | Chronic | ICD-10-CM

## 2023-03-31 DIAGNOSIS — I10 ESSENTIAL HYPERTENSION: Chronic | ICD-10-CM

## 2023-03-31 DIAGNOSIS — E78.2 MIXED HYPERLIPIDEMIA: Chronic | ICD-10-CM

## 2023-03-31 DIAGNOSIS — N18.4 TYPE 2 DIABETES MELLITUS WITH STAGE 4 CHRONIC KIDNEY DISEASE, WITHOUT LONG-TERM CURRENT USE OF INSULIN: Chronic | ICD-10-CM

## 2023-03-31 DIAGNOSIS — F11.20 UNCOMPLICATED OPIOID DEPENDENCE: Chronic | ICD-10-CM

## 2023-03-31 DIAGNOSIS — I70.0 AORTIC ATHEROSCLEROSIS: Chronic | ICD-10-CM

## 2023-03-31 DIAGNOSIS — F20.81 SCHIZOPHRENIFORM DISORDER: Chronic | ICD-10-CM

## 2023-03-31 DIAGNOSIS — K21.9 GASTROESOPHAGEAL REFLUX DISEASE WITHOUT ESOPHAGITIS: ICD-10-CM

## 2023-03-31 PROBLEM — R22.1 MASS OF LEFT SIDE OF NECK: Chronic | Status: RESOLVED | Noted: 2022-09-02 | Resolved: 2023-03-31

## 2023-03-31 LAB
ESTIMATED AVG GLUCOSE: 128 MG/DL (ref 68–131)
HBA1C MFR BLD: 6.1 % (ref 4–5.6)
TSH SERPL DL<=0.005 MIU/L-ACNC: 2.06 UIU/ML (ref 0.4–4)

## 2023-03-31 PROCEDURE — 1160F PR REVIEW ALL MEDS BY PRESCRIBER/CLIN PHARMACIST DOCUMENTED: ICD-10-PCS | Mod: HCNC,CPTII,S$GLB, | Performed by: FAMILY MEDICINE

## 2023-03-31 PROCEDURE — 3066F NEPHROPATHY DOC TX: CPT | Mod: HCNC,CPTII,S$GLB, | Performed by: FAMILY MEDICINE

## 2023-03-31 PROCEDURE — 99999 PR PBB SHADOW E&M-EST. PATIENT-LVL IV: CPT | Mod: PBBFAC,HCNC,, | Performed by: FAMILY MEDICINE

## 2023-03-31 PROCEDURE — 1125F AMNT PAIN NOTED PAIN PRSNT: CPT | Mod: HCNC,CPTII,S$GLB, | Performed by: FAMILY MEDICINE

## 2023-03-31 PROCEDURE — 3072F PR LOW RISK FOR RETINOPATHY: ICD-10-PCS | Mod: HCNC,CPTII,S$GLB, | Performed by: FAMILY MEDICINE

## 2023-03-31 PROCEDURE — 84443 ASSAY THYROID STIM HORMONE: CPT | Mod: HCNC | Performed by: FAMILY MEDICINE

## 2023-03-31 PROCEDURE — 1100F PTFALLS ASSESS-DOCD GE2>/YR: CPT | Mod: HCNC,CPTII,S$GLB, | Performed by: FAMILY MEDICINE

## 2023-03-31 PROCEDURE — 1125F PR PAIN SEVERITY QUANTIFIED, PAIN PRESENT: ICD-10-PCS | Mod: HCNC,CPTII,S$GLB, | Performed by: FAMILY MEDICINE

## 2023-03-31 PROCEDURE — 3074F PR MOST RECENT SYSTOLIC BLOOD PRESSURE < 130 MM HG: ICD-10-PCS | Mod: HCNC,CPTII,S$GLB, | Performed by: FAMILY MEDICINE

## 2023-03-31 PROCEDURE — 3288F PR FALLS RISK ASSESSMENT DOCUMENTED: ICD-10-PCS | Mod: HCNC,CPTII,S$GLB, | Performed by: FAMILY MEDICINE

## 2023-03-31 PROCEDURE — 3288F FALL RISK ASSESSMENT DOCD: CPT | Mod: HCNC,CPTII,S$GLB, | Performed by: FAMILY MEDICINE

## 2023-03-31 PROCEDURE — 83036 HEMOGLOBIN GLYCOSYLATED A1C: CPT | Mod: HCNC | Performed by: FAMILY MEDICINE

## 2023-03-31 PROCEDURE — 1159F PR MEDICATION LIST DOCUMENTED IN MEDICAL RECORD: ICD-10-PCS | Mod: HCNC,CPTII,S$GLB, | Performed by: FAMILY MEDICINE

## 2023-03-31 PROCEDURE — 3066F PR DOCUMENTATION OF TREATMENT FOR NEPHROPATHY: ICD-10-PCS | Mod: HCNC,CPTII,S$GLB, | Performed by: FAMILY MEDICINE

## 2023-03-31 PROCEDURE — 99215 OFFICE O/P EST HI 40 MIN: CPT | Mod: HCNC,S$GLB,, | Performed by: FAMILY MEDICINE

## 2023-03-31 PROCEDURE — 99999 PR PBB SHADOW E&M-EST. PATIENT-LVL IV: ICD-10-PCS | Mod: PBBFAC,HCNC,, | Performed by: FAMILY MEDICINE

## 2023-03-31 PROCEDURE — 1160F RVW MEDS BY RX/DR IN RCRD: CPT | Mod: HCNC,CPTII,S$GLB, | Performed by: FAMILY MEDICINE

## 2023-03-31 PROCEDURE — 3008F BODY MASS INDEX DOCD: CPT | Mod: HCNC,CPTII,S$GLB, | Performed by: FAMILY MEDICINE

## 2023-03-31 PROCEDURE — 3072F LOW RISK FOR RETINOPATHY: CPT | Mod: HCNC,CPTII,S$GLB, | Performed by: FAMILY MEDICINE

## 2023-03-31 PROCEDURE — 36415 COLL VENOUS BLD VENIPUNCTURE: CPT | Mod: HCNC | Performed by: FAMILY MEDICINE

## 2023-03-31 PROCEDURE — 3078F PR MOST RECENT DIASTOLIC BLOOD PRESSURE < 80 MM HG: ICD-10-PCS | Mod: HCNC,CPTII,S$GLB, | Performed by: FAMILY MEDICINE

## 2023-03-31 PROCEDURE — 3074F SYST BP LT 130 MM HG: CPT | Mod: HCNC,CPTII,S$GLB, | Performed by: FAMILY MEDICINE

## 2023-03-31 PROCEDURE — 1100F PR PT FALLS ASSESS DOC 2+ FALLS/FALL W/INJURY/YR: ICD-10-PCS | Mod: HCNC,CPTII,S$GLB, | Performed by: FAMILY MEDICINE

## 2023-03-31 PROCEDURE — 99215 PR OFFICE/OUTPT VISIT, EST, LEVL V, 40-54 MIN: ICD-10-PCS | Mod: HCNC,S$GLB,, | Performed by: FAMILY MEDICINE

## 2023-03-31 PROCEDURE — 1159F MED LIST DOCD IN RCRD: CPT | Mod: HCNC,CPTII,S$GLB, | Performed by: FAMILY MEDICINE

## 2023-03-31 PROCEDURE — 3078F DIAST BP <80 MM HG: CPT | Mod: HCNC,CPTII,S$GLB, | Performed by: FAMILY MEDICINE

## 2023-03-31 PROCEDURE — 3008F PR BODY MASS INDEX (BMI) DOCUMENTED: ICD-10-PCS | Mod: HCNC,CPTII,S$GLB, | Performed by: FAMILY MEDICINE

## 2023-03-31 RX ORDER — LEVOTHYROXINE SODIUM 100 UG/1
100 TABLET ORAL
Qty: 90 TABLET | Refills: 2 | Status: SHIPPED | OUTPATIENT
Start: 2023-03-31 | End: 2023-06-04

## 2023-03-31 RX ORDER — OMEPRAZOLE 40 MG/1
40 CAPSULE, DELAYED RELEASE ORAL EVERY MORNING
Qty: 90 CAPSULE | Refills: 2 | Status: SHIPPED | OUTPATIENT
Start: 2023-03-31 | End: 2023-12-21

## 2023-03-31 RX ORDER — AMLODIPINE BESYLATE 10 MG/1
10 TABLET ORAL DAILY
Qty: 90 TABLET | Refills: 2 | Status: SHIPPED | OUTPATIENT
Start: 2023-03-31 | End: 2024-02-08 | Stop reason: SDUPTHER

## 2023-03-31 RX ORDER — ATORVASTATIN CALCIUM 20 MG/1
20 TABLET, FILM COATED ORAL NIGHTLY
Qty: 90 TABLET | Refills: 2 | Status: SHIPPED | OUTPATIENT
Start: 2023-03-31 | End: 2024-02-26

## 2023-03-31 NOTE — ASSESSMENT & PLAN NOTE
Lab Results   Component Value Date    CHOL 138 01/11/2023    CHOL 147 09/02/2022    TRIG 90 01/11/2023    TRIG 146 09/02/2022    HDL 48 01/11/2023    HDL 39 (L) 09/02/2022    LDLCALC 72.0 01/11/2023    LDLCALC 78.8 09/02/2022    NONHDLCHOL 90 01/11/2023    NONHDLCHOL 108 09/02/2022    AST 17 01/11/2023    ALT 12 01/11/2023     The 10-year ASCVD risk score (Alber MONTIEL, et al., 2019) is: 16.1%    Values used to calculate the score:      Age: 68 years      Sex: Female      Is Non- : Yes      Diabetic: Yes      Tobacco smoker: No      Systolic Blood Pressure: 116 mmHg      Is BP treated: Yes      HDL Cholesterol: 48 mg/dL      Total Cholesterol: 138 mg/dL

## 2023-03-31 NOTE — ASSESSMENT & PLAN NOTE
Largely unchanged form my documentation of 12/21/22, except for lessening of the delusions of persecution. She asks for Valium, but she remains unwilling to accept psychiatric referral or other treatment. Given her chronic opioid use, I recommended against benzodiazepine use. She does not appear to be an eminent threat to herself or others and does not meet criteria for PEC.

## 2023-03-31 NOTE — ASSESSMENT & PLAN NOTE
Clinically stable. She no-showed her pulmonology appointment last month. We discussed differential diagnosis and risks and benefits of treatment options. It was agreed to proceed with pulmonology E-consult.

## 2023-03-31 NOTE — ASSESSMENT & PLAN NOTE
She is and has been on chronic opioids for pain management. She is physiologically dependent and tolerant and reports perceiving no significant sedation. She understands that combining prescribed pain medications with other sedating medications or alcohol carries risk of potentially life-threatening over-sedation. [facility] Controlled Prescription Drug Monitoring database was queried and showed: 30 Day Avg. MME 29.25 and 90 Day Avg. MME 31.13.

## 2023-03-31 NOTE — ASSESSMENT & PLAN NOTE
Improved on Jardiance. Avoiding NSAIDs.  Controlling diabetes.  Lab Results   Component Value Date    EGFRNORACEVR 37.3 (A) 03/24/2023    EGFRNORACEVR 30.1 (A) 01/11/2023    EGFRNORACEVR 30.1 (A) 11/16/2022    CREATININE 1.5 (H) 03/24/2023    CREATININE 1.8 (H) 01/11/2023    CREATININE 1.8 (H) 11/16/2022    BUN 24 (H) 03/24/2023    BUN 19 01/11/2023    BUN 16 11/16/2022

## 2023-03-31 NOTE — PROGRESS NOTES
"OFFICE VISIT 3/31/23  3:30 PM CDT    Subjective   CHIEF COMPLAINT: Follow-up    Refer to the history and data embedded within the "Assessment & Plan" section below for the status of the conditions evaluated and managed today.    Review of Systems   Respiratory:  Negative for chest tightness and shortness of breath.    Cardiovascular:  Negative for chest pain.   Endocrine: Negative for polydipsia and polyuria.   Psychiatric/Behavioral:  Positive for decreased concentration. Negative for agitation, hallucinations, self-injury and suicidal ideas. The patient is nervous/anxious.        Assessment and Plan   1. Pulmonary nodules  Overview:  CT CHEST WITHOUT CONTRAST 12/29/2022  IMPRESSION: Right apex nodular opacity with central cavitation versus dilated bronchus. Similar more linear left upper lobe irregular opacity. Findings likely represent focal scarring given other areas of scarring however evaluation with PET-CT or short-term follow-up CT chest recommended. Sub 5 mm pulmonary nodules. Twelve month follow-up CT chest recommended for high risk patient group only.    Assessment & Plan:  Clinically stable. She no-showed her pulmonology appointment last month. We discussed differential diagnosis and risks and benefits of treatment options. It was agreed to proceed with pulmonology E-consult.    Orders:  -     E-Consult to Pulmonary    2. 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 Clinically stable. Appointment never made from prior referral to head-and-neck surgery. She agreed to see head-and-neck surgery, even if at [facility].    Orders:  -     Ambulatory referral/consult to Endocrine/ENT Surgery for ENT/Head and Neck Surgeons; Future; Expected date: 04/07/2023    3. Post-surgical hypothyroidism  Assessment & Plan:  Lab Results   Component Value Date    TSH 2.602 09/02/2022    FREET4 0.97 09/02/2022     No results found for: THYROIDSTIMI, THYROPEROXID, THYGLBTUM, THGABSCRN, " THYRGINTERP     Orders:  -     levothyroxine (SYNTHROID) 100 MCG tablet; Take 1 tablet (100 mcg total) by mouth before breakfast.  Dispense: 90 tablet; Refill: 2  -     TSH; Future; Expected date: 03/31/2023    4. Mixed hyperlipidemia  Assessment & Plan:  Lab Results   Component Value Date    CHOL 138 01/11/2023    CHOL 147 09/02/2022    TRIG 90 01/11/2023    TRIG 146 09/02/2022    HDL 48 01/11/2023    HDL 39 (L) 09/02/2022    LDLCALC 72.0 01/11/2023    LDLCALC 78.8 09/02/2022    NONHDLCHOL 90 01/11/2023    NONHDLCHOL 108 09/02/2022    AST 17 01/11/2023    ALT 12 01/11/2023     The 10-year ASCVD risk score (Alber MONTIEL, et al., 2019) is: 16.1%    Values used to calculate the score:      Age: 68 years      Sex: Female      Is Non- : Yes      Diabetic: Yes      Tobacco smoker: No      Systolic Blood Pressure: 116 mmHg      Is BP treated: Yes      HDL Cholesterol: 48 mg/dL      Total Cholesterol: 138 mg/dL    Orders:  -     atorvastatin (LIPITOR) 20 MG tablet; Take 1 tablet (20 mg total) by mouth every evening.  Dispense: 90 tablet; Refill: 2    5. Essential hypertension  -     amLODIPine (NORVASC) 10 MG tablet; Take 1 tablet (10 mg total) by mouth once daily.  Dispense: 90 tablet; Refill: 2    6. Type 2 diabetes mellitus with stage 4 chronic kidney disease, without long-term current use of insulin  Assessment & Plan:  Diabetes Management Status    Statin: Taking  ACE/ARB: Not taking    Screening or Prevention Patient's value Goal Complete/Controlled?   HgA1C Testing and Control   Lab Results   Component Value Date    HGBA1C 6.1 (H) 11/03/2022      Annually/Less than 8% Yes   Lipid profile : 01/11/2023 Annually Yes   LDL control Lab Results   Component Value Date    LDLCALC 72.0 01/11/2023    Annually/Less than 100 mg/dl  Yes   Nephropathy screening Lab Results   Component Value Date    LABMICR <5.0 09/02/2022     Lab Results   Component Value Date    PROTEINUA Negative 03/24/2023    Annually Yes    Blood pressure BP Readings from Last 1 Encounters:   03/31/23 116/76    Less than 140/90 Yes   Dilated retinal exam : 03/24/2023 Annually Yes   Foot exam   : 10/24/2022 Annually Yes     Lab Results   Component Value Date    HGBA1C 6.1 (H) 11/03/2022    HGBA1C 6.1 (H) 09/02/2022    EGFRNORACEVR 37.3 (A) 03/24/2023    MICALBCREAT Unable to calculate 09/02/2022    LDLCALC 72.0 01/11/2023     No results found for: GLUTAMICACID, CPEPTIDE   Last 5 Patient Entered Readings                                          Most Recent A1c: 6.1% on 11/3/2022  (Goal: 8%)    There is no flowsheet data to display.        HEALTH MAINTENANCE: Diabetic health maintenance interventions reviewed and are up to date except for:  There are no preventive care reminders to display for this patient.     Orders:  -     Hemoglobin A1C; Future; Expected date: 08/24/2023  -     empagliflozin (JARDIANCE) 10 mg tablet; Take 1 tablet (10 mg total) by mouth once daily.  Dispense: 90 tablet; Refill: 1  -     Hemoglobin A1C; Future; Expected date: 03/31/2023    7. Gastroesophageal reflux disease without esophagitis  -     omeprazole (PRILOSEC) 40 MG capsule; Take 1 capsule (40 mg total) by mouth every morning.  Dispense: 90 capsule; Refill: 2    8. Coronary artery disease involving native coronary artery of native heart without angina pectoris  Assessment & Plan:  Asymptomatic. Clinically stable.      9. Aortic atherosclerosis  Overview:  CT CHEST WITHOUT CONTRAST 12/29/2022  FINDINGS: Thoracic aorta demonstrates mild atherosclerotic plaquing.    US ABDOMEN COMPLETE 12/23/2022  IMPRESSION: Ectasia of the infrarenal abdominal aorta without evidence for aneurysm.      Assessment & Plan:  Asymptomatic. Clinically stable. Treating with atorvastatin, BP control, avoidance of smoking.      10. Stage 4 chronic kidney disease  Assessment & Plan:  Improved on Jardiance. Avoiding NSAIDs.  Controlling diabetes.  Lab Results   Component Value Date    EGFRNORACEVR  37.3 (A) 03/24/2023    EGFRNORACEVR 30.1 (A) 01/11/2023    EGFRNORACEVR 30.1 (A) 11/16/2022    CREATININE 1.5 (H) 03/24/2023    CREATININE 1.8 (H) 01/11/2023    CREATININE 1.8 (H) 11/16/2022    BUN 24 (H) 03/24/2023    BUN 19 01/11/2023    BUN 16 11/16/2022       Orders:  -     empagliflozin (JARDIANCE) 10 mg tablet; Take 1 tablet (10 mg total) by mouth once daily.  Dispense: 90 tablet; Refill: 1    11. Opioid dependent for pain management  Overview:  PAIN MANAGEMENT: Shaquille Martinez MD    Assessment & Plan:  She is and has been on chronic opioids for pain management. She is physiologically dependent and tolerant and reports perceiving no significant sedation. She understands that combining prescribed pain medications with other sedating medications or alcohol carries risk of potentially life-threatening over-sedation. [facility] Controlled Prescription Drug Monitoring database was queried and showed: 30 Day Avg. MME 29.25 and 90 Day Avg. MME 31.13.       12. Schizophreniform disorder  Assessment & Plan:  Largely unchanged form my documentation of 12/21/22, except for lessening of the delusions of persecution. She asks for Valium, but she remains unwilling to accept psychiatric referral or other treatment. Given her chronic opioid use, I recommended against benzodiazepine use. She does not appear to be an eminent threat to herself or others and does not meet criteria for PEC.      Unless noted herein, any chronic conditions are represented as and appear stable, and no other significant complaints or concerns were reported.    Follow up in about 21 weeks (around 8/25/2023) for review test results, discuss treatment plan, re-evaluation.     Objective   Vitals:    03/31/23 1151   BP: 116/76   BP Location: Left arm   Patient Position: Sitting   BP Method: Medium (Manual)   Pulse: 67   Temp: 97 °F (36.1 °C)   TempSrc: Tympanic   SpO2: 99%   Weight: 70.4 kg (155 lb 3.3 oz)   Physical Exam  Vitals reviewed.    Constitutional:       General: She is not in acute distress.     Appearance: Normal appearance. She is not ill-appearing, toxic-appearing or diaphoretic.   Cardiovascular:      Rate and Rhythm: Normal rate and regular rhythm.   Pulmonary:      Effort: Pulmonary effort is normal.      Breath sounds: Normal breath sounds.   Neurological:      Mental Status: She is alert and oriented to person, place, and time. Mental status is at baseline.   Psychiatric:         Attention and Perception: She is inattentive. She does not perceive auditory or visual hallucinations.         Mood and Affect: Mood and affect normal.         Speech: Speech is tangential. Speech is not rapid and pressured or slurred.         Behavior: Behavior is not agitated, aggressive, withdrawn, hyperactive or combative. Behavior is cooperative.         Thought Content: Thought content is paranoid and delusional. Thought content does not include homicidal or suicidal ideation.         Judgment: Judgment is inappropriate.      Orders Placed This Encounter    Hemoglobin A1C    Hemoglobin A1C    TSH    Ambulatory referral/consult to Endocrine/ENT Surgery for ENT/Head and Neck Surgeons    levothyroxine (SYNTHROID) 100 MCG tablet    atorvastatin (LIPITOR) 20 MG tablet    amLODIPine (NORVASC) 10 MG tablet    empagliflozin (JARDIANCE) 10 mg tablet    omeprazole (PRILOSEC) 40 MG capsule    E-Consult to Pulmonary     TOTAL TIME evaluating and managing this patient for this encounter was greater than or equal to 50 minutes. This time was spent personally by me on the following activities: review of nurse's notes, pre-charting, review of patient's past medical history, assessing age-appropriate health maintenance needs, review of any interval history, medication reconciliation, obtaining history from the patient, examination of the patient, review and interpretation of lab results, review and interpretation of imaging test results, answering patient's questions  "about imaging test results, reviewing consulting specialist notes, evaluation of the patient's response to treatment, review of [place] [employer] Controlled Prescription Drug Monitoring database records for the patient, managing and/or ordering prescription medications, medication counseling, ordering labs, ordering referral to subspecialty provider(s), discussing differential diagnosis with patient, educating patient and answering their questions about treatment plan, goals of treatment, and follow-up, discussing strategies to help overcome any barriers to adherence to treatment plan, E-consult to pulmonology, and final documentation of the visit. This time was exclusive of any separately billable procedures for this patient and exclusive of time spent treating any other patients.   Documentation entered by me for this encounter may have been done in part using speech-recognition technology. Although I have made an effort to ensure accuracy, "sound like" errors may exist and should be interpreted in context.  "

## 2023-03-31 NOTE — ASSESSMENT & PLAN NOTE
Asymptomatic. Clinically stable. Appointment never made from prior referral to head-and-neck surgery. She agreed to see head-and-neck surgery, even if at [facility].

## 2023-03-31 NOTE — ASSESSMENT & PLAN NOTE
Diabetes Management Status    Statin: Taking  ACE/ARB: Not taking    Screening or Prevention Patient's value Goal Complete/Controlled?   HgA1C Testing and Control   Lab Results   Component Value Date    HGBA1C 6.1 (H) 11/03/2022      Annually/Less than 8% Yes   Lipid profile : 01/11/2023 Annually Yes   LDL control Lab Results   Component Value Date    LDLCALC 72.0 01/11/2023    Annually/Less than 100 mg/dl  Yes   Nephropathy screening Lab Results   Component Value Date    LABMICR <5.0 09/02/2022     Lab Results   Component Value Date    PROTEINUA Negative 03/24/2023    Annually Yes   Blood pressure BP Readings from Last 1 Encounters:   03/31/23 116/76    Less than 140/90 Yes   Dilated retinal exam : 03/24/2023 Annually Yes   Foot exam   : 10/24/2022 Annually Yes     Lab Results   Component Value Date    HGBA1C 6.1 (H) 11/03/2022    HGBA1C 6.1 (H) 09/02/2022    EGFRNORACEVR 37.3 (A) 03/24/2023    MICALBCREAT Unable to calculate 09/02/2022    LDLCALC 72.0 01/11/2023     No results found for: GLUTAMICACID, CPEPTIDE   Last 5 Patient Entered Readings                                          Most Recent A1c: 6.1% on 11/3/2022  (Goal: 8%)    There is no flowsheet data to display.        HEALTH MAINTENANCE: Diabetic health maintenance interventions reviewed and are up to date except for:  There are no preventive care reminders to display for this patient.

## 2023-03-31 NOTE — PATIENT INSTRUCTIONS
If you need help with MyChart and [facility], help is available:  online at [email or web address]  at the O[facility]bar in the 1st floor lobby of [facility]  or by phone at [phone]

## 2023-04-03 ENCOUNTER — PATIENT MESSAGE (OUTPATIENT)
Dept: INTERNAL MEDICINE | Facility: CLINIC | Age: 68
End: 2023-04-03
Payer: MEDICARE

## 2023-04-03 ENCOUNTER — E-CONSULT (OUTPATIENT)
Dept: PULMONOLOGY | Facility: CLINIC | Age: 68
End: 2023-04-03
Payer: MEDICARE

## 2023-04-03 ENCOUNTER — TELEPHONE (OUTPATIENT)
Dept: INTERNAL MEDICINE | Facility: CLINIC | Age: 68
End: 2023-04-03

## 2023-04-03 ENCOUNTER — TELEPHONE (OUTPATIENT)
Dept: INTERNAL MEDICINE | Facility: CLINIC | Age: 68
End: 2023-04-03
Payer: MEDICARE

## 2023-04-03 DIAGNOSIS — R91.1 NODULE OF UPPER LOBE OF RIGHT LUNG: Primary | ICD-10-CM

## 2023-04-03 DIAGNOSIS — R91.1 LUNG NODULE: Primary | ICD-10-CM

## 2023-04-03 PROCEDURE — 99451 PR INTERPROF, PHONE/INTERNET/EHR, CONSULT, >= 5MINS: ICD-10-PCS | Mod: S$GLB,,, | Performed by: INTERNAL MEDICINE

## 2023-04-03 PROCEDURE — 99451 NTRPROF PH1/NTRNET/EHR 5/>: CPT | Mod: S$GLB,,, | Performed by: INTERNAL MEDICINE

## 2023-04-03 NOTE — TELEPHONE ENCOUNTER
TO MY TEAM:  Please contact Lizz to relay message (below) or at least verify that she read and understood the same Patient Portal message I sent on [facility].  Schedule her for Quantiferon Gold TB lab.  Schedule CT in late June.  Message me back and let me know if she have productive cough.  Thanks.   --------------------------------------------------------------------------------   Pulmonology E-Consult Follow-up  Hi, Lizz.    I consulted one of our  pulmonologists  about your lung nodule.    Here is a summary of their recommendations.    RECOMMENDATIONS:   Check for tuberculosis with QuantiFERON lab.  If she has productive cough, send sputum for Gram stain and culture acid-fast stain and culture fungal stain and culture.  CT scan of the chest in 3 months     In response to their recommendations, the following orders have been entered for you.    Orders Placed This Encounter   Procedures    CT Chest Without Contrast    QUANTIFERON GOLD TB      I've asked my staff to contact you to help you schedule your tests.    Thanks for letting me care for you, and thanks for trusting [facility] with your healthcare needs.    Sincerely,    GENARO May MD   #[email or web address]

## 2023-04-03 NOTE — CONSULTS
[facility]  Response for E-Consult     Patient Name: Lizz Crockett  MRN: [id number]  Primary Care Provider: KARAN May MD   Requesting Provider: KARAN May MD  Consults    Findings:  Cavitary right upper lobe nodule      Check QuantiFERON TB gold repeat CT scan of the chest in 3 months if she has productive cough send sputum for Gram stain and culture acid-fast stain and culture fungal stain and culture    I did not speak to the requesting provider verbally about this.     Total time of Consultation: 10 minute    Percentage of time spent on verbal/written discussion: 25%     *This eConsult is based on the clinical data available to me and is furnished without benefit of a physical examination. The eConsult will need to be interpreted in light of any clinical issues or changes in patient status not available to me at the time of filing this eConsults. Significant changes in patient condition or level of acuity should result in immediate formal consultation and reevaluation. Please alert me if you have further questions.    Thank you for your consult.     Lawson Barclay MD  [facility]

## 2023-04-03 NOTE — TELEPHONE ENCOUNTER
TO MY TEAM:  Please contact Lizz to relay message (below) or at least verify that she read and understood the same Patient Portal message I sent on [facility].  Help her schedule pulmonology appointment.  --------------------------------------------------------------------------------   Lizz Davidson.    After further discussion with my colleagues about your complex lung nodule, we agreed that you need to see a pulmonologists (a specialist that diagnoses and treats lung disorders).    I have entered a referral order for you to be evaluated by our excellent pulmonologist, Lawson Barclay MD. You will like Dr. Barclay. He will take good care of you.     Someone from [facility] should be contacting you soon to help schedule your appointment. If you haven't been contacted within the next 3-4 business days, call [facility] [place] appointment desk ([phone]).     Thanks for letting me care for you, and thanks for trusting [facility] with your healthcare needs.    All the best,    GENARO May MD    Orders Placed This Encounter   Procedures    Ambulatory referral/consult to Pulmonology

## 2023-04-05 ENCOUNTER — LAB VISIT (OUTPATIENT)
Dept: LAB | Facility: HOSPITAL | Age: 68
End: 2023-04-05
Attending: FAMILY MEDICINE
Payer: MEDICARE

## 2023-04-05 DIAGNOSIS — R91.1 NODULE OF UPPER LOBE OF RIGHT LUNG: ICD-10-CM

## 2023-04-05 PROCEDURE — 86480 TB TEST CELL IMMUN MEASURE: CPT | Mod: HCNC | Performed by: FAMILY MEDICINE

## 2023-04-06 ENCOUNTER — PATIENT MESSAGE (OUTPATIENT)
Dept: ADMINISTRATIVE | Facility: HOSPITAL | Age: 68
End: 2023-04-06
Payer: MEDICARE

## 2023-04-06 ENCOUNTER — TELEPHONE (OUTPATIENT)
Dept: ADMINISTRATIVE | Facility: HOSPITAL | Age: 68
End: 2023-04-06
Payer: MEDICARE

## 2023-04-06 LAB
GAMMA INTERFERON BACKGROUND BLD IA-ACNC: 0.03 IU/ML
M TB IFN-G CD4+ BCKGRND COR BLD-ACNC: 0 IU/ML
MITOGEN IGNF BCKGRD COR BLD-ACNC: 9.97 IU/ML
TB GOLD PLUS: NEGATIVE
TB2 - NIL: 0.05 IU/ML

## 2023-04-17 ENCOUNTER — TELEPHONE (OUTPATIENT)
Dept: INTERNAL MEDICINE | Facility: CLINIC | Age: 68
End: 2023-04-17
Payer: MEDICARE

## 2023-04-17 ENCOUNTER — PATIENT MESSAGE (OUTPATIENT)
Dept: INTERNAL MEDICINE | Facility: CLINIC | Age: 68
End: 2023-04-17
Payer: MEDICARE

## 2023-04-17 NOTE — TELEPHONE ENCOUNTER
TO MY TEAM:   Please COPY and PASTE the text below into a [facility] message to Lizz.  Thanks.      Lizz Davidson.    I received your message about cough.    I recommend that we give this the time and attention it deserves and address this at your upcoming appointment with me tomorrow (Tuesday, April 18, 2023 at 1:00 PM).    I look forward to seeing you then.    All the best,    GENARO May MD

## 2023-04-18 ENCOUNTER — HOSPITAL ENCOUNTER (OUTPATIENT)
Dept: RADIOLOGY | Facility: HOSPITAL | Age: 68
Discharge: HOME | End: 2023-04-18
Attending: FAMILY MEDICINE
Payer: MEDICARE

## 2023-04-18 ENCOUNTER — OFFICE VISIT (OUTPATIENT)
Dept: INTERNAL MEDICINE | Facility: CLINIC | Age: 68
End: 2023-04-18
Payer: MEDICARE

## 2023-04-18 ENCOUNTER — TELEPHONE (OUTPATIENT)
Dept: OTOLARYNGOLOGY | Facility: CLINIC | Age: 68
End: 2023-04-18
Payer: MEDICARE

## 2023-04-18 VITALS
SYSTOLIC BLOOD PRESSURE: 120 MMHG | WEIGHT: 153 LBS | OXYGEN SATURATION: 99 % | HEIGHT: 66 IN | BODY MASS INDEX: 24.59 KG/M2 | DIASTOLIC BLOOD PRESSURE: 84 MMHG | HEART RATE: 74 BPM | TEMPERATURE: 98 F

## 2023-04-18 DIAGNOSIS — R91.8 PULMONARY NODULES: Chronic | ICD-10-CM

## 2023-04-18 DIAGNOSIS — J44.1 OBSTRUCTIVE CHRONIC BRONCHITIS WITH ACUTE EXACERBATION: ICD-10-CM

## 2023-04-18 DIAGNOSIS — D44.6 CAROTID BODY TUMOR: Chronic | ICD-10-CM

## 2023-04-18 DIAGNOSIS — F20.81 SCHIZOPHRENIFORM DISORDER: Chronic | ICD-10-CM

## 2023-04-18 DIAGNOSIS — J44.1 OBSTRUCTIVE CHRONIC BRONCHITIS WITH ACUTE EXACERBATION: Primary | ICD-10-CM

## 2023-04-18 DIAGNOSIS — Z53.20 PROCEDURE NOT CARRIED OUT BECAUSE OF PATIENT'S DECISION: Primary | ICD-10-CM

## 2023-04-18 DIAGNOSIS — N18.32 STAGE 3B CHRONIC KIDNEY DISEASE: Chronic | ICD-10-CM

## 2023-04-18 PROBLEM — J44.89 CHRONIC OBSTRUCTIVE BRONCHITIS: Status: ACTIVE | Noted: 2023-04-18

## 2023-04-18 PROBLEM — J44.89 CHRONIC OBSTRUCTIVE BRONCHITIS: Chronic | Status: ACTIVE | Noted: 2023-04-18

## 2023-04-18 PROCEDURE — 1125F AMNT PAIN NOTED PAIN PRSNT: CPT | Mod: HCNC,CPTII,S$GLB, | Performed by: FAMILY MEDICINE

## 2023-04-18 PROCEDURE — 3079F DIAST BP 80-89 MM HG: CPT | Mod: HCNC,CPTII,S$GLB, | Performed by: FAMILY MEDICINE

## 2023-04-18 PROCEDURE — 3072F PR LOW RISK FOR RETINOPATHY: ICD-10-PCS | Mod: HCNC,CPTII,S$GLB, | Performed by: FAMILY MEDICINE

## 2023-04-18 PROCEDURE — 99214 PR OFFICE/OUTPT VISIT, EST, LEVL IV, 30-39 MIN: ICD-10-PCS | Mod: HCNC,25,S$GLB, | Performed by: FAMILY MEDICINE

## 2023-04-18 PROCEDURE — 3008F BODY MASS INDEX DOCD: CPT | Mod: HCNC,CPTII,S$GLB, | Performed by: FAMILY MEDICINE

## 2023-04-18 PROCEDURE — 3066F PR DOCUMENTATION OF TREATMENT FOR NEPHROPATHY: ICD-10-PCS | Mod: HCNC,CPTII,S$GLB, | Performed by: FAMILY MEDICINE

## 2023-04-18 PROCEDURE — 3008F PR BODY MASS INDEX (BMI) DOCUMENTED: ICD-10-PCS | Mod: HCNC,CPTII,S$GLB, | Performed by: FAMILY MEDICINE

## 2023-04-18 PROCEDURE — 96372 THER/PROPH/DIAG INJ SC/IM: CPT | Mod: HCNC,S$GLB,, | Performed by: FAMILY MEDICINE

## 2023-04-18 PROCEDURE — 99499 UNLISTED E&M SERVICE: CPT | Mod: 95,,, | Performed by: FAMILY MEDICINE

## 2023-04-18 PROCEDURE — 3288F FALL RISK ASSESSMENT DOCD: CPT | Mod: HCNC,CPTII,S$GLB, | Performed by: FAMILY MEDICINE

## 2023-04-18 PROCEDURE — 3072F LOW RISK FOR RETINOPATHY: CPT | Mod: HCNC,CPTII,S$GLB, | Performed by: FAMILY MEDICINE

## 2023-04-18 PROCEDURE — 3074F PR MOST RECENT SYSTOLIC BLOOD PRESSURE < 130 MM HG: ICD-10-PCS | Mod: HCNC,CPTII,S$GLB, | Performed by: FAMILY MEDICINE

## 2023-04-18 PROCEDURE — 3074F SYST BP LT 130 MM HG: CPT | Mod: HCNC,CPTII,S$GLB, | Performed by: FAMILY MEDICINE

## 2023-04-18 PROCEDURE — 99214 OFFICE O/P EST MOD 30 MIN: CPT | Mod: HCNC,25,S$GLB, | Performed by: FAMILY MEDICINE

## 2023-04-18 PROCEDURE — 99499 NO LOS: ICD-10-PCS | Mod: 95,,, | Performed by: FAMILY MEDICINE

## 2023-04-18 PROCEDURE — 71046 X-RAY EXAM CHEST 2 VIEWS: CPT | Mod: 26,HCNC,, | Performed by: RADIOLOGY

## 2023-04-18 PROCEDURE — 1125F PR PAIN SEVERITY QUANTIFIED, PAIN PRESENT: ICD-10-PCS | Mod: HCNC,CPTII,S$GLB, | Performed by: FAMILY MEDICINE

## 2023-04-18 PROCEDURE — 1101F PR PT FALLS ASSESS DOC 0-1 FALLS W/OUT INJ PAST YR: ICD-10-PCS | Mod: HCNC,CPTII,S$GLB, | Performed by: FAMILY MEDICINE

## 2023-04-18 PROCEDURE — 3288F PR FALLS RISK ASSESSMENT DOCUMENTED: ICD-10-PCS | Mod: HCNC,CPTII,S$GLB, | Performed by: FAMILY MEDICINE

## 2023-04-18 PROCEDURE — 71046 XR CHEST PA AND LATERAL: ICD-10-PCS | Mod: 26,HCNC,, | Performed by: RADIOLOGY

## 2023-04-18 PROCEDURE — 99999 PR PBB SHADOW E&M-EST. PATIENT-LVL V: ICD-10-PCS | Mod: PBBFAC,HCNC,, | Performed by: FAMILY MEDICINE

## 2023-04-18 PROCEDURE — 3044F PR MOST RECENT HEMOGLOBIN A1C LEVEL <7.0%: ICD-10-PCS | Mod: HCNC,CPTII,S$GLB, | Performed by: FAMILY MEDICINE

## 2023-04-18 PROCEDURE — 3079F PR MOST RECENT DIASTOLIC BLOOD PRESSURE 80-89 MM HG: ICD-10-PCS | Mod: HCNC,CPTII,S$GLB, | Performed by: FAMILY MEDICINE

## 2023-04-18 PROCEDURE — 99999 PR PBB SHADOW E&M-EST. PATIENT-LVL V: CPT | Mod: PBBFAC,HCNC,, | Performed by: FAMILY MEDICINE

## 2023-04-18 PROCEDURE — 3044F HG A1C LEVEL LT 7.0%: CPT | Mod: HCNC,CPTII,S$GLB, | Performed by: FAMILY MEDICINE

## 2023-04-18 PROCEDURE — 1159F PR MEDICATION LIST DOCUMENTED IN MEDICAL RECORD: ICD-10-PCS | Mod: HCNC,CPTII,S$GLB, | Performed by: FAMILY MEDICINE

## 2023-04-18 PROCEDURE — 96372 PR INJECTION,THERAP/PROPH/DIAG2ST, IM OR SUBCUT: ICD-10-PCS | Mod: HCNC,S$GLB,, | Performed by: FAMILY MEDICINE

## 2023-04-18 PROCEDURE — 71046 X-RAY EXAM CHEST 2 VIEWS: CPT | Mod: TC,HCNC

## 2023-04-18 PROCEDURE — 1159F MED LIST DOCD IN RCRD: CPT | Mod: HCNC,CPTII,S$GLB, | Performed by: FAMILY MEDICINE

## 2023-04-18 PROCEDURE — 3066F NEPHROPATHY DOC TX: CPT | Mod: HCNC,CPTII,S$GLB, | Performed by: FAMILY MEDICINE

## 2023-04-18 PROCEDURE — 1101F PT FALLS ASSESS-DOCD LE1/YR: CPT | Mod: HCNC,CPTII,S$GLB, | Performed by: FAMILY MEDICINE

## 2023-04-18 RX ORDER — DOXYCYCLINE 100 MG/1
100 CAPSULE ORAL 2 TIMES DAILY
Qty: 20 CAPSULE | Refills: 0 | Status: SHIPPED | OUTPATIENT
Start: 2023-04-18 | End: 2023-04-28

## 2023-04-18 RX ORDER — ALBUTEROL SULFATE 90 UG/1
2 AEROSOL, METERED RESPIRATORY (INHALATION) EVERY 6 HOURS PRN
Qty: 18 G | Refills: 11 | Status: SHIPPED | OUTPATIENT
Start: 2023-04-18 | End: 2023-12-29 | Stop reason: SDUPTHER

## 2023-04-18 RX ORDER — METHYLPREDNISOLONE ACETATE 40 MG/ML
40 INJECTION, SUSPENSION INTRA-ARTICULAR; INTRALESIONAL; INTRAMUSCULAR; SOFT TISSUE ONCE
Status: COMPLETED | OUTPATIENT
Start: 2023-04-18 | End: 2023-04-18

## 2023-04-18 RX ORDER — PREDNISONE 20 MG/1
40 TABLET ORAL DAILY
Qty: 8 TABLET | Refills: 0 | Status: SHIPPED | OUTPATIENT
Start: 2023-04-18 | End: 2023-04-18 | Stop reason: ALTCHOICE

## 2023-04-18 RX ADMIN — METHYLPREDNISOLONE ACETATE 40 MG: 40 INJECTION, SUSPENSION INTRA-ARTICULAR; INTRALESIONAL; INTRAMUSCULAR; SOFT TISSUE at 02:04

## 2023-04-18 NOTE — ASSESSMENT & PLAN NOTE
Clinically stable.  Future Appointments   Date Time Provider Department Center   5/18/2023  1:30 PM [facility]son London [facility]

## 2023-04-18 NOTE — TELEPHONE ENCOUNTER
"Attempted to contact pt, no answer - received the following message "call can not be completed at this time".  Pt has not read numerous [employer] messages in the past so will not send message.  Went ahead and scheduled consult appt in the hopes that the pt will receive notification of the appt since call can not be completed.  "

## 2023-04-18 NOTE — ASSESSMENT & PLAN NOTE
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  Ordered, not scheduled: Ambulatory referral/consult to Endocrine/ENT Surgery for ENT/Head and Neck Surgeons  PLAN: Consult case management to assist with scheduling. Update ENT.

## 2023-04-18 NOTE — TELEPHONE ENCOUNTER
----- Message from Ricco Gonzalez MD sent at 4/18/2023  3:10 PM CDT -----  Antonio,    I remember this patient.  We tried to schedule her with Dr. Diego for evaluation and we were unable to contact her.  I will have my staff try and get her in with him as soon as possible.  Thanks.    Ricco      ----- Message -----  From: KARAN May MD  Sent: 4/18/2023   2:33 PM CDT  To: Ricco Gonzalez MD    Hi, Dr. Gonzalez.       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    Q: Do you have any connections in that department to move things forward?    Thank you for your help caring for our patients!    -Antonio

## 2023-04-18 NOTE — PROGRESS NOTES
"OFFICE VISIT 4/18/23  1:00 PM CDT    Subjective   CHIEF COMPLAINT: Follow-up and Cough    Refer to the history and data embedded within the "Assessment & Plan" section below for the status of the conditions evaluated and managed today. Unless noted otherwise, conditions appear compensated/controlled and stable.    Review of Systems   Constitutional:  Negative for chills, diaphoresis and fever.   Respiratory:  Positive for cough and wheezing. Negative for chest tightness.    Neurological:  Negative for syncope and light-headedness.       Assessment and Plan   1. Obstructive chronic bronchitis with acute exacerbation  Assessment & Plan:  She quit smoking in 1993 after about 20 pack-year smoking history She describes daily productive cough on majority of days for > last 2 years, consistent with chronic obstructive bronchitis, which is consistent with her smoking history. She now reports symptoms of AECB, onset over last few days. Quality described as worsening cough. Associated symptoms include increase sputum production and change in quality of sputum. Severity described as moderate. Timing described as symptoms typically worse at night. No fever or hemoptysis reported. When I asked her if she was allergic to prednisone, she said that she is NOT allergic to prednisone. She reports that once when she took a medrol dose pack about thee decades ago, it "made her feel crazy." No history of angioedema or IgE-mediated allergy to corticosteroids. She says she had had Depo-Medrol injections since them without adverse effect, and she requests Depo-Medrol injection today in lieu of oral steroids. We discussed risks and benefits of treatment options.    Orders:  -     doxycycline (VIBRAMYCIN) 100 MG Cap; Take 1 capsule (100 mg total) by mouth 2 (two) times daily. for 10 days  Dispense: 20 capsule; Refill: 0  -     X-Ray Chest PA And Lateral; Future; Expected date: 04/18/2023  -     Discontinue: predniSONE (DELTASONE) 20 MG " tablet; Take 2 tablets (40 mg total) by mouth once daily. for 4 days  Dispense: 8 tablet; Refill: 0  -     methylPREDNISolone acetate injection 40 mg  -     albuterol (PROVENTIL HFA) 90 mcg/actuation inhaler; Inhale 2 puffs into the lungs every 6 (six) hours as needed for Wheezing. (Rescue inhaler)  Dispense: 18 g; Refill: 11  -     inhalation spacing device; Use as directed when taking inhaled medicine  Dispense: 1 each; Refill: 0    2. Pulmonary nodules  Overview:  CT CHEST WITHOUT CONTRAST 12/29/2022  IMPRESSION: Right apex nodular opacity with central cavitation versus dilated bronchus. Similar more linear left upper lobe irregular opacity. Findings likely represent focal scarring given other areas of scarring however evaluation with PET-CT or short-term follow-up CT chest recommended. Sub 5 mm pulmonary nodules. Twelve month follow-up CT chest recommended for high risk patient group only.    Assessment & Plan:  Clinically stable.  Future Appointments   Date Time Provider Department Center   5/18/2023  1:30 PM [facility] [place]           3. Stage 3b chronic kidney disease  Assessment & Plan:  Lab Results   Component Value Date    EGFRNORACEVR 37.3 (A) 03/24/2023    EGFRNORACEVR 30.1 (A) 01/11/2023    EGFRNORACEVR 30.1 (A) 11/16/2022    CREATININE 1.5 (H) 03/24/2023    CREATININE 1.8 (H) 01/11/2023    CREATININE 1.8 (H) 11/16/2022    BUN 24 (H) 03/24/2023    BUN 19 01/11/2023    BUN 16 11/16/2022      Lab Results   Component Value Date    CREATININE 1.5 (H) 03/24/2023    EGFRNORACEVR 37.3 (A) 03/24/2023    ESTGFRAFRICA 43.0 (A) 11/28/2014    EGFRNONAA 37.3 (A) 11/28/2014           4. 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  Ordered, not scheduled: Ambulatory referral/consult to Endocrine/ENT Surgery for ENT/Head and Neck Surgeons  PLAN: Consult case management to assist with scheduling. Update  "ENT.    Orders:  -     Ambulatory referral/consult to Outpatient Case Management    5. Schizophreniform disorder  Assessment & Plan:  This is a chronic problem, stable. No significant change from status 3/31/23.    Orders:  -     Ambulatory referral/consult to Outpatient Case Management    Unless noted herein, any chronic conditions are represented as and appear stable, and no other significant complaints or concerns were reported.    Follow up if symptoms worsen or fail to improve.   Future Appointments   Date Time Provider Department Center   5/9/2023  9:20 AM Jin Ruffin OD [facility] OPHTHAL [facility]   5/18/2023  1:30 PM Nilson London MD [facility] PULMS[facility]   6/22/2023 11:00 AM [facility] CT1 LIMIT 500 LBS [facility] CT SCAN [place]   8/24/2023  9:40 AM LABORATORY, [facility] LAB [facility]   8/29/2023 10:30 AM KARAN May MD [facility] IM [facility]     Objective   Vitals:    04/18/23 1335   BP: 120/84   BP Location: Right arm   Patient Position: Sitting   BP Method: Medium (Manual)   Pulse: 74   Temp: 97.5 °F (36.4 °C)   TempSrc: Tympanic   SpO2: 99%   Weight: 69.4 kg (153 lb)   Height: 5' 6" (1.676 m)   Physical Exam  Vitals reviewed.   Constitutional:       General: She is not in acute distress.     Appearance: Normal appearance. She is not ill-appearing or diaphoretic.   Cardiovascular:      Rate and Rhythm: Normal rate and regular rhythm.      Heart sounds: Normal heart sounds.   Pulmonary:      Effort: Pulmonary effort is normal. No respiratory distress.      Breath sounds: No stridor. Wheezing and rhonchi present. No rales.   Skin:     General: Skin is warm and dry.   Neurological:      Mental Status: She is alert and oriented to person, place, and time. Mental status is at baseline.   Psychiatric:         Mood and Affect: Mood normal.        Documentation entered by me for this encounter may have been done in part using speech-recognition technology. Although I have made an effort to ensure accuracy, " ""sound like" errors may exist and should be interpreted in context.   "

## 2023-04-18 NOTE — ASSESSMENT & PLAN NOTE
"She quit smoking in 1993 after about 20 pack-year smoking history She describes daily productive cough on majority of days for > last 2 years, consistent with chronic obstructive bronchitis, which is consistent with her smoking history. She now reports symptoms of AECB, onset over last few days. Quality described as worsening cough. Associated symptoms include increase sputum production and change in quality of sputum. Severity described as moderate. Timing described as symptoms typically worse at night. No fever or hemoptysis reported. When I asked her if she was allergic to prednisone, she said that she is NOT allergic to prednisone. She reports that once when she took a medrol dose pack about thee decades ago, it "made her feel crazy." No history of angioedema or IgE-mediated allergy to corticosteroids. She says she had had Depo-Medrol injections since them without adverse effect, and she requests Depo-Medrol injection today in lieu of oral steroids. We discussed risks and benefits of treatment options.  "

## 2023-04-18 NOTE — ASSESSMENT & PLAN NOTE
Lab Results   Component Value Date    EGFRNORACEVR 37.3 (A) 03/24/2023    EGFRNORACEVR 30.1 (A) 01/11/2023    EGFRNORACEVR 30.1 (A) 11/16/2022    CREATININE 1.5 (H) 03/24/2023    CREATININE 1.8 (H) 01/11/2023    CREATININE 1.8 (H) 11/16/2022    BUN 24 (H) 03/24/2023    BUN 19 01/11/2023    BUN 16 11/16/2022      Lab Results   Component Value Date    CREATININE 1.5 (H) 03/24/2023    EGFRNORACEVR 37.3 (A) 03/24/2023    ESTGFRAFRICA 43.0 (A) 11/28/2014    EGFRNONAA 37.3 (A) 11/28/2014

## 2023-04-18 NOTE — Clinical Note
Dr. Carlos Davidson.     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  Q: Do you have any connections in that department to move things forward?  Thank you for your help caring for our patients!  -Antonio

## 2023-04-19 ENCOUNTER — PATIENT MESSAGE (OUTPATIENT)
Dept: ADMINISTRATIVE | Facility: HOSPITAL | Age: 68
End: 2023-04-19
Payer: MEDICARE

## 2023-04-19 NOTE — TELEPHONE ENCOUNTER
Spoke with pt.  She stated again that she does not wish to be seen at this time.  Appt with Dr Diego has been cancelled.

## 2023-04-25 ENCOUNTER — PATIENT OUTREACH (OUTPATIENT)
Dept: ADMINISTRATIVE | Facility: OTHER | Age: 68
End: 2023-04-25
Payer: MEDICARE

## 2023-04-25 NOTE — PROGRESS NOTES
CHW - Outreach Attempt    Community Health Worker left a voicemail message for 1st attempt to contact patient regarding: case management   \  Community Health Worker to attempt to contact patient on: 4/25/23

## 2023-04-28 ENCOUNTER — PATIENT OUTREACH (OUTPATIENT)
Dept: ADMINISTRATIVE | Facility: OTHER | Age: 68
End: 2023-04-28
Payer: MEDICARE

## 2023-04-28 NOTE — PROGRESS NOTES
CHW - Outreach Attempt    Community Health Worker left a voicemail message for 2nd attempt to contact patient regarding: case management referral    Community Health Worker to attempt to contact patient on: 4/28/23

## 2023-05-02 ENCOUNTER — PATIENT OUTREACH (OUTPATIENT)
Dept: ADMINISTRATIVE | Facility: OTHER | Age: 68
End: 2023-05-02
Payer: MEDICARE

## 2023-05-02 NOTE — PROGRESS NOTES
CHW - Outreach Attempt    Community Health Worker left a voicemail message for 3rd attempt to contact patient regarding: case management     Community Health Worker to attempt to contact patient on: 5/2/23

## 2023-05-03 ENCOUNTER — OUTPATIENT CASE MANAGEMENT (OUTPATIENT)
Dept: ADMINISTRATIVE | Facility: OTHER | Age: 68
End: 2023-05-03
Payer: MEDICARE

## 2023-05-03 NOTE — PROGRESS NOTES
Outpatient Care Management  Initial Patient Assessment    Patient: Lizz Crockett  MRN: [id number]  Date of Service: 2023  Completed by: Vinny Paula RN  Referral Date: 2023  Program: High Risk  Status: Ongoing  Effective Dates: 5/3/2023 - present  Responsible Staff: Vinny Paula RN        Reason for Visit   Patient presents with    OPCM Enrollment Call    Nursing Assessment       Brief Summary:  Lizz Crockett was referred by Dr. May  for carotid body tumor and schizophrenia. Patient qualifies for program based on her risk score of 81.1%.   Active problem list, medical, surgical and social history reviewed. Areas of need identified by patient include COPD management, Mrs. Crockett states she currently does not have any questions or concerns regarding her referred for diagnosis, she has been however dealing with frequent COPD flares and voiced educational needs  .   Next steps: Mail:  Welcome letter  COPD education  Consult CHW for SDOH completion  Follow up in two weeks    Disability Status  Is the patient alert and oriented (person, place, time, and situation)?: Alert and oriented x 4  Hearing Difficulty or Deaf: no  Visual Difficulty or Blind: no  Difficulty Concentrating, Remembering or Making Decisions: no  Communication Difficulty: no  Eating/Swallowing Difficulty: no  Walking or Climbing Stairs Difficulty: no  Dressing/Bathing Difficulty: no  Toileting : Independent  Difficulty Managing Errands Independently: no  Equipment Currently Used at Home: glucometer  Change in Functional Status Since Onset of Current Illness/Injury: no        Spiritual Beliefs  Spiritual, Cultural Beliefs, Mu-ism Practices, Values that Affect Care: no      Social History     Socioeconomic History    Marital status: Single   Tobacco Use    Smoking status: Former     Packs/day: 1.00     Years: 19.00     Pack years: 19.00     Types: Cigarettes     Start date:      Quit date:      Years since quittin.3     Smokeless tobacco: Never   Substance and Sexual Activity    Alcohol use: No    Drug use: No    Sexual activity: Yes       Roles and Relationships  Primary Source of Support/Comfort: child(alexa)  Name of Support/Comfort Primary Source: radha  Primary Roles/Responsibilities: retired      Advance Directives (For Healthcare)  Advance Directive  (If Adv Dir status is received, view document under Adv Dir in header or Chart Review Media tab): Patient does not have Advance Directive, declines information.        Patient Reported Insurance  Verified current insurance plan:: [employer]age  [employer] benefits discussed:: OTC Prescription Discounts; Transportation; Mail Order Pharmacy        Depression Patient Health Questionnaire 5/3/2023 9/2/2022   Over the last two weeks how often have you been bothered by little interest or pleasure in doing things Several days More than half the days   Over the last two weeks how often have you been bothered by feeling down, depressed or hopeless Several days More than half the days   PHQ-2 Total Score 2 4       Learning Assessment       05/03/2023 1057 [facility] (5/3/2023 - Present)   Created by Vinny Paula RN - RN (Nurse) Status: Complete                 PRIMARY LEARNER     Primary Learner Name:  daxa  - 05/03/2023 1057    Relationship:  Patient  - 05/03/2023 1057    Does the primary learner have any barriers to learning?:  Cognitive, Emotional  - 05/03/2023 1057    What is the preferred language of the primary learner?:  English JM - 05/03/2023 1057    How does the primary learner prefer to learn new concepts?:  Listening, Demonstration  - 05/03/2023 1057    How often do you need to have someone help you read instructions, pamphlets, or written material from your doctor or pharmacy?:  Sometimes JM - 05/03/2023 1057        CO-LEARNER #1     No question answered        CO-LEARNER #2     No question answered        SPECIAL TOPICS     No question  answered        ANSWERED BY:     No question answered        Edit History       Vinny Paula, RN - RN (Nurse)   05/03/2023 9489

## 2023-05-03 NOTE — LETTER
Lizz Crockett  [address] [place] [address]    Dear Lizz Crockett,     Welcome to [facility] Outpatient Care Management Program. We are here to assist patients with multiple long-term (chronic) conditions who often need more personalized healthcare.    It was a pleasure talking with you today. My name is Vinny Paula RN. I look forward to working with you as your Care Manager. I will be contacting you by telephone routinely to help coordinate care and resolve issues.    My goal is to help you function at the healthiest and highest level possible. You can contact me directly at [phone].    As an [facility] patient with [employer], some of the services we provide, at no cost to you, include:      Development of an individualized care plan with a Registered Nurse    Connection with a    Assistance from a Community Health Worker   Connection with available resources and services     Coordinate communication among your care team members    Provide coaching and education    Help you understand your doctors treatment plan   Help you obtain information about your insurance coverage.     All services provided by [facility]s Outpatient Care Managers and other care team members are coordinated with and communicated to your primary care team.      As part of your enrollment, you will be receiving education materials and more information about these services in your [facility] account, by phone, or through the mail. If you do not wish to participate or receive information, you can Opt Out by contacting our office at [phone].      Sincerely,      Vinny Paula RN  [facility]   Outpatient Care Management

## 2023-05-09 ENCOUNTER — PATIENT OUTREACH (OUTPATIENT)
Dept: ADMINISTRATIVE | Facility: OTHER | Age: 68
End: 2023-05-09
Payer: MEDICARE

## 2023-05-18 ENCOUNTER — OFFICE VISIT (OUTPATIENT)
Dept: PULMONOLOGY | Facility: CLINIC | Age: 68
End: 2023-05-18
Payer: MEDICARE

## 2023-05-18 ENCOUNTER — OUTPATIENT CASE MANAGEMENT (OUTPATIENT)
Dept: ADMINISTRATIVE | Facility: OTHER | Age: 68
End: 2023-05-18
Payer: MEDICARE

## 2023-05-18 VITALS
WEIGHT: 153.25 LBS | DIASTOLIC BLOOD PRESSURE: 62 MMHG | OXYGEN SATURATION: 98 % | SYSTOLIC BLOOD PRESSURE: 112 MMHG | HEIGHT: 66 IN | BODY MASS INDEX: 24.63 KG/M2 | RESPIRATION RATE: 21 BRPM | HEART RATE: 85 BPM

## 2023-05-18 DIAGNOSIS — R91.1 NODULE OF UPPER LOBE OF RIGHT LUNG: ICD-10-CM

## 2023-05-18 DIAGNOSIS — R91.1 SOLITARY PULMONARY NODULE: Primary | ICD-10-CM

## 2023-05-18 PROCEDURE — 3074F PR MOST RECENT SYSTOLIC BLOOD PRESSURE < 130 MM HG: ICD-10-PCS | Mod: CPTII,,, | Performed by: INTERNAL MEDICINE

## 2023-05-18 PROCEDURE — 3288F PR FALLS RISK ASSESSMENT DOCUMENTED: ICD-10-PCS | Mod: CPTII,,, | Performed by: INTERNAL MEDICINE

## 2023-05-18 PROCEDURE — 1125F PR PAIN SEVERITY QUANTIFIED, PAIN PRESENT: ICD-10-PCS | Mod: CPTII,,, | Performed by: INTERNAL MEDICINE

## 2023-05-18 PROCEDURE — 3078F PR MOST RECENT DIASTOLIC BLOOD PRESSURE < 80 MM HG: ICD-10-PCS | Mod: CPTII,,, | Performed by: INTERNAL MEDICINE

## 2023-05-18 PROCEDURE — 3074F SYST BP LT 130 MM HG: CPT | Mod: CPTII,,, | Performed by: INTERNAL MEDICINE

## 2023-05-18 PROCEDURE — 3066F PR DOCUMENTATION OF TREATMENT FOR NEPHROPATHY: ICD-10-PCS | Mod: CPTII,,, | Performed by: INTERNAL MEDICINE

## 2023-05-18 PROCEDURE — 3044F HG A1C LEVEL LT 7.0%: CPT | Mod: CPTII,,, | Performed by: INTERNAL MEDICINE

## 2023-05-18 PROCEDURE — 1159F PR MEDICATION LIST DOCUMENTED IN MEDICAL RECORD: ICD-10-PCS | Mod: CPTII,,, | Performed by: INTERNAL MEDICINE

## 2023-05-18 PROCEDURE — 3008F PR BODY MASS INDEX (BMI) DOCUMENTED: ICD-10-PCS | Mod: CPTII,,, | Performed by: INTERNAL MEDICINE

## 2023-05-18 PROCEDURE — 99204 PR OFFICE/OUTPT VISIT, NEW, LEVL IV, 45-59 MIN: ICD-10-PCS | Mod: ,,, | Performed by: INTERNAL MEDICINE

## 2023-05-18 PROCEDURE — 3072F LOW RISK FOR RETINOPATHY: CPT | Mod: CPTII,,, | Performed by: INTERNAL MEDICINE

## 2023-05-18 PROCEDURE — 1159F MED LIST DOCD IN RCRD: CPT | Mod: CPTII,,, | Performed by: INTERNAL MEDICINE

## 2023-05-18 PROCEDURE — 99215 OFFICE O/P EST HI 40 MIN: CPT | Performed by: INTERNAL MEDICINE

## 2023-05-18 PROCEDURE — 1101F PT FALLS ASSESS-DOCD LE1/YR: CPT | Mod: CPTII,,, | Performed by: INTERNAL MEDICINE

## 2023-05-18 PROCEDURE — 3008F BODY MASS INDEX DOCD: CPT | Mod: CPTII,,, | Performed by: INTERNAL MEDICINE

## 2023-05-18 PROCEDURE — 99204 OFFICE O/P NEW MOD 45 MIN: CPT | Mod: ,,, | Performed by: INTERNAL MEDICINE

## 2023-05-18 PROCEDURE — 99999 PR PBB SHADOW E&M-EST. PATIENT-LVL V: CPT | Mod: PBBFAC,,, | Performed by: INTERNAL MEDICINE

## 2023-05-18 PROCEDURE — 1101F PR PT FALLS ASSESS DOC 0-1 FALLS W/OUT INJ PAST YR: ICD-10-PCS | Mod: CPTII,,, | Performed by: INTERNAL MEDICINE

## 2023-05-18 PROCEDURE — 1160F PR REVIEW ALL MEDS BY PRESCRIBER/CLIN PHARMACIST DOCUMENTED: ICD-10-PCS | Mod: CPTII,,, | Performed by: INTERNAL MEDICINE

## 2023-05-18 PROCEDURE — 3044F PR MOST RECENT HEMOGLOBIN A1C LEVEL <7.0%: ICD-10-PCS | Mod: CPTII,,, | Performed by: INTERNAL MEDICINE

## 2023-05-18 PROCEDURE — 1160F RVW MEDS BY RX/DR IN RCRD: CPT | Mod: CPTII,,, | Performed by: INTERNAL MEDICINE

## 2023-05-18 PROCEDURE — 3078F DIAST BP <80 MM HG: CPT | Mod: CPTII,,, | Performed by: INTERNAL MEDICINE

## 2023-05-18 PROCEDURE — 3288F FALL RISK ASSESSMENT DOCD: CPT | Mod: CPTII,,, | Performed by: INTERNAL MEDICINE

## 2023-05-18 PROCEDURE — 3072F PR LOW RISK FOR RETINOPATHY: ICD-10-PCS | Mod: CPTII,,, | Performed by: INTERNAL MEDICINE

## 2023-05-18 PROCEDURE — 1125F AMNT PAIN NOTED PAIN PRSNT: CPT | Mod: CPTII,,, | Performed by: INTERNAL MEDICINE

## 2023-05-18 PROCEDURE — 99999 PR PBB SHADOW E&M-EST. PATIENT-LVL V: ICD-10-PCS | Mod: PBBFAC,,, | Performed by: INTERNAL MEDICINE

## 2023-05-18 PROCEDURE — 3066F NEPHROPATHY DOC TX: CPT | Mod: CPTII,,, | Performed by: INTERNAL MEDICINE

## 2023-05-18 RX ORDER — ATENOLOL 100 MG/1
TABLET ORAL
COMMUNITY
Start: 2023-03-23 | End: 2023-05-24

## 2023-05-18 RX ORDER — PRAVASTATIN SODIUM 40 MG/1
40 TABLET ORAL NIGHTLY
COMMUNITY
Start: 2023-02-02 | End: 2023-05-24

## 2023-05-18 RX ORDER — CLONIDINE HYDROCHLORIDE 0.1 MG/1
0.1 TABLET ORAL DAILY PRN
COMMUNITY
Start: 2023-01-12

## 2023-05-18 NOTE — PROGRESS NOTES
Subjective:      Patient ID: Lizz Crockett is a 68 y.o. female.    Chief Complaint: Pulmonary Nodules    Pulmonary Nodules      68-year-old female former smoker also with reported history of sarcoid diagnosed at age 38 but required no treatment was diagnosed by a bronchoscopy procedure according to her I had a CT of the chest in December which showed bilateral pulmonary nodules and scarring there is some question about cavitation versus airways through the nodules in the right upper and left upper lobes.  Consult was placed at that time and reviewed by Dr. Barclay.  He recommended checking a QuantiFERON assay which was done and is negative.  She is here for follow-up of that she has no specific pulmonary complaints.  She specifically denies chest pain, cough, wheezing, chills and hemoptysis.  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    Cataract     Chronic obstructive bronchitis 4/18/2023    Diabetes mellitus, type 2     GERD (gastroesophageal reflux disease)     Glaucoma     Hyperlipidemia     Hypertension     Sarcoidosis, unspecified     Schizophreniform disorder 12/21/2022    Thyroid disease     Type 2 diabetes mellitus with stage 4 chronic kidney disease, without long-term current use of insulin 9/2/2022     Past Surgical History:   Procedure Laterality Date    BREAST SURGERY      CATARACT EXTRACTION, BILATERAL Bilateral     INCISION AND DRAINAGE, UPPER EXTREMITY Left 1/26/2023    Procedure: INCISION AND DRAINAGE, UPPER EXTREMITY;  Surgeon: Cam Oneill MD;  Location: [facility];  Service: Orthopedics;  Laterality: Left;    THYROIDECTOMY      for multinodular goiter    TUBAL LIGATION       Social History     Tobacco Use    Smoking status: Former     Packs/day: 1.00     Years: 19.00     Pack years: 19.00     Types: Cigarettes     Start date: 1974     Quit  date:      Years since quittin.3    Smokeless tobacco: Never   Substance Use Topics    Alcohol use: No    Drug use: No     Family History   Problem Relation Age of Onset    Heart disease Mother     Hypertension Mother     Hypertension Father     Heart disease Father        Review of Systems as per hPI otherwise negative    Objective:     Physical Exam   Constitutional: She is oriented to person, place, and time. She appears well-developed. No distress.   HENT:   Mouth/Throat: No oropharyngeal exudate.   Cardiovascular: Normal rate and regular rhythm.   Pulmonary/Chest: Normal expansion, symmetric chest wall expansion and effort normal.   Musculoskeletal:      Cervical back: Neck supple.   Neurological: She is alert and oriented to person, place, and time.   Psychiatric: She has a normal mood and affect.   Nursing note and vitals reviewed.       Assessment:     1. Solitary pulmonary nodule    2. Nodule of upper lobe of right lung         Orders Placed This Encounter   Procedures    CT Chest Without Contrast     Standing Status:   Future     Standing Expiration Date:   2024     Order Specific Question:   May the Radiologist modify the order per protocol to meet the clinical needs of the patient?     Answer:   Yes    CT Chest Without Contrast     Standing Status:   Future     Standing Expiration Date:   2024     Order Specific Question:   May the Radiologist modify the order per protocol to meet the clinical needs of the patient?     Answer:   Yes     I personally reviewed CT of the chest from 2022.  My interpretation is as per history of present illness.  On my review there is no cavitation of the upper lobe nodules this is simply airways running through the area of scarring in bilateral apices.    Impression:     Right apex nodular opacity with central cavitation versus dilated bronchus.  Similar more linear left upper lobe irregular opacity.  Findings likely represent focal scarring given  other areas of scarring however evaluation with PET-CT or short-term follow-up CT chest recommended.  Sub 5 mm pulmonary nodules.  Twelve month follow-up CT chest recommended for high risk patient group only.  Other findings as above.      Plan:     Abnormal but likely benign CT findings.  She is due for a follow-up of imaging and will obtain that today.  Should this CT be stable/no detrimental change, we will likely repeat in 6 months.  Should that 1 be stable can resume yearly low-dose imaging.  Otherwise she is asymptomatic from a pulmonary standpoint and requires no specific medical treatment at this time.  I will notify her of CT results once available and otherwise see her back in 6 months with a CT, sooner if needed.  I discussed the above in detail with the patient who voiced understanding and agreement with this plan

## 2023-05-18 NOTE — PROGRESS NOTES
Outpatient Care Management  Plan of Care Follow Up Visit    Patient: Lizz Crockett  MRN: [id number]  Date of Service: 05/18/2023  Completed by: Vinny Paula RN  Referral Date: 04/18/2023    Reason for Visit   Patient presents with    OPCM Chart Review     Ms. Crockett is at appointments today I will reschedule her follow up call for tomorrow.

## 2023-05-19 ENCOUNTER — TELEPHONE (OUTPATIENT)
Dept: OPHTHALMOLOGY | Facility: CLINIC | Age: 68
End: 2023-05-19
Payer: MEDICARE

## 2023-05-19 RX ORDER — BIMATOPROST 0.1 MG/ML
1 SOLUTION/ DROPS OPHTHALMIC NIGHTLY
Qty: 2 ML | Refills: 11 | Status: SHIPPED | OUTPATIENT
Start: 2023-05-19 | End: 2024-05-18

## 2023-05-19 NOTE — TELEPHONE ENCOUNTER
Informed pt that we switched gtts from Latanoprost to Lumigan 1x nightly per pts request to switch gtts due to irritations

## 2023-05-23 NOTE — PROGRESS NOTES
CHW - Unable to Contact    Community Health Worker to close episode at this time due to three missed attempts for caregiver contact.     Vinny MORIN RN, notified of case closure.

## 2023-05-24 DIAGNOSIS — N18.4 TYPE 2 DIABETES MELLITUS WITH STAGE 4 CHRONIC KIDNEY DISEASE, WITHOUT LONG-TERM CURRENT USE OF INSULIN: Chronic | ICD-10-CM

## 2023-05-24 DIAGNOSIS — E11.22 TYPE 2 DIABETES MELLITUS WITH STAGE 4 CHRONIC KIDNEY DISEASE, WITHOUT LONG-TERM CURRENT USE OF INSULIN: Chronic | ICD-10-CM

## 2023-05-24 DIAGNOSIS — N18.4 STAGE 4 CHRONIC KIDNEY DISEASE: Chronic | ICD-10-CM

## 2023-05-24 RX ORDER — EMPAGLIFLOZIN 10 MG/1
TABLET, FILM COATED ORAL
Qty: 90 TABLET | Refills: 0 | Status: SHIPPED | OUTPATIENT
Start: 2023-05-24 | End: 2023-12-01

## 2023-05-24 NOTE — TELEPHONE ENCOUNTER
Refill Routing Note   Medication(s) are not appropriate for processing by [facility] for the following reason(s):      Required labs abnormal    [facility] action(s):  Defer None identified            Appointments  past 12m or future 3m with PCP    Date Provider   Last Visit   4/18/2023 KARAN May MD   Next Visit   8/29/2023 KARAN May MD   ED visits in past 90 days: 0        Note composed:5:13 PM 05/24/2023

## 2023-05-24 NOTE — TELEPHONE ENCOUNTER
No care due was identified.  [facility] Embedded Care Due Messages. Reference number: [id number].   5/24/2023 3:25:44 PM CDT

## 2023-05-24 NOTE — TELEPHONE ENCOUNTER
REFILL REQUEST APPROVED  Requested Prescriptions   Pending Prescriptions Disp Refills    JARDIANCE 10 mg tablet [Pharmacy Med Name: JARDIANCE 10MG TABLETS] 90 tablet 1     Sig: TAKE 1 TABLET BY MOUTH ONCE DAILY

## 2023-05-30 ENCOUNTER — HOSPITAL ENCOUNTER (OUTPATIENT)
Dept: RADIOLOGY | Facility: HOSPITAL | Age: 68
Discharge: HOME | End: 2023-05-30
Attending: INTERNAL MEDICINE
Payer: MEDICARE

## 2023-05-30 DIAGNOSIS — R91.1 SOLITARY PULMONARY NODULE: ICD-10-CM

## 2023-05-30 PROCEDURE — 71250 CT THORAX DX C-: CPT | Mod: TC

## 2023-05-30 PROCEDURE — 71250 CT CHEST WITHOUT CONTRAST: ICD-10-PCS | Mod: 26,,, | Performed by: STUDENT IN AN ORGANIZED HEALTH CARE EDUCATION/TRAINING PROGRAM

## 2023-05-30 PROCEDURE — 71250 CT THORAX DX C-: CPT | Mod: 26,,, | Performed by: STUDENT IN AN ORGANIZED HEALTH CARE EDUCATION/TRAINING PROGRAM

## 2023-06-01 ENCOUNTER — TELEPHONE (OUTPATIENT)
Dept: OPHTHALMOLOGY | Facility: CLINIC | Age: 68
End: 2023-06-01
Payer: MEDICARE

## 2023-06-01 NOTE — TELEPHONE ENCOUNTER
----- Message from Anuj Torres sent at 6/1/2023 11:34 AM CDT -----  Contact: Lizz  Patient is calling to speak with the nurse in regards to medication. Reports new drops has caused vision to get worse. Please give patient a callback at .[phone].

## 2023-06-03 DIAGNOSIS — E89.0 POST-SURGICAL HYPOTHYROIDISM: Chronic | ICD-10-CM

## 2023-06-03 NOTE — TELEPHONE ENCOUNTER
No care due was identified.  [facility] Embedded Care Due Messages. Reference number: [id number].   6/03/2023 9:34:25 AM CDT   20-Apr-2022 11:35

## 2023-06-04 RX ORDER — LEVOTHYROXINE SODIUM 100 UG/1
TABLET ORAL
Qty: 90 TABLET | Refills: 3 | Status: SHIPPED | OUTPATIENT
Start: 2023-06-04 | End: 2023-11-27

## 2023-06-04 NOTE — TELEPHONE ENCOUNTER
Refill Decision Note   Lizz Bon  is requesting a refill authorization.  Brief Assessment and Rationale for Refill:  Approve     Medication Therapy Plan:         Comments:     Note composed:2:44 AM 06/04/2023

## 2023-06-06 DIAGNOSIS — R91.1 SOLITARY PULMONARY NODULE: Primary | ICD-10-CM

## 2023-06-12 ENCOUNTER — OUTPATIENT CASE MANAGEMENT (OUTPATIENT)
Dept: ADMINISTRATIVE | Facility: OTHER | Age: 68
End: 2023-06-12
Payer: MEDICARE

## 2023-06-22 ENCOUNTER — PATIENT MESSAGE (OUTPATIENT)
Dept: INTERNAL MEDICINE | Facility: CLINIC | Age: 68
End: 2023-06-22
Payer: MEDICARE

## 2023-06-22 ENCOUNTER — PES CALL (OUTPATIENT)
Dept: ADMINISTRATIVE | Facility: CLINIC | Age: 68
End: 2023-06-22
Payer: MEDICARE

## 2023-06-22 ENCOUNTER — PATIENT MESSAGE (OUTPATIENT)
Dept: ADMINISTRATIVE | Facility: CLINIC | Age: 68
End: 2023-06-22
Payer: MEDICARE

## 2023-06-29 ENCOUNTER — OFFICE VISIT (OUTPATIENT)
Dept: INTERNAL MEDICINE | Facility: CLINIC | Age: 68
End: 2023-06-29
Payer: MEDICARE

## 2023-06-29 ENCOUNTER — HOSPITAL ENCOUNTER (OUTPATIENT)
Dept: RADIOLOGY | Facility: HOSPITAL | Age: 68
Discharge: HOME | End: 2023-06-29
Attending: NURSE PRACTITIONER
Payer: MEDICARE

## 2023-06-29 VITALS
TEMPERATURE: 97 F | SYSTOLIC BLOOD PRESSURE: 142 MMHG | BODY MASS INDEX: 24.84 KG/M2 | DIASTOLIC BLOOD PRESSURE: 96 MMHG | WEIGHT: 154.56 LBS | OXYGEN SATURATION: 98 % | HEIGHT: 66 IN | HEART RATE: 79 BPM

## 2023-06-29 DIAGNOSIS — R61 EXCESSIVE SWEATING: ICD-10-CM

## 2023-06-29 DIAGNOSIS — N18.32 TYPE 2 DIABETES MELLITUS WITH STAGE 3B CHRONIC KIDNEY DISEASE, WITHOUT LONG-TERM CURRENT USE OF INSULIN: Chronic | ICD-10-CM

## 2023-06-29 DIAGNOSIS — E11.22 TYPE 2 DIABETES MELLITUS WITH STAGE 3B CHRONIC KIDNEY DISEASE, WITHOUT LONG-TERM CURRENT USE OF INSULIN: Chronic | ICD-10-CM

## 2023-06-29 DIAGNOSIS — N89.8 VAGINAL ITCHING: Primary | ICD-10-CM

## 2023-06-29 PROCEDURE — 3077F SYST BP >= 140 MM HG: CPT | Mod: CPTII,S$GLB,, | Performed by: NURSE PRACTITIONER

## 2023-06-29 PROCEDURE — 3072F LOW RISK FOR RETINOPATHY: CPT | Mod: CPTII,S$GLB,, | Performed by: NURSE PRACTITIONER

## 2023-06-29 PROCEDURE — 3008F PR BODY MASS INDEX (BMI) DOCUMENTED: ICD-10-PCS | Mod: CPTII,S$GLB,, | Performed by: NURSE PRACTITIONER

## 2023-06-29 PROCEDURE — 99214 PR OFFICE/OUTPT VISIT, EST, LEVL IV, 30-39 MIN: ICD-10-PCS | Mod: S$GLB,,, | Performed by: NURSE PRACTITIONER

## 2023-06-29 PROCEDURE — 99999 PR PBB SHADOW E&M-EST. PATIENT-LVL V: CPT | Mod: PBBFAC,,, | Performed by: NURSE PRACTITIONER

## 2023-06-29 PROCEDURE — 1125F PR PAIN SEVERITY QUANTIFIED, PAIN PRESENT: ICD-10-PCS | Mod: CPTII,S$GLB,, | Performed by: NURSE PRACTITIONER

## 2023-06-29 PROCEDURE — 71046 X-RAY EXAM CHEST 2 VIEWS: CPT | Mod: 26,,, | Performed by: RADIOLOGY

## 2023-06-29 PROCEDURE — 99214 OFFICE O/P EST MOD 30 MIN: CPT | Mod: S$GLB,,, | Performed by: NURSE PRACTITIONER

## 2023-06-29 PROCEDURE — 3288F PR FALLS RISK ASSESSMENT DOCUMENTED: ICD-10-PCS | Mod: CPTII,S$GLB,, | Performed by: NURSE PRACTITIONER

## 2023-06-29 PROCEDURE — 1101F PT FALLS ASSESS-DOCD LE1/YR: CPT | Mod: CPTII,S$GLB,, | Performed by: NURSE PRACTITIONER

## 2023-06-29 PROCEDURE — 3077F PR MOST RECENT SYSTOLIC BLOOD PRESSURE >= 140 MM HG: ICD-10-PCS | Mod: CPTII,S$GLB,, | Performed by: NURSE PRACTITIONER

## 2023-06-29 PROCEDURE — 3044F HG A1C LEVEL LT 7.0%: CPT | Mod: CPTII,S$GLB,, | Performed by: NURSE PRACTITIONER

## 2023-06-29 PROCEDURE — 1159F MED LIST DOCD IN RCRD: CPT | Mod: CPTII,S$GLB,, | Performed by: NURSE PRACTITIONER

## 2023-06-29 PROCEDURE — 3288F FALL RISK ASSESSMENT DOCD: CPT | Mod: CPTII,S$GLB,, | Performed by: NURSE PRACTITIONER

## 2023-06-29 PROCEDURE — 3080F PR MOST RECENT DIASTOLIC BLOOD PRESSURE >= 90 MM HG: ICD-10-PCS | Mod: CPTII,S$GLB,, | Performed by: NURSE PRACTITIONER

## 2023-06-29 PROCEDURE — 3044F PR MOST RECENT HEMOGLOBIN A1C LEVEL <7.0%: ICD-10-PCS | Mod: CPTII,S$GLB,, | Performed by: NURSE PRACTITIONER

## 2023-06-29 PROCEDURE — 71046 X-RAY EXAM CHEST 2 VIEWS: CPT | Mod: TC

## 2023-06-29 PROCEDURE — 1125F AMNT PAIN NOTED PAIN PRSNT: CPT | Mod: CPTII,S$GLB,, | Performed by: NURSE PRACTITIONER

## 2023-06-29 PROCEDURE — 99999 PR PBB SHADOW E&M-EST. PATIENT-LVL V: ICD-10-PCS | Mod: PBBFAC,,, | Performed by: NURSE PRACTITIONER

## 2023-06-29 PROCEDURE — 71046 XR CHEST PA AND LATERAL: ICD-10-PCS | Mod: 26,,, | Performed by: RADIOLOGY

## 2023-06-29 PROCEDURE — 1159F PR MEDICATION LIST DOCUMENTED IN MEDICAL RECORD: ICD-10-PCS | Mod: CPTII,S$GLB,, | Performed by: NURSE PRACTITIONER

## 2023-06-29 PROCEDURE — 1101F PR PT FALLS ASSESS DOC 0-1 FALLS W/OUT INJ PAST YR: ICD-10-PCS | Mod: CPTII,S$GLB,, | Performed by: NURSE PRACTITIONER

## 2023-06-29 PROCEDURE — 3080F DIAST BP >= 90 MM HG: CPT | Mod: CPTII,S$GLB,, | Performed by: NURSE PRACTITIONER

## 2023-06-29 PROCEDURE — 3072F PR LOW RISK FOR RETINOPATHY: ICD-10-PCS | Mod: CPTII,S$GLB,, | Performed by: NURSE PRACTITIONER

## 2023-06-29 PROCEDURE — 3008F BODY MASS INDEX DOCD: CPT | Mod: CPTII,S$GLB,, | Performed by: NURSE PRACTITIONER

## 2023-06-29 PROCEDURE — 3066F NEPHROPATHY DOC TX: CPT | Mod: CPTII,S$GLB,, | Performed by: NURSE PRACTITIONER

## 2023-06-29 PROCEDURE — 3066F PR DOCUMENTATION OF TREATMENT FOR NEPHROPATHY: ICD-10-PCS | Mod: CPTII,S$GLB,, | Performed by: NURSE PRACTITIONER

## 2023-06-29 RX ORDER — CLOTRIMAZOLE 1 %
CREAM (GRAM) TOPICAL 2 TIMES DAILY
Qty: 45 G | Refills: 0 | Status: SHIPPED | OUTPATIENT
Start: 2023-06-29

## 2023-06-29 RX ORDER — FLUCONAZOLE 150 MG/1
150 TABLET ORAL ONCE
Qty: 1 TABLET | Refills: 0 | Status: SHIPPED | OUTPATIENT
Start: 2023-06-29 | End: 2023-06-29

## 2023-06-29 NOTE — PROGRESS NOTES
Subjective:       Patient ID: Lizz Crockett is a 68 y.o. female.    Chief Complaint: Excessive Sweating (For approx. 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    Cataract     Chronic obstructive bronchitis 2023    Diabetes mellitus, type 2     GERD (gastroesophageal reflux disease)     Glaucoma     Hyperlipidemia     Hypertension     Sarcoidosis, unspecified     Schizophreniform disorder 2022    Thyroid disease     Type 2 diabetes mellitus with stage 4 chronic kidney disease, without long-term current use of insulin 2022     Past Surgical History:   Procedure Laterality Date    BREAST SURGERY      CATARACT EXTRACTION, BILATERAL Bilateral     INCISION AND DRAINAGE, UPPER EXTREMITY Left 2023    Procedure: INCISION AND DRAINAGE, UPPER EXTREMITY;  Surgeon: Cam Oneill MD;  Location: [facility];  Service: Orthopedics;  Laterality: Left;    THYROIDECTOMY      for multinodular goiter    TUBAL LIGATION       Social History     Socioeconomic History    Marital status: Single   Tobacco Use    Smoking status: Former     Packs/day: 1.00     Years: 19.00     Pack years: 19.00     Types: Cigarettes     Start date:      Quit date:      Years since quittin.5    Smokeless tobacco: Never   Substance and Sexual Activity    Alcohol use: No    Drug use: No    Sexual activity: Yes     Review of patient's allergies indicates:   Allergen Reactions    Iodine     Codeine Anxiety    Iodinated contrast media Nausea And Vomiting     Current Outpatient Medications   Medication Sig    albuterol (PROVENTIL HFA) 90 mcg/actuation inhaler Inhale 2 puffs into the lungs every 6 (six) hours as needed for Wheezing. (Rescue inhaler)     allopurinoL (ZYLOPRIM) 100 MG tablet Take 1 tablet (100 mg total) by mouth once daily.    amLODIPine (NORVASC) 10 MG tablet Take 1 tablet (10 mg total) by mouth once daily.    atorvastatin (LIPITOR) 20 MG tablet Take 1 tablet (20 mg total) by mouth every evening.    bimatoprost (LUMIGAN) 0.01 % Drop Place 1 drop into both eyes every evening.    clindamycin (CLEOCIN T) 1 % Swab Apply topically 2 (two) times daily.    cloNIDine (CATAPRES) 0.1 MG tablet Take 0.1 mg by mouth daily as needed.    diclofenac sodium (VOLTAREN) 1 % Gel APPLY A SMALL AMOUNT( 2 TO 4 GRAM MAX) TOPICALLY TO PAINFUL JOINT AREAS FOUR TIMES DAILY AS NEEDED.    glycerin-mineral oil-lanolin Crea Apply 1 application topically daily as needed (to soothe itching).    HYDROcodone-acetaminophen (NORCO) 5-325 mg per tablet Take 1 tablet by mouth every 8 (eight) hours as needed for Pain.    inhalation spacing device Use as directed when taking inhaled medicine    JARDIANCE 10 mg tablet TAKE 1 TABLET BY MOUTH ONCE DAILY    levothyroxine (SYNTHROID) 100 MCG tablet TAKE 1 TABLET(100 MCG) BY MOUTH BEFORE BREAKFAST    mupirocin (BACTROBAN) 2 % ointment AAA bid. Antibiotic ointment    naloxone (NARCAN) 4 mg/actuation Spry     nitroGLYCERIN (NITROSTAT) 0.4 MG SL tablet Place 1 tablet (0.4 mg total) under the tongue every 5 (five) minutes as needed for Chest pain (for chest pain/discomfort). Call 911 if chest pain persists after second dose.    omeprazole (PRILOSEC) 40 MG capsule Take 1 capsule (40 mg total) by mouth every morning.    oxyCODONE-acetaminophen (PERCOCET) 7.5-325 mg per tablet Take 1 tablet by mouth every 8 hours as needed for pain    clotrimazole (LOTRIMIN) 1 % cream Apply topically 2 (two) times daily. AAA    fluconazole (DIFLUCAN) 150 MG Tab Take 1 tablet (150 mg total) by mouth once. for 1 dose    varicella-zoster [id number], PF, (SHINGRIX) 50 mcg/0.5 mL injection Inject 0.5 mL (one dose) into muscle now; schedule second dose  days later  (Patient not taking: Reported on 6/29/2023)     No current facility-administered medications for this visit.           Review of Systems   Constitutional:  Positive for diaphoresis. Negative for activity change, appetite change, chills, fatigue, fever and unexpected weight change.   HENT:  Negative for congestion, ear pain, postnasal drip, rhinorrhea, sinus pressure, sinus pain, sneezing, sore throat, tinnitus, trouble swallowing and voice change.    Eyes:  Negative for photophobia, pain and visual disturbance.   Respiratory:  Negative for cough, chest tightness, shortness of breath and wheezing.    Cardiovascular:  Negative for chest pain, palpitations and leg swelling.   Gastrointestinal:  Negative for abdominal distention, abdominal pain, constipation, diarrhea, nausea and vomiting.   Genitourinary:  Negative for decreased urine volume, difficulty urinating, dysuria, flank pain, frequency, hematuria and urgency.         See HPI   Musculoskeletal:  Negative for arthralgias, back pain, joint swelling, neck pain and neck stiffness.   Allergic/Immunologic: Negative for immunocompromised state.   Neurological:  Negative for dizziness, tremors, seizures, syncope, facial asymmetry, speech difficulty, weakness, light-headedness, numbness and headaches.   Hematological:  Negative for adenopathy. Does not bruise/bleed easily.   Psychiatric/Behavioral:  Negative for confusion and sleep disturbance.      Objective:      Physical Exam  Vitals reviewed.   Neck:      Comments: Noted mass to left neck- pt saw ENT-states she is not interested in having it biopsied/removed   Cardiovascular:      Rate and Rhythm: Normal rate and regular rhythm.      Pulses: Normal pulses.      Heart sounds: Normal heart sounds.   Pulmonary:      Effort: Pulmonary effort is normal.      Breath sounds: Normal breath sounds.   Musculoskeletal:         General: Normal range of motion.   Skin:     General: Skin is warm and dry.   Neurological:       Mental Status: She is alert and oriented to person, place, and time.   Psychiatric:         Mood and Affect: Mood normal.       Assessment:     Vitals:    06/29/23 0918   BP: (!) 142/96   Pulse: 79   Temp: 97.4 °F (36.3 °C)         1. Vaginal itching    2. Excessive sweating    3. Type 2 diabetes mellitus with stage 3b chronic kidney disease, without long-term current use of insulin        Plan:   Vaginal itching  -     fluconazole (DIFLUCAN) 150 MG Tab; Take 1 tablet (150 mg total) by mouth once. for 1 dose  Dispense: 1 tablet; Refill: 0    Excessive sweating  -     Urinalysis; Future; Expected date: 06/29/2023  -     Basic Metabolic Panel; Future; Expected date: 06/29/2023  -     X-Ray Chest PA And Lateral; Future; Expected date: 06/29/2023  -     CULTURE, URINE; Future; Expected date: 06/29/2023  -     TSH; Future; Expected date: 06/29/2023  -     CBC Auto Differential; Future; Expected date: 06/29/2023    Type 2 diabetes mellitus with stage 3b chronic kidney disease, without long-term current use of insulin    Other orders  -     clotrimazole (LOTRIMIN) 1 % cream; Apply topically 2 (two) times daily. AAA  Dispense: 45 g; Refill: 0        As above  Review and recs to follow  See GYN if vag itching fails to subside

## 2023-06-30 ENCOUNTER — TELEPHONE (OUTPATIENT)
Dept: INTERNAL MEDICINE | Facility: CLINIC | Age: 68
End: 2023-06-30
Payer: MEDICARE

## 2023-06-30 ENCOUNTER — PATIENT MESSAGE (OUTPATIENT)
Dept: INTERNAL MEDICINE | Facility: CLINIC | Age: 68
End: 2023-06-30
Payer: MEDICARE

## 2023-07-07 ENCOUNTER — PATIENT MESSAGE (OUTPATIENT)
Dept: INFECTIOUS DISEASES | Facility: CLINIC | Age: 68
End: 2023-07-07
Payer: MEDICARE

## 2023-08-09 ENCOUNTER — TELEPHONE (OUTPATIENT)
Dept: INTERNAL MEDICINE | Facility: CLINIC | Age: 68
End: 2023-08-09
Payer: MEDICARE

## 2023-08-09 NOTE — TELEPHONE ENCOUNTER
Spoke with pt and schedule her an appointment.   ----- Message from Trish Umanzor sent at 8/9/2023  1:20 PM CDT -----  Contact: Lizz Nieto is calling in regards to her having sever body pain from shoulder on down.please call back at [phone]            Thanks  FELECIA

## 2023-08-29 ENCOUNTER — OFFICE VISIT (OUTPATIENT)
Dept: INTERNAL MEDICINE | Facility: CLINIC | Age: 68
End: 2023-08-29
Payer: MEDICARE

## 2023-08-29 ENCOUNTER — TELEPHONE (OUTPATIENT)
Dept: INTERNAL MEDICINE | Facility: CLINIC | Age: 68
End: 2023-08-29
Payer: MEDICARE

## 2023-08-29 VITALS
WEIGHT: 152.56 LBS | OXYGEN SATURATION: 98 % | HEART RATE: 63 BPM | TEMPERATURE: 98 F | SYSTOLIC BLOOD PRESSURE: 120 MMHG | HEIGHT: 66 IN | DIASTOLIC BLOOD PRESSURE: 82 MMHG | RESPIRATION RATE: 20 BRPM | BODY MASS INDEX: 24.52 KG/M2

## 2023-08-29 DIAGNOSIS — N18.32 TYPE 2 DIABETES MELLITUS WITH STAGE 3B CHRONIC KIDNEY DISEASE, WITHOUT LONG-TERM CURRENT USE OF INSULIN: Chronic | ICD-10-CM

## 2023-08-29 DIAGNOSIS — N25.81 HYPERPARATHYROIDISM, SECONDARY RENAL: Chronic | ICD-10-CM

## 2023-08-29 DIAGNOSIS — E78.5 HYPERLIPIDEMIA ASSOCIATED WITH TYPE 2 DIABETES MELLITUS: Primary | Chronic | ICD-10-CM

## 2023-08-29 DIAGNOSIS — E11.22 TYPE 2 DIABETES MELLITUS WITH STAGE 3B CHRONIC KIDNEY DISEASE, WITHOUT LONG-TERM CURRENT USE OF INSULIN: Chronic | ICD-10-CM

## 2023-08-29 DIAGNOSIS — Z12.12 SCREENING FOR COLORECTAL CANCER: ICD-10-CM

## 2023-08-29 DIAGNOSIS — R91.8 PULMONARY NODULES: Chronic | ICD-10-CM

## 2023-08-29 DIAGNOSIS — I25.10 CORONARY ARTERY DISEASE INVOLVING NATIVE CORONARY ARTERY OF NATIVE HEART WITHOUT ANGINA PECTORIS: Chronic | ICD-10-CM

## 2023-08-29 DIAGNOSIS — E11.69 HYPERLIPIDEMIA ASSOCIATED WITH TYPE 2 DIABETES MELLITUS: Primary | Chronic | ICD-10-CM

## 2023-08-29 DIAGNOSIS — K76.0 FATTY LIVER: Chronic | ICD-10-CM

## 2023-08-29 DIAGNOSIS — I70.0 AORTIC ATHEROSCLEROSIS: Chronic | ICD-10-CM

## 2023-08-29 DIAGNOSIS — Z12.31 BREAST CANCER SCREENING BY MAMMOGRAM: ICD-10-CM

## 2023-08-29 DIAGNOSIS — D44.6 CAROTID BODY TUMOR: Chronic | ICD-10-CM

## 2023-08-29 DIAGNOSIS — E11.59 HYPERTENSION COMPLICATING DIABETES: Chronic | ICD-10-CM

## 2023-08-29 DIAGNOSIS — F11.20 UNCOMPLICATED OPIOID DEPENDENCE: Chronic | ICD-10-CM

## 2023-08-29 DIAGNOSIS — E89.0 POST-SURGICAL HYPOTHYROIDISM: Chronic | ICD-10-CM

## 2023-08-29 DIAGNOSIS — F20.81 SCHIZOPHRENIFORM DISORDER: Chronic | ICD-10-CM

## 2023-08-29 DIAGNOSIS — E79.0 HYPERURICEMIA: Chronic | ICD-10-CM

## 2023-08-29 DIAGNOSIS — I15.2 HYPERTENSION COMPLICATING DIABETES: Chronic | ICD-10-CM

## 2023-08-29 DIAGNOSIS — N18.32 STAGE 3B CHRONIC KIDNEY DISEASE: Chronic | ICD-10-CM

## 2023-08-29 DIAGNOSIS — Z12.11 SCREENING FOR COLORECTAL CANCER: ICD-10-CM

## 2023-08-29 PROBLEM — E11.9 HYPERTENSION COMPLICATING DIABETES: Chronic | Status: ACTIVE | Noted: 2022-09-02

## 2023-08-29 PROCEDURE — 1125F PR PAIN SEVERITY QUANTIFIED, PAIN PRESENT: ICD-10-PCS | Mod: CPTII,S$GLB,, | Performed by: FAMILY MEDICINE

## 2023-08-29 PROCEDURE — 99214 OFFICE O/P EST MOD 30 MIN: CPT | Mod: S$GLB,,, | Performed by: FAMILY MEDICINE

## 2023-08-29 PROCEDURE — 3074F SYST BP LT 130 MM HG: CPT | Mod: CPTII,S$GLB,, | Performed by: FAMILY MEDICINE

## 2023-08-29 PROCEDURE — 1101F PT FALLS ASSESS-DOCD LE1/YR: CPT | Mod: CPTII,S$GLB,, | Performed by: FAMILY MEDICINE

## 2023-08-29 PROCEDURE — 3044F HG A1C LEVEL LT 7.0%: CPT | Mod: CPTII,S$GLB,, | Performed by: FAMILY MEDICINE

## 2023-08-29 PROCEDURE — 3079F PR MOST RECENT DIASTOLIC BLOOD PRESSURE 80-89 MM HG: ICD-10-PCS | Mod: CPTII,S$GLB,, | Performed by: FAMILY MEDICINE

## 2023-08-29 PROCEDURE — 1101F PR PT FALLS ASSESS DOC 0-1 FALLS W/OUT INJ PAST YR: ICD-10-PCS | Mod: CPTII,S$GLB,, | Performed by: FAMILY MEDICINE

## 2023-08-29 PROCEDURE — 3288F PR FALLS RISK ASSESSMENT DOCUMENTED: ICD-10-PCS | Mod: CPTII,S$GLB,, | Performed by: FAMILY MEDICINE

## 2023-08-29 PROCEDURE — 3066F NEPHROPATHY DOC TX: CPT | Mod: CPTII,S$GLB,, | Performed by: FAMILY MEDICINE

## 2023-08-29 PROCEDURE — 3044F PR MOST RECENT HEMOGLOBIN A1C LEVEL <7.0%: ICD-10-PCS | Mod: CPTII,S$GLB,, | Performed by: FAMILY MEDICINE

## 2023-08-29 PROCEDURE — 1125F AMNT PAIN NOTED PAIN PRSNT: CPT | Mod: CPTII,S$GLB,, | Performed by: FAMILY MEDICINE

## 2023-08-29 PROCEDURE — 1160F RVW MEDS BY RX/DR IN RCRD: CPT | Mod: CPTII,S$GLB,, | Performed by: FAMILY MEDICINE

## 2023-08-29 PROCEDURE — 1159F MED LIST DOCD IN RCRD: CPT | Mod: CPTII,S$GLB,, | Performed by: FAMILY MEDICINE

## 2023-08-29 PROCEDURE — 3074F PR MOST RECENT SYSTOLIC BLOOD PRESSURE < 130 MM HG: ICD-10-PCS | Mod: CPTII,S$GLB,, | Performed by: FAMILY MEDICINE

## 2023-08-29 PROCEDURE — 3079F DIAST BP 80-89 MM HG: CPT | Mod: CPTII,S$GLB,, | Performed by: FAMILY MEDICINE

## 2023-08-29 PROCEDURE — 3066F PR DOCUMENTATION OF TREATMENT FOR NEPHROPATHY: ICD-10-PCS | Mod: CPTII,S$GLB,, | Performed by: FAMILY MEDICINE

## 2023-08-29 PROCEDURE — 3008F BODY MASS INDEX DOCD: CPT | Mod: CPTII,S$GLB,, | Performed by: FAMILY MEDICINE

## 2023-08-29 PROCEDURE — 3288F FALL RISK ASSESSMENT DOCD: CPT | Mod: CPTII,S$GLB,, | Performed by: FAMILY MEDICINE

## 2023-08-29 PROCEDURE — 99214 PR OFFICE/OUTPT VISIT, EST, LEVL IV, 30-39 MIN: ICD-10-PCS | Mod: S$GLB,,, | Performed by: FAMILY MEDICINE

## 2023-08-29 PROCEDURE — 1159F PR MEDICATION LIST DOCUMENTED IN MEDICAL RECORD: ICD-10-PCS | Mod: CPTII,S$GLB,, | Performed by: FAMILY MEDICINE

## 2023-08-29 PROCEDURE — 3008F PR BODY MASS INDEX (BMI) DOCUMENTED: ICD-10-PCS | Mod: CPTII,S$GLB,, | Performed by: FAMILY MEDICINE

## 2023-08-29 PROCEDURE — 1160F PR REVIEW ALL MEDS BY PRESCRIBER/CLIN PHARMACIST DOCUMENTED: ICD-10-PCS | Mod: CPTII,S$GLB,, | Performed by: FAMILY MEDICINE

## 2023-08-29 PROCEDURE — 99999 PR PBB SHADOW E&M-EST. PATIENT-LVL V: ICD-10-PCS | Mod: PBBFAC,,, | Performed by: FAMILY MEDICINE

## 2023-08-29 PROCEDURE — 99999 PR PBB SHADOW E&M-EST. PATIENT-LVL V: CPT | Mod: PBBFAC,,, | Performed by: FAMILY MEDICINE

## 2023-08-29 NOTE — PATIENT INSTRUCTIONS
I've listed below some mental health providers whom I believe are accepting new patients with your insurance. You should be able to get the mental health care you need at one of the centers listed below. Please contact them ASAP to schedule your first appointment. Tell them you were referred by your primary care provider.    [facility]  [address] [place], [place] [address]  Phone: [phone]  Fax: [phone]  [email or web address]  [address] [place] [address]  Phone: [phone]  Fax: [phone]  [email or web address]     [facility]  [address] [place] [address]  Phone: [phone]  [email or web address]    [facility]  [address] [address]  Phone: [phone]  [email or web address]    [facility]  [address] [place] [address]  Phone: [phone]  [email or web address]    Need Support Now?  If you or someone you know is struggling or in crisis, help is available. Call or text 312 or chat [employer]  989 offers 24/7 access to trained crisis counselors who can help people experiencing mental health-related distress. That could be:  Thoughts of suicide,  Mental health or substance use crisis, or  Any other kind of emotion distress.

## 2023-08-29 NOTE — ASSESSMENT & PLAN NOTE
BP Readings from Last 6 Encounters:   08/29/23 120/82   06/29/23 (!) 142/96   05/18/23 112/62   04/18/23 120/84   03/31/23 116/76   01/26/23 130/87      Last 5 Patient Entered Readings                Current 30 Day Average:   Recent Readings 4/29/2023 4/27/2023 12/23/2022 12/19/2022 12/17/2022   SBP (mmHg) 132 126 129 146 118   DBP (mmHg) 90 83 91 99 93   Pulse 88 76 80 76 93               Lab Results   Component Value Date    EGFRNORACEVR 30 (A) 06/29/2023    CREATININE 1.8 (H) 06/29/2023    BUN 22 06/29/2023    K 3.6 06/29/2023     06/29/2023     06/29/2023     Results for orders placed or performed during the hospital encounter of 09/02/22   SCHEDULED EKG 12-LEAD (to Muse)    Collection Time: 09/02/22  1:06 PM    Narrative    Test Reason : I10    Vent. Rate : 048 BPM     Atrial Rate : 048 BPM     P-R Int : 142 ms          QRS Dur : 080 ms      QT Int : 462 ms       P-R-T Axes : 039 -04 015 degrees     QTc Int : 412 ms    Sinus bradycardia  Otherwise normal ECG  No previous ECGs available  Confirmed by MALLY GOODMAN, SIM TEIXEIRA ([id number]) on 9/2/2022 10:29:09 PM    Referred By: GENARO VILLEDA           Confirmed By:SIM BAL MD

## 2023-08-29 NOTE — ASSESSMENT & PLAN NOTE
"Diabetes Management Status    Statin: Taking  ACE/ARB: Not taking    Screening or Prevention Patient's value Goal Complete/Controlled?   HgA1C Testing and Control   Lab Results   Component Value Date    HGBA1C 6.1 (H) 03/31/2023      Annually/Less than 8% Yes   Lipid profile : 01/11/2023 Annually Yes   LDL control Lab Results   Component Value Date    LDLCALC 72.0 01/11/2023    Annually/Less than 100 mg/dl  Yes   Nephropathy screening Lab Results   Component Value Date    LABMICR <5.0 09/02/2022     Lab Results   Component Value Date    PROTEINUA Negative 06/29/2023    Annually Yes   Blood pressure BP Readings from Last 1 Encounters:   08/29/23 120/82    Less than 140/90 Yes   Dilated retinal exam : 03/24/2023 Annually Yes   Foot exam   : 10/24/2022 Annually Yes     Lab Results   Component Value Date    HGBA1C 6.1 (H) 03/31/2023    HGBA1C 6.1 (H) 11/03/2022    HGBA1C 6.1 (H) 09/02/2022    EGFRNORACEVR 30 (A) 06/29/2023    MICALBCREAT Unable to calculate 09/02/2022    LDLCALC 72.0 01/11/2023     No results found for: "GLUTAMICACID", "CPEPTIDE"   Last 5 Patient Entered Readings                                          Most Recent A1c: 6.1% on 3/31/2023  (Goal: 8%)         No data to display               HEALTH MAINTENANCE: Diabetic health maintenance interventions reviewed and are up to date except for:  There are no preventive care reminders to display for this patient.   "

## 2023-08-29 NOTE — TELEPHONE ENCOUNTER
----- Message from Hine Mathews sent at 8/29/2023 10:12 AM CDT -----  Contact: Lizz Zamudio is calling to speak to the nurse regarding her scheduled appointment, she will be running late due to her not being able to drive and she will be catching a ride. Please give her a call at [phone]    Thanks  LJ

## 2023-08-29 NOTE — ASSESSMENT & PLAN NOTE
Lab Results   Component Value Date    CHOL 138 01/11/2023    CHOL 147 09/02/2022    TRIG 90 01/11/2023    TRIG 146 09/02/2022    HDL 48 01/11/2023    HDL 39 (L) 09/02/2022    LDLCALC 72.0 01/11/2023    LDLCALC 78.8 09/02/2022    NONHDLCHOL 90 01/11/2023    NONHDLCHOL 108 09/02/2022    AST 17 01/11/2023    ALT 12 01/11/2023     The 10-year ASCVD risk score (Alber MONTIEL, et al., 2019) is: 17.1%    Values used to calculate the score:      Age: 68 years      Sex: Female      Is Non- : Yes      Diabetic: Yes      Tobacco smoker: No      Systolic Blood Pressure: 120 mmHg      Is BP treated: Yes      HDL Cholesterol: 48 mg/dL      Total Cholesterol: 138 mg/dL

## 2023-08-29 NOTE — PROGRESS NOTES
OFFICE VISIT 8/29/23 10:40 AM CDT    CHIEF COMPLAINT: Follow-up    HPI  1. Hyperlipidemia associated with type 2 diabetes mellitus  Assessment & Plan:  Lab Results   Component Value Date    CHOL 138 01/11/2023    CHOL 147 09/02/2022    TRIG 90 01/11/2023    TRIG 146 09/02/2022    HDL 48 01/11/2023    HDL 39 (L) 09/02/2022    LDLCALC 72.0 01/11/2023    LDLCALC 78.8 09/02/2022    NONHDLCHOL 90 01/11/2023    NONHDLCHOL 108 09/02/2022    AST 17 01/11/2023    ALT 12 01/11/2023     The 10-year ASCVD risk score (Alber MONTIEL, et al., 2019) is: 17.1%    Values used to calculate the score:      Age: 68 years      Sex: Female      Is Non- : Yes      Diabetic: Yes      Tobacco smoker: No      Systolic Blood Pressure: 120 mmHg      Is BP treated: Yes      HDL Cholesterol: 48 mg/dL      Total Cholesterol: 138 mg/dL     Orders:  -     Comprehensive Metabolic Panel; Standing  -     Lipid Panel; Standing    2. Coronary artery disease involving native coronary artery of native heart without angina pectoris  -     Lipid Panel; Standing    3. Hypertension complicating diabetes  Assessment & Plan:  BP Readings from Last 6 Encounters:   08/29/23 120/82   06/29/23 (!) 142/96   05/18/23 112/62   04/18/23 120/84   03/31/23 116/76   01/26/23 130/87      Last 5 Patient Entered Readings                Current 30 Day Average:   Recent Readings 4/29/2023 4/27/2023 12/23/2022 12/19/2022 12/17/2022   SBP (mmHg) 132 126 129 146 118   DBP (mmHg) 90 83 91 99 93   Pulse 88 76 80 76 93                 Lab Results   Component Value Date    EGFRNORACEVR 30 (A) 06/29/2023    CREATININE 1.8 (H) 06/29/2023    BUN 22 06/29/2023    K 3.6 06/29/2023     06/29/2023     06/29/2023     Results for orders placed or performed during the hospital encounter of 09/02/22   SCHEDULED EKG 12-LEAD (to Muse)    Collection Time: 09/02/22  1:06 PM    Narrative    Test Reason : I10    Vent. Rate : 048 BPM     Atrial Rate : 048 BPM     P-R Int  ": 142 ms          QRS Dur : 080 ms      QT Int : 462 ms       P-R-T Axes : 039 -04 015 degrees     QTc Int : 412 ms    Sinus bradycardia  Otherwise normal ECG  No previous ECGs available  Confirmed by MALLY GOODMAN, SIM TEIXEIRA ([id number]) on 9/2/2022 10:29:09 PM    Referred By: GENARO VILLEDA           Confirmed By:SIM BAL MD         Orders:  -     Comprehensive Metabolic Panel; Standing  -     Lipid Panel; Standing    4. Aortic atherosclerosis  Overview:  CT CHEST WITHOUT CONTRAST 12/29/2022  FINDINGS: Thoracic aorta demonstrates mild atherosclerotic plaquing.    US ABDOMEN COMPLETE 12/23/2022  IMPRESSION: Ectasia of the infrarenal abdominal aorta without evidence for aneurysm.      Orders:  -     Lipid Panel; Standing    5. Stage 3b chronic kidney disease  -     Comprehensive Metabolic Panel; Standing  -     PTH, Intact; Standing    6. Post-surgical hypothyroidism  Assessment & Plan:  Lab Results   Component Value Date    TSH 2.916 06/29/2023    TSH 2.061 03/31/2023    TSH 2.602 09/02/2022    FREET4 0.97 09/02/2022     No results found for: "THYROIDSTIMI", "THYROPEROXID", "THYGLBTUM", "THGABSCRN", "THYRGINTERP"       7. Type 2 diabetes mellitus with stage 3b chronic kidney disease, without long-term current use of insulin  Assessment & Plan:  Diabetes Management Status    Statin: Taking  ACE/ARB: Not taking    Screening or Prevention Patient's value Goal Complete/Controlled?   HgA1C Testing and Control   Lab Results   Component Value Date    HGBA1C 6.1 (H) 03/31/2023      Annually/Less than 8% Yes   Lipid profile : 01/11/2023 Annually Yes   LDL control Lab Results   Component Value Date    LDLCALC 72.0 01/11/2023    Annually/Less than 100 mg/dl  Yes   Nephropathy screening Lab Results   Component Value Date    LABMICR <5.0 09/02/2022     Lab Results   Component Value Date    PROTEINUA Negative 06/29/2023    Annually Yes   Blood pressure BP Readings from Last 1 Encounters:   08/29/23 120/82    Less than 140/90 Yes " "  Dilated retinal exam : 03/24/2023 Annually Yes   Foot exam   : 10/24/2022 Annually Yes     Lab Results   Component Value Date    HGBA1C 6.1 (H) 03/31/2023    HGBA1C 6.1 (H) 11/03/2022    HGBA1C 6.1 (H) 09/02/2022    EGFRNORACEVR 30 (A) 06/29/2023    MICALBCREAT Unable to calculate 09/02/2022    LDLCALC 72.0 01/11/2023     No results found for: "GLUTAMICACID", "CPEPTIDE"   Last 5 Patient Entered Readings                                          Most Recent A1c: 6.1% on 3/31/2023  (Goal: 8%)           No data to display               HEALTH MAINTENANCE: Diabetic health maintenance interventions reviewed and are up to date except for:  There are no preventive care reminders to display for this patient.     Orders:  -     Hemoglobin A1C; Standing  -     Comprehensive Metabolic Panel; Standing  -     Lipid Panel; Standing  -     Microalbumin/Creatinine Ratio, Urine; Standing    8. Fatty liver  Overview:  US ABDOMEN COMPLETE 12/23/2022  IMPRESSION: 1. Findings most consistent with diffuse fatty infiltration of the liver.    Orders:  -     Comprehensive Metabolic Panel; Standing  -     GAMMA GT; Standing    9. Hyperparathyroidism, secondary to chronic kidney disease  -     PTH, Intact; Standing  -     Vitamin D; Standing    10. Hyperuricemia  -     URIC ACID; Standing    11. Breast cancer screening by mammogram  -     Mammo Digital Screening Bilat w/ Martinez; Future; Expected date: 08/29/2023    12. Screening for colorectal cancer  -     Cancel: Cologuard Screening (Multitarget Stool DNA); Future; Expected date: 08/29/2023    13. Opioid dependent for pain management  Overview:  PAIN MANAGEMENT: Shaquille Martinez MD      14. Schizophreniform disorder  -     Ambulatory referral/consult to Psychiatry; Future; Expected date: 09/05/2023    15. 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 Pulmonary nodules  Overview:  CT CHEST WITHOUT CONTRAST " "12/29/2022  IMPRESSION: Right apex nodular opacity with central cavitation versus dilated bronchus. Similar more linear left upper lobe irregular opacity. Findings likely represent focal scarring given other areas of scarring however evaluation with PET-CT or short-term follow-up CT chest recommended. Sub 5 mm pulmonary nodules. Twelve month follow-up CT chest recommended for high risk patient group only.         Unless noted herein, any chronic conditions are represented as and appear stable, and no other significant complaints or concerns were reported.    Follow up in about 4 months (around 12/29/2023) for review test results after completion, discuss plan, re-evaluation.     Review of Systems   Respiratory:  Negative for chest tightness and shortness of breath.    Cardiovascular:  Negative for chest pain.   Endocrine: Negative for polydipsia and polyuria.       Vitals:    08/29/23 1103   BP: 120/82   BP Location: Left arm   Patient Position: Sitting   BP Method: Large (Manual)   Pulse: 63   Resp: 20   Temp: 98.2 °F (36.8 °C)   SpO2: 98%   Weight: 69.2 kg (152 lb 8.9 oz)   Height: 5' 6" (1.676 m)   Physical Exam  Vitals reviewed.   Constitutional:       General: She is not in acute distress.     Appearance: Normal appearance. She is not ill-appearing, toxic-appearing or diaphoretic.   Cardiovascular:      Rate and Rhythm: Normal rate and regular rhythm.   Pulmonary:      Effort: Pulmonary effort is normal.      Breath sounds: Normal breath sounds.   Neurological:      Mental Status: She is alert and oriented to person, place, and time. Mental status is at baseline.   Psychiatric:         Mood and Affect: Mood normal.         Behavior: Behavior normal.         Judgment: Judgment normal.       Documentation entered by me for this encounter may have been done in part using speech-recognition technology. Although I have made an effort to ensure accuracy, "sound like" errors may exist and should be interpreted in " context.

## 2023-08-29 NOTE — ASSESSMENT & PLAN NOTE
"Lab Results   Component Value Date    TSH 2.916 06/29/2023    TSH 2.061 03/31/2023    TSH 2.602 09/02/2022    FREET4 0.97 09/02/2022     No results found for: "THYROIDSTIMI", "THYROPEROXID", "THYGLBTUM", "THGABSCRN", "THYRGINTERP"   "

## 2023-09-07 ENCOUNTER — HOSPITAL ENCOUNTER (OUTPATIENT)
Dept: RADIOLOGY | Facility: HOSPITAL | Age: 68
Discharge: HOME | End: 2023-09-07
Attending: FAMILY MEDICINE
Payer: MEDICARE

## 2023-09-07 DIAGNOSIS — D44.6 CAROTID BODY TUMOR: ICD-10-CM

## 2023-09-07 DIAGNOSIS — D44.6 CAROTID BODY TUMOR: Chronic | ICD-10-CM

## 2023-09-07 PROCEDURE — 70490 CT SOFT TISSUE NECK W/O DYE: CPT | Mod: TC

## 2023-09-07 PROCEDURE — 70490 CT SOFT TISSUE NECK W/O DYE: CPT | Mod: 26,,, | Performed by: RADIOLOGY

## 2023-09-07 PROCEDURE — 70490 CT SOFT TISSUE NECK WITHOUT CONTRAST: ICD-10-PCS | Mod: 26,,, | Performed by: RADIOLOGY

## 2023-09-08 NOTE — PROGRESS NOTES
History: She has previously refused referral to ENT/Head and Neck Surgery by Dr. Gonzalez in 2022 and then by me earlier this year.    @My Team: Please contact Lizz and relay message below and let me know if she would be willing to see one of our excellent head and neck specialists at [facility] to discuss her treatment options.  @Ricco Gonzalez MD: This is just FYI only. No action required unless you need to contact the patient.   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 The carotid artery is the big blood vessel that carries blood to your brain. This tumor can sometimes cause symptoms like headaches or a pulsing sensation in the neck. It's typically slow-growing but can interfere with blood flow and nearby nerves. I recommend that you see one of our head and neck specialists at [facility] to discuss your treatment options.

## 2023-09-11 ENCOUNTER — TELEPHONE (OUTPATIENT)
Dept: INTERNAL MEDICINE | Facility: CLINIC | Age: 68
End: 2023-09-11
Payer: MEDICARE

## 2023-09-11 DIAGNOSIS — E55.9 VITAMIN D DEFICIENCY: Primary | ICD-10-CM

## 2023-09-11 DIAGNOSIS — D44.6 CAROTID BODY TUMOR: Primary | Chronic | ICD-10-CM

## 2023-09-11 RX ORDER — ERGOCALCIFEROL 1.25 MG/1
50000 CAPSULE ORAL
Qty: 12 CAPSULE | Refills: 4 | Status: SHIPPED | OUTPATIENT
Start: 2023-09-11

## 2023-09-11 NOTE — TELEPHONE ENCOUNTER
----- Message from KARAN May MD sent at 9/8/2023  4:53 PM CDT -----  History: She has previously refused referral to ENT/Head and Neck Surgery by Dr. Gonzalez in 2022 and then by me earlier this year.    @My Team: Please contact Lizz and relay message below and let me know if she would be willing to see one of our excellent head and neck specialists at [facility] to discuss her treatment options.  @Ricco Gonzalez MD: This is just FYI only. No action required unless you need to contact the patient.   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 The carotid artery is the big blood vessel that carries blood to your brain. This tumor can sometimes cause symptoms like headaches or a pulsing sensation in the neck. It's typically slow-growing but can interfere with blood flow and nearby nerves. I recommend that you see one of our head and neck specialists at [facility] to discuss your treatment options.

## 2023-09-12 NOTE — TELEPHONE ENCOUNTER
TO MY TEAM:   Notify her of referral.  Help schedule appointment or tell her to call main Appointment Desk, [phone], to schedule her appointment.    Orders Placed This Encounter   Procedures    Ambulatory referral/consult to Endocrine/ENT Surgery for ENT/Head and Neck Surgeons

## 2023-09-22 NOTE — PROGRESS NOTES
Dr. May,    No problem.  I've included my staff on this message.  Can we get this patient set up with Dr. Diego at his next available apppointment?  Thanks.    Ricco Gonzalez

## 2023-09-29 RX ORDER — CLONIDINE HYDROCHLORIDE 0.1 MG/1
0.1 TABLET ORAL DAILY PRN
OUTPATIENT
Start: 2023-09-29

## 2023-09-29 NOTE — TELEPHONE ENCOUNTER
----- Message from Mishel Mckinley sent at 9/29/2023  9:55 AM CDT -----  Regarding: RX New and Transfer  Contact: Lizz  Type:  RX Refill Request    Who Called: Lizz  Refill or New Rx: new  RX Name and Strength:cloNIDine (CATAPRES) 0.1 MG tablet  How is the patient currently taking it? (ex. 1XDay): Take 0.1 mg by mouth daily as needed. - Oral  Is this a 30 day or 90 day RX:  Preferred Pharmacy with phone number:     [facility] #[id number] -    [address]  [address] [facility] [address]  Phone: [phone] Fax: [phone]    Local or Mail Order: local   Ordering Provider: FRANCOIS London  Would the patient rather a call back or a response via [facility]? call  Best Call Back Number: [phone] (home)      Additional Information: Lizz is requesting her RX: albuterol (PROVENTIL HFA) 90 mcg/actuation inhaler [facility] [place] [facility] listed above. Lizz also want a referral to psychiatry at [facility]. Lizz says if the provider would like to prescribe a different blood pressure medication please prescribe it. Susie blood pressure reading today 105/128.

## 2023-09-29 NOTE — TELEPHONE ENCOUNTER
No care due was identified.  [facility] Embedded Care Due Messages. Reference number: [id number].   9/29/2023 10:27:53 AM CDT

## 2023-09-29 NOTE — TELEPHONE ENCOUNTER
Refill not approved.  REASON: I have never prescribed this medicine for her before.  She does not have a compelling reason to be on this medication.  If she feels she needs refills of this medicine, she can request refill from previous prescribing provider.  Otherwise, we can discuss at her next appointment.

## 2023-11-10 ENCOUNTER — TELEPHONE (OUTPATIENT)
Dept: INTERNAL MEDICINE | Facility: CLINIC | Age: 68
End: 2023-11-10
Payer: MEDICARE

## 2023-11-10 ENCOUNTER — TELEPHONE (OUTPATIENT)
Dept: PULMONOLOGY | Facility: CLINIC | Age: 68
End: 2023-11-10
Payer: MEDICARE

## 2023-11-10 NOTE — TELEPHONE ENCOUNTER
Spoke with pt. Request copy of last visit summary. Information printed and left at front reception desk for .//ddw

## 2023-11-10 NOTE — TELEPHONE ENCOUNTER
Called and spoke to pt. Pt wanted a copy of her CT results. I assured the pt that I would print out her results and they would be ready for her to  when she came Monday.

## 2023-11-10 NOTE — TELEPHONE ENCOUNTER
----- Message from Shraddha Kang sent at 11/10/2023 12:01 PM CST -----  Contact: Lizz [phone]  Would like to receive medical advice.    Would they like a call back or a response via [facility]: call back    Additional information:  Lizz is calling to request getting her medical records printed so she can come by Monday to pick them up. Please call Lizz back for advice.

## 2023-11-10 NOTE — TELEPHONE ENCOUNTER
----- Message from Shraddha Kang sent at 11/10/2023 12:02 PM CST -----  Contact: Lizz [phone]  Would like to receive medical advice.    Would they like a call back or a response via [facility]:  call back    Additional information:  Lizz is calling to request an appt results copy of last visit to be printed for  on Monday. Please call Lizz back for advice

## 2023-11-16 ENCOUNTER — PATIENT MESSAGE (OUTPATIENT)
Dept: OTHER | Facility: OTHER | Age: 68
End: 2023-11-16
Payer: MEDICARE

## 2023-11-22 ENCOUNTER — HOSPITAL ENCOUNTER (OUTPATIENT)
Dept: RADIOLOGY | Facility: HOSPITAL | Age: 68
Discharge: HOME | End: 2023-11-22
Attending: INTERNAL MEDICINE
Payer: MEDICARE

## 2023-11-22 ENCOUNTER — OFFICE VISIT (OUTPATIENT)
Dept: PULMONOLOGY | Facility: CLINIC | Age: 68
End: 2023-11-22
Payer: MEDICARE

## 2023-11-22 VITALS
BODY MASS INDEX: 25.01 KG/M2 | HEIGHT: 66 IN | SYSTOLIC BLOOD PRESSURE: 110 MMHG | DIASTOLIC BLOOD PRESSURE: 78 MMHG | OXYGEN SATURATION: 98 % | HEART RATE: 94 BPM | WEIGHT: 155.63 LBS | RESPIRATION RATE: 17 BRPM

## 2023-11-22 DIAGNOSIS — R91.1 SOLITARY PULMONARY NODULE: ICD-10-CM

## 2023-11-22 DIAGNOSIS — R91.1 SOLITARY PULMONARY NODULE: Primary | ICD-10-CM

## 2023-11-22 PROCEDURE — 3288F FALL RISK ASSESSMENT DOCD: CPT | Mod: CPTII,S$GLB,, | Performed by: INTERNAL MEDICINE

## 2023-11-22 PROCEDURE — 3066F NEPHROPATHY DOC TX: CPT | Mod: CPTII,S$GLB,, | Performed by: INTERNAL MEDICINE

## 2023-11-22 PROCEDURE — 3074F PR MOST RECENT SYSTOLIC BLOOD PRESSURE < 130 MM HG: ICD-10-PCS | Mod: CPTII,S$GLB,, | Performed by: INTERNAL MEDICINE

## 2023-11-22 PROCEDURE — 3288F PR FALLS RISK ASSESSMENT DOCUMENTED: ICD-10-PCS | Mod: CPTII,S$GLB,, | Performed by: INTERNAL MEDICINE

## 2023-11-22 PROCEDURE — 3066F PR DOCUMENTATION OF TREATMENT FOR NEPHROPATHY: ICD-10-PCS | Mod: CPTII,S$GLB,, | Performed by: INTERNAL MEDICINE

## 2023-11-22 PROCEDURE — 1101F PR PT FALLS ASSESS DOC 0-1 FALLS W/OUT INJ PAST YR: ICD-10-PCS | Mod: CPTII,S$GLB,, | Performed by: INTERNAL MEDICINE

## 2023-11-22 PROCEDURE — 1101F PT FALLS ASSESS-DOCD LE1/YR: CPT | Mod: CPTII,S$GLB,, | Performed by: INTERNAL MEDICINE

## 2023-11-22 PROCEDURE — 1160F PR REVIEW ALL MEDS BY PRESCRIBER/CLIN PHARMACIST DOCUMENTED: ICD-10-PCS | Mod: CPTII,S$GLB,, | Performed by: INTERNAL MEDICINE

## 2023-11-22 PROCEDURE — 3078F PR MOST RECENT DIASTOLIC BLOOD PRESSURE < 80 MM HG: ICD-10-PCS | Mod: CPTII,S$GLB,, | Performed by: INTERNAL MEDICINE

## 2023-11-22 PROCEDURE — 99214 PR OFFICE/OUTPT VISIT, EST, LEVL IV, 30-39 MIN: ICD-10-PCS | Mod: S$GLB,,, | Performed by: INTERNAL MEDICINE

## 2023-11-22 PROCEDURE — 99214 OFFICE O/P EST MOD 30 MIN: CPT | Mod: S$GLB,,, | Performed by: INTERNAL MEDICINE

## 2023-11-22 PROCEDURE — 71250 CT THORAX DX C-: CPT | Mod: TC

## 2023-11-22 PROCEDURE — 71250 CT CHEST WITHOUT CONTRAST: ICD-10-PCS | Mod: 26,,, | Performed by: STUDENT IN AN ORGANIZED HEALTH CARE EDUCATION/TRAINING PROGRAM

## 2023-11-22 PROCEDURE — 1159F PR MEDICATION LIST DOCUMENTED IN MEDICAL RECORD: ICD-10-PCS | Mod: CPTII,S$GLB,, | Performed by: INTERNAL MEDICINE

## 2023-11-22 PROCEDURE — 3008F PR BODY MASS INDEX (BMI) DOCUMENTED: ICD-10-PCS | Mod: CPTII,S$GLB,, | Performed by: INTERNAL MEDICINE

## 2023-11-22 PROCEDURE — 1160F RVW MEDS BY RX/DR IN RCRD: CPT | Mod: CPTII,S$GLB,, | Performed by: INTERNAL MEDICINE

## 2023-11-22 PROCEDURE — 1159F MED LIST DOCD IN RCRD: CPT | Mod: CPTII,S$GLB,, | Performed by: INTERNAL MEDICINE

## 2023-11-22 PROCEDURE — 99999 PR PBB SHADOW E&M-EST. PATIENT-LVL III: CPT | Mod: PBBFAC,,, | Performed by: INTERNAL MEDICINE

## 2023-11-22 PROCEDURE — 3044F PR MOST RECENT HEMOGLOBIN A1C LEVEL <7.0%: ICD-10-PCS | Mod: CPTII,S$GLB,, | Performed by: INTERNAL MEDICINE

## 2023-11-22 PROCEDURE — 71250 CT THORAX DX C-: CPT | Mod: 26,,, | Performed by: STUDENT IN AN ORGANIZED HEALTH CARE EDUCATION/TRAINING PROGRAM

## 2023-11-22 PROCEDURE — 3044F HG A1C LEVEL LT 7.0%: CPT | Mod: CPTII,S$GLB,, | Performed by: INTERNAL MEDICINE

## 2023-11-22 PROCEDURE — 3074F SYST BP LT 130 MM HG: CPT | Mod: CPTII,S$GLB,, | Performed by: INTERNAL MEDICINE

## 2023-11-22 PROCEDURE — 3061F NEG MICROALBUMINURIA REV: CPT | Mod: CPTII,S$GLB,, | Performed by: INTERNAL MEDICINE

## 2023-11-22 PROCEDURE — 3008F BODY MASS INDEX DOCD: CPT | Mod: CPTII,S$GLB,, | Performed by: INTERNAL MEDICINE

## 2023-11-22 PROCEDURE — 99999 PR PBB SHADOW E&M-EST. PATIENT-LVL III: ICD-10-PCS | Mod: PBBFAC,,, | Performed by: INTERNAL MEDICINE

## 2023-11-22 PROCEDURE — 3061F PR NEG MICROALBUMINURIA RESULT DOCUMENTED/REVIEW: ICD-10-PCS | Mod: CPTII,S$GLB,, | Performed by: INTERNAL MEDICINE

## 2023-11-22 PROCEDURE — 3078F DIAST BP <80 MM HG: CPT | Mod: CPTII,S$GLB,, | Performed by: INTERNAL MEDICINE

## 2023-11-22 RX ORDER — ATENOLOL 100 MG/1
1 TABLET ORAL DAILY
COMMUNITY

## 2023-11-22 RX ORDER — HYDROXYZINE HYDROCHLORIDE 25 MG/1
25 TABLET, FILM COATED ORAL NIGHTLY PRN
COMMUNITY
Start: 2023-11-14 | End: 2023-12-29

## 2023-11-22 RX ORDER — CYCLOBENZAPRINE HCL 10 MG
10 TABLET ORAL 3 TIMES DAILY
COMMUNITY
Start: 2023-11-11

## 2023-11-22 RX ORDER — CHOLECALCIFEROL (VITAMIN D3) 125 MCG
1 CAPSULE ORAL DAILY
COMMUNITY

## 2023-11-22 RX ORDER — CLONAZEPAM 0.5 MG/1
0.5 TABLET ORAL DAILY PRN
COMMUNITY
Start: 2023-11-14
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
## 2023-11-24 DIAGNOSIS — N18.4 STAGE 4 CHRONIC KIDNEY DISEASE: Chronic | ICD-10-CM

## 2023-11-24 DIAGNOSIS — E11.22 TYPE 2 DIABETES MELLITUS WITH STAGE 4 CHRONIC KIDNEY DISEASE, WITHOUT LONG-TERM CURRENT USE OF INSULIN: Chronic | ICD-10-CM

## 2023-11-24 DIAGNOSIS — N18.4 TYPE 2 DIABETES MELLITUS WITH STAGE 4 CHRONIC KIDNEY DISEASE, WITHOUT LONG-TERM CURRENT USE OF INSULIN: Chronic | ICD-10-CM

## 2023-11-24 NOTE — TELEPHONE ENCOUNTER
Refill Routing Note   Medication(s) are not appropriate for processing by [facility] for the following reason(s):        Required labs abnormal    [facility] action(s):  Defer             Pharmacist review requested: Yes     Appointments  past 12m or future 3m with PCP    Date Provider   Last Visit   8/29/2023 KARAN May MD   Next Visit   12/29/2023 KARAN May MD   ED visits in past 90 days: 0        Note composed:8:01 AM 11/24/2023

## 2023-11-24 NOTE — TELEPHONE ENCOUNTER
No care due was identified.  [facility] Embedded Care Due Messages. Reference number: [id number].   11/24/2023 7:49:06 AM CST

## 2023-11-25 NOTE — TELEPHONE ENCOUNTER
Refill Routing Note   Medication(s) are not appropriate for processing by [facility] for the following reason(s):        Required labs abnormal-eGFR <30. Use not recommended     [facility] action(s):  Defer        Medication Therapy Plan: eGFR <30. use not recommended    Pharmacist review requested: Yes     Appointments  past 12m or future 3m with PCP    Date Provider   Last Visit   8/29/2023 KARAN May MD   Next Visit   12/29/2023 KARAN aMy MD   ED visits in past 90 days: 0        Note composed:8:26 PM 11/24/2023

## 2023-11-27 DIAGNOSIS — E89.0 POST-SURGICAL HYPOTHYROIDISM: Chronic | ICD-10-CM

## 2023-11-27 RX ORDER — LEVOTHYROXINE SODIUM 100 UG/1
100 TABLET ORAL
Qty: 90 TABLET | Refills: 1 | Status: SHIPPED | OUTPATIENT
Start: 2023-11-27

## 2023-11-27 NOTE — TELEPHONE ENCOUNTER
No care due was identified.  [facility] Embedded Care Due Messages. Reference number: [id number].   11/27/2023 7:23:26 AM CST

## 2023-11-27 NOTE — TELEPHONE ENCOUNTER
Refill Decision Note   Ilzz Bon  is requesting a refill authorization.  Brief Assessment and Rationale for Refill:  Approve     Medication Therapy Plan:         Comments:     Note composed:4:30 PM 11/27/2023

## 2023-11-28 DIAGNOSIS — N18.4 TYPE 2 DIABETES MELLITUS WITH STAGE 4 CHRONIC KIDNEY DISEASE, WITHOUT LONG-TERM CURRENT USE OF INSULIN: Chronic | ICD-10-CM

## 2023-11-28 DIAGNOSIS — N18.4 STAGE 4 CHRONIC KIDNEY DISEASE: Chronic | ICD-10-CM

## 2023-11-28 DIAGNOSIS — E11.22 TYPE 2 DIABETES MELLITUS WITH STAGE 4 CHRONIC KIDNEY DISEASE, WITHOUT LONG-TERM CURRENT USE OF INSULIN: Chronic | ICD-10-CM

## 2023-11-28 NOTE — TELEPHONE ENCOUNTER
No care due was identified.  [facility] Embedded Care Due Messages. Reference number: [id number].   11/28/2023 10:21:22 AM CST

## 2023-11-28 NOTE — TELEPHONE ENCOUNTER
Refill Decision Note   Lizz Crockett  is requesting a refill authorization.  Brief Assessment and Rationale for Refill:  Quick Discontinue     Medication Therapy Plan:       Medication Reconciliation Completed: No   Comments: Duplicate medication request--pended in a previous encounter and awaiting assessment    No Care Gaps recommended.     Duplicate Pended Encounter(s)/ Last Prescribed Details:    Ordering Encounter Report    Associated Reports   View Encounter            Note composed:1:48 PM 11/28/2023

## 2023-11-28 NOTE — TELEPHONE ENCOUNTER
----- Message from Dustin Castillo sent at 11/28/2023 10:16 AM CST -----  Contact: daxa  Patient stated that pharmacy is requesting an new prescription for JARDIANCE 10 mg tablet. Please call her back at [phone].        [facility] #[id number] - [place] - [address] [facility]  [address] [address]  Phone: [phone] Fax: [phone]     Thanks  DD

## 2023-12-01 RX ORDER — EMPAGLIFLOZIN 10 MG/1
TABLET, FILM COATED ORAL
Qty: 90 TABLET | Refills: 0 | Status: SHIPPED | OUTPATIENT
Start: 2023-12-01 | End: 2024-03-04

## 2023-12-01 NOTE — TELEPHONE ENCOUNTER
REFILL APPROVED. Will address further refills at upcoming appointment with me listed below.  #LMRX   --------------------------------  Future Appointments   Date Time Provider Department Center   12/29/2023 10:40 AM KARAN May MD [facility] IM [facility]   11/26/2024  9:30 AM [facility] CT1 LIMIT 500 LBS [facility] CT SCAN [facility]   11/26/2024  9:45 AM Nilson London [facility]

## 2023-12-21 DIAGNOSIS — K21.9 GASTROESOPHAGEAL REFLUX DISEASE WITHOUT ESOPHAGITIS: ICD-10-CM

## 2023-12-21 RX ORDER — OMEPRAZOLE 40 MG/1
40 CAPSULE, DELAYED RELEASE ORAL EVERY MORNING
Qty: 90 CAPSULE | Refills: 2 | Status: SHIPPED | OUTPATIENT
Start: 2023-12-21

## 2023-12-21 NOTE — TELEPHONE ENCOUNTER
Provider Staff:  Action required for this patient    Requires labs  [a1c due 3.6.2024]     Please see care gap opportunities below in Care Due Message.    Thanks!  [facility]     Appointments      Date Provider   Last Visit   8/29/2023 KARAN May MD   Next Visit   12/29/2023 KARAN May MD     Refill Decision Note   Lizz Crockett  is requesting a refill authorization.  Brief Assessment and Rationale for Refill:  Approve     Medication Therapy Plan:  a1c due 3.6.2024      Comments:     Note composed:4:17 PM 12/21/2023

## 2023-12-21 NOTE — TELEPHONE ENCOUNTER
Care Due:                  Date            Visit Type   Department     Provider  --------------------------------------------------------------------------------                                EP -                              PRIMARY      [facility] INTER[facility]  Last Visit: 08-      CARE ([facility])   MEDICINE       Pete May  Next Visit: None Scheduled  None         None Found                                                            Last  Test          Frequency    Reason                     Performed    Due Date  --------------------------------------------------------------------------------    HBA1C.......  6 months...  JARDIANCE................  09- 03-    [facility] Embedded Care Due Messages. Reference number: [id number].   12/21/2023 3:14:45 AM CST

## 2023-12-25 ENCOUNTER — PATIENT MESSAGE (OUTPATIENT)
Dept: PULMONOLOGY | Facility: CLINIC | Age: 68
End: 2023-12-25
Payer: MEDICARE

## 2023-12-29 ENCOUNTER — OFFICE VISIT (OUTPATIENT)
Dept: INTERNAL MEDICINE | Facility: CLINIC | Age: 68
End: 2023-12-29
Payer: MEDICARE

## 2023-12-29 VITALS
HEIGHT: 66 IN | SYSTOLIC BLOOD PRESSURE: 124 MMHG | RESPIRATION RATE: 16 BRPM | BODY MASS INDEX: 24.77 KG/M2 | DIASTOLIC BLOOD PRESSURE: 70 MMHG | OXYGEN SATURATION: 97 % | HEART RATE: 107 BPM | WEIGHT: 154.13 LBS | TEMPERATURE: 97 F

## 2023-12-29 DIAGNOSIS — Z20.822 SUSPECTED COVID-19 VIRUS INFECTION: ICD-10-CM

## 2023-12-29 DIAGNOSIS — N18.4 TYPE 2 DIABETES MELLITUS WITH STAGE 4 CHRONIC KIDNEY DISEASE, WITHOUT LONG-TERM CURRENT USE OF INSULIN: Primary | Chronic | ICD-10-CM

## 2023-12-29 DIAGNOSIS — E11.69 HYPERLIPIDEMIA ASSOCIATED WITH TYPE 2 DIABETES MELLITUS: Chronic | ICD-10-CM

## 2023-12-29 DIAGNOSIS — Z91.89 AT RISK FOR NONCOMPLIANCE: ICD-10-CM

## 2023-12-29 DIAGNOSIS — E11.22 TYPE 2 DIABETES MELLITUS WITH STAGE 4 CHRONIC KIDNEY DISEASE, WITHOUT LONG-TERM CURRENT USE OF INSULIN: Primary | Chronic | ICD-10-CM

## 2023-12-29 DIAGNOSIS — F11.20 UNCOMPLICATED OPIOID DEPENDENCE: Chronic | ICD-10-CM

## 2023-12-29 DIAGNOSIS — E78.5 HYPERLIPIDEMIA ASSOCIATED WITH TYPE 2 DIABETES MELLITUS: Chronic | ICD-10-CM

## 2023-12-29 DIAGNOSIS — E79.0 HYPERURICEMIA: Chronic | ICD-10-CM

## 2023-12-29 DIAGNOSIS — F20.81 SCHIZOPHRENIFORM DISORDER: Chronic | ICD-10-CM

## 2023-12-29 DIAGNOSIS — J44.1 OBSTRUCTIVE CHRONIC BRONCHITIS WITH ACUTE EXACERBATION: ICD-10-CM

## 2023-12-29 DIAGNOSIS — N25.81 HYPERPARATHYROIDISM, SECONDARY RENAL: Chronic | ICD-10-CM

## 2023-12-29 DIAGNOSIS — Z12.12 SCREENING FOR COLORECTAL CANCER: ICD-10-CM

## 2023-12-29 DIAGNOSIS — E11.59 HYPERTENSION COMPLICATING DIABETES: Chronic | ICD-10-CM

## 2023-12-29 DIAGNOSIS — I15.2 HYPERTENSION COMPLICATING DIABETES: Chronic | ICD-10-CM

## 2023-12-29 DIAGNOSIS — N18.4 STAGE 4 CHRONIC KIDNEY DISEASE: Chronic | ICD-10-CM

## 2023-12-29 DIAGNOSIS — D44.6 CAROTID BODY TUMOR: Chronic | ICD-10-CM

## 2023-12-29 DIAGNOSIS — Z12.11 SCREENING FOR COLORECTAL CANCER: ICD-10-CM

## 2023-12-29 DIAGNOSIS — I25.10 CORONARY ARTERY DISEASE INVOLVING NATIVE CORONARY ARTERY OF NATIVE HEART WITHOUT ANGINA PECTORIS: Chronic | ICD-10-CM

## 2023-12-29 DIAGNOSIS — Z12.31 ENCOUNTER FOR SCREENING MAMMOGRAM FOR MALIGNANT NEOPLASM OF BREAST: ICD-10-CM

## 2023-12-29 LAB
CTP QC/QA: YES
SARS-COV-2 RDRP RESP QL NAA+PROBE: NEGATIVE

## 2023-12-29 PROCEDURE — 1101F PT FALLS ASSESS-DOCD LE1/YR: CPT | Mod: CPTII,S$GLB,, | Performed by: FAMILY MEDICINE

## 2023-12-29 PROCEDURE — 87635 SARS-COV-2 COVID-19 AMP PRB: CPT | Mod: QW,S$GLB,, | Performed by: FAMILY MEDICINE

## 2023-12-29 PROCEDURE — 99215 OFFICE O/P EST HI 40 MIN: CPT | Mod: 25,S$GLB,, | Performed by: FAMILY MEDICINE

## 2023-12-29 PROCEDURE — 3078F DIAST BP <80 MM HG: CPT | Mod: CPTII,S$GLB,, | Performed by: FAMILY MEDICINE

## 2023-12-29 PROCEDURE — 1125F AMNT PAIN NOTED PAIN PRSNT: CPT | Mod: CPTII,S$GLB,, | Performed by: FAMILY MEDICINE

## 2023-12-29 PROCEDURE — 3074F SYST BP LT 130 MM HG: CPT | Mod: CPTII,S$GLB,, | Performed by: FAMILY MEDICINE

## 2023-12-29 PROCEDURE — 96372 THER/PROPH/DIAG INJ SC/IM: CPT | Mod: S$GLB,,, | Performed by: FAMILY MEDICINE

## 2023-12-29 PROCEDURE — 99999 PR PBB SHADOW E&M-EST. PATIENT-LVL V: CPT | Mod: PBBFAC,,, | Performed by: FAMILY MEDICINE

## 2023-12-29 PROCEDURE — 1159F MED LIST DOCD IN RCRD: CPT | Mod: CPTII,S$GLB,, | Performed by: FAMILY MEDICINE

## 2023-12-29 PROCEDURE — 3008F BODY MASS INDEX DOCD: CPT | Mod: CPTII,S$GLB,, | Performed by: FAMILY MEDICINE

## 2023-12-29 PROCEDURE — 3288F FALL RISK ASSESSMENT DOCD: CPT | Mod: CPTII,S$GLB,, | Performed by: FAMILY MEDICINE

## 2023-12-29 RX ORDER — AMOXICILLIN 500 MG/1
500 TABLET, FILM COATED ORAL EVERY 12 HOURS
Qty: 20 TABLET | Refills: 0 | Status: SHIPPED | OUTPATIENT
Start: 2023-12-29 | End: 2024-01-08

## 2023-12-29 RX ORDER — METHYLPREDNISOLONE ACETATE 40 MG/ML
40 INJECTION, SUSPENSION INTRA-ARTICULAR; INTRALESIONAL; INTRAMUSCULAR; SOFT TISSUE
Status: COMPLETED | OUTPATIENT
Start: 2023-12-29 | End: 2023-12-29

## 2023-12-29 RX ORDER — ALBUTEROL SULFATE 90 UG/1
2 AEROSOL, METERED RESPIRATORY (INHALATION) EVERY 6 HOURS PRN
Qty: 18 G | Refills: 11 | Status: SHIPPED | OUTPATIENT
Start: 2023-12-29 | End: 2024-12-28

## 2023-12-29 RX ORDER — ASPIRIN 81 MG/1
81 TABLET ORAL DAILY
Qty: 90 TABLET | Refills: 8 | Status: SHIPPED | OUTPATIENT
Start: 2023-12-29

## 2023-12-29 RX ADMIN — METHYLPREDNISOLONE ACETATE 40 MG: 40 INJECTION, SUSPENSION INTRA-ARTICULAR; INTRALESIONAL; INTRAMUSCULAR; SOFT TISSUE at 12:12

## 2023-12-29 NOTE — ASSESSMENT & PLAN NOTE
"Diabetes Management Status    Statin: Taking  ACE/ARB: Not taking    Screening or Prevention Patient's value Goal Complete/Controlled?   HgA1C Testing and Control   Lab Results   Component Value Date    HGBA1C 6.0 (H) 09/07/2023      Annually/Less than 8% Yes   Lipid profile : 09/07/2023 Annually Yes   LDL control Lab Results   Component Value Date    LDLCALC 93.4 09/07/2023    Annually/Less than 100 mg/dl  Yes   Nephropathy screening Lab Results   Component Value Date    LABMICR 17.0 09/07/2023     Lab Results   Component Value Date    PROTEINUA Negative 06/29/2023    Annually Yes   Blood pressure BP Readings from Last 1 Encounters:   12/29/23 124/70    Less than 140/90 Yes   Dilated retinal exam : 12/23/2022 Annually Yes   Foot exam   : 08/29/2023 Annually Yes     Lab Results   Component Value Date    HGBA1C 6.0 (H) 09/07/2023    HGBA1C 6.1 (H) 03/31/2023    HGBA1C 6.1 (H) 11/03/2022    EGFRNORACEVR 29.9 (A) 09/07/2023    MICALBCREAT 17.7 09/07/2023    LDLCALC 93.4 09/07/2023     No results found for: "GLUTAMICACID", "CPEPTIDE"   Last 5 Patient Entered Readings                                          Most Recent A1c: 6% on 9/7/2023  (Goal: 8%)  More data exists         12/29/2023 12/27/2023 12/25/2023 12/24/2023 12/23/2023   Recent Readings   Blood Glucose (mg/dL) 141 mg/dL 164 mg/dL 289 mg/dL 202 mg/dL 186 mg/dL 39 mg/dL      HEALTH MAINTENANCE: Diabetic health maintenance interventions reviewed and are up to date except for:      Topic    Eye Exam       "

## 2023-12-29 NOTE — ASSESSMENT & PLAN NOTE
Lab Results   Component Value Date    .9 (H) 09/07/2023    .8 (H) 03/24/2023    CALCIUM 9.5 09/07/2023    CALCIUM 9.8 06/29/2023    CALCIUM 9.7 03/24/2023    ALBUMIN 3.7 09/07/2023    ALBUMIN 3.8 03/24/2023    PHOS 3.1 03/24/2023    PHOS 3.1 10/24/2022    [id number] 14 (L) 09/07/2023    [id number] 17 (L) 03/24/2023     Lab Results   Component Value Date    EGFRNORACEVR 29.9 (A) 09/07/2023    EGFRNORACEVR 30 (A) 06/29/2023    EGFRNORACEVR 37.3 (A) 03/24/2023    CREATININE 1.8 (H) 09/07/2023    CREATININE 1.8 (H) 06/29/2023    CREATININE 1.5 (H) 03/24/2023     Lab Results   Component Value Date    CREATRANDUR 96.0 09/07/2023

## 2023-12-29 NOTE — ASSESSMENT & PLAN NOTE
Lab Results   Component Value Date    URICACID 7.3 (H) 09/07/2023    URICACID 5.8 (H) 03/24/2023    URICACID 7.1 (H) 12/01/2022    EGFRNORACEVR 29.9 (A) 09/07/2023    CREATININE 1.8 (H) 09/07/2023    BUN 14 09/07/2023

## 2023-12-29 NOTE — ASSESSMENT & PLAN NOTE
BP Readings from Last 6 Encounters:   12/29/23 124/70   11/22/23 110/78   08/29/23 120/82   06/29/23 (!) 142/96   05/18/23 112/62   04/18/23 120/84      Last 5 Patient Entered Readings                Current 30 Day Average: 126/89  Recent Readings 12/29/2023 12/29/2023 12/27/2023 12/27/2023 12/25/2023   SBP (mmHg) 137 137 129 139 142   DBP (mmHg) 103 110 92 102 104   Pulse 113 110 90 90 99                 Lab Results   Component Value Date    EGFRNORACEVR 29.9 (A) 09/07/2023    CREATININE 1.8 (H) 09/07/2023    BUN 14 09/07/2023    K 4.3 09/07/2023     09/07/2023     09/07/2023     Results for orders placed or performed during the hospital encounter of 09/02/22   SCHEDULED EKG 12-LEAD (to Muse)    Collection Time: 09/02/22  1:06 PM    Narrative    Test Reason : I10    Vent. Rate : 048 BPM     Atrial Rate : 048 BPM     P-R Int : 142 ms          QRS Dur : 080 ms      QT Int : 462 ms       P-R-T Axes : 039 -04 015 degrees     QTc Int : 412 ms    Sinus bradycardia  Otherwise normal ECG  No previous ECGs available  Confirmed by MALLY GOODMAN, SIM TEIXEIRA ([id number]) on 9/2/2022 10:29:09 PM    Referred By: GENARO VILLEDA           Confirmed By:SIM BAL MD

## 2023-12-29 NOTE — ASSESSMENT & PLAN NOTE
Lab Results   Component Value Date    CHOL 157 09/07/2023    CHOL 138 01/11/2023    TRIG 98 09/07/2023    TRIG 90 01/11/2023    HDL 44 09/07/2023    HDL 48 01/11/2023    LDLCALC 93.4 09/07/2023    LDLCALC 72.0 01/11/2023    NONHDLCHOL 113 09/07/2023    NONHDLCHOL 90 01/11/2023    AST 19 09/07/2023    ALT 13 09/07/2023     The 10-year ASCVD risk score (Alber MONTIEL, et al., 2019) is: 20.1%    Values used to calculate the score:      Age: 68 years      Sex: Female      Is Non- : Yes      Diabetic: Yes      Tobacco smoker: No      Systolic Blood Pressure: 124 mmHg      Is BP treated: Yes      HDL Cholesterol: 44 mg/dL      Total Cholesterol: 157 mg/dL

## 2023-12-29 NOTE — ASSESSMENT & PLAN NOTE
Lab Results   Component Value Date    EGFRNORACEVR 29.9 (A) 09/07/2023    EGFRNORACEVR 30 (A) 06/29/2023    EGFRNORACEVR 37.3 (A) 03/24/2023    CREATININE 1.8 (H) 09/07/2023    CREATININE 1.8 (H) 06/29/2023    CREATININE 1.5 (H) 03/24/2023    BUN 14 09/07/2023    BUN 22 06/29/2023    BUN 24 (H) 03/24/2023

## 2023-12-29 NOTE — PROGRESS NOTES
"OFFICE VISIT 12/29/23 10:40 AM CST    CHIEF COMPLAINT: Follow-up    ALLERGY CLARIFICATIONS  She is not allergic to NSAIDS. She says she takes OTC ibuprofen and naproxen without adverse effect.  She is not allergic to cortisteroids. She has taken methylprednisolone without adverse effect. She says she previously listed it as an "allergy" because she believed that the tapering dose of a Medrol Dosepak was ineffective.    The following problems were reported or identified, and each was evaluated and/or managed/treated.     1. Type 2 diabetes mellitus with stage 4 chronic kidney disease, without long-term current use of insulin  Assessment & Plan:  Diabetes Management Status    Statin: Taking  ACE/ARB: Not taking    Screening or Prevention Patient's value Goal Complete/Controlled?   HgA1C Testing and Control   Lab Results   Component Value Date    HGBA1C 6.0 (H) 09/07/2023      Annually/Less than 8% Yes   Lipid profile : 09/07/2023 Annually Yes   LDL control Lab Results   Component Value Date    LDLCALC 93.4 09/07/2023    Annually/Less than 100 mg/dl  Yes   Nephropathy screening Lab Results   Component Value Date    LABMICR 17.0 09/07/2023     Lab Results   Component Value Date    PROTEINUA Negative 06/29/2023    Annually Yes   Blood pressure BP Readings from Last 1 Encounters:   12/29/23 124/70    Less than 140/90 Yes   Dilated retinal exam : 12/23/2022 Annually Yes   Foot exam   : 08/29/2023 Annually Yes     Lab Results   Component Value Date    HGBA1C 6.0 (H) 09/07/2023    HGBA1C 6.1 (H) 03/31/2023    HGBA1C 6.1 (H) 11/03/2022    EGFRNORACEVR 29.9 (A) 09/07/2023    MICALBCREAT 17.7 09/07/2023    LDLCALC 93.4 09/07/2023     No results found for: "GLUTAMICACID", "CPEPTIDE"   Last 5 Patient Entered Readings                                          Most Recent A1c: 6% on 9/7/2023  (Goal: 8%)  More data exists         12/29/2023 12/27/2023 12/25/2023 12/24/2023 12/23/2023   Recent Readings   Blood Glucose (mg/dL) 141 " mg/dL 164 mg/dL 289 mg/dL 202 mg/dL 186 mg/dL 39 mg/dL      HEALTH MAINTENANCE: Diabetic health maintenance interventions reviewed and are up to date except for:      Topic    Eye Exam         Orders:  -     Hemoglobin A1C; Future; Expected date: 03/08/2024    2. Stage 4 chronic kidney disease  Assessment & Plan:  Lab Results   Component Value Date    EGFRNORACEVR 29.9 (A) 09/07/2023    EGFRNORACEVR 30 (A) 06/29/2023    EGFRNORACEVR 37.3 (A) 03/24/2023    CREATININE 1.8 (H) 09/07/2023    CREATININE 1.8 (H) 06/29/2023    CREATININE 1.5 (H) 03/24/2023    BUN 14 09/07/2023    BUN 22 06/29/2023    BUN 24 (H) 03/24/2023        Orders:  -     PTH, Intact; Future; Expected date: 03/08/2024  -     Renal Function Panel; Future; Expected date: 03/08/2024    3. Hyperparathyroidism, secondary to chronic kidney disease  Assessment & Plan:  Lab Results   Component Value Date    .9 (H) 09/07/2023    .8 (H) 03/24/2023    CALCIUM 9.5 09/07/2023    CALCIUM 9.8 06/29/2023    CALCIUM 9.7 03/24/2023    ALBUMIN 3.7 09/07/2023    ALBUMIN 3.8 03/24/2023    PHOS 3.1 03/24/2023    PHOS 3.1 10/24/2022    PWYWURSN50TH 14 (L) 09/07/2023    HQAYFXWT80WC 17 (L) 03/24/2023     Lab Results   Component Value Date    EGFRNORACEVR 29.9 (A) 09/07/2023    EGFRNORACEVR 30 (A) 06/29/2023    EGFRNORACEVR 37.3 (A) 03/24/2023    CREATININE 1.8 (H) 09/07/2023    CREATININE 1.8 (H) 06/29/2023    CREATININE 1.5 (H) 03/24/2023     Lab Results   Component Value Date    CREATRANDUR 96.0 09/07/2023        Orders:  -     PTH, Intact; Future; Expected date: 03/08/2024  -     Renal Function Panel; Future; Expected date: 03/08/2024  -     Vitamin D; Future; Expected date: 03/08/2024    4. 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 She gave informed refusal for any further evaluation and management of this condition. I instructed her to let me know if she changes her  mind.      5. Hypertension complicating diabetes  Assessment & Plan:  BP Readings from Last 6 Encounters:   12/29/23 124/70   11/22/23 110/78   08/29/23 120/82   06/29/23 (!) 142/96   05/18/23 112/62   04/18/23 120/84      Last 5 Patient Entered Readings                Current 30 Day Average: 126/89  Recent Readings 12/29/2023 12/29/2023 12/27/2023 12/27/2023 12/25/2023   SBP (mmHg) 137 137 129 139 142   DBP (mmHg) 103 110 92 102 104   Pulse 113 110 90 90 99                 Lab Results   Component Value Date    EGFRNORACEVR 29.9 (A) 09/07/2023    CREATININE 1.8 (H) 09/07/2023    BUN 14 09/07/2023    K 4.3 09/07/2023     09/07/2023     09/07/2023     Results for orders placed or performed during the hospital encounter of 09/02/22   SCHEDULED EKG 12-LEAD (to Muse)    Collection Time: 09/02/22  1:06 PM    Narrative    Test Reason : I10    Vent. Rate : 048 BPM     Atrial Rate : 048 BPM     P-R Int : 142 ms          QRS Dur : 080 ms      QT Int : 462 ms       P-R-T Axes : 039 -04 015 degrees     QTc Int : 412 ms    Sinus bradycardia  Otherwise normal ECG  No previous ECGs available  Confirmed by MALLY GOODMAN, SIM TEIXEIRA ([id number]) on 9/2/2022 10:29:09 PM    Referred By: GENARO VILLEDA           Confirmed By:SIM BAL MD           6. Hyperlipidemia copmplicating type 2 diabetes mellitus  Assessment & Plan:  Lab Results   Component Value Date    CHOL 157 09/07/2023    CHOL 138 01/11/2023    TRIG 98 09/07/2023    TRIG 90 01/11/2023    HDL 44 09/07/2023    HDL 48 01/11/2023    LDLCALC 93.4 09/07/2023    LDLCALC 72.0 01/11/2023    NONHDLCHOL 113 09/07/2023    NONHDLCHOL 90 01/11/2023    AST 19 09/07/2023    ALT 13 09/07/2023     The 10-year ASCVD risk score (Alber MONTIEL, et al., 2019) is: 20.1%    Values used to calculate the score:      Age: 68 years      Sex: Female      Is Non- : Yes      Diabetic: Yes      Tobacco smoker: No      Systolic Blood Pressure: 124 mmHg      Is BP treated:  Yes      HDL Cholesterol: 44 mg/dL      Total Cholesterol: 157 mg/dL       7. Hyperuricemia  Assessment & Plan:  Lab Results   Component Value Date    URICACID 7.3 (H) 09/07/2023    URICACID 5.8 (H) 03/24/2023    URICACID 7.1 (H) 12/01/2022    EGFRNORACEVR 29.9 (A) 09/07/2023    CREATININE 1.8 (H) 09/07/2023    BUN 14 09/07/2023          8. Obstructive chronic bronchitis with acute exacerbation  -     POCT COVID-19 Rapid Screening  -     methylPREDNISolone acetate injection 40 mg  -     albuterol (PROVENTIL HFA) 90 mcg/actuation inhaler; Inhale 2 puffs into the lungs every 6 (six) hours as needed for Wheezing. (Rescue inhaler)  Dispense: 18 g; Refill: 11  -     inhalation spacing device; Use as directed when taking inhaled medicine  Dispense: 1 each; Refill: 0  -     amoxicillin (AMOXIL) 500 MG Tab; Take 1 tablet (500 mg total) by mouth every 12 (twelve) hours. for 10 days  Dispense: 20 tablet; Refill: 0    9. Opioid dependent for pain management  Overview:  PAIN MANAGEMENT: Shaquille Martinez MD      10. Schizophreniform disorder  Overview:  [address] [address]  Phone: [phone]  Fax: [phone]  [email or web address]    Assessment & Plan:  ASSESSMENT: Appears stable on current meds.  PLAN: Continue present treatment plan.       11. At risk for noncompliance  Assessment & Plan:  We discussed strategies to help her overcome any barriers to adherence with treatment plan.       12. Suspected COVID-19 virus infection  -     POCT COVID-19 Rapid Screening    13. Coronary artery disease involving native coronary artery of native heart without angina pectoris  -     aspirin (ECOTRIN) 81 MG EC tablet; Take 1 tablet (81 mg total) by mouth once daily.  Dispense: 90 tablet; Refill: 8    14. Screening for colorectal cancer  -     Ambulatory referral/consult to Endo Procedure ; Future; Expected date: 02/05/2024    15. Encounter for screening mammogram for malignant neoplasm of  "breast  -     Mammo Digital Screening Bilat; Future; Expected date: 02/04/2024    Unless noted herein, any chronic conditions are represented as and appear stable, and no other significant complaints or concerns were reported.    Follow up in about 11 weeks (around 3/15/2024) for review test results after completion, discuss plan, re-evaluation.     Review of Systems   Respiratory:  Negative for chest tightness and shortness of breath.    Cardiovascular:  Negative for chest pain.   Endocrine: Negative for polydipsia and polyuria.       Vitals:    12/29/23 1020   BP: 124/70   BP Location: Left arm   Patient Position: Sitting   BP Method: Medium (Manual)   Pulse: 107   Resp: 16   Temp: 97.4 °F (36.3 °C)   SpO2: 97%   Weight: 69.9 kg (154 lb 1.6 oz)   Height: 5' 6" (1.676 m)   Physical Exam  Vitals reviewed.   Constitutional:       General: She is not in acute distress.     Appearance: Normal appearance. She is not ill-appearing or diaphoretic.   Cardiovascular:      Rate and Rhythm: Normal rate and regular rhythm.   Pulmonary:      Effort: Pulmonary effort is normal.      Breath sounds: Normal breath sounds.   Skin:     General: Skin is warm and dry.   Neurological:      Mental Status: She is alert and oriented to person, place, and time. Mental status is at baseline.   Psychiatric:         Mood and Affect: Mood normal.         Behavior: Behavior normal.         Judgment: Judgment normal.     TOTAL TIME evaluating and managing this patient for this encounter was greater than or equal to 40 minutes. This time was exclusive of any separately billable procedures for this patient and exclusive of time spent treating any other patients.   Documentation entered by me for this encounter may have been done in part using speech-recognition technology. Although I have made an effort to ensure accuracy, "sound like" errors may exist and should be interpreted in context.  "

## 2024-02-04 NOTE — ASSESSMENT & PLAN NOTE
She has previously been referred to head and neck surgery. She gave informed refusal for any further evaluation and management of this condition. I instructed her to let me know if she changes her mind.

## 2024-02-06 ENCOUNTER — HOSPITAL ENCOUNTER (OUTPATIENT)
Dept: PREADMISSION TESTING | Facility: HOSPITAL | Age: 69
Discharge: HOME | End: 2024-02-06
Attending: FAMILY MEDICINE
Payer: MEDICARE

## 2024-02-06 DIAGNOSIS — Z12.11 SCREENING FOR COLORECTAL CANCER: Primary | ICD-10-CM

## 2024-02-06 DIAGNOSIS — Z12.12 SCREENING FOR COLORECTAL CANCER: Primary | ICD-10-CM

## 2024-02-06 RX ORDER — SODIUM, POTASSIUM,MAG SULFATES 17.5-3.13G
1 SOLUTION, RECONSTITUTED, ORAL ORAL DAILY
Qty: 1 KIT | Refills: 0 | Status: SHIPPED | OUTPATIENT
Start: 2024-02-06 | End: 2024-02-08

## 2024-02-08 DIAGNOSIS — I10 ESSENTIAL HYPERTENSION: Chronic | ICD-10-CM

## 2024-02-08 NOTE — TELEPHONE ENCOUNTER
No care due was identified.  [facility] Embedded Care Due Messages. Reference number: [id number].   2/08/2024 3:07:51 PM CST

## 2024-02-08 NOTE — TELEPHONE ENCOUNTER
----- Message from Anaya Alvarez sent at 2/8/2024  2:19 PM CST -----  Contact: Lizz  Patient is calling because she need a antibiotic because she still have a bad cough and suggestion   Also patient need    Type:  RX Refill Request    Who Called: Lizz  Refill or New Rx:refill  RX Name and Strength:amLODIPine (NORVASC) 10 MG tablet  How is the patient currently taking it? (ex. 1XDay):  Is this a 30 day or 90 day RX:  Preferred Pharmacy with phone number:  [id number] - [facility] [place], [place] - [address] AT [facility]  [address] [place] [address]  Phone: [phone] Fax: [phone]     Local or Mail Order:Local  Ordering Provider:jonah  Would the patient rather a call back or a response via [facility]? Callback  Best Call Back Number:[phone]  Additional Information:

## 2024-02-09 RX ORDER — AMLODIPINE BESYLATE 10 MG/1
10 TABLET ORAL DAILY
Qty: 90 TABLET | Refills: 2 | Status: SHIPPED | OUTPATIENT
Start: 2024-02-09 | End: 2024-06-03 | Stop reason: SDUPTHER

## 2024-02-09 NOTE — TELEPHONE ENCOUNTER
REFILL REQUEST APPROVED.  Requested Prescriptions   Pending Prescriptions Disp Refills    amLODIPine (NORVASC) 10 MG tablet 90 tablet 2     Sig: Take 1 tablet (10 mg total) by mouth once daily.

## 2024-02-26 DIAGNOSIS — E78.2 MIXED HYPERLIPIDEMIA: Chronic | ICD-10-CM

## 2024-02-26 RX ORDER — ATORVASTATIN CALCIUM 20 MG/1
20 TABLET, FILM COATED ORAL NIGHTLY
Qty: 90 TABLET | Refills: 1 | Status: SHIPPED | OUTPATIENT
Start: 2024-02-26

## 2024-02-26 NOTE — TELEPHONE ENCOUNTER
Lizz Crockett  is requesting a refill authorization.  Brief Assessment and Rationale for Refill:  Approve     Medication Therapy Plan:  FLOS      Comments:     Note composed:10:29 AM 02/26/2024

## 2024-02-26 NOTE — TELEPHONE ENCOUNTER
Care Due:                  Date            Visit Type   Department     Provider  --------------------------------------------------------------------------------                                EP -                              PRIMARY      [facility]  Last Visit: 12-      CARE ([facility])   MEDICINE       Pete May  Next Visit: None Scheduled  None         None Found                                                            Last  Test          Frequency    Reason                     Performed    Due Date  --------------------------------------------------------------------------------    HBA1C.......  6 months...  JARDIANCE................  09- 03-    [facility] Embedded Care Due Messages. Reference number: [id number].   2/26/2024 7:58:21 AM CST

## 2024-03-02 DIAGNOSIS — N18.4 STAGE 4 CHRONIC KIDNEY DISEASE: Chronic | ICD-10-CM

## 2024-03-02 DIAGNOSIS — E11.22 TYPE 2 DIABETES MELLITUS WITH STAGE 4 CHRONIC KIDNEY DISEASE, WITHOUT LONG-TERM CURRENT USE OF INSULIN: Chronic | ICD-10-CM

## 2024-03-02 DIAGNOSIS — N18.4 TYPE 2 DIABETES MELLITUS WITH STAGE 4 CHRONIC KIDNEY DISEASE, WITHOUT LONG-TERM CURRENT USE OF INSULIN: Chronic | ICD-10-CM

## 2024-03-02 NOTE — TELEPHONE ENCOUNTER
No care due was identified.  [facility] Embedded Care Due Messages. Reference number: [id number].   3/02/2024 9:45:00 AM CST

## 2024-03-04 RX ORDER — EMPAGLIFLOZIN 10 MG/1
10 TABLET, FILM COATED ORAL
Qty: 90 TABLET | Refills: 0 | Status: SHIPPED | OUTPATIENT
Start: 2024-03-04 | End: 2024-05-31

## 2024-03-04 NOTE — TELEPHONE ENCOUNTER
REFILL REQUEST APPROVED.  Requested Prescriptions   Pending Prescriptions Disp Refills    JARDIANCE 10 mg tablet [Pharmacy Med Name: JARDIANCE 10MG TABLETS] 90 tablet 0     Sig: TAKE 1 TABLET(10 MG) BY MOUTH EVERY DAY

## 2024-03-04 NOTE — TELEPHONE ENCOUNTER
Refill Routing Note   Medication(s) are not appropriate for processing by [facility] for the following reason(s):        Required labs abnormal    [facility] action(s):  Defer               Appointments  past 12m or future 3m with PCP    Date Provider   Last Visit   12/29/2023 KARAN May MD   Next Visit   Visit date not found KARAN May MD   ED visits in past 90 days: 0        Note composed:5:14 AM 03/04/2024

## 2024-03-08 ENCOUNTER — LAB VISIT (OUTPATIENT)
Dept: LAB | Facility: HOSPITAL | Age: 69
End: 2024-03-08
Attending: FAMILY MEDICINE
Payer: MEDICARE

## 2024-03-08 DIAGNOSIS — N25.81 HYPERPARATHYROIDISM, SECONDARY RENAL: Chronic | ICD-10-CM

## 2024-03-08 DIAGNOSIS — N18.4 TYPE 2 DIABETES MELLITUS WITH STAGE 4 CHRONIC KIDNEY DISEASE, WITHOUT LONG-TERM CURRENT USE OF INSULIN: Chronic | ICD-10-CM

## 2024-03-08 DIAGNOSIS — N18.4 STAGE 4 CHRONIC KIDNEY DISEASE: Chronic | ICD-10-CM

## 2024-03-08 DIAGNOSIS — E11.22 TYPE 2 DIABETES MELLITUS WITH STAGE 4 CHRONIC KIDNEY DISEASE, WITHOUT LONG-TERM CURRENT USE OF INSULIN: Chronic | ICD-10-CM

## 2024-03-08 LAB
25(OH)D3+25(OH)D2 SERPL-MCNC: 21 NG/ML (ref 30–96)
ALBUMIN SERPL BCP-MCNC: 3.6 G/DL (ref 3.5–5.2)
ANION GAP SERPL CALC-SCNC: 9 MMOL/L (ref 8–16)
BUN SERPL-MCNC: 23 MG/DL (ref 8–23)
CALCIUM SERPL-MCNC: 9.8 MG/DL (ref 8.7–10.5)
CHLORIDE SERPL-SCNC: 107 MMOL/L (ref 95–110)
CO2 SERPL-SCNC: 22 MMOL/L (ref 23–29)
CREAT SERPL-MCNC: 1.6 MG/DL (ref 0.5–1.4)
EST. GFR  (NO RACE VARIABLE): 34.3 ML/MIN/1.73 M^2
ESTIMATED AVG GLUCOSE: 131 MG/DL (ref 68–131)
GLUCOSE SERPL-MCNC: 100 MG/DL (ref 70–110)
HBA1C MFR BLD: 6.2 % (ref 4–5.6)
PHOSPHATE SERPL-MCNC: 4.3 MG/DL (ref 2.7–4.5)
POTASSIUM SERPL-SCNC: 4.3 MMOL/L (ref 3.5–5.1)
PTH-INTACT SERPL-MCNC: 245.3 PG/ML (ref 9–77)
SODIUM SERPL-SCNC: 138 MMOL/L (ref 136–145)

## 2024-03-08 PROCEDURE — 82306 VITAMIN D 25 HYDROXY: CPT | Performed by: FAMILY MEDICINE

## 2024-03-08 PROCEDURE — 83036 HEMOGLOBIN GLYCOSYLATED A1C: CPT | Performed by: FAMILY MEDICINE

## 2024-03-08 PROCEDURE — 83970 ASSAY OF PARATHORMONE: CPT | Performed by: FAMILY MEDICINE

## 2024-03-08 PROCEDURE — 80069 RENAL FUNCTION PANEL: CPT | Performed by: FAMILY MEDICINE

## 2024-03-08 PROCEDURE — 36415 COLL VENOUS BLD VENIPUNCTURE: CPT | Performed by: FAMILY MEDICINE

## 2024-03-15 ENCOUNTER — OFFICE VISIT (OUTPATIENT)
Dept: INTERNAL MEDICINE | Facility: CLINIC | Age: 69
End: 2024-03-15
Payer: MEDICARE

## 2024-03-15 ENCOUNTER — HOSPITAL ENCOUNTER (OUTPATIENT)
Dept: RADIOLOGY | Facility: HOSPITAL | Age: 69
Discharge: HOME | End: 2024-03-15
Attending: PHYSICIAN ASSISTANT
Payer: MEDICARE

## 2024-03-15 VITALS
SYSTOLIC BLOOD PRESSURE: 126 MMHG | BODY MASS INDEX: 25.61 KG/M2 | WEIGHT: 159.38 LBS | TEMPERATURE: 98 F | HEART RATE: 100 BPM | DIASTOLIC BLOOD PRESSURE: 88 MMHG | OXYGEN SATURATION: 100 % | HEIGHT: 66 IN

## 2024-03-15 DIAGNOSIS — M19.91 PRIMARY LOCALIZED OSTEOARTHROSIS OF MULTIPLE SITES: Chronic | ICD-10-CM

## 2024-03-15 DIAGNOSIS — E11.69 HYPERLIPIDEMIA ASSOCIATED WITH TYPE 2 DIABETES MELLITUS: Chronic | ICD-10-CM

## 2024-03-15 DIAGNOSIS — E11.22 TYPE 2 DIABETES MELLITUS WITH STAGE 4 CHRONIC KIDNEY DISEASE, WITHOUT LONG-TERM CURRENT USE OF INSULIN: Primary | Chronic | ICD-10-CM

## 2024-03-15 DIAGNOSIS — N25.81 HYPERPARATHYROIDISM, SECONDARY RENAL: Chronic | ICD-10-CM

## 2024-03-15 DIAGNOSIS — I70.0 AORTIC ATHEROSCLEROSIS: Chronic | ICD-10-CM

## 2024-03-15 DIAGNOSIS — D44.6 CAROTID BODY TUMOR: ICD-10-CM

## 2024-03-15 DIAGNOSIS — Z12.11 SCREENING FOR MALIGNANT NEOPLASM OF COLON: ICD-10-CM

## 2024-03-15 DIAGNOSIS — E89.0 POST-SURGICAL HYPOTHYROIDISM: Chronic | ICD-10-CM

## 2024-03-15 DIAGNOSIS — F20.81 SCHIZOPHRENIFORM DISORDER: Chronic | ICD-10-CM

## 2024-03-15 DIAGNOSIS — I25.10 CORONARY ARTERY DISEASE INVOLVING NATIVE CORONARY ARTERY OF NATIVE HEART WITHOUT ANGINA PECTORIS: Chronic | ICD-10-CM

## 2024-03-15 DIAGNOSIS — E11.59 HYPERTENSION COMPLICATING DIABETES: Chronic | ICD-10-CM

## 2024-03-15 DIAGNOSIS — M85.89 OSTEOPENIA OF MULTIPLE SITES: ICD-10-CM

## 2024-03-15 DIAGNOSIS — K76.0 FATTY LIVER: Chronic | ICD-10-CM

## 2024-03-15 DIAGNOSIS — J44.1 COPD WITH EXACERBATION: ICD-10-CM

## 2024-03-15 DIAGNOSIS — J44.89 CHRONIC OBSTRUCTIVE BRONCHITIS: Chronic | ICD-10-CM

## 2024-03-15 DIAGNOSIS — N18.4 TYPE 2 DIABETES MELLITUS WITH STAGE 4 CHRONIC KIDNEY DISEASE, WITHOUT LONG-TERM CURRENT USE OF INSULIN: Primary | Chronic | ICD-10-CM

## 2024-03-15 DIAGNOSIS — H40.9 GLAUCOMA, UNSPECIFIED GLAUCOMA TYPE, UNSPECIFIED LATERALITY: ICD-10-CM

## 2024-03-15 DIAGNOSIS — F11.20 UNCOMPLICATED OPIOID DEPENDENCE: Chronic | ICD-10-CM

## 2024-03-15 DIAGNOSIS — Z12.31 ENCOUNTER FOR SCREENING MAMMOGRAM FOR MALIGNANT NEOPLASM OF BREAST: ICD-10-CM

## 2024-03-15 DIAGNOSIS — R91.8 PULMONARY NODULES: Chronic | ICD-10-CM

## 2024-03-15 DIAGNOSIS — I15.2 HYPERTENSION COMPLICATING DIABETES: Chronic | ICD-10-CM

## 2024-03-15 DIAGNOSIS — E79.0 HYPERURICEMIA: Chronic | ICD-10-CM

## 2024-03-15 DIAGNOSIS — E78.5 HYPERLIPIDEMIA ASSOCIATED WITH TYPE 2 DIABETES MELLITUS: Chronic | ICD-10-CM

## 2024-03-15 PROCEDURE — 71046 X-RAY EXAM CHEST 2 VIEWS: CPT | Mod: 26,,, | Performed by: RADIOLOGY

## 2024-03-15 PROCEDURE — 3044F HG A1C LEVEL LT 7.0%: CPT | Mod: CPTII,S$GLB,, | Performed by: PHYSICIAN ASSISTANT

## 2024-03-15 PROCEDURE — 3008F BODY MASS INDEX DOCD: CPT | Mod: CPTII,S$GLB,, | Performed by: PHYSICIAN ASSISTANT

## 2024-03-15 PROCEDURE — 3074F SYST BP LT 130 MM HG: CPT | Mod: CPTII,S$GLB,, | Performed by: PHYSICIAN ASSISTANT

## 2024-03-15 PROCEDURE — 96372 THER/PROPH/DIAG INJ SC/IM: CPT | Mod: S$GLB,,, | Performed by: PHYSICIAN ASSISTANT

## 2024-03-15 PROCEDURE — 1125F AMNT PAIN NOTED PAIN PRSNT: CPT | Mod: CPTII,S$GLB,, | Performed by: PHYSICIAN ASSISTANT

## 2024-03-15 PROCEDURE — 3079F DIAST BP 80-89 MM HG: CPT | Mod: CPTII,S$GLB,, | Performed by: PHYSICIAN ASSISTANT

## 2024-03-15 PROCEDURE — 1159F MED LIST DOCD IN RCRD: CPT | Mod: CPTII,S$GLB,, | Performed by: PHYSICIAN ASSISTANT

## 2024-03-15 PROCEDURE — 71046 X-RAY EXAM CHEST 2 VIEWS: CPT | Mod: TC

## 2024-03-15 PROCEDURE — 99214 OFFICE O/P EST MOD 30 MIN: CPT | Mod: 25,S$GLB,, | Performed by: PHYSICIAN ASSISTANT

## 2024-03-15 PROCEDURE — 99999 PR PBB SHADOW E&M-EST. PATIENT-LVL V: CPT | Mod: PBBFAC,,, | Performed by: PHYSICIAN ASSISTANT

## 2024-03-15 PROCEDURE — 1101F PT FALLS ASSESS-DOCD LE1/YR: CPT | Mod: CPTII,S$GLB,, | Performed by: PHYSICIAN ASSISTANT

## 2024-03-15 PROCEDURE — 3288F FALL RISK ASSESSMENT DOCD: CPT | Mod: CPTII,S$GLB,, | Performed by: PHYSICIAN ASSISTANT

## 2024-03-15 RX ORDER — FLUTICASONE PROPIONATE AND SALMETEROL 250; 50 UG/1; UG/1
1 POWDER RESPIRATORY (INHALATION) 2 TIMES DAILY
Qty: 1 EACH | Refills: 0 | Status: SHIPPED | OUTPATIENT
Start: 2024-03-15 | End: 2024-04-15

## 2024-03-15 RX ORDER — DOXYCYCLINE HYCLATE 100 MG
100 TABLET ORAL 2 TIMES DAILY
Qty: 20 TABLET | Refills: 0 | Status: SHIPPED | OUTPATIENT
Start: 2024-03-15 | End: 2024-03-25

## 2024-03-15 RX ORDER — METHYLPREDNISOLONE ACETATE 40 MG/ML
60 INJECTION, SUSPENSION INTRA-ARTICULAR; INTRALESIONAL; INTRAMUSCULAR; SOFT TISSUE ONCE
Status: COMPLETED | OUTPATIENT
Start: 2024-03-15 | End: 2024-03-15

## 2024-03-15 RX ADMIN — METHYLPREDNISOLONE ACETATE 60 MG: 40 INJECTION, SUSPENSION INTRA-ARTICULAR; INTRALESIONAL; INTRAMUSCULAR; SOFT TISSUE at 11:03

## 2024-03-15 NOTE — PROGRESS NOTES
Subjective:      Patient ID: Lizz Crockett is a 69 y.o. female.    Chief Complaint: Follow-up    HPI  Here today for a routine follow up for her medical problems. Recent labs will be reviewed.   Nephrology: Stable on recent labs. Hasn't seen nephrologist.   Vitamin D. On once weekly vitamin D.   Colonoscopy, mammogram, and eye exam due.   Up to date with pulm. CT and follow up scheduled.   Pain management off [place].    DM stable.   Chronic cough with mucous in her throat. Clear. Mild improvement with mucinex  but will not go away. Only using albuterol.   Patient Active Problem List   Diagnosis    Post-surgical hypothyroidism    Hyperlipidemia copmplicating type 2 diabetes mellitus    Glaucoma    Gastroesophageal reflux disease without esophagitis    Asymptomatic menopausal state    Hypertension complicating diabetes    Type 2 diabetes mellitus with stage 4 chronic kidney disease, without long-term current use of insulin    At risk for knowledge deficit    Stage 4 chronic kidney disease    Onychomadesis of toenail    Screening for malignant neoplasm of colon    Opioid dependent for pain management    Osteopenia of multiple sites    Primary localized osteoarthrosis of multiple sites    Fatty liver    Schizophreniform disorder    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)    Pulmonary nodules    Coronary artery disease involving native coronary artery of native heart without angina pectoris    Aortic atherosclerosis    Obstructive chronic bronchitis with acute exacerbation    Hyperparathyroidism, secondary to chronic kidney disease    Hyperuricemia secondary to chronic kidney disease    At risk for nonadherence with treatment plan         Current Outpatient Medications:     albuterol (PROVENTIL HFA) 90 mcg/actuation inhaler, Inhale 2 puffs into the lungs every 6 (six) hours as needed for Wheezing. (Rescue inhaler), Disp: 18 g, Rfl: 11    allopurinoL (ZYLOPRIM) 100 MG tablet, Take 1 tablet (100 mg total) by mouth  once daily., Disp: 90 tablet, Rfl: 3    amLODIPine (NORVASC) 10 MG tablet, Take 1 tablet (10 mg total) by mouth once daily., Disp: 90 tablet, Rfl: 2    aspirin (ECOTRIN) 81 MG EC tablet, Take 1 tablet (81 mg total) by mouth once daily., Disp: 90 tablet, Rfl: 8    atenoloL (TENORMIN) 100 MG tablet, Take 1 tablet by mouth once daily., Disp: , Rfl:     atorvastatin (LIPITOR) 20 MG tablet, TAKE 1 TABLET(20 MG) BY MOUTH EVERY EVENING, Disp: 90 tablet, Rfl: 1    bimatoprost (LUMIGAN) 0.01 % Drop, Place 1 drop into both eyes every evening., Disp: 2 mL, Rfl: 11    cholecalciferol, vitamin D3, 125 mcg (5,000 unit) capsule, Take 1 capsule by mouth once daily., Disp: , Rfl:     clonazePAM (KLONOPIN) 0.5 MG tablet, Take 0.5 mg by mouth daily as needed., Disp: , Rfl:     cloNIDine (CATAPRES) 0.1 MG tablet, Take 0.1 mg by mouth daily as needed., Disp: , Rfl:     clotrimazole (LOTRIMIN) 1 % cream, Apply topically 2 (two) times daily. AAA, Disp: 45 g, Rfl: 0    cyclobenzaprine (FLEXERIL) 10 MG tablet, Take 10 mg by mouth 3 (three) times daily., Disp: , Rfl:     ergocalciferol (ERGOCALCIFEROL) 50,000 unit Cap, Take 1 capsule (50,000 Units total) by mouth every 7 days., Disp: 12 capsule, Rfl: 4    glycerin-mineral oil-lanolin Crea, Apply 1 application topically daily as needed (to soothe itching)., Disp: 192 g, Rfl: 0    inhalation spacing device, Use as directed when taking inhaled medicine, Disp: 1 each, Rfl: 0    JARDIANCE 10 mg tablet, TAKE 1 TABLET(10 MG) BY MOUTH EVERY DAY, Disp: 90 tablet, Rfl: 0    levothyroxine (SYNTHROID) 100 MCG tablet, Take 1 tablet (100 mcg total) by mouth before breakfast., Disp: 90 tablet, Rfl: 1    naloxone (NARCAN) 4 mg/actuation [place], , Disp: , Rfl:     omeprazole (PRILOSEC) 40 MG capsule, Take 1 capsule (40 mg total) by mouth every morning., Disp: 90 capsule, Rfl: 2    oxyCODONE-acetaminophen (PERCOCET) 7.5-325 mg per tablet, Take 1 tablet by mouth every 8 hours as needed for pain, Disp: 90 tablet,  Rfl: 0    doxycycline (VIBRA-TABS) 100 MG tablet, Take 1 tablet (100 mg total) by mouth 2 (two) times daily. for 10 days, Disp: 20 tablet, Rfl: 0    fluticasone-salmeterol diskus inhaler 250-50 mcg, Inhale 1 puff into the lungs 2 (two) times daily. Controller, Disp: 1 each, Rfl: 0    HYDROcodone-acetaminophen (NORCO) 5-325 mg per tablet, Take 1 tablet by mouth every 8 (eight) hours as needed for Pain. (Patient not taking: Reported on 8/29/2023), Disp: 12 tablet, Rfl: 0    nitroGLYCERIN (NITROSTAT) 0.4 MG SL tablet, Place 1 tablet (0.4 mg total) under the tongue every 5 (five) minutes as needed for Chest pain (for chest pain/discomfort). Call 911 if chest pain persists after second dose., Disp: 25 tablet, Rfl: 0  No current facility-administered medications for this visit.    Review of Systems   Constitutional:  Negative for activity change, appetite change, chills, diaphoresis, fatigue, fever and unexpected weight change.   HENT:  Positive for postnasal drip. Negative for congestion, hearing loss, rhinorrhea, sore throat, trouble swallowing and voice change.    Eyes: Negative.  Negative for visual disturbance.   Respiratory:  Positive for cough and chest tightness. Negative for choking and shortness of breath.    Cardiovascular:  Negative for chest pain, palpitations and leg swelling.   Gastrointestinal:  Negative for abdominal distention, abdominal pain, blood in stool, constipation, diarrhea, nausea and vomiting.   Endocrine: Negative for cold intolerance, heat intolerance, polydipsia and polyuria.   Genitourinary: Negative.  Negative for difficulty urinating and frequency.   Musculoskeletal:  Negative for arthralgias, back pain, gait problem, joint swelling and myalgias.   Skin:  Negative for color change, pallor, rash and wound.   Neurological:  Negative for dizziness, tremors, weakness, light-headedness, numbness and headaches.   Hematological:  Negative for adenopathy.   Psychiatric/Behavioral:  Negative for  "behavioral problems, confusion, self-injury, sleep disturbance and suicidal ideas. The patient is not nervous/anxious.      Objective:   /88 (BP Location: Right arm, Patient Position: Sitting, BP Method: Medium (Manual))   Pulse 100   Temp 98.3 °F (36.8 °C) (Tympanic)   Ht 5' 6" (1.676 m)   Wt 72.3 kg (159 lb 6.3 oz)   SpO2 100%   BMI 25.73 kg/m²     Physical Exam  Vitals reviewed.   Constitutional:       General: She is not in acute distress.     Appearance: Normal appearance. She is well-developed. She is not ill-appearing, toxic-appearing or diaphoretic.   HENT:      Head: Normocephalic and atraumatic.      Right Ear: External ear normal.      Left Ear: External ear normal.      Nose: Rhinorrhea present.      Mouth/Throat:      Mouth: Mucous membranes are moist.      Pharynx: No oropharyngeal exudate or posterior oropharyngeal erythema.   Eyes:      Conjunctiva/sclera: Conjunctivae normal.      Pupils: Pupils are equal, round, and reactive to light.   Cardiovascular:      Rate and Rhythm: Normal rate and regular rhythm.      Heart sounds: Normal heart sounds. No murmur heard.     No friction rub. No gallop.   Pulmonary:      Effort: Pulmonary effort is normal. No respiratory distress.      Breath sounds: Wheezing and rhonchi present. No rales.   Chest:      Chest wall: No tenderness.   Abdominal:      General: There is no distension.      Palpations: Abdomen is soft.      Tenderness: There is no abdominal tenderness.   Musculoskeletal:         General: Normal range of motion.      Cervical back: Normal range of motion and neck supple.   Lymphadenopathy:      Cervical: No cervical adenopathy.   Skin:     General: Skin is warm and dry.      Capillary Refill: Capillary refill takes less than 2 seconds.      Findings: No rash.   Neurological:      Mental Status: She is alert and oriented to person, place, and time.      Motor: No weakness.      Coordination: Coordination normal.      Gait: Gait normal. "   Psychiatric:         Mood and Affect: Mood normal.         Behavior: Behavior normal.         Thought Content: Thought content normal.         Judgment: Judgment normal.       Lab Visit on 03/08/2024   Component Date Value Ref Range Status    PTH, Intact 03/08/2024 245.3 (H)  9.0 - 77.0 pg/mL Final    Glucose 03/08/2024 100  70 - 110 mg/dL Final    Sodium 03/08/2024 138  136 - 145 mmol/L Final    Potassium 03/08/2024 4.3  3.5 - 5.1 mmol/L Final    Chloride 03/08/2024 107  95 - 110 mmol/L Final    CO2 03/08/2024 22 (L)  23 - 29 mmol/L Final    BUN 03/08/2024 23  8 - 23 mg/dL Final    Calcium 03/08/2024 9.8  8.7 - 10.5 mg/dL Final    Creatinine 03/08/2024 1.6 (H)  0.5 - 1.4 mg/dL Final    Albumin 03/08/2024 3.6  3.5 - 5.2 g/dL Final    Phosphorus 03/08/2024 4.3  2.7 - 4.5 mg/dL Final    eGFR 03/08/2024 34.3 (A)  >60 mL/min/1.73 m^2 Final    Anion Gap 03/08/2024 9  8 - 16 mmol/L Final    Vit D, 25-Hydroxy 03/08/2024 21 (L)  30 - 96 ng/mL Final    Comment: Vitamin D deficiency.........<10 ng/mL                              Vitamin D insufficiency......10-29 ng/mL       Vitamin D sufficiency........> or equal to 30 ng/mL  Vitamin D toxicity............>100 ng/mL      Hemoglobin A1C 03/08/2024 6.2 (H)  4.0 - 5.6 % Final    Comment: ADA Screening Guidelines:  5.7-6.4%  Consistent with prediabetes  >or=6.5%  Consistent with diabetes    High levels of fetal hemoglobin interfere with the HbA1C  assay. Heterozygous hemoglobin variants (HbS, HgC, etc)do  not significantly interfere with this assay.   However, presence of multiple variants may affect accuracy.      Estimated Avg Glucose 03/08/2024 131  68 - 131 mg/dL Final       Assessment:     1. Type 2 diabetes mellitus with stage 4 chronic kidney disease, without long-term current use of insulin    2. Hyperlipidemia copmplicating type 2 diabetes mellitus    3. Hypertension complicating diabetes    4. Post-surgical hypothyroidism    5. Aortic atherosclerosis    6. Coronary  artery disease involving native coronary artery of native heart without angina pectoris    7. Carotid body tumor    8. Opioid dependent for pain management    9. Primary localized osteoarthrosis of multiple sites    10. Fatty liver    11. Schizophreniform disorder    12. Pulmonary nodules    13. Chronic obstructive bronchitis    14. Hyperparathyroidism, secondary to chronic kidney disease    15. Hyperuricemia secondary to chronic kidney disease    16. Glaucoma, unspecified glaucoma type, unspecified laterality    17. Osteopenia of multiple sites    18. Screening for malignant neoplasm of colon    19. COPD with exacerbation    20. Encounter for screening mammogram for malignant neoplasm of breast      Plan:   Type 2 diabetes mellitus with stage 4 chronic kidney disease, without long-term current use of insulin  -     Ambulatory referral/consult to Optometry; Future; Expected date: 03/22/2024  -     Ambulatory referral/consult to Nephrology; Future; Expected date: 03/22/2024    Hyperlipidemia copmplicating type 2 diabetes mellitus    Hypertension complicating diabetes  -stable and controlled on current meds    Post-surgical hypothyroidism  -stable    Aortic atherosclerosis  Coronary artery disease involving native coronary artery of native heart without angina pectoris  Carotid body tumor  -stable on current meds    Opioid dependent for pain management    Primary localized osteoarthrosis of multiple sites    Fatty liver  -  Schizophreniform disorder    Pulmonary nodules    Chronic obstructive bronchitis    Hyperparathyroidism, secondary to chronic kidney disease  -     Ambulatory referral/consult to Nephrology; Future; Expected date: 03/22/2024    Hyperuricemia secondary to chronic kidney disease  -     Ambulatory referral/consult to Nephrology; Future; Expected date: 03/22/2024    Glaucoma, unspecified glaucoma type, unspecified laterality  -     Ambulatory referral/consult to Optometry; Future; Expected date:  03/22/2024    Osteopenia of multiple sites    Screening for malignant neoplasm of colon  -     Ambulatory referral/consult to Endo Procedure ; Future; Expected date: 03/16/2024    COPD with exacerbation  -     X-Ray Chest PA And Lateral; Future; Expected date: 03/15/2024  -     doxycycline (VIBRA-TABS) 100 MG tablet; Take 1 tablet (100 mg total) by mouth 2 (two) times daily. for 10 days  Dispense: 20 tablet; Refill: 0  -     methylPREDNISolone acetate injection 60 mg  -     fluticasone-salmeterol diskus inhaler 250-50 mcg; Inhale 1 puff into the lungs 2 (two) times daily. Controller  Dispense: 1 each; Refill: 0    Encounter for screening mammogram for malignant neoplasm of breast  -     Mammo Digital Screening Bilat w/ [facility]; Future; Expected date: 03/15/2024    -requesting steroid shot today over pills. Pt declines to take prednisone or medrol dose pack by mouth.   -up to date with pulm  -wash mouth out after use of advair inhaler    Follow up in about 3 months (around 6/15/2024), or if symptoms worsen or fail to improve.

## 2024-04-03 DIAGNOSIS — N18.4 STAGE 4 CHRONIC KIDNEY DISEASE: Primary | Chronic | ICD-10-CM

## 2024-04-05 ENCOUNTER — PATIENT MESSAGE (OUTPATIENT)
Dept: GASTROENTEROLOGY | Facility: HOSPITAL | Age: 69
End: 2024-04-05
Payer: MEDICARE

## 2024-04-13 DIAGNOSIS — J44.1 COPD WITH EXACERBATION: ICD-10-CM

## 2024-04-15 RX ORDER — FLUTICASONE PROPIONATE AND SALMETEROL 250; 50 UG/1; UG/1
1 POWDER RESPIRATORY (INHALATION) 2 TIMES DAILY
Qty: 60 EACH | Refills: 6 | Status: SHIPPED | OUTPATIENT
Start: 2024-04-15

## 2024-04-22 ENCOUNTER — TELEPHONE (OUTPATIENT)
Dept: NEPHROLOGY | Facility: CLINIC | Age: 69
End: 2024-04-22
Payer: MEDICARE

## 2024-04-22 NOTE — TELEPHONE ENCOUNTER
Called to remind to do lab. No show lab appt. Called dgtr and she will give message to her to do lab. 4/22/24/sf

## 2024-04-23 ENCOUNTER — LAB VISIT (OUTPATIENT)
Dept: LAB | Facility: HOSPITAL | Age: 69
End: 2024-04-23
Attending: INTERNAL MEDICINE
Payer: MEDICARE

## 2024-04-23 DIAGNOSIS — N18.4 STAGE 4 CHRONIC KIDNEY DISEASE: Chronic | ICD-10-CM

## 2024-04-23 LAB
ANION GAP SERPL CALC-SCNC: 9 MMOL/L (ref 8–16)
BASOPHILS # BLD AUTO: 0.04 K/UL (ref 0–0.2)
BASOPHILS NFR BLD: 0.5 % (ref 0–1.9)
BUN SERPL-MCNC: 18 MG/DL (ref 8–23)
CALCIUM SERPL-MCNC: 10.1 MG/DL (ref 8.7–10.5)
CHLORIDE SERPL-SCNC: 108 MMOL/L (ref 95–110)
CO2 SERPL-SCNC: 24 MMOL/L (ref 23–29)
CREAT SERPL-MCNC: 1.6 MG/DL (ref 0.5–1.4)
DIFFERENTIAL METHOD BLD: NORMAL
EOSINOPHIL # BLD AUTO: 0.3 K/UL (ref 0–0.5)
EOSINOPHIL NFR BLD: 3.3 % (ref 0–8)
ERYTHROCYTE [DISTWIDTH] IN BLOOD BY AUTOMATED COUNT: 13.2 % (ref 11.5–14.5)
EST. GFR  (NO RACE VARIABLE): 34.3 ML/MIN/1.73 M^2
GLUCOSE SERPL-MCNC: 110 MG/DL (ref 70–110)
HCT VFR BLD AUTO: 47.6 % (ref 37–48.5)
HGB BLD-MCNC: 15.6 G/DL (ref 12–16)
IMM GRANULOCYTES # BLD AUTO: 0.02 K/UL (ref 0–0.04)
IMM GRANULOCYTES NFR BLD AUTO: 0.3 % (ref 0–0.5)
LYMPHOCYTES # BLD AUTO: 3.1 K/UL (ref 1–4.8)
LYMPHOCYTES NFR BLD: 40.7 % (ref 18–48)
MCH RBC QN AUTO: 30.7 PG (ref 27–31)
MCHC RBC AUTO-ENTMCNC: 32.8 G/DL (ref 32–36)
MCV RBC AUTO: 94 FL (ref 82–98)
MONOCYTES # BLD AUTO: 0.7 K/UL (ref 0.3–1)
MONOCYTES NFR BLD: 9.6 % (ref 4–15)
NEUTROPHILS # BLD AUTO: 3.4 K/UL (ref 1.8–7.7)
NEUTROPHILS NFR BLD: 45.6 % (ref 38–73)
NRBC BLD-RTO: 0 /100 WBC
PLATELET # BLD AUTO: 426 K/UL (ref 150–450)
PMV BLD AUTO: 9.9 FL (ref 9.2–12.9)
POTASSIUM SERPL-SCNC: 4.6 MMOL/L (ref 3.5–5.1)
PTH-INTACT SERPL-MCNC: 228.3 PG/ML (ref 9–77)
RBC # BLD AUTO: 5.08 M/UL (ref 4–5.4)
SODIUM SERPL-SCNC: 141 MMOL/L (ref 136–145)
WBC # BLD AUTO: 7.5 K/UL (ref 3.9–12.7)

## 2024-04-23 PROCEDURE — 83970 ASSAY OF PARATHORMONE: CPT | Performed by: INTERNAL MEDICINE

## 2024-04-23 PROCEDURE — 85025 COMPLETE CBC W/AUTO DIFF WBC: CPT | Performed by: INTERNAL MEDICINE

## 2024-04-23 PROCEDURE — 36415 COLL VENOUS BLD VENIPUNCTURE: CPT | Performed by: INTERNAL MEDICINE

## 2024-04-23 PROCEDURE — 80048 BASIC METABOLIC PNL TOTAL CA: CPT | Performed by: INTERNAL MEDICINE

## 2024-04-25 ENCOUNTER — OFFICE VISIT (OUTPATIENT)
Dept: NEPHROLOGY | Facility: CLINIC | Age: 69
End: 2024-04-25
Payer: MEDICARE

## 2024-04-25 VITALS
BODY MASS INDEX: 25.75 KG/M2 | HEART RATE: 90 BPM | SYSTOLIC BLOOD PRESSURE: 134 MMHG | DIASTOLIC BLOOD PRESSURE: 70 MMHG | WEIGHT: 160.25 LBS | HEIGHT: 66 IN | RESPIRATION RATE: 18 BRPM

## 2024-04-25 DIAGNOSIS — E11.22 TYPE 2 DIABETES MELLITUS WITH STAGE 4 CHRONIC KIDNEY DISEASE, WITHOUT LONG-TERM CURRENT USE OF INSULIN: Chronic | ICD-10-CM

## 2024-04-25 DIAGNOSIS — N14.0 ANALGESIC NEPHROPATHY: Primary | ICD-10-CM

## 2024-04-25 DIAGNOSIS — N18.4 TYPE 2 DIABETES MELLITUS WITH STAGE 4 CHRONIC KIDNEY DISEASE, WITHOUT LONG-TERM CURRENT USE OF INSULIN: Chronic | ICD-10-CM

## 2024-04-25 DIAGNOSIS — E79.0 HYPERURICEMIA: Chronic | ICD-10-CM

## 2024-04-25 DIAGNOSIS — N25.81 HYPERPARATHYROIDISM, SECONDARY RENAL: Chronic | ICD-10-CM

## 2024-04-25 DIAGNOSIS — E55.9 VITAMIN D DEFICIENCY: ICD-10-CM

## 2024-04-25 PROCEDURE — 99215 OFFICE O/P EST HI 40 MIN: CPT | Mod: S$GLB,,, | Performed by: INTERNAL MEDICINE

## 2024-04-25 PROCEDURE — 1159F MED LIST DOCD IN RCRD: CPT | Mod: CPTII,S$GLB,, | Performed by: INTERNAL MEDICINE

## 2024-04-25 PROCEDURE — 3008F BODY MASS INDEX DOCD: CPT | Mod: CPTII,S$GLB,, | Performed by: INTERNAL MEDICINE

## 2024-04-25 PROCEDURE — 3075F SYST BP GE 130 - 139MM HG: CPT | Mod: CPTII,S$GLB,, | Performed by: INTERNAL MEDICINE

## 2024-04-25 PROCEDURE — 3044F HG A1C LEVEL LT 7.0%: CPT | Mod: CPTII,S$GLB,, | Performed by: INTERNAL MEDICINE

## 2024-04-25 PROCEDURE — 1101F PT FALLS ASSESS-DOCD LE1/YR: CPT | Mod: CPTII,S$GLB,, | Performed by: INTERNAL MEDICINE

## 2024-04-25 PROCEDURE — 3066F NEPHROPATHY DOC TX: CPT | Mod: CPTII,S$GLB,, | Performed by: INTERNAL MEDICINE

## 2024-04-25 PROCEDURE — 3078F DIAST BP <80 MM HG: CPT | Mod: CPTII,S$GLB,, | Performed by: INTERNAL MEDICINE

## 2024-04-25 PROCEDURE — 99999 PR PBB SHADOW E&M-EST. PATIENT-LVL V: CPT | Mod: PBBFAC,,, | Performed by: INTERNAL MEDICINE

## 2024-04-25 PROCEDURE — 3288F FALL RISK ASSESSMENT DOCD: CPT | Mod: CPTII,S$GLB,, | Performed by: INTERNAL MEDICINE

## 2024-04-25 PROCEDURE — 1125F AMNT PAIN NOTED PAIN PRSNT: CPT | Mod: CPTII,S$GLB,, | Performed by: INTERNAL MEDICINE

## 2024-04-25 RX ORDER — ERGOCALCIFEROL 1.25 MG/1
50000 CAPSULE ORAL
Qty: 6 CAPSULE | Refills: 4 | Status: SHIPPED | OUTPATIENT
Start: 2024-04-25

## 2024-04-25 RX ORDER — BLOOD-GLUCOSE CONTROL, NORMAL
EACH MISCELLANEOUS
COMMUNITY

## 2024-04-25 NOTE — PROGRESS NOTES
Lizz Crockett is a 69 y.o. female for whom nephrology consult has been requested to evaluate and give opinion.   Renal clinic consult f/u note:  Date of consult: 4/25/24  Reason for f/u and chief c/o: CKD  Referring physician: Dr. May    HPI: Thank you for referring the pt to us. H/o and chart were reviewed. Pt was seen and examined. Pt is a 68 y/o female with h/o of CKD stage 3 and HTN who was referred back to renal clinic after she was lost to f/u. Pt was last seen in renal clinic in Jan 2023. In addition to above, pt has a h/o of extensive NSAIds use and hyperuricemia. Pt says that she uses ibuprofen almost daily, on some days none, but sometimes as many as 3 per day. She had a bottle in her purse she had that she showed me that she had just purchased. Pt has frequent pains and aches and what appeared to be trigger points for these aches. Pt had no acute or new c/o's today.     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    SOCIAL HISTORY:  She  reports that she quit smoking about 31 years ago. Her smoking use included cigarettes. She started smoking about 50 years ago. She has a 19 pack-year smoking history. She has quit using smokeless tobacco. She reports that she does not drink alcohol and does not use drugs.    FAMILY MEDICAL HISTORY:  Her family history includes Heart disease in her father and mother; Hypertension in her father and mother.    Review of patient's allergies indicates:   Allergen  Reactions    Iodine     Codeine Anxiety    Iodinated contrast media Nausea And Vomiting           Prior to Admission medications    Medication Sig Start Date End Date Taking? Authorizing Provider   albuterol (PROVENTIL HFA) 90 mcg/actuation inhaler Inhale 2 puffs into the lungs every 6 (six) hours as needed for Wheezing. (Rescue inhaler) 12/29/23 12/28/24 Yes KARAN May MD   amLODIPine (NORVASC) 10 MG tablet Take 1 tablet (10 mg total) by mouth once daily. 2/9/24  Yes KARAN May MD   aspirin (ECOTRIN) 81 MG EC tablet Take 1 tablet (81 mg total) by mouth once daily. 12/29/23  Yes KARAN May MD   atenoloL (TENORMIN) 100 MG tablet Take 1 tablet by mouth once daily.   Yes Provider, Historical   atorvastatin (LIPITOR) 20 MG tablet TAKE 1 TABLET(20 MG) BY MOUTH EVERY EVENING 2/26/24  Yes KARAN May MD   bimatoprost (LUMIGAN) 0.01 % Drop Place 1 drop into both eyes every evening. 5/19/23 5/18/24 Yes Jin Ruffin OD   blood sugar diagnostic (ONETOUCH VERIO TEST STRIPS MISC) USE TO TEST BLOOD GLUCOSE THREE TIMES DAILY   Yes Provider, Historical   clonazePAM (KLONOPIN) 0.5 MG tablet Take 0.5 mg by mouth daily as needed. 11/14/23  Yes Provider, Historical   cyclobenzaprine (FLEXERIL) 10 MG tablet Take 10 mg by mouth 3 (three) times daily. 11/11/23  Yes Provider, Historical   fluticasone-salmeterol 250-50 mcg/dose (WIXELA INHUB) 250-50 mcg/dose diskus inhaler INHALE 1 PUFF INTO THE LUNGS TWICE DAILY 4/15/24  Yes Emily Nayak PA-C   HYDROcodone-acetaminophen (NORCO) 5-325 mg per tablet Take 1 tablet by mouth every 8 (eight) hours as needed for Pain. 1/31/23  Yes Italia Guzman PA-C   inhalation spacing device Use as directed when taking inhaled medicine 12/29/23  Yes KARAN May MD   JARDIANCE 10 mg tablet TAKE 1 TABLET(10 MG) BY MOUTH EVERY DAY 3/4/24  Yes KARAN May MD   lancets (ONETOUCH DELICA PLUS LANCET) 30 gauge Misc USE TO TEST BLOOD GLUCOSE EVERY DAY AND  AS NEEDED   Yes Provider, Historical   levothyroxine (SYNTHROID) 100 MCG tablet Take 1 tablet (100 mcg total) by mouth before breakfast. 11/27/23  Yes KARAN May MD   naloxone (NARCAN) 4 mg/actuation [place]  10/11/22  Yes Provider, Historical   omeprazole (PRILOSEC) 40 MG capsule Take 1 capsule (40 mg total) by mouth every morning. 12/21/23  Yes KARAN May MD   oxyCODONE-acetaminophen (PERCOCET) 7.5-325 mg per tablet Take 1 tablet by mouth every 8 hours as needed for pain 9/9/22  Yes    cholecalciferol, vitamin D3, 125 mcg (5,000 unit) capsule Take 1 capsule by mouth once daily.  4/25/24 Yes Provider, Historical   ergocalciferol (ERGOCALCIFEROL) 50,000 unit Cap Take 1 capsule (50,000 Units total) by mouth every 7 days. 9/11/23 4/25/24 Yes KARAN May MD   cloNIDine (CATAPRES) 0.1 MG tablet Take 0.1 mg by mouth daily as needed.  Patient not taking: Reported on 4/25/2024 1/12/23   Provider, Historical   clotrimazole (LOTRIMIN) 1 % cream Apply topically 2 (two) times daily. AAA  Patient not taking: Reported on 4/25/2024 6/29/23   Simona Kidd NP   ergocalciferol (ERGOCALCIFEROL) 50,000 unit Cap Take 1 capsule (50,000 Units total) by mouth every 14 (fourteen) days. 4/25/24   Reymundo Ruby MD   glycerin-mineral oil-lanolin Crea Apply 1 application topically daily as needed (to soothe itching).  Patient not taking: Reported on 4/25/2024 10/14/22   Phyllis Dietz, NP   nitroGLYCERIN (NITROSTAT) 0.4 MG SL tablet Place 1 tablet (0.4 mg total) under the tongue every 5 (five) minutes as needed for Chest pain (for chest pain/discomfort). Call 911 if chest pain persists after second dose. 1/3/23 1/3/24  KARAN May MD   allopurinoL (ZYLOPRIM) 100 MG tablet Take 1 tablet (100 mg total) by mouth once daily.  Patient not taking: Reported on 4/25/2024 1/19/23 4/25/24  Cam Meza MD        REVIEW OF SYSTEMS:  Patient has no fever, fatigue, visual changes, chest pain, edema, cough,  "dyspnea, nausea, vomiting, constipation, diarrhea, arthralgias, pruritis, dizziness, weakness, depression, confusion.      PHYSICAL EXAM:   height is 5' 6" (1.676 m) and weight is 72.7 kg (160 lb 4.4 oz). Her blood pressure is 134/70 and her pulse is 90. Her respiration is 18.   Gen: WDWN female in no apparent distress  Psych: Normal mood and affect  Skin: No rashes or ulcers  Neck: No JVD  Chest: Clear with no rales, rhonchi, wheezing with normal effort  CV: Regular with no murmurs, gallops or rubs  Abd: Soft, nontender, no distension  Ext: No edema    Labs reviewed  BMP  Lab Results   Component Value Date     04/23/2024    K 4.6 04/23/2024     04/23/2024    CO2 24 04/23/2024    BUN 18 04/23/2024    CREATININE 1.6 (H) 04/23/2024    CALCIUM 10.1 04/23/2024    ANIONGAP 9 04/23/2024    EGFRNORACEVR 34.3 (A) 04/23/2024     Lab Results   Component Value Date    WBC 7.50 04/23/2024    HGB 15.6 04/23/2024    HCT 47.6 04/23/2024    MCV 94 04/23/2024     04/23/2024     Lab Results   Component Value Date    .3 (H) 04/23/2024    CALCIUM 10.1 04/23/2024    PHOS 4.3 03/08/2024     Lab Results   Component Value Date    URICACID 7.3 (H) 09/07/2023     Urine p/cr 90 mg      IMPRESSION AND RECOMMENDATIONS: 68 y/o female with multiple risks factors for CKD presented for f/u after being lost to f/u:    Renal: s Cr stable but not normal  Stable CKD stage 4. Stable renal function  Multiple risks factors for CKD: NSAIds (analgesic nephropathy) taking almost daily, HTN h/o, hyperuricemia (uric acid nephropathy), DM-2 (diabetoc nephropathy), sarcoidosis  Also borderline high Ca can raise Cr without causing CKD  Discussed with pt, informed pt in layman's language  K normal  Na normal  Acid base stable  Mild borderline hypercalcemia, corrected for s lb 3.6    Advised pt to stop using NSAIds, examples mentioned  D/c cholecalciferol  Lower ergo calciferol 20556 u from q week to q 2 weeks  Will repeat uric acid " level    2. HTN: BP controlled  Meds reviewed    3. DM-2: has microalbuminuria, continue jardiance  Should be on an ACE-I or ARB: will consider upon return    4. Sarcoidosis: per chart.  Chart was reviewed      Plans and recommendations:  As discussed above  Total time spent 45 minutes including time needed to review the records, the   patient evaluation, documentation, face-to-face discussion with the patient,   more than 50% of the time was spent on coordination of care and counseling.    Level V visit.  RTC 3 months    Reymundo Ruby MD

## 2024-04-30 ENCOUNTER — TELEPHONE (OUTPATIENT)
Dept: INTERNAL MEDICINE | Facility: CLINIC | Age: 69
End: 2024-04-30
Payer: MEDICARE

## 2024-04-30 NOTE — TELEPHONE ENCOUNTER
----- Message from Erna Doyle MA sent at 4/30/2024 10:33 AM CDT -----  Type:  Sooner Appointment Request    Patient is requesting a sooner appointment.  Patient declined first available appointment listed as well as another facility and provider .  Patient will not accept being placed on the waitlist and is requesting a message be sent to doctor.    Name of Caller: Pt     When is the first available appointment? May 14 th     Symptoms: Cold     Would the patient rather a call back or a response via [facility]? Callback     Best Call Back Number:[phone]    Additional Information:

## 2024-05-02 ENCOUNTER — HOSPITAL ENCOUNTER (OUTPATIENT)
Dept: RADIOLOGY | Facility: HOSPITAL | Age: 69
Discharge: HOME | End: 2024-05-02
Attending: PHYSICIAN ASSISTANT
Payer: MEDICARE

## 2024-05-02 ENCOUNTER — OFFICE VISIT (OUTPATIENT)
Dept: INTERNAL MEDICINE | Facility: CLINIC | Age: 69
End: 2024-05-02
Payer: MEDICARE

## 2024-05-02 VITALS
SYSTOLIC BLOOD PRESSURE: 138 MMHG | WEIGHT: 158.31 LBS | RESPIRATION RATE: 17 BRPM | BODY MASS INDEX: 25.55 KG/M2 | TEMPERATURE: 99 F | DIASTOLIC BLOOD PRESSURE: 92 MMHG | OXYGEN SATURATION: 98 % | HEART RATE: 104 BPM

## 2024-05-02 VITALS — WEIGHT: 158.75 LBS | BODY MASS INDEX: 25.51 KG/M2 | HEIGHT: 66 IN

## 2024-05-02 DIAGNOSIS — Z12.31 ENCOUNTER FOR SCREENING MAMMOGRAM FOR MALIGNANT NEOPLASM OF BREAST: ICD-10-CM

## 2024-05-02 DIAGNOSIS — R05.9 COUGH, UNSPECIFIED TYPE: ICD-10-CM

## 2024-05-02 DIAGNOSIS — J42 CHRONIC BRONCHITIS, UNSPECIFIED CHRONIC BRONCHITIS TYPE: Primary | ICD-10-CM

## 2024-05-02 LAB
CTP QC/QA: YES
CTP QC/QA: YES
POC MOLECULAR INFLUENZA A AGN: NEGATIVE
POC MOLECULAR INFLUENZA B AGN: NEGATIVE
SARS-COV-2 RDRP RESP QL NAA+PROBE: NEGATIVE

## 2024-05-02 PROCEDURE — 99214 OFFICE O/P EST MOD 30 MIN: CPT | Mod: S$GLB,,, | Performed by: NURSE PRACTITIONER

## 2024-05-02 PROCEDURE — 77063 BREAST TOMOSYNTHESIS BI: CPT | Mod: 26,,, | Performed by: RADIOLOGY

## 2024-05-02 PROCEDURE — 99999 PR PBB SHADOW E&M-EST. PATIENT-LVL V: CPT | Mod: PBBFAC,,, | Performed by: NURSE PRACTITIONER

## 2024-05-02 PROCEDURE — 1125F AMNT PAIN NOTED PAIN PRSNT: CPT | Mod: CPTII,S$GLB,, | Performed by: NURSE PRACTITIONER

## 2024-05-02 PROCEDURE — 77067 SCR MAMMO BI INCL CAD: CPT | Mod: 26,,, | Performed by: RADIOLOGY

## 2024-05-02 PROCEDURE — 3044F HG A1C LEVEL LT 7.0%: CPT | Mod: CPTII,S$GLB,, | Performed by: NURSE PRACTITIONER

## 2024-05-02 PROCEDURE — 87635 SARS-COV-2 COVID-19 AMP PRB: CPT | Mod: QW,S$GLB,, | Performed by: NURSE PRACTITIONER

## 2024-05-02 PROCEDURE — 87502 INFLUENZA DNA AMP PROBE: CPT | Mod: QW,S$GLB,, | Performed by: NURSE PRACTITIONER

## 2024-05-02 PROCEDURE — 77063 BREAST TOMOSYNTHESIS BI: CPT | Mod: TC

## 2024-05-02 PROCEDURE — 3288F FALL RISK ASSESSMENT DOCD: CPT | Mod: CPTII,S$GLB,, | Performed by: NURSE PRACTITIONER

## 2024-05-02 PROCEDURE — 3075F SYST BP GE 130 - 139MM HG: CPT | Mod: CPTII,S$GLB,, | Performed by: NURSE PRACTITIONER

## 2024-05-02 PROCEDURE — 3066F NEPHROPATHY DOC TX: CPT | Mod: CPTII,S$GLB,, | Performed by: NURSE PRACTITIONER

## 2024-05-02 PROCEDURE — 3008F BODY MASS INDEX DOCD: CPT | Mod: CPTII,S$GLB,, | Performed by: NURSE PRACTITIONER

## 2024-05-02 PROCEDURE — 3080F DIAST BP >= 90 MM HG: CPT | Mod: CPTII,S$GLB,, | Performed by: NURSE PRACTITIONER

## 2024-05-02 PROCEDURE — 1159F MED LIST DOCD IN RCRD: CPT | Mod: CPTII,S$GLB,, | Performed by: NURSE PRACTITIONER

## 2024-05-02 PROCEDURE — 1101F PT FALLS ASSESS-DOCD LE1/YR: CPT | Mod: CPTII,S$GLB,, | Performed by: NURSE PRACTITIONER

## 2024-05-02 RX ORDER — BENZONATATE 200 MG/1
200 CAPSULE ORAL 2 TIMES DAILY PRN
Qty: 30 CAPSULE | Refills: 0 | Status: SHIPPED | OUTPATIENT
Start: 2024-05-02 | End: 2024-05-17

## 2024-05-02 NOTE — PROGRESS NOTES
Subjective:       Patient ID: Lizz Crockett is a 69 y.o. female.    Chief Complaint: Cough and Generalized Body Aches    HPI    Patient is here for above  She states that she always has a cough off and on.  She says she took a home COVID test which is negative COVID and flu test today are both negative.  She denies fever.  She has chronic bronchitis.  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    Cataract     Chronic obstructive bronchitis 2023    Diabetes mellitus, type 2     GERD (gastroesophageal reflux disease)     Glaucoma     Hyperlipidemia     Hypertension     PONV (postoperative nausea and vomiting)     Sarcoidosis, unspecified     Schizophreniform disorder 2022    Thyroid disease     Type 2 diabetes mellitus with stage 4 chronic kidney disease, without long-term current use of insulin 2022     Past Surgical History:   Procedure Laterality Date    BREAST SURGERY      CATARACT EXTRACTION, BILATERAL Bilateral     INCISION AND DRAINAGE, UPPER EXTREMITY Left 2023    Procedure: INCISION AND DRAINAGE, UPPER EXTREMITY;  Surgeon: Cam Oneill MD;  Location: [facility];  Service: Orthopedics;  Laterality: Left;    THYROIDECTOMY      for multinodular goiter    TUBAL LIGATION       Social History     Socioeconomic History    Marital status: Single   Tobacco Use    Smoking status: Former     Current packs/day: 0.00     Average packs/day: 1 pack/day for 19.0 years (19.0 ttl pk-yrs)     Types: Cigarettes     Start date:      Quit date:      Years since quittin.3    Smokeless tobacco: Former   Substance and Sexual Activity    Alcohol use: No    Drug use: No    Sexual activity: Yes     Review of patient's allergies indicates:   Allergen Reactions    Iodine     Codeine Anxiety    Iodinated  contrast media Nausea And Vomiting     Current Outpatient Medications   Medication Sig Dispense Refill    albuterol (PROVENTIL HFA) 90 mcg/actuation inhaler Inhale 2 puffs into the lungs every 6 (six) hours as needed for Wheezing. (Rescue inhaler) 18 g 11    amLODIPine (NORVASC) 10 MG tablet Take 1 tablet (10 mg total) by mouth once daily. 90 tablet 2    aspirin (ECOTRIN) 81 MG EC tablet Take 1 tablet (81 mg total) by mouth once daily. 90 tablet 8    atenoloL (TENORMIN) 100 MG tablet Take 1 tablet by mouth once daily.      atorvastatin (LIPITOR) 20 MG tablet TAKE 1 TABLET(20 MG) BY MOUTH EVERY EVENING 90 tablet 1    bimatoprost (LUMIGAN) 0.01 % Drop Place 1 drop into both eyes every evening. 2 mL 11    blood sugar diagnostic (ONETOUCH VERIO TEST STRIPS MISC) USE TO TEST BLOOD GLUCOSE THREE TIMES DAILY      blood-glucose meter (ONETOUCH VERIO REFLECT METER MISC) USE TO TEST BLOOD GLUCOSE      clonazePAM (KLONOPIN) 0.5 MG tablet Take 0.5 mg by mouth daily as needed.      cloNIDine (CATAPRES) 0.1 MG tablet Take 0.1 mg by mouth daily as needed.      clotrimazole (LOTRIMIN) 1 % cream Apply topically 2 (two) times daily. AAA 45 g 0    cyclobenzaprine (FLEXERIL) 10 MG tablet Take 10 mg by mouth 3 (three) times daily.      ergocalciferol (ERGOCALCIFEROL) 50,000 unit Cap Take 1 capsule (50,000 Units total) by mouth every 14 (fourteen) days. 6 capsule 4    fluticasone-salmeterol 250-50 mcg/dose (WIXELA INHUB) 250-50 mcg/dose diskus inhaler INHALE 1 PUFF INTO THE LUNGS TWICE DAILY 60 each 6    glycerin-mineral oil-lanolin Crea Apply 1 application topically daily as needed (to soothe itching). 192 g 0    HYDROcodone-acetaminophen (NORCO) 5-325 mg per tablet Take 1 tablet by mouth every 8 (eight) hours as needed for Pain. 12 tablet 0    inhalation spacing device Use as directed when taking inhaled medicine 1 each 0    JARDIANCE 10 mg tablet TAKE 1 TABLET(10 MG) BY MOUTH EVERY DAY 90 tablet 0    lancets (ONETOUCH DELICA PLUS  LANCET) 30 gauge Misc USE TO TEST BLOOD GLUCOSE EVERY DAY AND AS NEEDED      levothyroxine (SYNTHROID) 100 MCG tablet Take 1 tablet (100 mcg total) by mouth before breakfast. 90 tablet 1    naloxone (NARCAN) 4 mg/actuation Spry       omeprazole (PRILOSEC) 40 MG capsule Take 1 capsule (40 mg total) by mouth every morning. 90 capsule 2    oxyCODONE-acetaminophen (PERCOCET) 7.5-325 mg per tablet Take 1 tablet by mouth every 8 hours as needed for pain 90 tablet 0    benzonatate (TESSALON) 200 MG capsule Take 1 capsule (200 mg total) by mouth 2 (two) times daily as needed for Cough. 30 capsule 0    nitroGLYCERIN (NITROSTAT) 0.4 MG SL tablet Place 1 tablet (0.4 mg total) under the tongue every 5 (five) minutes as needed for Chest pain (for chest pain/discomfort). Call 911 if chest pain persists after second dose. 25 tablet 0     No current facility-administered medications for this visit.           Review of Systems   Constitutional:  Negative for activity change, appetite change, chills, diaphoresis, fatigue, fever and unexpected weight change.   HENT:  Negative for congestion, ear pain, postnasal drip, rhinorrhea, sinus pressure, sinus pain, sneezing, sore throat, tinnitus, trouble swallowing and voice change.    Eyes:  Negative for photophobia, pain and visual disturbance.   Respiratory:  Positive for cough. Negative for chest tightness, shortness of breath and wheezing.    Cardiovascular:  Negative for chest pain, palpitations and leg swelling.   Gastrointestinal:  Negative for abdominal distention, abdominal pain, constipation, diarrhea, nausea and vomiting.   Genitourinary:  Negative for decreased urine volume, difficulty urinating, dysuria, flank pain, frequency, hematuria and urgency.   Musculoskeletal:  Negative for arthralgias, back pain, joint swelling, neck pain and neck stiffness.   Allergic/Immunologic: Negative for immunocompromised state.   Neurological:  Negative for dizziness, tremors, seizures, syncope,  facial asymmetry, speech difficulty, weakness, light-headedness, numbness and headaches.   Hematological:  Negative for adenopathy. Does not bruise/bleed easily.   Psychiatric/Behavioral:  Negative for confusion and sleep disturbance.        Objective:      Physical Exam  Vitals reviewed.   Cardiovascular:      Rate and Rhythm: Normal rate and regular rhythm.      Heart sounds: Normal heart sounds.   Pulmonary:      Effort: Pulmonary effort is normal.      Breath sounds: Normal breath sounds.   Neurological:      Mental Status: She is alert and oriented to person, place, and time.         Assessment:     Vitals:    05/02/24 1018   BP: (!) 138/92   Pulse: 104   Resp: 17   Temp: 99.1 °F (37.3 °C)         1. Chronic bronchitis, unspecified chronic bronchitis type    2. Cough, unspecified type        Plan:   Chronic bronchitis, unspecified chronic bronchitis type    Cough, unspecified type  -     POCT COVID-19 Rapid Screening  -     POCT Influenza A/B Molecular    Other orders  -     benzonatate (TESSALON) 200 MG capsule; Take 1 capsule (200 mg total) by mouth 2 (two) times daily as needed for Cough.  Dispense: 30 capsule; Refill: 0        I explain the chronic nature of her condition/chronic bronchitis and worrisome symptoms that warrant re-evaluation  Patient is interested in trying over-the-counter Flonase pill  Tessalon Perles sent to the pharmacy as well  Follow with PCP

## 2024-05-30 DIAGNOSIS — E89.0 POST-SURGICAL HYPOTHYROIDISM: Chronic | ICD-10-CM

## 2024-05-30 RX ORDER — LEVOTHYROXINE SODIUM 100 UG/1
100 TABLET ORAL
Qty: 90 TABLET | Refills: 0 | Status: SHIPPED | OUTPATIENT
Start: 2024-05-30

## 2024-05-30 NOTE — TELEPHONE ENCOUNTER
Provider Staff:  Action required for this patient    Requires labs      Please see care gap opportunities below in Care Due Message.    Thanks!  [facility]     Appointments      Date Provider   Last Visit   12/29/2023 KARAN May MD   Next Visit   6/25/2024 KARAN May MD     Refill Decision Note   Lizz Crockett  is requesting a refill authorization.  Brief Assessment and Rationale for Refill:  Approve     Medication Therapy Plan:         Comments:     Note composed:7:59 AM 05/30/2024

## 2024-05-30 NOTE — TELEPHONE ENCOUNTER
Care Due:                  Date            Visit Type   Department     Provider  --------------------------------------------------------------------------------                                EP -                              PRIMARY      [facility] INTERNAL  Last Visit: 12-      CARE ([facility])   GIA May                              EP -                              PRIMARY      [facility] INTERNAL  Next Visit: 06-      CARE ([facility])   GIA May                                                            Last  Test          Frequency    Reason                     Performed    Due Date  --------------------------------------------------------------------------------    TSH.........  12 months..  levothyroxine............  06- 06-    [facility] Embedded Care Due Messages. Reference number: [id number].   5/30/2024 7:46:14 AM CDT

## 2024-05-31 DIAGNOSIS — N18.4 TYPE 2 DIABETES MELLITUS WITH STAGE 4 CHRONIC KIDNEY DISEASE, WITHOUT LONG-TERM CURRENT USE OF INSULIN: Chronic | ICD-10-CM

## 2024-05-31 DIAGNOSIS — N18.4 STAGE 4 CHRONIC KIDNEY DISEASE: Chronic | ICD-10-CM

## 2024-05-31 DIAGNOSIS — E11.22 TYPE 2 DIABETES MELLITUS WITH STAGE 4 CHRONIC KIDNEY DISEASE, WITHOUT LONG-TERM CURRENT USE OF INSULIN: Chronic | ICD-10-CM

## 2024-05-31 RX ORDER — EMPAGLIFLOZIN 10 MG/1
10 TABLET, FILM COATED ORAL
Qty: 90 TABLET | Refills: 1 | Status: SHIPPED | OUTPATIENT
Start: 2024-05-31

## 2024-05-31 NOTE — TELEPHONE ENCOUNTER
Refill Decision Note   Lizz Crockett  is requesting a refill authorization.  Brief Assessment and Rationale for Refill:  Approve     Medication Therapy Plan: You carvajal      Pharmacist review requested: Yes   Comments:     Note composed:11:54 AM 05/31/2024

## 2024-05-31 NOTE — TELEPHONE ENCOUNTER
Refill Routing Note   Medication(s) are not appropriate for processing by [facility] for the following reason(s):        Required labs abnormal  CREATININE 1.6 (H) 04/23/2024      EGFRNORACEVR 34.3 (A) 04/23/2024       OR action(s):  Defer             Pharmacist review requested: Yes     Appointments  past 12m or future 3m with PCP    Date Provider   Last Visit   12/29/2023 KARAN May MD   Next Visit   6/25/2024 KARAN May MD   ED visits in past 90 days: 0        Note composed:9:37 AM 05/31/2024

## 2024-05-31 NOTE — TELEPHONE ENCOUNTER
No care due was identified.  [facility] Embedded Care Due Messages. Reference number: [id number].   5/31/2024 7:26:01 AM CDT

## 2024-06-03 ENCOUNTER — OFFICE VISIT (OUTPATIENT)
Dept: INTERNAL MEDICINE | Facility: CLINIC | Age: 69
End: 2024-06-03
Payer: MEDICARE

## 2024-06-03 VITALS
TEMPERATURE: 97 F | SYSTOLIC BLOOD PRESSURE: 134 MMHG | BODY MASS INDEX: 25.47 KG/M2 | OXYGEN SATURATION: 98 % | HEIGHT: 66 IN | DIASTOLIC BLOOD PRESSURE: 92 MMHG | WEIGHT: 158.5 LBS | HEART RATE: 98 BPM

## 2024-06-03 DIAGNOSIS — K02.9 DENTAL CARIES: Primary | ICD-10-CM

## 2024-06-03 DIAGNOSIS — I10 ESSENTIAL HYPERTENSION: Chronic | ICD-10-CM

## 2024-06-03 DIAGNOSIS — K04.7 DENTAL INFECTION: ICD-10-CM

## 2024-06-03 PROCEDURE — 3080F DIAST BP >= 90 MM HG: CPT | Mod: CPTII,S$GLB,, | Performed by: NURSE PRACTITIONER

## 2024-06-03 PROCEDURE — 3008F BODY MASS INDEX DOCD: CPT | Mod: CPTII,S$GLB,, | Performed by: NURSE PRACTITIONER

## 2024-06-03 PROCEDURE — 3288F FALL RISK ASSESSMENT DOCD: CPT | Mod: CPTII,S$GLB,, | Performed by: NURSE PRACTITIONER

## 2024-06-03 PROCEDURE — 3066F NEPHROPATHY DOC TX: CPT | Mod: CPTII,S$GLB,, | Performed by: NURSE PRACTITIONER

## 2024-06-03 PROCEDURE — 3044F HG A1C LEVEL LT 7.0%: CPT | Mod: CPTII,S$GLB,, | Performed by: NURSE PRACTITIONER

## 2024-06-03 PROCEDURE — 99999 PR PBB SHADOW E&M-EST. PATIENT-LVL IV: CPT | Mod: PBBFAC,,, | Performed by: NURSE PRACTITIONER

## 2024-06-03 PROCEDURE — 1101F PT FALLS ASSESS-DOCD LE1/YR: CPT | Mod: CPTII,S$GLB,, | Performed by: NURSE PRACTITIONER

## 2024-06-03 PROCEDURE — 3075F SYST BP GE 130 - 139MM HG: CPT | Mod: CPTII,S$GLB,, | Performed by: NURSE PRACTITIONER

## 2024-06-03 PROCEDURE — 99214 OFFICE O/P EST MOD 30 MIN: CPT | Mod: S$GLB,,, | Performed by: NURSE PRACTITIONER

## 2024-06-03 PROCEDURE — 1159F MED LIST DOCD IN RCRD: CPT | Mod: CPTII,S$GLB,, | Performed by: NURSE PRACTITIONER

## 2024-06-03 RX ORDER — CHLORHEXIDINE GLUCONATE ORAL RINSE 1.2 MG/ML
SOLUTION DENTAL
Qty: 473 ML | Refills: 0 | Status: SHIPPED | OUTPATIENT
Start: 2024-06-03

## 2024-06-03 RX ORDER — AMLODIPINE BESYLATE 10 MG/1
10 TABLET ORAL DAILY
Qty: 90 TABLET | Refills: 1 | Status: SHIPPED | OUTPATIENT
Start: 2024-06-03

## 2024-06-03 RX ORDER — HYDROXYZINE HYDROCHLORIDE 25 MG/1
25 TABLET, FILM COATED ORAL NIGHTLY PRN
COMMUNITY
Start: 2024-05-29

## 2024-06-03 RX ORDER — AMOXICILLIN AND CLAVULANATE POTASSIUM 875; 125 MG/1; MG/1
1 TABLET, FILM COATED ORAL EVERY 12 HOURS
Qty: 20 TABLET | Refills: 0 | Status: SHIPPED | OUTPATIENT
Start: 2024-06-03 | End: 2024-06-13

## 2024-06-05 NOTE — PROGRESS NOTES
Subjective:       Patient ID: Lizz Crockett is a 69 y.o. female.    Chief Complaint: Follow-up (Productive cough pt states that mucus is green) and Medication Refill (amlodipine)    Follow-up  Associated symptoms include coughing and a sore throat. Pertinent negatives include no abdominal pain, arthralgias, chest pain, chills, congestion, diaphoresis, fatigue, fever, headaches, joint swelling, nausea, neck pain, numbness, vomiting or weakness.   Medication Refill  Associated symptoms include coughing and a sore throat. Pertinent negatives include no abdominal pain, arthralgias, chest pain, chills, congestion, diaphoresis, fatigue, fever, headaches, joint swelling, nausea, neck pain, numbness, vomiting or weakness.       Patient returns with complaint of coughing and throat irritation  Patient has severe dental infection.  This has been ongoing for a while she has been told that she needs to have a dental procedure completed to remove the affected teeth.  She has yet to do this for unknown reasons  She has requesting a refill on oral wash    She reports she has been out of amlodipine x2 weeks she is asymptomatic in this regard blood pressure is a little bit elevated today    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    Cataract     Chronic obstructive bronchitis 04/18/2023    Diabetes mellitus, type 2     GERD (gastroesophageal reflux disease)     Glaucoma     Hyperlipidemia     Hypertension     PONV (postoperative nausea and vomiting)     Sarcoidosis, unspecified     Schizophreniform disorder 12/21/2022    Thyroid disease     Type 2 diabetes mellitus with stage 4 chronic kidney disease, without long-term current use of insulin 09/02/2022     Past Surgical History:   Procedure Laterality Date    BREAST SURGERY      CATARACT EXTRACTION, BILATERAL Bilateral     INCISION AND DRAINAGE, UPPER EXTREMITY Left 1/26/2023     Procedure: INCISION AND DRAINAGE, UPPER EXTREMITY;  Surgeon: Cam Oneill MD;  Location: [facility];  Service: Orthopedics;  Laterality: Left;    THYROIDECTOMY      for multinodular goiter    TUBAL LIGATION       Social History     Socioeconomic History    Marital status: Single   Tobacco Use    Smoking status: Former     Current packs/day: 0.00     Average packs/day: 1 pack/day for 19.0 years (19.0 ttl pk-yrs)     Types: Cigarettes     Start date:      Quit date:      Years since quittin.4    Smokeless tobacco: Former   Substance and Sexual Activity    Alcohol use: No    Drug use: No    Sexual activity: Yes     Review of patient's allergies indicates:   Allergen Reactions    Iodine     Codeine Anxiety    Iodinated contrast media Nausea And Vomiting     Current Outpatient Medications   Medication Sig    albuterol (PROVENTIL HFA) 90 mcg/actuation inhaler Inhale 2 puffs into the lungs every 6 (six) hours as needed for Wheezing. (Rescue inhaler)    aspirin (ECOTRIN) 81 MG EC tablet Take 1 tablet (81 mg total) by mouth once daily.    atenoloL (TENORMIN) 100 MG tablet Take 1 tablet by mouth once daily.    atorvastatin (LIPITOR) 20 MG tablet TAKE 1 TABLET(20 MG) BY MOUTH EVERY EVENING    blood sugar diagnostic (ONETOUCH VERIO TEST STRIPS MISC) USE TO TEST BLOOD GLUCOSE THREE TIMES DAILY    blood-glucose meter (ONETOUCH VERIO REFLECT METER MISC) USE TO TEST BLOOD GLUCOSE    clonazePAM (KLONOPIN) 0.5 MG tablet Take 0.5 mg by mouth daily as needed.    cloNIDine (CATAPRES) 0.1 MG tablet Take 0.1 mg by mouth daily as needed.    clotrimazole (LOTRIMIN) 1 % cream Apply topically 2 (two) times daily. AAA    cyclobenzaprine (FLEXERIL) 10 MG tablet Take 10 mg by mouth 3 (three) times daily.    ergocalciferol (ERGOCALCIFEROL) 50,000 unit Cap Take 1 capsule (50,000 Units total) by mouth every 14 (fourteen) days.    fluticasone-salmeterol 250-50 mcg/dose (WIXELA INHUB) 250-50 mcg/dose diskus inhaler INHALE 1 PUFF  INTO THE LUNGS TWICE DAILY    glycerin-mineral oil-lanolin Crea Apply 1 application topically daily as needed (to soothe itching).    HYDROcodone-acetaminophen (NORCO) 5-325 mg per tablet Take 1 tablet by mouth every 8 (eight) hours as needed for Pain.    hydrOXYzine HCL (ATARAX) 25 MG tablet Take 25 mg by mouth nightly as needed.    inhalation spacing device Use as directed when taking inhaled medicine    JARDIANCE 10 mg tablet TAKE 1 TABLET(10 MG) BY MOUTH EVERY DAY    lancets (ONETOUCH DELICA PLUS LANCET) 30 gauge Misc USE TO TEST BLOOD GLUCOSE EVERY DAY AND AS NEEDED    levothyroxine (SYNTHROID) 100 MCG tablet TAKE 1 TABLET(100 MCG) BY MOUTH BEFORE BREAKFAST    naloxone (NARCAN) 4 mg/actuation Spry     omeprazole (PRILOSEC) 40 MG capsule Take 1 capsule (40 mg total) by mouth every morning.    oxyCODONE-acetaminophen (PERCOCET) 7.5-325 mg per tablet Take 1 tablet by mouth every 8 hours as needed for pain    amLODIPine (NORVASC) 10 MG tablet Take 1 tablet (10 mg total) by mouth once daily.    amoxicillin-clavulanate 875-125mg (AUGMENTIN) 875-125 mg per tablet Take 1 tablet by mouth every 12 (twelve) hours. for 10 days    bimatoprost (LUMIGAN) 0.01 % Drop Place 1 drop into both eyes every evening.    chlorhexidine (PERIDEX) 0.12 % solution Swish for 30 seconds with 15 mL (one capful) of undiluted oral rinse after toothbrushing, then expectorate; repeat twice daily (morning and evening)    nitroGLYCERIN (NITROSTAT) 0.4 MG SL tablet Place 1 tablet (0.4 mg total) under the tongue every 5 (five) minutes as needed for Chest pain (for chest pain/discomfort). Call 911 if chest pain persists after second dose.     No current facility-administered medications for this visit.           Review of Systems   Constitutional:  Negative for activity change, appetite change, chills, diaphoresis, fatigue, fever and unexpected weight change.   HENT:  Positive for dental problem and sore throat. Negative for congestion, ear pain,  postnasal drip, rhinorrhea, sinus pressure, sinus pain, sneezing, tinnitus, trouble swallowing and voice change.    Eyes:  Negative for photophobia, pain and visual disturbance.   Respiratory:  Positive for cough. Negative for chest tightness, shortness of breath and wheezing.    Cardiovascular:  Negative for chest pain, palpitations and leg swelling.   Gastrointestinal:  Negative for abdominal distention, abdominal pain, constipation, diarrhea, nausea and vomiting.   Genitourinary:  Negative for decreased urine volume, difficulty urinating, dysuria, flank pain, frequency, hematuria and urgency.   Musculoskeletal:  Negative for arthralgias, back pain, joint swelling, neck pain and neck stiffness.   Allergic/Immunologic: Negative for immunocompromised state.   Neurological:  Negative for dizziness, tremors, seizures, syncope, facial asymmetry, speech difficulty, weakness, light-headedness, numbness and headaches.   Hematological:  Negative for adenopathy. Does not bruise/bleed easily.   Psychiatric/Behavioral:  Negative for confusion and sleep disturbance.        Objective:      Physical Exam  Vitals reviewed.   HENT:      Mouth/Throat:      Dentition: Abnormal dentition. Dental tenderness, gingival swelling and dental caries present.      Pharynx: Posterior oropharyngeal erythema present.   Neurological:      Mental Status: She is alert.         Assessment:     Vitals:    06/03/24 1449   BP: (!) 134/92   Pulse: 98   Temp: 96.8 °F (36 °C)         1. Dental caries    2. Dental infection    3. Essential hypertension        Plan:   Dental caries    Dental infection    Essential hypertension  Comments:  has been out of norvasc x 2 weeks  Orders:  -     amLODIPine (NORVASC) 10 MG tablet; Take 1 tablet (10 mg total) by mouth once daily.  Dispense: 90 tablet; Refill: 1    Other orders  -     amoxicillin-clavulanate 875-125mg (AUGMENTIN) 875-125 mg per tablet; Take 1 tablet by mouth every 12 (twelve) hours. for 10 days   Dispense: 20 tablet; Refill: 0  -     chlorhexidine (PERIDEX) 0.12 % solution; Swish for 30 seconds with 15 mL (one capful) of undiluted oral rinse after toothbrushing, then expectorate; repeat twice daily (morning and evening)  Dispense: 473 mL; Refill: 0      As above  I highly advised patient to see a dentist immediately to discuss surgical procedure for dental caries/gingival disease  Restart amlodipine monitor blood pressure follow-up with PCP as scheduled

## 2024-06-11 ENCOUNTER — PATIENT MESSAGE (OUTPATIENT)
Dept: OTHER | Facility: OTHER | Age: 69
End: 2024-06-11
Payer: MEDICARE

## 2024-06-20 ENCOUNTER — OFFICE VISIT (OUTPATIENT)
Dept: OBSTETRICS AND GYNECOLOGY | Facility: CLINIC | Age: 69
End: 2024-06-20
Payer: MEDICARE

## 2024-06-20 VITALS
DIASTOLIC BLOOD PRESSURE: 82 MMHG | HEIGHT: 66 IN | BODY MASS INDEX: 25.15 KG/M2 | WEIGHT: 156.5 LBS | SYSTOLIC BLOOD PRESSURE: 126 MMHG

## 2024-06-20 DIAGNOSIS — Z01.419 ENCOUNTER FOR ANNUAL ROUTINE GYNECOLOGICAL EXAMINATION: Primary | ICD-10-CM

## 2024-06-20 DIAGNOSIS — Z12.4 CERVICAL CANCER SCREENING: ICD-10-CM

## 2024-06-20 PROCEDURE — 1160F RVW MEDS BY RX/DR IN RCRD: CPT | Mod: CPTII,S$GLB,,

## 2024-06-20 PROCEDURE — 3008F BODY MASS INDEX DOCD: CPT | Mod: CPTII,S$GLB,,

## 2024-06-20 PROCEDURE — 1159F MED LIST DOCD IN RCRD: CPT | Mod: CPTII,S$GLB,,

## 2024-06-20 PROCEDURE — 3288F FALL RISK ASSESSMENT DOCD: CPT | Mod: CPTII,S$GLB,,

## 2024-06-20 PROCEDURE — 99999 PR PBB SHADOW E&M-EST. PATIENT-LVL IV: CPT | Mod: PBBFAC,,,

## 2024-06-20 PROCEDURE — 3079F DIAST BP 80-89 MM HG: CPT | Mod: CPTII,S$GLB,,

## 2024-06-20 PROCEDURE — 88175 CYTOPATH C/V AUTO FLUID REDO: CPT

## 2024-06-20 PROCEDURE — 3066F NEPHROPATHY DOC TX: CPT | Mod: CPTII,S$GLB,,

## 2024-06-20 PROCEDURE — 3044F HG A1C LEVEL LT 7.0%: CPT | Mod: CPTII,S$GLB,,

## 2024-06-20 PROCEDURE — G0101 CA SCREEN;PELVIC/BREAST EXAM: HCPCS | Mod: S$GLB,,,

## 2024-06-20 PROCEDURE — 87624 HPV HI-RISK TYP POOLED RSLT: CPT

## 2024-06-20 PROCEDURE — 1101F PT FALLS ASSESS-DOCD LE1/YR: CPT | Mod: CPTII,S$GLB,,

## 2024-06-20 PROCEDURE — 1125F AMNT PAIN NOTED PAIN PRSNT: CPT | Mod: CPTII,S$GLB,,

## 2024-06-20 PROCEDURE — 3074F SYST BP LT 130 MM HG: CPT | Mod: CPTII,S$GLB,,

## 2024-06-20 NOTE — PROGRESS NOTES
Subjective:       Patient ID: Lizz Crockett is a 69 y.o. female.    Chief Complaint:  Well Woman      History of Present Illness  HPI  Annual Exam-Postmenopausal  Patient presents for annual exam. The patient has no complaints today. The patient is not sexually active. GYN screening history: last pap: normal with +HPV other HR and last mammogram: approximate date 22 and was normal. The patient has never been taking hormone replacement therapy. Patient denies post-menopausal vaginal bleeding. The patient wears seatbelts: yes. The patient participates in regular exercise: not asked. Has the patient ever been transfused or tattooed?: not asked. The patient reports that there is not domestic violence in her life.    GYN & OB History  No LMP recorded. Patient is postmenopausal.   Date of Last Pap: 2024    OB History    Para Term  AB Living   2 2 2         SAB IAB Ectopic Multiple Live Births                  # Outcome Date GA Lbr Rick/2nd Weight Sex Type Anes PTL Lv   2 Term            1 Term                Review of Systems  Review of Systems   Constitutional: Negative.    HENT: Negative.     Eyes: Negative.    Respiratory: Negative.     Cardiovascular: Negative.    Gastrointestinal: Negative.    Genitourinary: Negative.    Musculoskeletal: Negative.    Integumentary:  Negative.   Neurological: Negative.    Hematological: Negative.    Psychiatric/Behavioral: Negative.     All other systems reviewed and are negative.  Breast: negative.            Objective:      Physical Exam:   Constitutional: She is oriented to person, place, and time. She appears well-developed and well-nourished.    HENT:   Head: Normocephalic and atraumatic.   Nose: Nose normal.    Eyes: Pupils are equal, round, and reactive to light. Conjunctivae and EOM are normal.     Cardiovascular:  Normal rate and regular rhythm.             Pulmonary/Chest: Effort normal. She has no decreased breath sounds. She has no rhonchi. Right  breast exhibits no inverted nipple, no mass, no nipple discharge, no tenderness and no bleeding. Left breast exhibits no inverted nipple, no mass, no nipple discharge, no tenderness and no bleeding. Breasts are symmetrical.        Abdominal: Soft. There is no abdominal tenderness.     Genitourinary:    Inguinal canal, uterus, right adnexa and left adnexa normal.      Pelvic exam was performed with patient supine.   The external female genitalia was normal.   No external genitalia lesions identified,Genitalia hair distrobution normal .     Labial bartholins normal.Cervix is normal. Right adnexum displays no mass and no tenderness. Left adnexum displays no mass and no tenderness. No vaginal discharge, tenderness or bleeding in the vagina. Vaginal atrophy noted. Cervix exhibits no motion tenderness, no discharge and no tenderness.    pap smear completedUerus contour normal  Uterus is not tender. Normal urethral meatus.          Musculoskeletal: Normal range of motion and moves all extremeties.       Neurological: She is alert and oriented to person, place, and time.    Skin: Skin is warm and dry.    Psychiatric: She has a normal mood and affect. Her speech is normal and behavior is normal. Mood, judgment and thought content normal.             Assessment:        1. Encounter for annual routine gynecological examination    2. Cervical cancer screening               Plan:   Continue annual well woman exam.  Pap collected. Patient was counseled today on ASCCP screening guidelines (pap smear every 3 years in low risk patients) and recommendations for annual pelvic exams and clinical breast exams, yearly mammograms until age 75; and to see her PCP for other healthcare maintenance.   Ensure adequate calcium and vitamin D intake and daily weight bearing exercises.    Diagnosis and orders this visit:  Encounter for annual routine gynecological examination    Cervical cancer screening  -     Liquid-Based Pap Smear, Screening  -      HPV High Risk Genotypes, PCR         Naomy Epperson, NP

## 2024-06-20 NOTE — PATIENT INSTRUCTIONS
I recommend the following to help with hot flashes: including lifestyle measures such as avoiding caffeine, dressing in layers, avoiding hot foods/drinks, soy milk before bed, sleeping with a fan--you can also use over the counter--amberen, estroven, toribio kaylee, black cohosh, or red clover.

## 2024-06-21 ENCOUNTER — NURSE TRIAGE (OUTPATIENT)
Dept: ADMINISTRATIVE | Facility: CLINIC | Age: 69
End: 2024-06-21
Payer: MEDICARE

## 2024-06-21 NOTE — TELEPHONE ENCOUNTER
Pt requesting that results not be displayed on My Chart from yesterday's office visit. Pt states that someone got into her medical acct. Pt transferred to [facility] at [phone]. Encounter routed to provider.     Reason for Disposition   General information question, no triage required and triager able to answer question    Protocols used: Information Only Call - No Triage-A-[place]

## 2024-06-24 ENCOUNTER — TELEPHONE (OUTPATIENT)
Dept: INTERNAL MEDICINE | Facility: CLINIC | Age: 69
End: 2024-06-24
Payer: MEDICARE

## 2024-06-27 LAB
FINAL PATHOLOGIC DIAGNOSIS: NORMAL
Lab: NORMAL

## 2024-06-28 LAB
HPV HR 12 DNA SPEC QL NAA+PROBE: NEGATIVE
HPV16 AG SPEC QL: NEGATIVE
HPV18 DNA SPEC QL NAA+PROBE: NEGATIVE

## 2024-07-17 ENCOUNTER — OFFICE VISIT (OUTPATIENT)
Dept: INTERNAL MEDICINE | Facility: CLINIC | Age: 69
End: 2024-07-17
Payer: MEDICARE

## 2024-07-17 ENCOUNTER — PATIENT MESSAGE (OUTPATIENT)
Dept: INTERNAL MEDICINE | Facility: CLINIC | Age: 69
End: 2024-07-17

## 2024-07-17 DIAGNOSIS — E78.5 HYPERLIPIDEMIA ASSOCIATED WITH TYPE 2 DIABETES MELLITUS: Chronic | ICD-10-CM

## 2024-07-17 DIAGNOSIS — E11.22 TYPE 2 DIABETES MELLITUS WITH STAGE 4 CHRONIC KIDNEY DISEASE, WITHOUT LONG-TERM CURRENT USE OF INSULIN: Chronic | ICD-10-CM

## 2024-07-17 DIAGNOSIS — Z12.11 SCREENING FOR COLORECTAL CANCER: ICD-10-CM

## 2024-07-17 DIAGNOSIS — N18.4 STAGE 4 CHRONIC KIDNEY DISEASE: Chronic | ICD-10-CM

## 2024-07-17 DIAGNOSIS — E11.59 HYPERTENSION COMPLICATING DIABETES: Chronic | ICD-10-CM

## 2024-07-17 DIAGNOSIS — Z01.00 DIABETIC EYE EXAM: ICD-10-CM

## 2024-07-17 DIAGNOSIS — I15.2 HYPERTENSION COMPLICATING DIABETES: Chronic | ICD-10-CM

## 2024-07-17 DIAGNOSIS — J44.89 OBSTRUCTIVE CHRONIC BRONCHITIS: Primary | Chronic | ICD-10-CM

## 2024-07-17 DIAGNOSIS — E11.9 DIABETIC EYE EXAM: ICD-10-CM

## 2024-07-17 DIAGNOSIS — Z12.12 SCREENING FOR COLORECTAL CANCER: ICD-10-CM

## 2024-07-17 DIAGNOSIS — K76.0 FATTY LIVER: Chronic | ICD-10-CM

## 2024-07-17 DIAGNOSIS — E89.0 POST-SURGICAL HYPOTHYROIDISM: Chronic | ICD-10-CM

## 2024-07-17 DIAGNOSIS — E11.69 HYPERLIPIDEMIA ASSOCIATED WITH TYPE 2 DIABETES MELLITUS: Chronic | ICD-10-CM

## 2024-07-17 DIAGNOSIS — N18.4 TYPE 2 DIABETES MELLITUS WITH STAGE 4 CHRONIC KIDNEY DISEASE, WITHOUT LONG-TERM CURRENT USE OF INSULIN: Chronic | ICD-10-CM

## 2024-07-17 PROBLEM — J44.1 OBSTRUCTIVE CHRONIC BRONCHITIS WITH ACUTE EXACERBATION: Chronic | Status: ACTIVE | Noted: 2023-04-18

## 2024-07-17 PROCEDURE — 1160F RVW MEDS BY RX/DR IN RCRD: CPT | Mod: CPTII,95,, | Performed by: FAMILY MEDICINE

## 2024-07-17 PROCEDURE — 99215 OFFICE O/P EST HI 40 MIN: CPT | Mod: 95,,, | Performed by: FAMILY MEDICINE

## 2024-07-17 PROCEDURE — 3044F HG A1C LEVEL LT 7.0%: CPT | Mod: CPTII,95,, | Performed by: FAMILY MEDICINE

## 2024-07-17 PROCEDURE — G2211 COMPLEX E/M VISIT ADD ON: HCPCS | Mod: 95,,, | Performed by: FAMILY MEDICINE

## 2024-07-17 PROCEDURE — 3066F NEPHROPATHY DOC TX: CPT | Mod: CPTII,95,, | Performed by: FAMILY MEDICINE

## 2024-07-17 PROCEDURE — 1159F MED LIST DOCD IN RCRD: CPT | Mod: CPTII,95,, | Performed by: FAMILY MEDICINE

## 2024-07-17 RX ORDER — IPRATROPIUM BROMIDE AND ALBUTEROL SULFATE 2.5; .5 MG/3ML; MG/3ML
3 SOLUTION RESPIRATORY (INHALATION) EVERY 6 HOURS PRN
Qty: 75 ML | Refills: 2 | Status: SHIPPED | OUTPATIENT
Start: 2024-07-17 | End: 2025-07-17

## 2024-07-17 NOTE — PROGRESS NOTES
"TELEMEDICINE VIRTUAL VIDEO VISIT  7/17/24  1:40 PM CDT    Visit Type: Audiovisual    Patient's Location: Lizz represents that they are located within the state of [place].    History of Present Illness  She presents today with ongoing cough and lung issues. She describes a persistent cough that began around Thanksgiving, with symptoms including coughing up thick, dark green mucus and a severe sensation of strangulation when coughing. She reports difficulty expectorating the very thick mucus, often having to beat on her chest to bring it up. Despite using albuterol and fluticasone/salmeterol inhalers, as well as an albuterol nebulizer twice daily, she has experienced limited relief. Previous treatments, including antibiotics and a steroid injection, have provided minimal temporary respite.    She has a history of stable lung nodules, which were reviewed by her pulmonologist, Dr. London, last November. A repeat CT and follow-up are scheduled for this coming November. Recently, she encountered difficulties obtaining her inhaler due to a prescription error, which has since been resolved. However, she has not been using the fluticasone/salmeterol inhaler regularly due to uncertainty about its purpose and proper use. After clarification that it is a "controller" medicine, she has agreed to take it as directed, twice daily.    She is a former smoker and denies current tobacco use. She reports chronic pain with opioid analgesics managed by Dr. Martinez. She is enrolled in the [facility] Program but has not provided recent blood pressure readings. Her diastolic blood pressure was previously elevated, and recent in-office measurements show it remains uncontrolled. Her diabetes is well-managed, with a last hemoglobin A1c of 6.2% in March. She is due for annual diabetes labs. She also has stage 4 chronic kidney disease with stable creatinine levels and is due for liver function tests for her fatty liver " disease.    Assessment & Plan  She has been experiencing uncontrolled symptoms of chronic bronchitis since Thanksgiving. A chest X-ray in March did not reveal pneumonia or masses, and she has been advised to continue using her controller inhaler (fluticasone/salmeterol) twice daily. Her liver function will be monitored through scheduled labs due to her fatty liver disease. The well-controlled diabetes will be monitored with annual labs, including a lipid panel and urine microalbumin. Her chronic kidney disease has shown some improvement, and a metabolic panel will be ordered to monitor her kidney function.     She has not provided blood pressure readings for the [facility] Program in the last three months. Staff will assist her in participating successfully in the program to control her blood pressure. Her COPD, characterized by a persistent cough and difficulty breathing, will be managed with increased nebulizer treatments, and she agreed to call or message her pulmonologist, Dr. London, with an update on her status. An incentive spirometer was recommended to help with her breathing exercises. She is to let me know if her symptoms worsen or fail to improve as expected. The follow-up CT scan for her lung nodules is scheduled for November.     Review of Systems   Constitutional:  Negative for chills and fever.   HENT:  Negative for ear pain, postnasal drip and sore throat.    Respiratory:  Positive for cough. Negative for chest tightness, shortness of breath and wheezing.    Cardiovascular:  Negative for chest pain.   Musculoskeletal:  Negative for myalgias.   Integumentary:  Negative for rash.   Allergic/Immunologic: Negative for environmental allergies.   Neurological:  Negative for headaches.         1. Obstructive chronic bronchitis  -     albuterol-ipratropium (DUO-NEB) 2.5 mg-0.5 mg/3 mL nebulizer solution; Take 3 mLs by nebulization every 6 (six) hours as needed (wheezing or coughing).  Rescue  Dispense: 75 mL; Refill: 2  -     nebulizer accessories Kit; Use to administer nebulized medicine as directed.  Dispense: 1 kit; Refill: 5    2. Stage 4 chronic kidney disease  -     Comprehensive Metabolic Panel; Standing    3. Type 2 diabetes mellitus with stage 4 chronic kidney disease, without long-term current use of insulin  -     Hemoglobin A1C; Standing  -     Comprehensive Metabolic Panel; Standing  -     Lipid Panel; Standing  -     Microalbumin/Creatinine Ratio, Urine; Standing    4. Fatty liver  Overview:  US ABDOMEN COMPLETE 12/23/2022  IMPRESSION: 1. Findings most consistent with diffuse fatty infiltration of the liver.    Orders:  -     Comprehensive Metabolic Panel; Standing  -     PLATELET COUNT; Future; Expected date: 07/17/2024  -     GAMMA GT; Future; Expected date: 07/17/2024    5. Hyperlipidemia copmplicating type 2 diabetes mellitus  -     Comprehensive Metabolic Panel; Standing  -     Lipid Panel; Standing    6. Post-surgical hypothyroidism  -     TSH; Standing    7. Hypertension complicating diabetes  -     SCHEDULED EKG 12-LEAD (to Muse); Future  -     Comprehensive Metabolic Panel; Standing  -     Lipid Panel; Standing  -     Microalbumin/Creatinine Ratio, Urine; Standing    8. Screening for colorectal cancer  -     Cologuard Screening (Multitarget Stool DNA); Future; Expected date: 07/17/2024    9. Diabetic eye exam  -     Ambulatory referral/consult to Ophthalmology; Future; Expected date: 07/24/2024    No other significant complaints or concerns were reported.  Refer to Patient Portal Message for additional education/instructions provided.  Today's visit involved the intricate management of episodic problem(s) and the ongoing care for the patient's serious or complex condition(s) listed above, reflecting the inherent complexity of providing longitudinal, comprehensive evaluation and management as the central hub for the patient's primary care services.  There were no vitals  "filed for this visit.  Physical Exam    Constitutional: No acute distress. Normal appearance. Not ill-appearing.  Pulmonary: Pulmonary effort is normal. No respiratory distress. She did not cough once during the whole encounter.  Skin: Skin is not jaundiced.  Neurological: Alert. Mental status is at baseline.  Psychiatric: Mood grossly euthymic.    This note was generated with the assistance of ambient listening technology. Verbal consent was obtained by the patient and accompanying visitor(s) for the recording of patient appointment to facilitate this note. I attest to having reviewed and edited the generated note for accuracy, though some syntax or spelling errors may persist. Please contact the author of this note for any clarification.    I spent a total of 46 minutes today evaluating and managing this patient for this encounter.  This includes face to face time and non-face to face time preparing to see the patient (eg, review of tests), obtaining and/or reviewing separately obtained history, documenting clinical information in the electronic or other health record, independently interpreting results and communicating results to the patient/family/caregiver, or care coordinator. This time was exclusive of any separately billable procedures for this patient and exclusive of time spent treating any other patient.    Documentation entered by me for this encounter may have been done in part using speech-recognition technology. Although I have made an effort to ensure accuracy, "sound like" errors may exist and should be interpreted in context.    Each patient to whom medical services are provided by telemedicine is: (1) informed of the relationship between the physician and patient and the respective role of any other health care provider with respect to management of the patient; and (2) notified that he or she may decline to receive medical services by telemedicine and may withdraw from such care at any time. "     FOLLOW-UP:  Schedule her for EKG and Labs: TSH, Hemoglobin A1C, Comprehensive Metabolic Panel, Lipid Panel, Urine Microalbumin/Creatinine Ratio, Platelet Count, Gamma GT  Schedule appointment in-office for quick in-and-out visit on same day as labs for: blood pressure measurement, weight measurement, and diabetic foot exam. (Can be on same day as labs.)  Schedule or call-back for OV in Jan-Feb.  Explain Cologuard and give instructions:  1 . Receive and unpack your Cologuard kit. Make a commitment to yourself to complete the test within one week.  2 . Place your sample container into the toilet bracket.  3 . Use your Cologuard kit to collect your sample.  4 . Scrape your sample, then fill the sample container with the preservative.  5 . Using the prepaid UPS label, ship the box back WITHIN 24 HOURS.

## 2024-07-17 NOTE — Clinical Note
Schedule her for EKG and Labs: TSH, Hemoglobin A1C, Comprehensive Metabolic Panel, Lipid Panel, Urine Microalbumin/Creatinine Ratio, Platelet Count, Gamma GT  Schedule appointment in-office for quick in-and-out visit on same day as labs for: blood pressure measurement, weight measurement, and diabetic foot exam. (Can be on same day as labs.)  Schedule or call-back for OV in Jan-Feb.  Explain Cologuard and give instructions:  1 . Receive and unpack your Cologuard kit. Make a commitment to yourself to complete the test within one week.   2 . Place your sample container into the toilet bracket.  3 . Use your Cologuard kit to collect your sample.  4 . Scrape your sample, then fill the sample container with the preservative.  5 . Using the prepaid UPS label, ship the box back WITHIN 24 HOURS.  Arava Counseling:  Patient counseled regarding adverse effects of Arava including but not limited to nausea, vomiting, abnormalities in liver function tests. Patients may develop mouth sores, rash, diarrhea, and abnormalities in blood counts. The patient understands that monitoring is required including LFTs and blood counts.  There is a rare possibility of scarring of the liver and lung problems that can occur when taking methotrexate. Persistent nausea, loss of appetite, pale stools, dark urine, cough, and shortness of breath should be reported immediately. Patient advised to discontinue Arava treatment and consult with a physician prior to attempting conception. The patient will have to undergo a treatment to eliminate Arava from the body prior to conception.

## 2024-07-18 ENCOUNTER — TELEPHONE (OUTPATIENT)
Dept: INTERNAL MEDICINE | Facility: CLINIC | Age: 69
End: 2024-07-18
Payer: MEDICARE

## 2024-07-18 NOTE — TELEPHONE ENCOUNTER
----- Message from KARAN May MD sent at 7/17/2024  3:08 PM CDT -----  Schedule her for EKG and Labs: TSH, Hemoglobin A1C, Comprehensive Metabolic Panel, Lipid Panel, Urine Microalbumin/Creatinine Ratio, Platelet Count, Gamma GT    Schedule appointment in-office for quick in-and-out visit on same day as labs for: blood pressure measurement, weight measurement, and diabetic foot exam. (Can be on same day as labs.)    Schedule or call-back for OV in Jan-Feb.    Explain Cologuard and give instructions:   1 . Receive and unpack your Cologuard kit. Make a commitment to yourself to complete the test within one week.    2 . Place your sample container into the toilet bracket.   3 . Use your Cologuard kit to collect your sample.   4 . Scrape your sample, then fill the sample container with the preservative.   5 . Using the prepaid UPS label, ship the box back WITHIN 24 HOURS.

## 2024-07-22 ENCOUNTER — HOSPITAL ENCOUNTER (OUTPATIENT)
Dept: CARDIOLOGY | Facility: HOSPITAL | Age: 69
Discharge: HOME | End: 2024-07-22
Attending: FAMILY MEDICINE
Payer: MEDICARE

## 2024-07-22 ENCOUNTER — OFFICE VISIT (OUTPATIENT)
Dept: INTERNAL MEDICINE | Facility: CLINIC | Age: 69
End: 2024-07-22
Payer: MEDICARE

## 2024-07-22 VITALS
HEIGHT: 66 IN | BODY MASS INDEX: 25.19 KG/M2 | WEIGHT: 156.75 LBS | OXYGEN SATURATION: 97 % | DIASTOLIC BLOOD PRESSURE: 82 MMHG | HEART RATE: 97 BPM | TEMPERATURE: 98 F | SYSTOLIC BLOOD PRESSURE: 130 MMHG

## 2024-07-22 DIAGNOSIS — M20.11 HALLUX VALGUS, RIGHT: Chronic | ICD-10-CM

## 2024-07-22 DIAGNOSIS — I15.2 HYPERTENSION COMPLICATING DIABETES: Chronic | ICD-10-CM

## 2024-07-22 DIAGNOSIS — E11.59 HYPERTENSION COMPLICATING DIABETES: Chronic | ICD-10-CM

## 2024-07-22 DIAGNOSIS — E78.5 HYPERLIPIDEMIA ASSOCIATED WITH TYPE 2 DIABETES MELLITUS: Chronic | ICD-10-CM

## 2024-07-22 DIAGNOSIS — E89.0 POST-SURGICAL HYPOTHYROIDISM: Chronic | ICD-10-CM

## 2024-07-22 DIAGNOSIS — I70.0 AORTIC ATHEROSCLEROSIS: Chronic | ICD-10-CM

## 2024-07-22 DIAGNOSIS — E11.22 TYPE 2 DIABETES MELLITUS WITH STAGE 4 CHRONIC KIDNEY DISEASE, WITHOUT LONG-TERM CURRENT USE OF INSULIN: Chronic | ICD-10-CM

## 2024-07-22 DIAGNOSIS — L84 TYPE 2 DIABETES MELLITUS WITH PRESSURE CALLUS: Primary | Chronic | ICD-10-CM

## 2024-07-22 DIAGNOSIS — E11.628 TYPE 2 DIABETES MELLITUS WITH PRESSURE CALLUS: Primary | Chronic | ICD-10-CM

## 2024-07-22 DIAGNOSIS — N18.4 TYPE 2 DIABETES MELLITUS WITH STAGE 4 CHRONIC KIDNEY DISEASE, WITHOUT LONG-TERM CURRENT USE OF INSULIN: Chronic | ICD-10-CM

## 2024-07-22 DIAGNOSIS — E11.69 HYPERLIPIDEMIA ASSOCIATED WITH TYPE 2 DIABETES MELLITUS: Chronic | ICD-10-CM

## 2024-07-22 DIAGNOSIS — K76.0 FATTY LIVER: Chronic | ICD-10-CM

## 2024-07-22 DIAGNOSIS — I10 ESSENTIAL HYPERTENSION: Chronic | ICD-10-CM

## 2024-07-22 DIAGNOSIS — E78.2 MIXED HYPERLIPIDEMIA: Chronic | ICD-10-CM

## 2024-07-22 PROBLEM — I25.10 CORONARY ARTERY DISEASE INVOLVING NATIVE CORONARY ARTERY OF NATIVE HEART WITHOUT ANGINA PECTORIS: Chronic | Status: RESOLVED | Noted: 2023-01-03 | Resolved: 2024-07-22

## 2024-07-22 LAB
OHS QRS DURATION: 76 MS
OHS QTC CALCULATION: 474 MS

## 2024-07-22 PROCEDURE — 3044F HG A1C LEVEL LT 7.0%: CPT | Mod: CPTII,S$GLB,, | Performed by: FAMILY MEDICINE

## 2024-07-22 PROCEDURE — 93005 ELECTROCARDIOGRAM TRACING: CPT

## 2024-07-22 PROCEDURE — 99214 OFFICE O/P EST MOD 30 MIN: CPT | Mod: S$GLB,,, | Performed by: FAMILY MEDICINE

## 2024-07-22 PROCEDURE — 3066F NEPHROPATHY DOC TX: CPT | Mod: CPTII,S$GLB,, | Performed by: FAMILY MEDICINE

## 2024-07-22 PROCEDURE — 1160F RVW MEDS BY RX/DR IN RCRD: CPT | Mod: CPTII,S$GLB,, | Performed by: FAMILY MEDICINE

## 2024-07-22 PROCEDURE — 3075F SYST BP GE 130 - 139MM HG: CPT | Mod: CPTII,S$GLB,, | Performed by: FAMILY MEDICINE

## 2024-07-22 PROCEDURE — 93010 ELECTROCARDIOGRAM REPORT: CPT | Mod: ,,, | Performed by: INTERNAL MEDICINE

## 2024-07-22 PROCEDURE — 1125F AMNT PAIN NOTED PAIN PRSNT: CPT | Mod: CPTII,S$GLB,, | Performed by: FAMILY MEDICINE

## 2024-07-22 PROCEDURE — 3008F BODY MASS INDEX DOCD: CPT | Mod: CPTII,S$GLB,, | Performed by: FAMILY MEDICINE

## 2024-07-22 PROCEDURE — 1159F MED LIST DOCD IN RCRD: CPT | Mod: CPTII,S$GLB,, | Performed by: FAMILY MEDICINE

## 2024-07-22 PROCEDURE — G2211 COMPLEX E/M VISIT ADD ON: HCPCS | Mod: S$GLB,,, | Performed by: FAMILY MEDICINE

## 2024-07-22 PROCEDURE — 3079F DIAST BP 80-89 MM HG: CPT | Mod: CPTII,S$GLB,, | Performed by: FAMILY MEDICINE

## 2024-07-22 PROCEDURE — 99999 PR PBB SHADOW E&M-EST. PATIENT-LVL IV: CPT | Mod: PBBFAC,,, | Performed by: FAMILY MEDICINE

## 2024-07-22 RX ORDER — LEVOTHYROXINE SODIUM 100 UG/1
100 TABLET ORAL
Qty: 90 TABLET | Refills: 1 | Status: SHIPPED | OUTPATIENT
Start: 2024-07-22

## 2024-07-22 RX ORDER — ATORVASTATIN CALCIUM 20 MG/1
20 TABLET, FILM COATED ORAL NIGHTLY
Qty: 90 TABLET | Refills: 1 | Status: SHIPPED | OUTPATIENT
Start: 2024-07-22

## 2024-07-22 RX ORDER — AMLODIPINE BESYLATE 10 MG/1
10 TABLET ORAL DAILY
Qty: 90 TABLET | Refills: 1 | Status: SHIPPED | OUTPATIENT
Start: 2024-07-22

## 2024-07-22 NOTE — PROGRESS NOTES
LAST APPOINTMENT WITH ME:  7/17/2024  Health Maintenance Due   Topic Date Due    TETANUS VACCINE  Never done    Colorectal Cancer Screening  Never done    Shingles Vaccine (1 of 2) Never done    RSV Vaccine (Age 60+ and Pregnant patients) (1 - 1-dose 60+ series) Never done    COVID-19 Vaccine (3 - 2023-24 season) 09/01/2023    Eye Exam  12/23/2023       OFFICE VISIT 7/22/24  9:00 AM CDT    CHIEF COMPLAINT: Diabetes    She had labs done today to evaluate the status of her type 2 diabetes, including hemoglobin A1c and urine microalbumin. Results are pending. Diabetic foot exam performed, remarkable for hallux valgus on the right, which she says is painful, and she wants to discuss treatment options with podiatry. Hypertension is controlled. EKG done today, results pending. Aortic atherosclerosis is asymptomatic on atorvastatin. She is also on atorvastatin for hyperlipidemia complicating her type 2 diabetes. Type 2 diabetes also complicated by stage 4 chronic kidney disease. Labs ordered included comprehensive metabolic panel to assess renal function and lipid panel to assess effectiveness of treatment for hyperlipidemia. She has underlying fatty liver disease, and we have ordered labs (CMP, GGT, platelet count) to evaluate the status of that. She is on levothyroxine for her hypothyroidism. TSH performed to evaluate the status of that condition.      1. Type 2 diabetes mellitus with pressure callus  -     Ambulatory referral/consult to Podiatry; Future; Expected date: 07/29/2024    2. Hallux valgus, right  -     Ambulatory referral/consult to Podiatry; Future; Expected date: 07/29/2024    3. Post-surgical hypothyroidism  -     levothyroxine (SYNTHROID) 100 MCG tablet; Take 1 tablet (100 mcg total) by mouth before breakfast.  Dispense: 90 tablet; Refill: 1    4. Essential hypertension  -     amLODIPine (NORVASC) 10 MG tablet; Take 1 tablet (10 mg total) by mouth once daily.  Dispense: 90 tablet; Refill: 1    5. Aortic  atherosclerosis  Overview:  CT CHEST WITHOUT CONTRAST 12/29/2022  FINDINGS: Thoracic aorta demonstrates mild atherosclerotic plaquing.    US ABDOMEN COMPLETE 12/23/2022  IMPRESSION: Ectasia of the infrarenal abdominal aorta without evidence for aneurysm.      Orders:  -     atorvastatin (LIPITOR) 20 MG tablet; Take 1 tablet (20 mg total) by mouth every evening.  Dispense: 90 tablet; Refill: 1    6. Mixed hyperlipidemia  -     atorvastatin (LIPITOR) 20 MG tablet; Take 1 tablet (20 mg total) by mouth every evening.  Dispense: 90 tablet; Refill: 1    7. Type 2 diabetes mellitus with stage 4 chronic kidney disease, without long-term current use of insulin    8. Hyperlipidemia copmplicating type 2 diabetes mellitus  -     atorvastatin (LIPITOR) 20 MG tablet; Take 1 tablet (20 mg total) by mouth every evening.  Dispense: 90 tablet; Refill: 1    9. Hypertension complicating diabetes  -     amLODIPine (NORVASC) 10 MG tablet; Take 1 tablet (10 mg total) by mouth once daily.  Dispense: 90 tablet; Refill: 1    10. Fatty liver  Overview:  US ABDOMEN COMPLETE 12/23/2022  IMPRESSION: 1. Findings most consistent with diffuse fatty infiltration of the liver.      No other significant complaints or concerns were reported.  Today's visit involved the intricate management of episodic problem(s) and the ongoing care for the patient's serious or complex condition(s) listed above, reflecting the inherent complexity of providing longitudinal, comprehensive evaluation and management as the central hub for the patient's primary care services.    No follow-ups on file.   Future Appointments   Date Time Provider Department Center   8/9/2024  8:45 AM Jennifer Correa DPM [facility] POD [facility]   10/22/2024  8:40 AM KARAN May MD [facility] IM [facility]   10/23/2024  8:20 AM Jin Ruffin OD [facility] OPHTHAL [facility]   11/26/2024  9:30 AM [facility] CT1 LIMIT 500 LBS [facility] CT SCAN [facility]   11/26/2024  9:45 AM Nilson London MD [facility]  "PULMSVC [facility]       Review of Systems   Respiratory:  Negative for chest tightness and shortness of breath.    Cardiovascular:  Negative for chest pain.   Endocrine: Negative for polydipsia and polyuria.       Vitals:    07/22/24 0927   BP: 130/82   BP Location: Right arm   Patient Position: Sitting   BP Method: Medium (Manual)   Pulse: 97   Temp: 98.2 °F (36.8 °C)   TempSrc: Tympanic   SpO2: 97%   Weight: 71.1 kg (156 lb 12 oz)   Height: 5' 6" (1.676 m)   Physical Exam  Vitals reviewed.   Constitutional:       General: She is not in acute distress.     Appearance: Normal appearance. She is not ill-appearing, toxic-appearing or diaphoretic.   Cardiovascular:      Rate and Rhythm: Normal rate.   Pulmonary:      Effort: Pulmonary effort is normal.   Musculoskeletal:      Comments: R Hallux Valgus   Neurological:      Mental Status: She is alert and oriented to person, place, and time. Mental status is at baseline.   Psychiatric:         Mood and Affect: Mood normal.         Behavior: Behavior normal.         Judgment: Judgment normal.     DIABETIC FOOT EXAM:  Protective Sensation (w/ 10 gram monofilament):  Right: Intact  Left: Intact    Visual Inspection:  Right Hallux Valgus. Callus on medial aspect of right toe.    Pedal Pulses:   Right: Present  Left: Present    Posterior Tibialis Pulses:   Right:Present  Left: Present     Documentation entered by me for this encounter may have been done in part using speech-recognition technology. Although I have made an effort to ensure accuracy, "sound like" errors may exist and should be interpreted in context.  "

## 2024-07-27 NOTE — PROGRESS NOTES
Results to be addressed at upcoming appointment(s) already scheduled.  Future Appointments  8/9/2024   8:45 AM    Jennifer Correa DPM     [facility] POD            [facility]  10/22/2024 8:40 AM    KARAN May MD      [facility] IM             [facility]  10/23/2024 8:20 AM    Jin Ruffin OD    [facility] OPHTHAL        [facility]  11/26/2024 9:30 AM    [facility] CT1 LIMIT 500 LBS     [facility] CT SCAN        [facility]  11/26/2024 9:45 AM    Nilson London MD       [facility] PULMSVC        [facility]

## 2024-08-09 DIAGNOSIS — J44.89 OBSTRUCTIVE CHRONIC BRONCHITIS: Chronic | ICD-10-CM

## 2024-08-09 RX ORDER — IPRATROPIUM BROMIDE AND ALBUTEROL SULFATE 2.5; .5 MG/3ML; MG/3ML
SOLUTION RESPIRATORY (INHALATION)
Qty: 360 ML | Refills: 0 | Status: SHIPPED | OUTPATIENT
Start: 2024-08-09

## 2024-08-13 DIAGNOSIS — E89.0 POST-SURGICAL HYPOTHYROIDISM: Chronic | ICD-10-CM

## 2024-08-13 DIAGNOSIS — E05.80 IATROGENIC HYPERTHYROIDISM: Primary | ICD-10-CM

## 2024-08-13 RX ORDER — LEVOTHYROXINE SODIUM 88 UG/1
88 TABLET ORAL
Qty: 90 TABLET | Refills: 1 | Status: SHIPPED | OUTPATIENT
Start: 2024-08-13

## 2024-08-13 NOTE — LETTER
08/13/2024        TED MANZANO   [address] [id number]        Dear Ted,    I hope this letter finds you well.     I've been unsuccessful in reaching you by phone, so I'm sending you this letter. I wanted to take a moment to review your recent lab results with you and provide some explanations to help you understand them better.    - Thyroid Function Tests (TSH and Free T4): These tests measure how well your thyroid is functioning.    - Your TSH level was low at 0.151 (L), which suggests that your thyroid may be slightly overactive. This is likely due to the current dose of your thyroid medication, so Ive reduced your dose of levothyroxine from 100 mcg daily to 88 mcg daily to better match your needs. I recommend checking your thyroid labs (TSH and Free T4) again before your next appointment to ensure the adjustment is working as expected.    - Comprehensive Metabolic Panel (CMP): This panel gives us an overview of various important substances in your blood and helps us assess your kidney and liver function, as well as your electrolyte balance.    - Kidney function: Your creatinine level was slightly elevated at 1.5 (H), but this is an improvement from your previous results, where it was even higher. This indicates that your kidney function is improving, which is great news. However, we will continue to monitor this closely. The eGFR, which measures how well your kidneys are filtering, was noted as abnormal at 37.0 (A), but this also reflects a slight improvement from previous tests.    - Liver function: Your alkaline phosphatase was a bit high at 150 (H). This could be due to a variety of factors, such as bone growth or liver issues, but its not a cause for immediate concern since your other liver enzymes (AST and ALT) were perfectly normal.    - Electrolytes and other components: Your sodium, potassium, chloride, CO2, calcium, and other values were all within normal ranges, which is  reassuring and indicates that your bodys electrolyte balance is stable.    ---continued on next page---         - Lipid Panel: This checks your cholesterol levels, which are important indicators of heart health.    - Your total cholesterol, triglycerides, HDL, and LDL cholesterol levels are all within normal ranges, which is excellent for your heart health. The ratio of your total cholesterol to HDL is also in a healthy range.    - Hemoglobin A1C: This test measures your average blood sugar levels over the past few months.    - Your Hemoglobin A1C was slightly elevated at 6.2 (H), which indicates that your blood sugar levels have been higher than ideal. However, this result is stable compared to your previous tests, which is a positive sign. We will continue to monitor this and may discuss some lifestyle adjustments if needed.    In summary, while there are a few areas that we will continue to monitor, such as your thyroid function and blood sugar levels, most of your results are stable and improving, which is encouraging.    YOUR NEXT STEP: Please call the appointment desk at [phone] to schedule your lab (TSH and Free T4) to be done a few days before our next visit on Tuesday, October 22nd at 8:40 AM. This will allow us to assess how your levels are responding to the medication adjustment.    I look forward to seeing you at your next appointment.     Best regards,     GENARO May MD, FAAFP

## 2024-08-13 NOTE — PROGRESS NOTES
08/13/2024        TED MANZANO   [address] [address]        Dear Ted,    I hope this letter finds you well.     I've been unsuccessful in reaching you by phone, so I'm sending you this letter. I wanted to take a moment to review your recent lab results with you and provide some explanations to help you understand them better.    - Thyroid Function Tests (TSH and Free T4): These tests measure how well your thyroid is functioning.    - Your TSH level was low at 0.151 (L), which suggests that your thyroid may be slightly overactive. This is likely due to the current dose of your thyroid medication, so Ive reduced your dose of levothyroxine from 100 mcg daily to 88 mcg daily to better match your needs. I recommend checking your thyroid labs (TSH and Free T4) again before your next appointment to ensure the adjustment is working as expected.    - Comprehensive Metabolic Panel (CMP): This panel gives us an overview of various important substances in your blood and helps us assess your kidney and liver function, as well as your electrolyte balance.    - Kidney function: Your creatinine level was slightly elevated at 1.5 (H), but this is an improvement from your previous results, where it was even higher. This indicates that your kidney function is improving, which is great news. However, we will continue to monitor this closely. The eGFR, which measures how well your kidneys are filtering, was noted as abnormal at 37.0 (A), but this also reflects a slight improvement from previous tests.    - Liver function: Your alkaline phosphatase was a bit high at 150 (H). This could be due to a variety of factors, such as bone growth or liver issues, but its not a cause for immediate concern since your other liver enzymes (AST and ALT) were perfectly normal.    - Electrolytes and other components: Your sodium, potassium, chloride, CO2, calcium, and other values were all within normal ranges, which is  reassuring and indicates that your bodys electrolyte balance is stable.    - Lipid Panel: This checks your cholesterol levels, which are important indicators of heart health.    - Your total cholesterol, triglycerides, HDL, and LDL cholesterol levels are all within normal ranges, which is excellent for your heart health. The ratio of your total cholesterol to HDL is also in a healthy range.    - Hemoglobin A1C: This test measures your average blood sugar levels over the past few months.    - Your Hemoglobin A1C was slightly elevated at 6.2 (H), which indicates that your blood sugar levels have been higher than ideal. However, this result is stable compared to your previous tests, which is a positive sign. We will continue to monitor this and may discuss some lifestyle adjustments if needed.    In summary, while there are a few areas that we will continue to monitor, such as your thyroid function and blood sugar levels, most of your results are stable and improving, which is encouraging.    YOUR NEXT STEP: Please call the appointment desk at [phone] to schedule your lab (TSH and Free T4) to be done a few days before our next visit on Tuesday, October 22nd at 8:40 AM. This will allow us to assess how your levels are responding to the medication adjustment.    I look forward to seeing you at your next appointment.     Best regards,     GENARO May MD, FAAFP

## 2024-08-16 ENCOUNTER — OFFICE VISIT (OUTPATIENT)
Dept: PODIATRY | Facility: CLINIC | Age: 69
End: 2024-08-16
Payer: MEDICARE

## 2024-08-16 VITALS — HEIGHT: 66 IN | WEIGHT: 156.75 LBS | BODY MASS INDEX: 25.19 KG/M2

## 2024-08-16 DIAGNOSIS — M21.611: ICD-10-CM

## 2024-08-16 DIAGNOSIS — E11.628 TYPE 2 DIABETES MELLITUS WITH PRESSURE CALLUS: Primary | Chronic | ICD-10-CM

## 2024-08-16 DIAGNOSIS — L84 CORN OR CALLUS: ICD-10-CM

## 2024-08-16 DIAGNOSIS — N18.4 STAGE 4 CHRONIC KIDNEY DISEASE: ICD-10-CM

## 2024-08-16 DIAGNOSIS — L84 TYPE 2 DIABETES MELLITUS WITH PRESSURE CALLUS: Primary | Chronic | ICD-10-CM

## 2024-08-16 PROCEDURE — 99999 PR PBB SHADOW E&M-EST. PATIENT-LVL IV: CPT | Mod: PBBFAC,,, | Performed by: PODIATRIST

## 2024-08-16 NOTE — PROGRESS NOTES
Subjective:       Patient ID: Lizz Crockett is a 69 y.o. female.    Chief Complaint: Callouses (Diabetic pt c/o  right hallux callus 0/10, pt c/o left 2nd, 3rd, and 4th digit toe numbness, PCP Lalo Alvarez last seen 7/22/2024)      HPI: Lizz Crockett presents to the office today, under referral by , KARAN May MD, for her annual diabetic foot assessment and risk evaluation.  Patient is a DMII. Patient states history of blister formation to the inferior aspect of the right great toe and callus formation to lateral aspect of the right foot.  States this is moderately painful with ambulation.  Was recently seen by her primary care provider in provided antibiotic cream.  States this is provided significant improvement to the area blister formation.  Reporting 0/10 pain today. This patient last saw his/her internal/family medicine physician on 07/22/2024.     Hemoglobin A1C   Date Value Ref Range Status   07/22/2024 6.2 (H) 4.0 - 5.6 % Final     Comment:     ADA Screening Guidelines:  5.7-6.4%  Consistent with prediabetes  >or=6.5%  Consistent with diabetes    High levels of fetal hemoglobin interfere with the HbA1C  assay. Heterozygous hemoglobin variants (HbS, HgC, etc)do  not significantly interfere with this assay.   However, presence of multiple variants may affect accuracy.     03/08/2024 6.2 (H) 4.0 - 5.6 % Final     Comment:     ADA Screening Guidelines:  5.7-6.4%  Consistent with prediabetes  >or=6.5%  Consistent with diabetes    High levels of fetal hemoglobin interfere with the HbA1C  assay. Heterozygous hemoglobin variants (HbS, HgC, etc)do  not significantly interfere with this assay.   However, presence of multiple variants may affect accuracy.     09/07/2023 6.0 (H) 4.0 - 5.6 % Final     Comment:     ADA Screening Guidelines:  5.7-6.4%  Consistent with prediabetes  >or=6.5%  Consistent with diabetes    High levels of fetal hemoglobin interfere with the HbA1C  assay. Heterozygous hemoglobin  variants (HbS, HgC, etc)do  not significantly interfere with this assay.   However, presence of multiple variants may affect accuracy.     .    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    Cataract     Chronic obstructive bronchitis 2023    Diabetes mellitus, type 2     GERD (gastroesophageal reflux disease)     Glaucoma     Hyperlipidemia     Hypertension     PONV (postoperative nausea and vomiting)     Sarcoidosis, unspecified     Schizophreniform disorder 2022    Thyroid disease     Type 2 diabetes mellitus with stage 4 chronic kidney disease, without long-term current use of insulin 2022       Family History   Problem Relation Name Age of Onset    Heart disease Mother      Hypertension Mother      Hypertension Father      Heart disease Father         Social History     Socioeconomic History    Marital status: Single   Tobacco Use    Smoking status: Former     Current packs/day: 0.00     Average packs/day: 1 pack/day for 19.0 years (19.0 ttl pk-yrs)     Types: Cigarettes     Start date:      Quit date:      Years since quittin.6    Smokeless tobacco: Former   Substance and Sexual Activity    Alcohol use: No    Drug use: No    Sexual activity: Yes     Social Determinants of Health     Financial Resource Strain: Patient Declined (2024)    Overall Financial Resource Strain (CARDIA)     Difficulty of Paying Living Expenses: Patient declined   Food Insecurity: No Food Insecurity (2024)    Hunger Vital Sign     Worried About Running Out of Food in the Last Year: Never true     Ran Out of Food in the Last Year: Never true   Physical Activity: Unknown (2024)    Exercise Vital Sign     Days of Exercise per Week: 0 days   Stress: Stress Concern  "Present (7/16/2024)    Citizen of [place] of Occupational [employer] - Occupational Stress Questionnaire     Feeling of Stress : To some extent   Housing Stability: Unknown (7/16/2024)    Housing Stability Vital Sign     Unable to Pay for Housing in the Last Year: No       Past Surgical History:   Procedure Laterality Date    BREAST SURGERY      CATARACT EXTRACTION, BILATERAL Bilateral     INCISION AND DRAINAGE, UPPER EXTREMITY Left 1/26/2023    Procedure: INCISION AND DRAINAGE, UPPER EXTREMITY;  Surgeon: Cam Oneill MD;  Location: [facility];  Service: Orthopedics;  Laterality: Left;    THYROIDECTOMY      for multinodular goiter    TUBAL LIGATION         Review of Systems        Objective:   Ht 5' 6" (1.676 m)   Wt 71.1 kg (156 lb 12 oz)   BMI 25.30 kg/m²     Physical Exam  LOWER EXTREMITY PHYSICAL EXAMINATION    ORTHOPEDIC:  Asymptomatic bunion deformity present to the right great toe.  No pain with range of motion.  No significant pain with push-off or ambulation.    VASCULAR:  The right dorsalis pedis pulse 2/4 and the right posterior tibial pulse 2/4.  The left dorsalis pedis pulse 2/4 and posterior tibial pulse on the left is 2/4.  Capillary refill is intact.  Pedal hair growth intact    NEUROLOGY:  Protective sensation is intact with [place] monofilament. Proprioception is intact. Intact sensation to light touch.      DERMATOLOGY:  Dried blister formation sub right great toe with healthy underlying tissue.  No active drainage is noted.  Some flaking tissue noted from previous blister formation.  Hyperkeratotic lesion present to the plantar aspect the left 5th metatarsal head without any underlying ulceration.    Assessment:     1. Type 2 diabetes mellitus with pressure callus    2. Stage 4 chronic kidney disease    3. Asymptomatic bunion of right great toe    4. Corn or callus - Right Foot        Plan:     Type 2 diabetes mellitus with pressure callus  -     Ambulatory referral/consult to " Podiatry    Stage 4 chronic kidney disease    Asymptomatic bunion of right great toe  -     Ambulatory referral/consult to Podiatry    [place] or callus - Right Foot      I counseled the patient on his/her Diabetic Mellitus regarding today's clinical examination and objection findings. We did also discuss recent medication changes, pertinent labs and imaging evaluations and other medical consultation notes and progress notes. Greater than 50% of this visit was spent on counseling and coordination of care. Greater than 20 minutes of this appt. was spent on education about the diabetic foot, in relation to PVD and/or neuropathy, and the prevention of limb loss.     Shoe gear is inspected and wear and proper fit/type. Patient is reminded of the importance of good nutrition and blood sugar control to help prevent podiatric complications of diabetes. Patient instructed on proper foot hygeine. We discussed wearing proper shoe gear, daily foot inspections, never walking without protective shoe gear, never putting sharp instruments to feet.  Patient  will continue to monitor the areas daily, inspect feet, wear protective shoe gear when ambulatory, moisturizer to maintain skin integrity.     Patient's DMI/DMII is managed by Internal/Family Medicine Physician and/or Endocrinology Advanced Practice Provider.    Hypertrophic skin formation, as outlined within the examination portion of this note, is surgically debrided with sharp #10/#15 blade, to alleviate discomfort with weight bearing and ambulation, and to lessen the possibility of skin complications, e.g., ulceration due to pressure. No ulceration(s) is are noted with/post debridement. The lesion is completed healed and resolved. No evidence of infection.

## 2024-08-23 ENCOUNTER — LAB VISIT (OUTPATIENT)
Dept: LAB | Facility: HOSPITAL | Age: 69
End: 2024-08-23
Attending: PODIATRIST
Payer: MEDICARE

## 2024-08-23 ENCOUNTER — OFFICE VISIT (OUTPATIENT)
Dept: PODIATRY | Facility: CLINIC | Age: 69
End: 2024-08-23
Payer: MEDICARE

## 2024-08-23 DIAGNOSIS — N18.4 TYPE 2 DIABETES MELLITUS WITH STAGE 4 CHRONIC KIDNEY DISEASE, WITHOUT LONG-TERM CURRENT USE OF INSULIN: ICD-10-CM

## 2024-08-23 DIAGNOSIS — M10.371 ACUTE GOUT DUE TO RENAL IMPAIRMENT INVOLVING TOE OF RIGHT FOOT: ICD-10-CM

## 2024-08-23 DIAGNOSIS — N18.4 STAGE 4 CHRONIC KIDNEY DISEASE: Primary | ICD-10-CM

## 2024-08-23 DIAGNOSIS — E11.22 TYPE 2 DIABETES MELLITUS WITH STAGE 4 CHRONIC KIDNEY DISEASE, WITHOUT LONG-TERM CURRENT USE OF INSULIN: ICD-10-CM

## 2024-08-23 DIAGNOSIS — N18.4 STAGE 4 CHRONIC KIDNEY DISEASE: ICD-10-CM

## 2024-08-23 DIAGNOSIS — M25.571 TOE JOINT PAIN, RIGHT: ICD-10-CM

## 2024-08-23 LAB
CRP SERPL-MCNC: 23.1 MG/L (ref 0–8.2)
ERYTHROCYTE [DISTWIDTH] IN BLOOD BY AUTOMATED COUNT: 13 % (ref 11.5–14.5)
ERYTHROCYTE [SEDIMENTATION RATE] IN BLOOD BY PHOTOMETRIC METHOD: 40 MM/HR (ref 0–36)
HCT VFR BLD AUTO: 45.5 % (ref 37–48.5)
HGB BLD-MCNC: 14.6 G/DL (ref 12–16)
MCH RBC QN AUTO: 29.2 PG (ref 27–31)
MCHC RBC AUTO-ENTMCNC: 32.1 G/DL (ref 32–36)
MCV RBC AUTO: 91 FL (ref 82–98)
PLATELET # BLD AUTO: 332 K/UL (ref 150–450)
PMV BLD AUTO: 10.6 FL (ref 9.2–12.9)
RBC # BLD AUTO: 5 M/UL (ref 4–5.4)
URATE SERPL-MCNC: 6.8 MG/DL (ref 2.4–5.7)
WBC # BLD AUTO: 8.36 K/UL (ref 3.9–12.7)

## 2024-08-23 PROCEDURE — 99999 PR PBB SHADOW E&M-EST. PATIENT-LVL IV: CPT | Mod: PBBFAC,,, | Performed by: PODIATRIST

## 2024-08-23 PROCEDURE — 89060 EXAM SYNOVIAL FLUID CRYSTALS: CPT | Performed by: PODIATRIST

## 2024-08-23 PROCEDURE — 85027 COMPLETE CBC AUTOMATED: CPT | Performed by: PODIATRIST

## 2024-08-23 PROCEDURE — 36415 COLL VENOUS BLD VENIPUNCTURE: CPT | Performed by: PODIATRIST

## 2024-08-23 PROCEDURE — 84550 ASSAY OF BLOOD/URIC ACID: CPT | Performed by: PODIATRIST

## 2024-08-23 PROCEDURE — 86140 C-REACTIVE PROTEIN: CPT | Performed by: PODIATRIST

## 2024-08-23 PROCEDURE — 85652 RBC SED RATE AUTOMATED: CPT | Performed by: PODIATRIST

## 2024-08-23 RX ORDER — DEXAMETHASONE SODIUM PHOSPHATE 4 MG/ML
4 INJECTION, SOLUTION INTRA-ARTICULAR; INTRALESIONAL; INTRAMUSCULAR; INTRAVENOUS; SOFT TISSUE
Status: DISCONTINUED | OUTPATIENT
Start: 2024-08-23 | End: 2024-08-23 | Stop reason: HOSPADM

## 2024-08-23 RX ORDER — TRIAMCINOLONE ACETONIDE 40 MG/ML
40 INJECTION, SUSPENSION INTRA-ARTICULAR; INTRAMUSCULAR
Status: DISCONTINUED | OUTPATIENT
Start: 2024-08-23 | End: 2024-08-23 | Stop reason: HOSPADM

## 2024-08-23 RX ADMIN — TRIAMCINOLONE ACETONIDE 40 MG: 40 INJECTION, SUSPENSION INTRA-ARTICULAR; INTRAMUSCULAR at 11:08

## 2024-08-23 RX ADMIN — DEXAMETHASONE SODIUM PHOSPHATE 4 MG: 4 INJECTION, SOLUTION INTRA-ARTICULAR; INTRALESIONAL; INTRAMUSCULAR; INTRAVENOUS; SOFT TISSUE at 11:08

## 2024-08-23 NOTE — PROGRESS NOTES
Subjective:       Patient ID: Lizz Crockett is a 69 y.o. female.    Chief Complaint: Foot Pain (Patient complains of 10/10 pain at present to right 1st MTP. She states she is unable to ambulate due to pain and swelling to area. )      HPI: Lizz Crockett presents to the office today with the chief complaint of severe pains to the right foot at the big toe joint. Pains are rated at approx. 10/10. Pains are described as sharp and intense in nature. Patient states these symptoms started approx. 8 days ago. Prolonged walking and standing as well as direct palpation and pressure worsens the pains. Patient denies trauma.  She is unable to ambulate due to severe pain. Reports extreme warmth and redness about the area. States moderate swelling as well. States no NSAID medications at this juncture given her history of CKD stage 4. Patient's Primary Care Provider is KARAN May MD.     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    Cataract     Chronic obstructive bronchitis 04/18/2023    Diabetes mellitus, type 2     GERD (gastroesophageal reflux disease)     Glaucoma     Hyperlipidemia     Hypertension     PONV (postoperative nausea and vomiting)     Sarcoidosis, unspecified     Schizophreniform disorder 12/21/2022    Thyroid disease     Type 2 diabetes mellitus with stage 4 chronic kidney disease, without long-term current use of insulin 09/02/2022       Family History   Problem Relation Name Age of Onset    Heart disease Mother      Hypertension Mother      Hypertension Father      Heart disease Father         Social History     Socioeconomic History    Marital status: Single   Tobacco Use    Smoking status: Former     Current packs/day: 0.00     Average packs/day: 1 pack/day for  19.0 years (19.0 ttl pk-yrs)     Types: Cigarettes     Start date:      Quit date:      Years since quittin.6    Smokeless tobacco: Former   Substance and Sexual Activity    Alcohol use: No    Drug use: No    Sexual activity: Yes     Social Determinants of Health     Financial Resource Strain: Patient Declined (2024)    Overall Financial Resource Strain (CARDIA)     Difficulty of Paying Living Expenses: Patient declined   Food Insecurity: No Food Insecurity (2024)    Hunger Vital Sign     Worried About Running Out of Food in the Last Year: Never true     Ran Out of Food in the Last Year: Never true   Physical Activity: Unknown (2024)    Exercise Vital Sign     Days of Exercise per Week: 0 days   Stress: Stress Concern Present (2024)    [place] [employer]ational [employer] - Occupational Stress Questionnaire     Feeling of Stress : To some extent   Housing Stability: Unknown (2024)    Housing Stability Vital Sign     Unable to Pay for Housing in the Last Year: No       Past Surgical History:   Procedure Laterality Date    BREAST SURGERY      CATARACT EXTRACTION, BILATERAL Bilateral     INCISION AND DRAINAGE, UPPER EXTREMITY Left 2023    Procedure: INCISION AND DRAINAGE, UPPER EXTREMITY;  Surgeon: Cam nOeill MD;  Location: [facility];  Service: Orthopedics;  Laterality: Left;    THYROIDECTOMY      for multinodular goiter    TUBAL LIGATION         Review of Systems      Objective:   There were no vitals taken for this visit.    Physical Exam    LOWER EXTREMITY PHYSICAL EXAMINATION  ORTHOPEDIC: Manual Muscle Testing is 5/5 in all planes on the right foot without pains, with and without resistance.  Gait pattern is significantly and severely antalgic.  Significant and severe pain with a attempted range motion of the right 1st metatarsophalangeal joint.  Guarding noted on evaluation    NEUROLOGY: Sensation to light touch is intact. Proprioception is intact.   Vibratory sensation is intact    VASCULAR: On the right foot, the dorsalis pedis pulse is 2/4 and the posterior tibial pulse is 1/4. Capillary refill time is less than 3 seconds. Hair growth is present on the dorsum of the foot and at the digits. Proximal to distal temperature is warm to warm.    DERMATOLOGY: Minimal erythema is noted to the right great toe joint. No calor is noted. No cellulitis is noted.      Assessment:     1. Stage 4 chronic kidney disease    2. Toe joint pain, right    3. Acute gout due to renal impairment involving toe of right foot    4. Type 2 diabetes mellitus with stage 4 chronic kidney disease, without long-term current use of insulin          Plan:     Stage 4 chronic kidney disease  -     CBC Without Differential; Future; Expected date: 08/23/2024  -     Sedimentation rate; Future; Expected date: 08/23/2024  -     C-reactive protein; Future; Expected date: 08/23/2024  -     Uric acid; Future; Expected date: 08/23/2024  -     Body fluid crystal Other (Specify)  -     Cancel: WBC & Diff,Body Fluid Other (Specify)    Toe joint pain, right  -     CBC Without Differential; Future; Expected date: 08/23/2024  -     Sedimentation rate; Future; Expected date: 08/23/2024  -     C-reactive protein; Future; Expected date: 08/23/2024  -     Uric acid; Future; Expected date: 08/23/2024  -     Body fluid crystal Other (Specify)  -     Cancel: WBC & Diff,Body Fluid Other (Specify)    Acute gout due to renal impairment involving toe of right foot  -     CBC Without Differential; Future; Expected date: 08/23/2024  -     Sedimentation rate; Future; Expected date: 08/23/2024  -     C-reactive protein; Future; Expected date: 08/23/2024  -     Uric acid; Future; Expected date: 08/23/2024  -     Body fluid crystal Other (Specify)  -     Cancel: WBC & Diff,Body Fluid Other (Specify)    Type 2 diabetes mellitus with stage 4 chronic kidney disease, without long-term current use of insulin      Thorough  discussion is had with the patient today, concerning the diagnosis, its etiology, and the treatment algorithm at present.    Joint aspiration obtained from the right foot.  Fluid sent for crystal evaluation  Updated labs.  CBC, ESR, CRP, uric acid  Recent labs were evaluated showing CKD stage 4 with GFR of 37.  Previously 29.9     Patient relates interest in steroid injection at this time as she did not want to take oral steroid medications.  Did discuss risk of steroid use as relates to increased anxiety, insomnia, post injection steroid flares, elevated blood sugars, swelling, pigment/skin changes.  Patient relates understanding.  Recommend use of anti-inflammatories with icing to decrease risk of a post-injection steroid flare.    Foot counseling and education is provided at this visit. Patient is advised to wear socks and shoes at all times.  Do not walk barefoot, or with just socks, even when indoors.  Be sure to check and inspect the inside of the shoe before putting them on her feet.  Protect your feet at all times.  Walking shoes and/or athletic shoes are the best types of shoe gear. Do not wear vinyl or plastic type shoe gear, as they do not stretch and/or breathe.  Protect your feet from hot and/or cold. Elevate the extremities when sitting.  Do not wear excessively tight socks and/or shoe gear. Wiggle your toes for a few minutes throughout the day. Move your ankles up and down, in and out, to help blood flow in your lower extremity.         Future Appointments   Date Time Provider Department Center   10/22/2024  8:40 AM KARAN May MD [facility]   10/23/2024  8:20 AM Jin Ruffin OD [facility] OPHTHAL [facility]   11/26/2024  9:30 AM [facility] CT1 LIMIT 500 LBS [facility] CT SCAN [facility]   11/26/2024  9:45 AM Nilson London [facility]

## 2024-08-23 NOTE — PROCEDURES
Small Joint Aspiration/Injection: R great MTP    Date/Time: 8/23/2024 11:00 AM    Performed by: Jennifer Correa DPM  Authorized by: Jennifer Correa DPM    Consent Done?:  Yes (Verbal)  Indications:  Pain and joint swelling  Site marked: the procedure site was marked    Timeout: prior to procedure the correct patient, procedure, and site was verified    Prep: patient was prepped and draped in usual sterile fashion      Local anesthesia used?: Yes    Anesthesia:  Local infiltration  Location:  Great toe  Site:  R great MTP  Needle size:  25 G  Approach:  Dorsal  Medications:  4 mg dexAMETHasone 4 mg/mL; 40 mg triamcinolone acetonide 40 mg/mL  Aspirate amount (ml):  2  Aspirate:  Yellow and blood-tinged  Lab: fluid sent for laboratory analysis    Patient tolerance:  Patient tolerated the procedure well with no immediate complications

## 2024-08-24 LAB
BODY FLD TYPE: ABNORMAL
CRYSTALS FLD MICRO: POSITIVE
PATH INTERP FLD-IMP: NORMAL

## 2024-08-26 ENCOUNTER — TELEPHONE (OUTPATIENT)
Dept: PODIATRY | Facility: CLINIC | Age: 69
End: 2024-08-26
Payer: MEDICARE

## 2024-08-26 DIAGNOSIS — E11.69 HYPERLIPIDEMIA ASSOCIATED WITH TYPE 2 DIABETES MELLITUS: Chronic | ICD-10-CM

## 2024-08-26 DIAGNOSIS — E78.5 HYPERLIPIDEMIA ASSOCIATED WITH TYPE 2 DIABETES MELLITUS: Chronic | ICD-10-CM

## 2024-08-26 DIAGNOSIS — E78.2 MIXED HYPERLIPIDEMIA: Chronic | ICD-10-CM

## 2024-08-26 DIAGNOSIS — I70.0 AORTIC ATHEROSCLEROSIS: Chronic | ICD-10-CM

## 2024-08-26 RX ORDER — ATORVASTATIN CALCIUM 20 MG/1
20 TABLET, FILM COATED ORAL NIGHTLY
Qty: 90 TABLET | Refills: 3 | Status: SHIPPED | OUTPATIENT
Start: 2024-08-26

## 2024-08-26 NOTE — TELEPHONE ENCOUNTER
No care due was identified.  [facility] Embedded Care Due Messages. Reference number: [id number].   8/26/2024 7:26:41 AM CDT

## 2024-08-26 NOTE — TELEPHONE ENCOUNTER
Discuss the results of labs.  Uric acid confirming gout with crystals from the joint fluid.  Discuss that patient is high risk for subsequent flares.  May require medication but would continue to watch and monitor.  States that her pain is 100% improved currently.

## 2024-08-26 NOTE — TELEPHONE ENCOUNTER
Refill Decision Note   Lizz Crockett  is requesting a refill authorization.  Brief Assessment and Rationale for Refill:  Approve     Medication Therapy Plan:         Comments:     Note composed:2:25 PM 08/26/2024             Appointments     Last Visit   7/22/2024 KARAN May MD   Next Visit   10/22/2024 KARAN May MD

## 2024-09-10 DIAGNOSIS — J44.89 OBSTRUCTIVE CHRONIC BRONCHITIS: Chronic | ICD-10-CM

## 2024-09-10 NOTE — TELEPHONE ENCOUNTER
Refill Routing Note   Medication(s) are not appropriate for processing by [facility] for the following reason(s):        New or recently adjusted medication    [facility] action(s):  Defer               Appointments  past 12m or future 3m with PCP    Date Provider   Last Visit   7/22/2024 KARAN May MD   Next Visit   10/22/2024 KARAN May MD   ED visits in past 90 days: 0        Note composed:11:05 AM 09/10/2024

## 2024-09-10 NOTE — TELEPHONE ENCOUNTER
No care due was identified.  [facility] Embedded Care Due Messages. Reference number: [id number].   9/10/2024 3:15:50 AM CDT

## 2024-09-17 ENCOUNTER — TELEPHONE (OUTPATIENT)
Dept: INTERNAL MEDICINE | Facility: CLINIC | Age: 69
End: 2024-09-17
Payer: MEDICARE

## 2024-09-17 NOTE — TELEPHONE ENCOUNTER
----- Message from Trista eKndall sent at 9/17/2024 11:07 AM CDT -----  Contact: [phone]  Type:  Patient Returning Call    Who Called:TED MANZANO [[id number]]  Who Left Message for Patient:  Does the patient know what this is regarding?:the medication albuterol-ipratropium (DUO-NEB) 2.5 mg-0.5 mg/3 mL nebulizer solution is not working/ still coughing/ mucus/ rattling on her chest/   Would the patient rather a call back or a response via [facility]? Call back   Best Call Back Number: [phone]  Additional Information: [id number] [id number].... want to try BENZONATE 100 MG TESSALON/ ALAHIST

## 2024-09-24 DIAGNOSIS — K21.9 GASTROESOPHAGEAL REFLUX DISEASE WITHOUT ESOPHAGITIS: ICD-10-CM

## 2024-09-24 RX ORDER — OMEPRAZOLE 40 MG/1
40 CAPSULE, DELAYED RELEASE ORAL EVERY MORNING
Qty: 90 CAPSULE | Refills: 3 | Status: SHIPPED | OUTPATIENT
Start: 2024-09-24

## 2024-09-24 NOTE — TELEPHONE ENCOUNTER
No care due was identified.  [facility] Embedded Care Due Messages. Reference number: [id number].   9/24/2024 6:17:41 AM CDT

## 2024-09-24 NOTE — TELEPHONE ENCOUNTER
Refill Decision Note   Lizz Bon  is requesting a refill authorization.  Brief Assessment and Rationale for Refill:  Approve     Medication Therapy Plan:         Comments:     Note composed:12:52 PM 09/24/2024

## 2024-09-25 RX ORDER — IPRATROPIUM BROMIDE AND ALBUTEROL SULFATE 2.5; .5 MG/3ML; MG/3ML
SOLUTION RESPIRATORY (INHALATION)
Qty: 1080 ML | Refills: 3 | OUTPATIENT
Start: 2024-09-25

## 2024-09-25 NOTE — TELEPHONE ENCOUNTER
REFILL NOT APPROVED  REASON: She has upcoming appointment soon. We can address this and any other refills at that appointment.  Requested Prescriptions   Pending Prescriptions Disp Refills    albuterol-ipratropium (DUO-NEB) 2.5 mg-0.5 mg/3 mL nebulizer solution [Pharmacy Med Name: IPRATROPI/ALB 0.5/3MG INH SL 30X3ML] 1080 mL 3     Sig: USE 1 VIAL VIA NEBULIZER EVERY 6 HOURS AS NEEDED FOR WHEEZING OR COUGHING OR RESCUE      #LMRX   Future Appointments   Date Time Provider Department Center   9/26/2024  1:20 PM [facility]   10/22/2024  8:40 AM [facility]   10/23/2024  8:20 AM Jin Ruffin OD [facility] OPHTHAL [facility]   11/26/2024  9:30 AM [facility] CT1 LIMIT 500 LBS [facility] CT SCAN [facility]   11/26/2024  9:45 AM [facility] London [facility]

## 2024-10-06 ENCOUNTER — PATIENT MESSAGE (OUTPATIENT)
Dept: INTERNAL MEDICINE | Facility: CLINIC | Age: 69
End: 2024-10-06
Payer: MEDICARE

## 2024-10-06 DIAGNOSIS — R19.5 POSITIVE COLORECTAL CANCER SCREENING USING COLOGUARD TEST: Primary | Chronic | ICD-10-CM

## 2024-10-22 ENCOUNTER — PATIENT MESSAGE (OUTPATIENT)
Dept: INTERNAL MEDICINE | Facility: CLINIC | Age: 69
End: 2024-10-22

## 2024-10-22 ENCOUNTER — OFFICE VISIT (OUTPATIENT)
Dept: INTERNAL MEDICINE | Facility: CLINIC | Age: 69
End: 2024-10-22
Payer: MEDICARE

## 2024-10-22 DIAGNOSIS — Z53.20 PROCEDURE NOT CARRIED OUT BECAUSE OF PATIENT'S DECISION: Primary | ICD-10-CM

## 2024-10-22 PROCEDURE — 99499 UNLISTED E&M SERVICE: CPT | Mod: 95,,, | Performed by: FAMILY MEDICINE

## 2024-10-22 NOTE — PROGRESS NOTES
Making the Most of Your Virtual Visits    Dear Lizz,    I'm sorry that you had some trouble logging in on time for your virtual video visit today, and I want to ensure your future appointments go as smoothly as possible. Your time is valuable, and I'm here to help you get the care you need without added stress. When one virtual visit appointment starts late, it can unfortunately lead to delays for others, and I know that's not an experience anyone enjoys.    Here are a few friendly tips that can help you start your next virtual visit appointment on time and give us the opportunity to address your health concerns without feeling rushed:    1. Early Check-In: Log in to your [facility] account 10-15 minutes before your scheduled appointment time. This allows you to troubleshoot any tech problems and update your information without feeling rushed, ensuring we have the full time set aside for your visit.    2. Day-Before Reminder: Just like I do with my own appointments, I recommend doing the ePre-check as soon as you receive the automated reminder text the day before your visit. It's a great way to get it out of the way so you can relax knowing you're all set for our session.    3. Tech Ready: Make sure the [facility] dexter is updated on your mobile device. If you're unsure how to do this or if you run into any issues, the [facility] Patient Support Team is just a phone call away at [phone].    4. Stay Notified: Keep an eye out for the reminder text messages, as they're a handy nudge to get everything prepared ahead of time.    I understand that life can get busy, and it's easy for appointments to slip one's mind. My goal is to provide you with the best care possible, and that includes making our virtual visits convenient and effective for you. If you have any concerns or need further assistance, please don't hesitate to reach out.    Thank you for your attention to this, and I look forward to our next visit being a  great experience for both of us!    Warm regards,    Dr. HURT

## 2024-11-19 ENCOUNTER — TELEPHONE (OUTPATIENT)
Dept: INTERNAL MEDICINE | Facility: CLINIC | Age: 69
End: 2024-11-19
Payer: MEDICARE

## 2024-11-19 ENCOUNTER — PATIENT MESSAGE (OUTPATIENT)
Dept: INTERNAL MEDICINE | Facility: CLINIC | Age: 69
End: 2024-11-19
Payer: MEDICARE

## 2024-11-19 NOTE — TELEPHONE ENCOUNTER
Attempted to call pt with no reply or voicemail. Scheduled appt with Crista to discuss COPD diagnosis and flu shot.

## 2024-11-19 NOTE — TELEPHONE ENCOUNTER
----- Message from Randal sent at 11/19/2024  1:10 PM CST -----  Contact: self   .Type: Patient Call Back        Who called:   Patient      What is the request in detail:    Called in concerning flu vaccine and also copd diagnosis   Patient would also like to make an appt with the providerr before 11/26   Can the clinic reply by [facility]?           Would the patient rather a call back or a response via [facility]?   call      Best call back number:  [phone]

## 2024-11-22 ENCOUNTER — HOSPITAL ENCOUNTER (OUTPATIENT)
Dept: RADIOLOGY | Facility: HOSPITAL | Age: 69
Discharge: HOME | End: 2024-11-22
Attending: NURSE PRACTITIONER
Payer: MEDICARE

## 2024-11-22 ENCOUNTER — OFFICE VISIT (OUTPATIENT)
Dept: INTERNAL MEDICINE | Facility: CLINIC | Age: 69
End: 2024-11-22
Payer: MEDICARE

## 2024-11-22 VITALS
HEIGHT: 66 IN | BODY MASS INDEX: 24.06 KG/M2 | DIASTOLIC BLOOD PRESSURE: 84 MMHG | WEIGHT: 149.69 LBS | HEART RATE: 116 BPM | TEMPERATURE: 98 F | OXYGEN SATURATION: 98 % | SYSTOLIC BLOOD PRESSURE: 130 MMHG

## 2024-11-22 DIAGNOSIS — E78.5 HYPERLIPIDEMIA ASSOCIATED WITH TYPE 2 DIABETES MELLITUS: Chronic | ICD-10-CM

## 2024-11-22 DIAGNOSIS — G89.29 NECK PAIN, CHRONIC: ICD-10-CM

## 2024-11-22 DIAGNOSIS — I70.0 AORTIC ATHEROSCLEROSIS: Chronic | ICD-10-CM

## 2024-11-22 DIAGNOSIS — I10 HYPERTENSION COMPLICATING DIABETES: Chronic | ICD-10-CM

## 2024-11-22 DIAGNOSIS — E11.9 HYPERTENSION COMPLICATING DIABETES: Chronic | ICD-10-CM

## 2024-11-22 DIAGNOSIS — N18.4 STAGE 4 CHRONIC KIDNEY DISEASE: Chronic | ICD-10-CM

## 2024-11-22 DIAGNOSIS — J44.1 OBSTRUCTIVE CHRONIC BRONCHITIS WITH ACUTE EXACERBATION: ICD-10-CM

## 2024-11-22 DIAGNOSIS — K21.9 GASTROESOPHAGEAL REFLUX DISEASE WITHOUT ESOPHAGITIS: ICD-10-CM

## 2024-11-22 DIAGNOSIS — R20.2 PARESTHESIA OF LOWER EXTREMITY: ICD-10-CM

## 2024-11-22 DIAGNOSIS — E11.69 HYPERLIPIDEMIA ASSOCIATED WITH TYPE 2 DIABETES MELLITUS: Chronic | ICD-10-CM

## 2024-11-22 DIAGNOSIS — M54.2 CHRONIC CERVICAL PAIN: ICD-10-CM

## 2024-11-22 DIAGNOSIS — J44.1 COPD WITH EXACERBATION: ICD-10-CM

## 2024-11-22 DIAGNOSIS — E78.2 MIXED HYPERLIPIDEMIA: Chronic | ICD-10-CM

## 2024-11-22 DIAGNOSIS — Z23 NEED FOR INFLUENZA VACCINATION: ICD-10-CM

## 2024-11-22 DIAGNOSIS — I10 ESSENTIAL HYPERTENSION: Chronic | ICD-10-CM

## 2024-11-22 DIAGNOSIS — G89.29 CHRONIC CERVICAL PAIN: ICD-10-CM

## 2024-11-22 DIAGNOSIS — E89.0 POST-SURGICAL HYPOTHYROIDISM: Chronic | ICD-10-CM

## 2024-11-22 DIAGNOSIS — M54.2 NECK PAIN, CHRONIC: ICD-10-CM

## 2024-11-22 DIAGNOSIS — J44.89 OBSTRUCTIVE CHRONIC BRONCHITIS: Chronic | ICD-10-CM

## 2024-11-22 DIAGNOSIS — K76.0 FATTY LIVER: Chronic | ICD-10-CM

## 2024-11-22 DIAGNOSIS — E11.22 TYPE 2 DIABETES MELLITUS WITH STAGE 4 CHRONIC KIDNEY DISEASE, WITHOUT LONG-TERM CURRENT USE OF INSULIN: Primary | ICD-10-CM

## 2024-11-22 DIAGNOSIS — N18.4 TYPE 2 DIABETES MELLITUS WITH STAGE 4 CHRONIC KIDNEY DISEASE, WITHOUT LONG-TERM CURRENT USE OF INSULIN: Primary | ICD-10-CM

## 2024-11-22 DIAGNOSIS — I25.10 CORONARY ARTERY DISEASE INVOLVING NATIVE CORONARY ARTERY OF NATIVE HEART WITHOUT ANGINA PECTORIS: Chronic | ICD-10-CM

## 2024-11-22 DIAGNOSIS — Z29.11 NEED FOR RSV VACCINATION: ICD-10-CM

## 2024-11-22 LAB
ALBUMIN SERPL BCP-MCNC: 3.9 G/DL (ref 3.5–5.2)
ALBUMIN/CREAT UR: 14.3 UG/MG (ref 0–30)
ALP SERPL-CCNC: 175 U/L (ref 40–150)
ALT SERPL W/O P-5'-P-CCNC: 16 U/L (ref 10–44)
ANION GAP SERPL CALC-SCNC: 11 MMOL/L (ref 8–16)
AST SERPL-CCNC: 17 U/L (ref 10–40)
BILIRUB SERPL-MCNC: 0.4 MG/DL (ref 0.1–1)
BUN SERPL-MCNC: 16 MG/DL (ref 8–23)
CALCIUM SERPL-MCNC: 10.2 MG/DL (ref 8.7–10.5)
CHLORIDE SERPL-SCNC: 108 MMOL/L (ref 95–110)
CHOLEST SERPL-MCNC: 140 MG/DL (ref 120–199)
CHOLEST/HDLC SERPL: 2.9 {RATIO} (ref 2–5)
CO2 SERPL-SCNC: 22 MMOL/L (ref 23–29)
CREAT SERPL-MCNC: 1.5 MG/DL (ref 0.5–1.4)
CREAT UR-MCNC: 70 MG/DL (ref 15–325)
EST. GFR  (NO RACE VARIABLE): 37 ML/MIN/1.73 M^2
ESTIMATED AVG GLUCOSE: 126 MG/DL (ref 68–131)
GLUCOSE SERPL-MCNC: 81 MG/DL (ref 70–110)
HBA1C MFR BLD: 6 % (ref 4–5.6)
HDLC SERPL-MCNC: 48 MG/DL (ref 40–75)
HDLC SERPL: 34.3 % (ref 20–50)
LDLC SERPL CALC-MCNC: 73.2 MG/DL (ref 63–159)
MICROALBUMIN UR DL<=1MG/L-MCNC: 10 UG/ML
NONHDLC SERPL-MCNC: 92 MG/DL
POTASSIUM SERPL-SCNC: 4 MMOL/L (ref 3.5–5.1)
PROT SERPL-MCNC: 8.1 G/DL (ref 6–8.4)
SODIUM SERPL-SCNC: 141 MMOL/L (ref 136–145)
T4 FREE SERPL-MCNC: 1.65 NG/DL (ref 0.71–1.51)
TRIGL SERPL-MCNC: 94 MG/DL (ref 30–150)
TSH SERPL DL<=0.005 MIU/L-ACNC: <0.01 UIU/ML (ref 0.4–4)

## 2024-11-22 PROCEDURE — 3066F NEPHROPATHY DOC TX: CPT | Mod: CPTII,S$GLB,, | Performed by: NURSE PRACTITIONER

## 2024-11-22 PROCEDURE — 1159F MED LIST DOCD IN RCRD: CPT | Mod: CPTII,S$GLB,, | Performed by: NURSE PRACTITIONER

## 2024-11-22 PROCEDURE — 84439 ASSAY OF FREE THYROXINE: CPT | Performed by: FAMILY MEDICINE

## 2024-11-22 PROCEDURE — 80053 COMPREHEN METABOLIC PANEL: CPT | Performed by: FAMILY MEDICINE

## 2024-11-22 PROCEDURE — 3079F DIAST BP 80-89 MM HG: CPT | Mod: CPTII,S$GLB,, | Performed by: NURSE PRACTITIONER

## 2024-11-22 PROCEDURE — 90653 IIV ADJUVANT VACCINE IM: CPT | Mod: S$GLB,,, | Performed by: NURSE PRACTITIONER

## 2024-11-22 PROCEDURE — 99214 OFFICE O/P EST MOD 30 MIN: CPT | Mod: 25,S$GLB,, | Performed by: NURSE PRACTITIONER

## 2024-11-22 PROCEDURE — 72050 X-RAY EXAM NECK SPINE 4/5VWS: CPT | Mod: TC

## 2024-11-22 PROCEDURE — 1126F AMNT PAIN NOTED NONE PRSNT: CPT | Mod: CPTII,S$GLB,, | Performed by: NURSE PRACTITIONER

## 2024-11-22 PROCEDURE — 99999 PR PBB SHADOW E&M-EST. PATIENT-LVL V: CPT | Mod: PBBFAC,,, | Performed by: NURSE PRACTITIONER

## 2024-11-22 PROCEDURE — G0008 ADMIN INFLUENZA VIRUS VAC: HCPCS | Mod: S$GLB,,, | Performed by: NURSE PRACTITIONER

## 2024-11-22 PROCEDURE — 82570 ASSAY OF URINE CREATININE: CPT | Performed by: FAMILY MEDICINE

## 2024-11-22 PROCEDURE — 3044F HG A1C LEVEL LT 7.0%: CPT | Mod: CPTII,S$GLB,, | Performed by: NURSE PRACTITIONER

## 2024-11-22 PROCEDURE — 3288F FALL RISK ASSESSMENT DOCD: CPT | Mod: CPTII,S$GLB,, | Performed by: NURSE PRACTITIONER

## 2024-11-22 PROCEDURE — 1101F PT FALLS ASSESS-DOCD LE1/YR: CPT | Mod: CPTII,S$GLB,, | Performed by: NURSE PRACTITIONER

## 2024-11-22 PROCEDURE — 72050 X-RAY EXAM NECK SPINE 4/5VWS: CPT | Mod: 26,,, | Performed by: RADIOLOGY

## 2024-11-22 PROCEDURE — 3075F SYST BP GE 130 - 139MM HG: CPT | Mod: CPTII,S$GLB,, | Performed by: NURSE PRACTITIONER

## 2024-11-22 PROCEDURE — 3061F NEG MICROALBUMINURIA REV: CPT | Mod: CPTII,S$GLB,, | Performed by: NURSE PRACTITIONER

## 2024-11-22 PROCEDURE — 83036 HEMOGLOBIN GLYCOSYLATED A1C: CPT | Performed by: FAMILY MEDICINE

## 2024-11-22 PROCEDURE — 84443 ASSAY THYROID STIM HORMONE: CPT | Performed by: FAMILY MEDICINE

## 2024-11-22 PROCEDURE — 80061 LIPID PANEL: CPT | Performed by: FAMILY MEDICINE

## 2024-11-22 PROCEDURE — 3008F BODY MASS INDEX DOCD: CPT | Mod: CPTII,S$GLB,, | Performed by: NURSE PRACTITIONER

## 2024-11-22 RX ORDER — ALBUTEROL SULFATE 90 UG/1
2 INHALANT RESPIRATORY (INHALATION) EVERY 6 HOURS PRN
Qty: 18 G | Refills: 11 | Status: SHIPPED | OUTPATIENT
Start: 2024-11-22 | End: 2025-11-22

## 2024-11-22 RX ORDER — OMEPRAZOLE 40 MG/1
40 CAPSULE, DELAYED RELEASE ORAL EVERY MORNING
Qty: 90 CAPSULE | Refills: 3 | Status: SHIPPED | OUTPATIENT
Start: 2024-11-22

## 2024-11-22 RX ORDER — AMLODIPINE BESYLATE 10 MG/1
10 TABLET ORAL DAILY
Qty: 90 TABLET | Refills: 1 | Status: SHIPPED | OUTPATIENT
Start: 2024-11-22

## 2024-11-22 RX ORDER — ASPIRIN 81 MG/1
81 TABLET ORAL DAILY
Qty: 90 TABLET | Refills: 8 | Status: SHIPPED | OUTPATIENT
Start: 2024-11-22

## 2024-11-22 RX ORDER — LEVOTHYROXINE SODIUM 88 UG/1
88 TABLET ORAL
Qty: 90 TABLET | Refills: 1 | Status: SHIPPED | OUTPATIENT
Start: 2024-11-22

## 2024-11-22 RX ORDER — FLUTICASONE PROPIONATE AND SALMETEROL 250; 50 UG/1; UG/1
1 POWDER RESPIRATORY (INHALATION) 2 TIMES DAILY
Qty: 60 EACH | Refills: 6 | Status: SHIPPED | OUTPATIENT
Start: 2024-11-22 | End: 2024-11-26

## 2024-11-22 RX ORDER — IPRATROPIUM BROMIDE AND ALBUTEROL SULFATE 2.5; .5 MG/3ML; MG/3ML
3 SOLUTION RESPIRATORY (INHALATION)
Qty: 360 ML | Refills: 0 | Status: SHIPPED | OUTPATIENT
Start: 2024-11-22

## 2024-11-22 RX ORDER — ATORVASTATIN CALCIUM 20 MG/1
20 TABLET, FILM COATED ORAL NIGHTLY
Qty: 90 TABLET | Refills: 3 | Status: SHIPPED | OUTPATIENT
Start: 2024-11-22

## 2024-11-22 NOTE — PROGRESS NOTES
Subjective:      Patient ID: Lizz Crockett is a 69 y.o. female.    Chief Complaint: Follow-up    History of Present Illness    CHIEF COMPLAINT:  Lizz presents today for follow-up on multiple health concerns.    HYPERTENSION:  She reports consistently elevated blood pressure readings at home, with systolic measurements around 196 mmHg and diastolic readings ranging from 109 to 146 mmHg. She is currently taking lisinopril for blood pressure management.    DIABETES:  She reports numbness in the three middle toes of both feet, with the left foot more affected than the right. She denies experiencing tingling sensations in these toes. She attempts to improve circulation by moving her feet around.    COPD:  She reports persistent coughing and spitting up mucus, which was initially green but has since become clear. She has been using breathing treatments 3 times daily with a personal breathing machine. She uses a disc inhaler containing a steroid component, taking one puff into the lungs twice daily and rinsing her mouth afterwards. She expresses some hesitation about using the inhaler but acknowledges its necessity for preventing exacerbations.    NECK PAIN AND MASS:  She reports neck pain extending to the back of her head, rating it as 8-10 out of 10. The pain began over a year ago when her nephew fell on her. She also mentions the presence of a palpable mass in her neck.    JOINT PAIN:  She reports severe joint pain, rating it as 8-10 out of 10, extending from her neck down to her ankle. The pain is described as constant and intense. She expresses concern about the normality of her joint pain.    EAR DISCOMFORT:  She reports ear discomfort, describing an itchy sensation and a scratching sound. She also mentions associated head pain, which has been affecting her concentration and causing her to lose her train of thought during conversations.    GASTROINTESTINAL CONCERNS:  She reports ongoing GI concerns. She has  submitted a stool sample for analysis and has been requested to follow up with a gastroenterologist. She expresses some hesitation about potential procedures but acknowledges the need to consult with the GI specialist.    MEDICATIONS:  She is currently taking lisinopril for hypertension, a muscle relaxer for pain management, and a disc inhaler for COPD. She mentions being unable to receive additional pain medication prescriptions from her primary care physician due to her involvement with a pain management specialist.      ROS:  General: -fever, -chills, -fatigue, -weight gain, -weight loss  Eyes: -vision changes, -redness, -discharge  ENT: -ear pain, -nasal congestion, -sore throat  Cardiovascular: -chest pain, -palpitations, -lower extremity edema  Respiratory: +cough, -shortness of breath  Gastrointestinal: -abdominal pain, -nausea, -vomiting, -diarrhea, -constipation, -blood in stool  Genitourinary: -dysuria, -hematuria, -frequency  Musculoskeletal: +joint pain, -muscle pain, +neck pain  Skin: -rash, -lesion  Neurological: -headache, -dizziness, +numbness, -tingling  Psychiatric: -anxiety, -depression, -sleep difficulty  Head: +head pain          Patient Active Problem List   Diagnosis    Post-surgical hypothyroidism    Hyperlipidemia copmplicating type 2 diabetes mellitus    Glaucoma    Gastroesophageal reflux disease without esophagitis    Asymptomatic menopausal state    Hypertension complicating diabetes    Type 2 diabetes mellitus with stage 4 chronic kidney disease, without long-term current use of insulin    At risk for knowledge deficit    Stage 4 chronic kidney disease    Onychomadesis of toenail    Opioid dependent for pain management    Osteopenia of multiple sites    Primary localized osteoarthrosis of multiple sites    Fatty liver    Schizophreniform disorder    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 (Rescue inhaler), Disp: 18 g, Rfl: 11    albuterol-ipratropium (DUO-NEB) 2.5 mg-0.5 mg/3 mL nebulizer solution, Take 3 mLs by nebulization every 6 (six) hours while awake. Rescue, Disp: 360 mL, Rfl: 0    amLODIPine (NORVASC) 10 MG tablet, Take 1 tablet (10 mg total) by mouth once daily., Disp: 90 tablet, Rfl: 1    aspirin (ECOTRIN) 81 MG EC tablet, Take 1 tablet (81 mg total) by mouth once daily., Disp: 90 tablet, Rfl: 8    atorvastatin (LIPITOR) 20 MG tablet, Take 1 tablet (20 mg total) by mouth every evening., Disp: 90 tablet, Rfl: 3    bimatoprost (LUMIGAN) 0.01 % Drop, Place 1 drop into both eyes every evening., Disp: 2 mL, Rfl: 11    blood sugar diagnostic (ONETOUCH VERIO TEST STRIPS MISC), USE TO TEST BLOOD GLUCOSE THREE TIMES DAILY, Disp: , Rfl:     blood-glucose meter (ONETOUCH VERIO REFLECT METER MISC), USE TO TEST BLOOD GLUCOSE, Disp: , Rfl:     chlorhexidine (PERIDEX) 0.12 % solution, Swish for 30 seconds with 15 mL (one capful) of undiluted oral rinse after toothbrushing, then expectorate; repeat twice daily (morning and evening), Disp: 473 mL, Rfl: 0    clonazePAM (KLONOPIN) 0.5 MG tablet, Take 0.5 mg by mouth daily as needed., Disp: , Rfl:     clotrimazole (LOTRIMIN) 1 % cream, Apply topically 2 (two) times daily. AAA, Disp: 45 g, Rfl: 0    cyclobenzaprine (FLEXERIL) 10 MG tablet, Take 10 mg by mouth 3 (three) times daily., Disp: , Rfl:     empagliflozin (JARDIANCE) 10 mg tablet, Take 1 tablet (10 mg total) by mouth once daily., Disp: 90 tablet,  Rfl: 0    ergocalciferol (ERGOCALCIFEROL) 50,000 unit Cap, Take 1 capsule (50,000 Units total) by mouth every 14 (fourteen) days., Disp: 6 capsule, Rfl: 4    fluticasone-salmeterol diskus inhaler 250-50 mcg, Inhale 1 puff into the lungs 2 (two) times daily., Disp: 60 each, Rfl: 6    glycerin-mineral oil-lanolin Crea, Apply 1 application topically daily as needed (to soothe itching)., Disp: 192 g, Rfl: 0    HYDROcodone-acetaminophen (NORCO) 5-325 mg per tablet, Take 1 tablet by mouth every 8 (eight) hours as needed for Pain., Disp: 12 tablet, Rfl: 0    hydrOXYzine HCL (ATARAX) 25 MG tablet, Take 25 mg by mouth nightly as needed., Disp: , Rfl:     inhalation spacing device, Use as directed when taking inhaled medicine, Disp: 1 each, Rfl: 0    lancets (ONETOUCH DELICA PLUS LANCET) 30 gauge Misc, USE TO TEST BLOOD GLUCOSE EVERY DAY AND AS NEEDED, Disp: , Rfl:     levothyroxine (SYNTHROID) 88 MCG tablet, Take 1 tablet (88 mcg total) by mouth before breakfast., Disp: 90 tablet, Rfl: 1    naloxone (NARCAN) 4 mg/actuation [place], , Disp: , Rfl:     nebulizer accessories Kit, Use to administer nebulized medicine as directed., Disp: 1 kit, Rfl: 5    nitroGLYCERIN (NITROSTAT) 0.4 MG SL tablet, Place 1 tablet (0.4 mg total) under the tongue every 5 (five) minutes as needed for Chest pain (for chest pain/discomfort). Call 911 if chest pain persists after second dose., Disp: 25 tablet, Rfl: 0    omeprazole (PRILOSEC) 40 MG capsule, Take 1 capsule (40 mg total) by mouth every morning., Disp: 90 capsule, Rfl: 3    oxyCODONE-acetaminophen (PERCOCET) 7.5-325 mg per tablet, Take 1 tablet by mouth every 8 hours as needed for pain, Disp: 90 tablet, Rfl: 0    RSVPreF3 antigen-AS01E, PF, (AREXVY, PF,) 120 mcg/0.5 mL SusR vaccine, Inject into the muscle., Disp: 1 mL, Rfl: 0    Current Facility-Administered Medications:     RSVPreF3 antigen-AS01E (PF) (Arexvy) vaccine 0.5 mL, 0.5 mL, Intramuscular, 1 time in Clinic/HOD,       Objective:   BP  "130/84 (BP Location: Left arm, Patient Position: Sitting)   Pulse (!) 116   Temp 98.2 °F (36.8 °C) (Tympanic)   Ht 5' 6" (1.676 m)   Wt 67.9 kg (149 lb 11.1 oz)   SpO2 98%   BMI 24.16 kg/m²     Physical Exam             Physical Exam  Vitals and nursing note reviewed.   Constitutional:       General: She is awake. She is not in acute distress.     Appearance: Normal appearance. She is well-developed and well-groomed. She is not ill-appearing, toxic-appearing or diaphoretic.   HENT:      Head: Normocephalic and atraumatic.      Right Ear: External ear normal.      Left Ear: External ear normal.      Nose: No congestion or rhinorrhea.      Mouth/Throat:      Pharynx: No oropharyngeal exudate or posterior oropharyngeal erythema.   Eyes:      Conjunctiva/sclera: Conjunctivae normal.      Pupils: Pupils are equal, round, and reactive to light.   Cardiovascular:      Rate and Rhythm: Regular rhythm. Tachycardia present.      Heart sounds: Normal heart sounds. No murmur heard.  Pulmonary:      Effort: Pulmonary effort is normal. No tachypnea, bradypnea, accessory muscle usage, prolonged expiration, respiratory distress or retractions.      Breath sounds: Normal breath sounds. No stridor, decreased air movement or transmitted upper airway sounds. No decreased breath sounds, wheezing, rhonchi or rales.   Abdominal:      General: Abdomen is flat. Bowel sounds are normal.      Palpations: Abdomen is soft.   Musculoskeletal:         General: No swelling, deformity or signs of injury.      Cervical back: Neck supple. No erythema, rigidity, torticollis, tenderness, bony tenderness or crepitus. No pain with movement. Normal range of motion.      Right lower leg: No edema.      Left lower leg: No edema.   Skin:     General: Skin is warm and dry.      Capillary Refill: Capillary refill takes less than 2 seconds.   Neurological:      General: No focal deficit present.      Mental Status: She is alert and oriented to person, " place, and time.      Gait: Gait abnormal.   Psychiatric:         Attention and Perception: Attention and perception normal.         Mood and Affect: Mood and affect normal.         Speech: Speech normal.         Behavior: Behavior normal. Behavior is cooperative.         Thought Content: Thought content normal.         Cognition and Memory: Cognition normal.          Assessment/Plan :   1. Type 2 diabetes mellitus with stage 4 chronic kidney disease, without long-term current use of insulin  -     Foot Exam Performed  -     empagliflozin (JARDIANCE) 10 mg tablet; Take 1 tablet (10 mg total) by mouth once daily.  Dispense: 90 tablet; Refill: 0  -     Microalbumin/Creatinine Ratio, Urine  -     Lipid Panel  -     Comprehensive Metabolic Panel  -     Hemoglobin A1C    2. Essential hypertension  -     amLODIPine (NORVASC) 10 MG tablet; Take 1 tablet (10 mg total) by mouth once daily.  Dispense: 90 tablet; Refill: 1    3. Stage 4 chronic kidney disease  -     empagliflozin (JARDIANCE) 10 mg tablet; Take 1 tablet (10 mg total) by mouth once daily.  Dispense: 90 tablet; Refill: 0  -     Comprehensive Metabolic Panel    4. Obstructive chronic bronchitis with acute exacerbation  -     albuterol (PROVENTIL HFA) 90 mcg/actuation inhaler; Inhale 2 puffs into the lungs every 6 (six) hours as needed for Wheezing. (Rescue inhaler)  Dispense: 18 g; Refill: 11  -     inhalation spacing device; Use as directed when taking inhaled medicine  Dispense: 1 each; Refill: 0    5. Coronary artery disease involving native coronary artery of native heart without angina pectoris  -     aspirin (ECOTRIN) 81 MG EC tablet; Take 1 tablet (81 mg total) by mouth once daily.  Dispense: 90 tablet; Refill: 8    6. Chronic cervical pain  -     X-Ray Cervical Spine Complete 5 view; Future; Expected date: 11/22/2024    7. Paresthesia of lower extremity  -     Foot Exam Performed    8. Obstructive chronic bronchitis  -     albuterol-ipratropium (DUO-NEB)  2.5 mg-0.5 mg/3 mL nebulizer solution; Take 3 mLs by nebulization every 6 (six) hours while awake. Rescue  Dispense: 360 mL; Refill: 0    9. Neck pain, chronic    10. Need for influenza vaccination  -     influenza (adjuvanted) (Fluad) 45 mcg/0.5 mL IM vaccine (> or = 66 yo) 0.5 mL    11. Hypertension complicating diabetes  -     amLODIPine (NORVASC) 10 MG tablet; Take 1 tablet (10 mg total) by mouth once daily.  Dispense: 90 tablet; Refill: 1  -     Microalbumin/Creatinine Ratio, Urine  -     Lipid Panel  -     Comprehensive Metabolic Panel    12. Aortic atherosclerosis  Overview:  CT CHEST WITHOUT CONTRAST 12/29/2022  FINDINGS: Thoracic aorta demonstrates mild atherosclerotic plaquing.    US ABDOMEN COMPLETE 12/23/2022  IMPRESSION: Ectasia of the infrarenal abdominal aorta without evidence for aneurysm.      Orders:  -     atorvastatin (LIPITOR) 20 MG tablet; Take 1 tablet (20 mg total) by mouth every evening.  Dispense: 90 tablet; Refill: 3    13. Mixed hyperlipidemia  -     atorvastatin (LIPITOR) 20 MG tablet; Take 1 tablet (20 mg total) by mouth every evening.  Dispense: 90 tablet; Refill: 3    14. Hyperlipidemia copmplicating type 2 diabetes mellitus  -     atorvastatin (LIPITOR) 20 MG tablet; Take 1 tablet (20 mg total) by mouth every evening.  Dispense: 90 tablet; Refill: 3  -     Lipid Panel  -     Comprehensive Metabolic Panel    15. Post-surgical hypothyroidism  -     levothyroxine (SYNTHROID) 88 MCG tablet; Take 1 tablet (88 mcg total) by mouth before breakfast.  Dispense: 90 tablet; Refill: 1  -     TSH    16. Gastroesophageal reflux disease without esophagitis  -     omeprazole (PRILOSEC) 40 MG capsule; Take 1 capsule (40 mg total) by mouth every morning.  Dispense: 90 capsule; Refill: 3    17. COPD with exacerbation  -     fluticasone-salmeterol diskus inhaler 250-50 mcg; Inhale 1 puff into the lungs 2 (two) times daily.  Dispense: 60 each; Refill: 6    18. Fatty liver  Overview:  US ABDOMEN COMPLETE  12/23/2022  IMPRESSION: 1. Findings most consistent with diffuse fatty infiltration of the liver.    Orders:  -     Comprehensive Metabolic Panel    19. Need for RSV vaccination  -     RSVPreF3 antigen-AS01E (PF) (Arexvy) vaccine 0.5 mL    Other orders  -     T4, Free         Assessment & Plan    Assessed elevated blood pressure and heart rate  Evaluated neck pain persisting for over 1 year  Considered history of COPD and current inhaler use  Reviewed reported home blood pressure readings  Assessed complaint of numbness in toes, considering diabetic neuropathy  Planned x-ray of neck to evaluate pain  Considered request for colonoscopy and need for pre-op clearance  Noted elevated heart rate may be related to pain and insufficient water intake    CHRONIC OBSTRUCTIVE PULMONARY DISEASE (COPD):  - Explained RSV (RSV) and its potential complications for patients with COPD or diabetes.  - Educated on need to rinse mouth after using inhaler due to steroid content.  - Continued inhaler: 1 puff into lungs twice daily, rinse mouth after use.  - Refilled inhaler with additional refills provided.    DIABETES MELLITUS WITH DIABETIC POLYNEUROPATHY:  - Discussed importance of checking feet daily for diabetic patients to prevent complications.  - Lizz to check bottom of feet nightly before bed for any foreign objects or issues.  - Lizz to continue applying Vaseline to feet nightly.    MEDICATION MANAGEMENT:  - Explained importance of reading medication inserts for proper usage instructions.  - Discussed role of pharmacists in providing medication usage instructions.    PALPITATIONS:  - Lizz to increase water intake to address elevated heart rate.    CERVICALGIA:  - X-ray of neck ordered.    FOLLOW-UP:  - Follow up with pulmonologist Dr. Nilson Liu on Tuesday, September 26th at 9:45 AM.          Follow up if symptoms worsen or fail to improve.    This note was generated with the assistance of ambient listening technology. Verbal  consent was obtained by the patient and accompanying visitor(s) for the recording of patient appointment to facilitate this note. I attest to having reviewed and edited the generated note for accuracy, though some syntax or spelling errors may persist. Please contact the author of this note for any clarification.

## 2024-11-25 DIAGNOSIS — M43.10 RETROLISTHESIS OF VERTEBRAE: ICD-10-CM

## 2024-11-25 DIAGNOSIS — M50.30 DDD (DEGENERATIVE DISC DISEASE), CERVICAL: ICD-10-CM

## 2024-11-25 DIAGNOSIS — M54.2 CERVICAL PAIN: Primary | ICD-10-CM

## 2024-11-26 ENCOUNTER — OFFICE VISIT (OUTPATIENT)
Dept: PULMONOLOGY | Facility: CLINIC | Age: 69
End: 2024-11-26
Attending: INTERNAL MEDICINE
Payer: MEDICARE

## 2024-11-26 ENCOUNTER — HOSPITAL ENCOUNTER (OUTPATIENT)
Dept: RADIOLOGY | Facility: HOSPITAL | Age: 69
Discharge: HOME | End: 2024-11-26
Attending: INTERNAL MEDICINE
Payer: MEDICARE

## 2024-11-26 VITALS
OXYGEN SATURATION: 97 % | HEART RATE: 110 BPM | BODY MASS INDEX: 23.35 KG/M2 | WEIGHT: 145.31 LBS | RESPIRATION RATE: 18 BRPM | DIASTOLIC BLOOD PRESSURE: 78 MMHG | HEIGHT: 66 IN | SYSTOLIC BLOOD PRESSURE: 124 MMHG

## 2024-11-26 DIAGNOSIS — R91.1 SOLITARY PULMONARY NODULE: ICD-10-CM

## 2024-11-26 DIAGNOSIS — J41.8 MIXED SIMPLE AND MUCOPURULENT CHRONIC BRONCHITIS: Primary | ICD-10-CM

## 2024-11-26 DIAGNOSIS — Z87.891 SMOKING HISTORY: ICD-10-CM

## 2024-11-26 PROCEDURE — 99999 PR PBB SHADOW E&M-EST. PATIENT-LVL V: CPT | Mod: PBBFAC,,, | Performed by: INTERNAL MEDICINE

## 2024-11-26 PROCEDURE — 71250 CT THORAX DX C-: CPT | Mod: 26,,, | Performed by: RADIOLOGY

## 2024-11-26 PROCEDURE — 71250 CT THORAX DX C-: CPT | Mod: TC

## 2024-11-26 RX ORDER — TIOTROPIUM BROMIDE AND OLODATEROL 3.124; 2.736 UG/1; UG/1
1 SPRAY, METERED RESPIRATORY (INHALATION) DAILY
Qty: 4 G | Refills: 6 | Status: SHIPPED | OUTPATIENT
Start: 2024-11-26
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
## 2024-12-09 ENCOUNTER — TELEPHONE (OUTPATIENT)
Dept: OPHTHALMOLOGY | Facility: CLINIC | Age: 69
End: 2024-12-09
Payer: MEDICARE

## 2024-12-09 NOTE — TELEPHONE ENCOUNTER
Returned call to patient and R/S per her request.      ----- Message from Trista sent at 12/9/2024  9:42 AM CST -----  Contact: [phone]  Type:  Patient Returning Call    Who Called:TED MANZANO [[id number]]  Who Left Message for Patient:  Does the patient know what this is regarding?:need to talk to the nurse about her appointment today  Would the patient rather a call back or a response via [facility]? Call back  Best Call Back Number: [phone]  Additional Information: [id number] [phone]

## 2024-12-11 ENCOUNTER — PATIENT MESSAGE (OUTPATIENT)
Dept: INTERNAL MEDICINE | Facility: CLINIC | Age: 69
End: 2024-12-11
Payer: MEDICARE

## 2024-12-11 DIAGNOSIS — E89.0 POST-SURGICAL HYPOTHYROIDISM: Primary | Chronic | ICD-10-CM

## 2024-12-11 DIAGNOSIS — E05.80 IATROGENIC HYPERTHYROIDISM: ICD-10-CM

## 2024-12-11 RX ORDER — LEVOTHYROXINE SODIUM 25 UG/1
25 TABLET ORAL
Qty: 90 TABLET | Refills: 0 | Status: SHIPPED | OUTPATIENT
Start: 2024-12-11 | End: 2024-12-24 | Stop reason: DRUGHIGH

## 2024-12-11 NOTE — TELEPHONE ENCOUNTER
"TO MY TEAM:   Please schedule her an appointment with Crista so Crista can explain to her the information and instructions below.    On 8/13/24 her levothyroxine 100 µg prescription was discontinued and replaced with levothyroxine 88 µg.   The prescription for levothyroxine 88 µg even included a "Note to Pharmacy" that read: "NOTE DOSE CHANGE. DISCONTINUE any prescription for this drug issued prior to 08/13/2024."  However, it appears that her pharmacy has been dispensing her both doses, which explains her hyperthyroid state.  LEVOTHYROXINE 0.088MG TAB - DISPENSED ON 11/22/2024  LEVOTHYROXINE 0.100MG TAB - DISPENSED ON 11/09/2024  LEVOTHYROXINE 0.100MG TAB - DISPENSED ON 08/26/2024  LEVOTHYROXINE 0.088MG TAB - DISPENSED ON 08/13/2024  LEVOTHYROXINE 0.100MG TAB - DISPENSED ON 05/30/2024  LEVOTHYROXINE 0.100MG TAB - DISPENSED ON 02/25/2024    I have given her a new prescription:  Medications Ordered This Encounter   Medications    levothyroxine (SYNTHROID) 25 MCG tablet     Sig: Take 1 tablet (25 mcg total) by mouth before breakfast.     Dispense:  90 tablet     Refill:  0     NOTE DOSE CHANGE. DISCONTINUE any prescription for this drug issued prior to 12/11/2024.      This dose almost certainly is too low. However, I think it will be easier for her to understand if she is told to flush all of her current levothyroxine down the commode and start taking the new dose and check her labs once every month.  Lab Frequency Next Occurrence   T4, Free Every 4 Weeks 12/11/2024, 1/3/2025, 1/31/2025, 2/28/2025, 3/28/2025, 4/25/2025, 5/23/2025, 6/20/2025   TSH Every 4 Weeks 12/11/2024, 1/3/2025, 1/31/2025, 2/28/2025, 3/28/2025, 4/25/2025, 5/23/2025, 6/20/2025      When her TSH > 0.400, we can reinitiate oxine 88 µg daily.  "

## 2024-12-12 ENCOUNTER — TELEPHONE (OUTPATIENT)
Dept: INTERNAL MEDICINE | Facility: CLINIC | Age: 69
End: 2024-12-12
Payer: MEDICARE

## 2024-12-12 ENCOUNTER — OFFICE VISIT (OUTPATIENT)
Dept: OPHTHALMOLOGY | Facility: CLINIC | Age: 69
End: 2024-12-12
Payer: MEDICARE

## 2024-12-12 DIAGNOSIS — E11.9 TYPE 2 DIABETES MELLITUS WITHOUT RETINOPATHY: Primary | ICD-10-CM

## 2024-12-12 DIAGNOSIS — H40.1211 LOW TENSION GLAUCOMA OF RIGHT EYE, MILD STAGE: ICD-10-CM

## 2024-12-12 DIAGNOSIS — H52.4 MYOPIA WITH ASTIGMATISM AND PRESBYOPIA, BILATERAL: ICD-10-CM

## 2024-12-12 DIAGNOSIS — Z96.1 PSEUDOPHAKIA OF BOTH EYES: ICD-10-CM

## 2024-12-12 DIAGNOSIS — H40.1223 LOW TENSION GLAUCOMA OF LEFT EYE, SEVERE STAGE: ICD-10-CM

## 2024-12-12 DIAGNOSIS — H52.203 MYOPIA WITH ASTIGMATISM AND PRESBYOPIA, BILATERAL: ICD-10-CM

## 2024-12-12 DIAGNOSIS — D31.31 CHOROIDAL NEVUS OF RIGHT EYE: ICD-10-CM

## 2024-12-12 DIAGNOSIS — H52.13 MYOPIA WITH ASTIGMATISM AND PRESBYOPIA, BILATERAL: ICD-10-CM

## 2024-12-12 PROCEDURE — 1160F RVW MEDS BY RX/DR IN RCRD: CPT | Mod: CPTII,S$GLB,, | Performed by: OPTOMETRIST

## 2024-12-12 PROCEDURE — 92133 CPTRZD OPH DX IMG PST SGM ON: CPT | Mod: S$GLB,,, | Performed by: OPTOMETRIST

## 2024-12-12 PROCEDURE — 1159F MED LIST DOCD IN RCRD: CPT | Mod: CPTII,S$GLB,, | Performed by: OPTOMETRIST

## 2024-12-12 PROCEDURE — 99999 PR PBB SHADOW E&M-EST. PATIENT-LVL III: CPT | Mod: PBBFAC,,, | Performed by: OPTOMETRIST

## 2024-12-12 PROCEDURE — 2023F DILAT RTA XM W/O RTNOPTHY: CPT | Mod: CPTII,S$GLB,, | Performed by: OPTOMETRIST

## 2024-12-12 PROCEDURE — 1126F AMNT PAIN NOTED NONE PRSNT: CPT | Mod: CPTII,S$GLB,, | Performed by: OPTOMETRIST

## 2024-12-12 PROCEDURE — 3066F NEPHROPATHY DOC TX: CPT | Mod: CPTII,S$GLB,, | Performed by: OPTOMETRIST

## 2024-12-12 PROCEDURE — 92015 DETERMINE REFRACTIVE STATE: CPT | Mod: S$GLB,,, | Performed by: OPTOMETRIST

## 2024-12-12 PROCEDURE — 3044F HG A1C LEVEL LT 7.0%: CPT | Mod: CPTII,S$GLB,, | Performed by: OPTOMETRIST

## 2024-12-12 PROCEDURE — 3061F NEG MICROALBUMINURIA REV: CPT | Mod: CPTII,S$GLB,, | Performed by: OPTOMETRIST

## 2024-12-12 PROCEDURE — 99214 OFFICE O/P EST MOD 30 MIN: CPT | Mod: S$GLB,,, | Performed by: OPTOMETRIST

## 2024-12-12 RX ORDER — LATANOPROST 50 UG/ML
1 SOLUTION/ DROPS OPHTHALMIC NIGHTLY
Qty: 2.5 ML | Refills: 11 | Status: SHIPPED | OUTPATIENT
Start: 2024-12-12

## 2024-12-12 NOTE — TELEPHONE ENCOUNTER
LINO HENRY    Patient Age: 82 year old   Refill request by: Phone.  Caller informed to check with the pharmacy later for their refill.  If problems arise, we will contact patient.  Refill to be: Faxed to [facility] pharmacy    Medication requested to be refilled: Patient is aware of the three business day policy for prescription refills.  Has been taking 2 every 24 hours.    Lorazepam       WEIGHT AND HEIGHT:   Wt Readings from Last 1 Encounters:   05/31/19 68 kg (150 lb)     Ht Readings from Last 1 Encounters:   03/18/19 5' 7\" (1.702 m)     BMI Readings from Last 1 Encounters:   05/31/19 23.49 kg/m²       ALLERGIES:  Zolpidem  Current Outpatient Medications   Medication   • LORazepam (ATIVAN) 1 MG tablet   • gabapentin (NEURONTIN) 100 MG capsule   • traMADol (ULTRAM) 50 MG tablet   • lisinopril (ZESTRIL) 10 MG tablet   • digoxin (LANOXIN) 0.125 MG tablet   • clopidogrel (PLAVIX) 75 MG tablet   • omeprazole (PRILOSEC) 20 MG capsule   • metoPROLOL tartrate (LOPRESSOR) 50 MG tablet   • lovastatin (MEVACOR) 20 MG tablet   • carbidopa-levodopa (SINEMET)  MG per tablet   • aspirin 325 MG tablet   • levothyroxine (SYNTHROID, LEVOTHROID) 75 MCG tablet   • calcium-vitamin D (OSCAL 500/200 D-3) 500-200 MG-UNIT per tablet   • GLUCOSAMINE CHONDROITIN COMPLX PO   • Polyethylene Glycol 3350 (MIRALAX PO)     No current facility-administered medications for this visit.      PHARMACY to use: See below          Pharmacy preference(s) on file:   [facility] [address] [place] [address] - [address] [facility] AT [facility] [address]  [address] [facility] [address]  Phone: [phone] Fax: [phone]      CALL BACK INFO: Ok to leave response (including medical information) on answering machine  ROUTING: Patient's physician/staff        PCP: Danielle Smith MD         INS: Payor: MEDICARE / Plan: PARTA AND B / Product Type: MEDICARE   PATIENT ADDRESS:  [address] [place] [id number]    Pt was contacted appointment was offered pt refused appointment due to transportation, pt stated that she will give the clinic a call to schedule appointment.

## 2024-12-12 NOTE — PROGRESS NOTES
HPI     Diabetes            Comments: Yearly diabetic exam          Comments    Dm since 2022  PCIOL ou          Last edited by Belen Cohen on 12/12/2024  3:30 PM.            Assessment /Plan     For exam results, see Encounter Report.    Type 2 diabetes mellitus without retinopathy  There was no diabetic retinopathy present in either eye today.   Recommended that pt continue care with PCP and/or specialists regarding diabetes.  Follow-up dilated eye exam recommended in 12 months, sooner with any vision changes or new concerns.    Low tension glaucoma of left eye, severe stage  Low tension glaucoma of right eye, mild stage  -     Posterior Segment OCT Optic Nerve- Both eyes  -     latanoprost 0.005 % ophthalmic solution; Place 1 drop into both eyes every evening.  Dispense: 2.5 mL; Refill: 11  Progression on NFL scans today OD, OS  Overall stable GCL today OU  Will lower target from 15 to 13  Start Latanoprost qhs OU  Monitor 1 month    Pseudophakia of both eyes  Stable OU  Monitor 12 months    Choroidal nevus of right eye  Stable nevus OD  Monitor 12 months    Myopia with astigmatism and presbyopia, bilateral  Eyeglass Final Rx       Eyeglass Final Rx         Sphere Cylinder Axis Add    Right -2.00 +1.50 175 +2.50    Left -1.25 +0.50 005 +2.50      Expiration Date: 12/12/2025                    RTC 1 month for 24-2VF and IOP check or PRN  Discussed above and all questions were answered.

## 2024-12-20 ENCOUNTER — PATIENT MESSAGE (OUTPATIENT)
Dept: ADMINISTRATIVE | Facility: OTHER | Age: 69
End: 2024-12-20
Payer: MEDICARE

## 2024-12-23 ENCOUNTER — TELEPHONE (OUTPATIENT)
Dept: INTERNAL MEDICINE | Facility: CLINIC | Age: 69
End: 2024-12-23
Payer: MEDICARE

## 2024-12-23 NOTE — TELEPHONE ENCOUNTER
----- Message from Gregory sent at 12/23/2024 12:30 PM CST -----  Contact: Lizz  Type:  RX Refill Request    Who Called: Lizz   Refill or New Rx:refill  RX Name and Strength:Atenolol 10 mg ( not on med list)  How is the patient currently taking it? (ex. 1XDay):1xday  Is this a 30 day or 90 day RX:90day  Preferred Pharmacy with phone number:  [id number] - [place] - [address] AT [facility]  [address] [address]  Phone: [phone] Fax: [phone]  Local or Mail Order: local  Ordering Provider:   Would the patient rather a call back or a response via [facility]?  Call back  Best Call Back Number: [phone]  Additional Information:   Thanks   Am

## 2024-12-23 NOTE — TELEPHONE ENCOUNTER
Spoke with patient and explained because Dr. May or no prescriber in [facility] prescribed it we can not fill this. She will be in to see nurse practitoner Siddharth Baxter tomorrow

## 2024-12-24 ENCOUNTER — OFFICE VISIT (OUTPATIENT)
Dept: INTERNAL MEDICINE | Facility: CLINIC | Age: 69
End: 2024-12-24
Payer: MEDICARE

## 2024-12-24 VITALS
DIASTOLIC BLOOD PRESSURE: 86 MMHG | HEART RATE: 91 BPM | BODY MASS INDEX: 23.35 KG/M2 | TEMPERATURE: 96 F | SYSTOLIC BLOOD PRESSURE: 122 MMHG | OXYGEN SATURATION: 98 % | HEIGHT: 66 IN | WEIGHT: 145.31 LBS

## 2024-12-24 DIAGNOSIS — J44.89 OBSTRUCTIVE CHRONIC BRONCHITIS: Chronic | ICD-10-CM

## 2024-12-24 DIAGNOSIS — E05.80 IATROGENIC HYPERTHYROIDISM: Primary | ICD-10-CM

## 2024-12-24 DIAGNOSIS — E89.0 POST-SURGICAL HYPOTHYROIDISM: Chronic | ICD-10-CM

## 2024-12-24 LAB
T4 FREE SERPL-MCNC: 0.81 NG/DL (ref 0.71–1.51)
TSH SERPL DL<=0.005 MIU/L-ACNC: 0.11 UIU/ML (ref 0.4–4)

## 2024-12-24 PROCEDURE — 1159F MED LIST DOCD IN RCRD: CPT | Mod: CPTII,S$GLB,, | Performed by: NURSE PRACTITIONER

## 2024-12-24 PROCEDURE — 3044F HG A1C LEVEL LT 7.0%: CPT | Mod: CPTII,S$GLB,, | Performed by: NURSE PRACTITIONER

## 2024-12-24 PROCEDURE — 99214 OFFICE O/P EST MOD 30 MIN: CPT | Mod: S$GLB,,, | Performed by: NURSE PRACTITIONER

## 2024-12-24 PROCEDURE — 84439 ASSAY OF FREE THYROXINE: CPT | Performed by: FAMILY MEDICINE

## 2024-12-24 PROCEDURE — 3008F BODY MASS INDEX DOCD: CPT | Mod: CPTII,S$GLB,, | Performed by: NURSE PRACTITIONER

## 2024-12-24 PROCEDURE — 1126F AMNT PAIN NOTED NONE PRSNT: CPT | Mod: CPTII,S$GLB,, | Performed by: NURSE PRACTITIONER

## 2024-12-24 PROCEDURE — 3079F DIAST BP 80-89 MM HG: CPT | Mod: CPTII,S$GLB,, | Performed by: NURSE PRACTITIONER

## 2024-12-24 PROCEDURE — 3288F FALL RISK ASSESSMENT DOCD: CPT | Mod: CPTII,S$GLB,, | Performed by: NURSE PRACTITIONER

## 2024-12-24 PROCEDURE — 3061F NEG MICROALBUMINURIA REV: CPT | Mod: CPTII,S$GLB,, | Performed by: NURSE PRACTITIONER

## 2024-12-24 PROCEDURE — 99999 PR PBB SHADOW E&M-EST. PATIENT-LVL V: CPT | Mod: PBBFAC,,, | Performed by: NURSE PRACTITIONER

## 2024-12-24 PROCEDURE — 1101F PT FALLS ASSESS-DOCD LE1/YR: CPT | Mod: CPTII,S$GLB,, | Performed by: NURSE PRACTITIONER

## 2024-12-24 PROCEDURE — 3066F NEPHROPATHY DOC TX: CPT | Mod: CPTII,S$GLB,, | Performed by: NURSE PRACTITIONER

## 2024-12-24 PROCEDURE — G2211 COMPLEX E/M VISIT ADD ON: HCPCS | Mod: S$GLB,,, | Performed by: NURSE PRACTITIONER

## 2024-12-24 PROCEDURE — 84443 ASSAY THYROID STIM HORMONE: CPT | Performed by: FAMILY MEDICINE

## 2024-12-24 PROCEDURE — 3074F SYST BP LT 130 MM HG: CPT | Mod: CPTII,S$GLB,, | Performed by: NURSE PRACTITIONER

## 2024-12-24 PROCEDURE — 1160F RVW MEDS BY RX/DR IN RCRD: CPT | Mod: CPTII,S$GLB,, | Performed by: NURSE PRACTITIONER

## 2024-12-24 RX ORDER — IPRATROPIUM BROMIDE AND ALBUTEROL SULFATE 2.5; .5 MG/3ML; MG/3ML
SOLUTION RESPIRATORY (INHALATION)
Qty: 360 ML | Refills: 0 | Status: SHIPPED | OUTPATIENT
Start: 2024-12-24

## 2024-12-24 RX ORDER — LIDOCAINE 50 MG/G
PATCH TOPICAL
COMMUNITY
Start: 2024-12-18

## 2024-12-24 RX ORDER — LEVOTHYROXINE SODIUM 25 UG/1
25 TABLET ORAL
Qty: 90 TABLET | Refills: 0 | Status: SHIPPED | OUTPATIENT
Start: 2024-12-24

## 2024-12-24 RX ORDER — LEVOTHYROXINE SODIUM 88 UG/1
88 TABLET ORAL
Qty: 30 TABLET | Refills: 11 | Status: SHIPPED | OUTPATIENT
Start: 2024-12-24 | End: 2024-12-24 | Stop reason: DRUGHIGH

## 2024-12-24 RX ORDER — OXYCODONE AND ACETAMINOPHEN 7.5; 325 MG/1; MG/1
1 TABLET ORAL EVERY 8 HOURS PRN
COMMUNITY
Start: 2024-12-16

## 2024-12-24 NOTE — PROGRESS NOTES
Subjective:      Patient ID: Lizz Crockett is a 69 y.o. female.    Chief Complaint: Medication Problem    History of Present Illness    CHIEF COMPLAINT:  Lizz presents today for thyroid medication management.    THYROID AND ASSOCIATED SYMPTOMS:  She reports hyperthyroidism with concurrent use of levothyroxine 88mcg and 100mcg due to prescription confusion. She experiences unintentional weight loss, excessive sweating (particularly from the back of her head), and frequent diarrhea. Her daughter provided an unspecified product to help manage hot flashes.    BLOOD PRESSURE:  She reports an episode of hypotension with blood pressure dropping to 59. She experiences dizziness with both hypertension and hypotension.    ROS:  General: -fever, -chills, -fatigue, -weight gain, +weight loss  Eyes: -vision changes, -redness, -discharge  ENT: -ear pain, -nasal congestion, -sore throat  Cardiovascular: -chest pain, -palpitations, -lower extremity edema  Respiratory: -cough, -shortness of breath  Gastrointestinal: -abdominal pain, -nausea, -vomiting, +diarrhea, -constipation, -blood in stool  Genitourinary: -dysuria, -hematuria, -frequency  Musculoskeletal: -joint pain, -muscle pain  Skin: -rash, -lesion  Neurological: -headache, +dizziness, -numbness, -tingling  Psychiatric: -anxiety, -depression, -sleep difficulty          Patient Active Problem List   Diagnosis    Post-surgical hypothyroidism    Hyperlipidemia copmplicating type 2 diabetes mellitus    Glaucoma    Gastroesophageal reflux disease without esophagitis    Asymptomatic menopausal state    Hypertension complicating diabetes    Type 2 diabetes mellitus with stage 4 chronic kidney disease, without long-term current use of insulin    At risk for knowledge deficit    Stage 4 chronic kidney disease    Onychomadesis of toenail    Opioid dependent for pain management    Osteopenia of multiple sites    Primary localized osteoarthrosis of multiple sites    Fatty liver     Schizophreniform disorder    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 (Rescue inhaler), Disp: 18 g, Rfl: 11    amLODIPine (NORVASC) 10 MG tablet, Take 1 tablet (10 mg total) by mouth once daily., Disp: 90 tablet, Rfl: 1    aspirin (ECOTRIN) 81 MG EC tablet, Take 1 tablet (81 mg total) by mouth once daily., Disp: 90 tablet, Rfl: 8    atorvastatin (LIPITOR) 20 MG tablet, Take 1 tablet (20 mg total) by mouth every evening., Disp: 90 tablet, Rfl: 3    chlorhexidine (PERIDEX) 0.12 % solution, Swish for 30 seconds with 15 mL (one capful) of undiluted oral rinse after toothbrushing, then expectorate; repeat twice daily (morning and evening), Disp: 473 mL, Rfl: 0    clonazePAM (KLONOPIN) 0.5 MG tablet, Take 0.5 mg by mouth daily as needed., Disp: , Rfl:     cyclobenzaprine (FLEXERIL) 10 MG tablet, Take 10 mg by mouth 3 (three) times daily., Disp: , Rfl:     empagliflozin (JARDIANCE) 10 mg tablet, Take 1 tablet (10 mg total) by mouth once daily., Disp: 90 tablet, Rfl: 0    HYDROcodone-acetaminophen (NORCO) 5-325 mg per tablet, Take 1 tablet by mouth every 8 (eight) hours as needed for Pain., Disp: 12 tablet, Rfl: 0    hydrOXYzine HCL (ATARAX) 25 MG tablet, Take 25 mg by mouth nightly as needed., Disp: , Rfl:     latanoprost 0.005 % ophthalmic solution, Place 1 drop into both eyes every evening., Disp: 2.5 mL, Rfl: 11    LIDOcaine (LIDODERM) 5 %, APPLY 1 PATCH TOPICALLY TO THE SKIN DAILY  "AS NEEDED, Disp: , Rfl:     omeprazole (PRILOSEC) 40 MG capsule, Take 1 capsule (40 mg total) by mouth every morning., Disp: 90 capsule, Rfl: 3    oxyCODONE-acetaminophen (PERCOCET) 7.5-325 mg per tablet, Take 1 tablet by mouth every 8 (eight) hours as needed., Disp: , Rfl:     tiotropium-olodateroL (STIOLTO RESPIMAT) 2.5-2.5 mcg/actuation Mist, Inhale 1 puff into the lungs once daily. Controller, Disp: 4 g, Rfl: 6    albuterol-ipratropium (DUO-NEB) 2.5 mg-0.5 mg/3 mL nebulizer solution, USE 3 ML VIA NEBULIZER EVERY 6 HOURS WHILE AWAKE FOR RESCUE, Disp: 360 mL, Rfl: 0    blood sugar diagnostic (ONETOUCH VERIO TEST STRIPS MISC), USE TO TEST BLOOD GLUCOSE THREE TIMES DAILY, Disp: , Rfl:     blood-glucose meter (ONETOUCH VERIO REFLECT METER MISC), USE TO TEST BLOOD GLUCOSE, Disp: , Rfl:     clotrimazole (LOTRIMIN) 1 % cream, Apply topically 2 (two) times daily. AAA, Disp: 45 g, Rfl: 0    inhalation spacing device, Use as directed when taking inhaled medicine, Disp: 1 each, Rfl: 0    lancets (ONETOUCH DELICA PLUS LANCET) 30 gauge Misc, USE TO TEST BLOOD GLUCOSE EVERY DAY AND AS NEEDED, Disp: , Rfl:     levothyroxine (SYNTHROID) 25 MCG tablet, Take 1 tablet (25 mcg total) by mouth before breakfast., Disp: 90 tablet, Rfl: 0    naloxone (NARCAN) 4 mg/actuation [place], , Disp: , Rfl:     nebulizer accessories Kit, Use to administer nebulized medicine as directed., Disp: 1 kit, Rfl: 5    RSVPreF3 antigen-AS01E, PF, (AREXVY, PF,) 120 mcg/0.5 mL SusR vaccine, Inject into the muscle., Disp: 1 mL, Rfl: 0    Current Facility-Administered Medications:     RSVPreF3 antigen-AS01E (PF) (Arexvy) vaccine 0.5 mL, 0.5 mL, Intramuscular, 1 time in Clinic/HOD,       Objective:   /86 (BP Location: Right arm, Patient Position: Sitting)   Pulse 91   Temp 96.1 °F (35.6 °C) (Tympanic)   Ht 5' 6" (1.676 m)   Wt 65.9 kg (145 lb 4.5 oz)   SpO2 98%   BMI 23.45 kg/m²     Physical Exam             Physical Exam  Vitals and nursing note " reviewed.   Constitutional:       General: She is awake. She is not in acute distress.     Appearance: Normal appearance. She is well-developed and well-groomed. She is not ill-appearing, toxic-appearing or diaphoretic.   HENT:      Head: Normocephalic and atraumatic.      Right Ear: External ear normal.      Left Ear: External ear normal.   Eyes:      Pupils: Pupils are equal, round, and reactive to light.   Cardiovascular:      Rate and Rhythm: Normal rate and regular rhythm.      Heart sounds: No murmur heard.  Pulmonary:      Effort: Pulmonary effort is normal. No tachypnea, bradypnea, accessory muscle usage, prolonged expiration, respiratory distress or retractions.      Breath sounds: Normal breath sounds. No stridor, decreased air movement or transmitted upper airway sounds. No decreased breath sounds, wheezing, rhonchi or rales.   Abdominal:      General: Abdomen is flat. Bowel sounds are normal.      Palpations: Abdomen is soft.   Musculoskeletal:         General: No swelling, tenderness, deformity or signs of injury.      Cervical back: Normal range of motion and neck supple.      Right lower leg: No edema.      Left lower leg: No edema.   Skin:     General: Skin is warm and dry.      Capillary Refill: Capillary refill takes less than 2 seconds.   Neurological:      General: No focal deficit present.      Mental Status: She is alert and oriented to person, place, and time.      Gait: Gait abnormal (amb with cane).   Psychiatric:         Attention and Perception: Attention and perception normal.         Mood and Affect: Mood and affect normal.         Speech: Speech normal.         Behavior: Behavior normal. Behavior is cooperative.         Cognition and Memory: Cognition normal.          Assessment/Plan :   1. Iatrogenic hyperthyroidism  -     levothyroxine (SYNTHROID) 25 MCG tablet; Take 1 tablet (25 mcg total) by mouth before breakfast.  Dispense: 90 tablet; Refill: 0    2. Post-surgical  hypothyroidism      Assessment & Plan    Decreased levothyroxine dose from 88 mcg to 25 mcg due to hyperthyroidism    THYROTOXICOSIS:  - Explained thyroid hormone overproduction and its effects on the body.  - Discussed importance of proper levothyroxine dosing.  - Decreased levothyroxine from 88 mcg to 25 mcg daily.  - Discontinued levothyroxine 100 mcg.  - Ordered TSH labs every 4 weeks to monitor thyroid function.    FOLLOW-UP:  - Follow up after obtaining TSH labs.        Called pharmacy to verify that patient picked up her medication and for them to not dispense the 88mcg.  Pharmacy stated patient picked up the  25 mcg on 12/12.  Discussed the possibility of flushing all the other levothyroxine down the toilet.  Patient stated she is not going to flush her medicine down the toilet; that she is going to separate it instead.  Patient verbalized to me that she is to only take levothyroxine 25 mcg until told differently.  Follow up if symptoms worsen or fail to improve.    This note was generated with the assistance of ambient listening technology. Verbal consent was obtained by the patient and accompanying visitor(s) for the recording of patient appointment to facilitate this note. I attest to having reviewed and edited the generated note for accuracy, though some syntax or spelling errors may persist. Please contact the author of this note for any clarification.

## 2024-12-26 ENCOUNTER — TELEPHONE (OUTPATIENT)
Dept: INTERNAL MEDICINE | Facility: CLINIC | Age: 69
End: 2024-12-26
Payer: MEDICARE

## 2024-12-26 NOTE — TELEPHONE ENCOUNTER
Send kissnofrog message that lab work has been scheduled for Monday Jan 27th anytime between 7 and 5pm., non fasting.

## 2024-12-30 DIAGNOSIS — I10 ESSENTIAL HYPERTENSION: Chronic | ICD-10-CM

## 2024-12-30 DIAGNOSIS — E11.9 HYPERTENSION COMPLICATING DIABETES: Chronic | ICD-10-CM

## 2024-12-30 DIAGNOSIS — I10 HYPERTENSION COMPLICATING DIABETES: Chronic | ICD-10-CM

## 2024-12-30 NOTE — TELEPHONE ENCOUNTER
----- Message from Amy sent at 12/30/2024 10:14 AM CST -----  Contact: [phone]  .[email or web address]     Patient is calling for Medical Advice regarding:is out of blood pressure medicine     How long has patient had these symptoms:thought the dr called in medication but there is none     Pharmacy name and phone#:    Patient wants a call back or thru [facility]:call back     Comments:  Please advise and give return call   Monitor is acting up so she could not take it today   Please advise patient replies from provider may take up to 48 hours.

## 2024-12-30 NOTE — TELEPHONE ENCOUNTER
Spoke with pt who wanted to let Dr. May know that she was out of blood pressure medicine and was unable to check her pressure today because her machine was not working. She wanted to know if she would have more blood pressure medication sent in. I stated that I would send a message to Ms. Baxter.

## 2025-01-03 ENCOUNTER — TELEPHONE (OUTPATIENT)
Dept: INTERNAL MEDICINE | Facility: CLINIC | Age: 70
End: 2025-01-03
Payer: MEDICARE

## 2025-01-03 RX ORDER — AMLODIPINE BESYLATE 10 MG/1
10 TABLET ORAL DAILY
Qty: 90 TABLET | Refills: 1 | OUTPATIENT
Start: 2025-01-03

## 2025-01-03 NOTE — TELEPHONE ENCOUNTER
----- Message from Diegomarycaterina sent at 1/3/2025  1:33 PM CST -----  Contact: TED MANZANO [[id number]]  ..Type:  Patient Requesting Call    Who Called:TED MANZANO [[id number]]  Does the patient know what this is regarding?:pt is requesting a clearance for the dentist for an abscess in her mouth  Would the patient rather a call back or a response via [facility]? call  Best Call Back Number:.[phone] ([place])     Additional Information:

## 2025-01-10 ENCOUNTER — TELEPHONE (OUTPATIENT)
Dept: PAIN MEDICINE | Facility: CLINIC | Age: 70
End: 2025-01-10
Payer: MEDICARE

## 2025-01-10 ENCOUNTER — TELEPHONE (OUTPATIENT)
Dept: OPHTHALMOLOGY | Facility: CLINIC | Age: 70
End: 2025-01-10
Payer: MEDICARE

## 2025-01-10 DIAGNOSIS — M54.2 CHRONIC CERVICAL PAIN: Primary | ICD-10-CM

## 2025-01-10 DIAGNOSIS — G89.29 CHRONIC CERVICAL PAIN: Primary | ICD-10-CM

## 2025-01-10 NOTE — TELEPHONE ENCOUNTER
Called pt to get her rescheduled with   Pt states that her VA has been very blurry due to very high blood pressure and was nervous because it was 166/100 and was having a hard time getting her blood pressure medication. I told pt that it continues to become any higher should she get medical attention because it was already at a dangerous range. Pt states that her daughter is planning on trying to try and help her and will take care of her in case she needs medical attention. I then asked pt what time next week we could see her and placed her for her preferred time and date for HVF 24-2 and IOP ck.

## 2025-01-10 NOTE — TELEPHONE ENCOUNTER
----- Message from Anaya sent at 1/10/2025 11:26 AM CST -----  Type:  Sooner Apoointment Request    Caller is requesting a sooner appointment.  Caller declined first available appointment listed below.  Caller will not accept being placed on the waitlist and is requesting a message be sent to doctor.  Name of Caller:Lizz  When is the first available appointment?01/17/25  Symptoms:patient canceled appt  Would the patient rather a call back or a response via [facility]? Callback  Best Call Back Number:[phone]  Additional Information: Need a callback

## 2025-01-13 ENCOUNTER — TELEPHONE (OUTPATIENT)
Dept: PULMONOLOGY | Facility: CLINIC | Age: 70
End: 2025-01-13
Payer: MEDICARE

## 2025-01-13 ENCOUNTER — TELEPHONE (OUTPATIENT)
Dept: PAIN MEDICINE | Facility: CLINIC | Age: 70
End: 2025-01-13
Payer: MEDICARE

## 2025-01-13 NOTE — TELEPHONE ENCOUNTER
----- Message from Amy sent at 1/13/2025  2:03 PM CST -----  Contact: [phone]  Patient is returning a phone call.    Who left a message for the patient: Taina Kendall     Does patient know what this is regarding:  yes     Would you like a call back, or a response through your [facility] portal?:   call back     Comments:

## 2025-01-13 NOTE — TELEPHONE ENCOUNTER
----- Message from Amy sent at 1/13/2025  2:04 PM CST -----  Contact: [phone]  Patient is returning a phone call.    Who left a message for the patient: Jennifer Saucedo    Does patient know what this is regarding:  yes     Would you like a call back, or a response through your [facility] portal?:   call back     Comments:

## 2025-01-13 NOTE — TELEPHONE ENCOUNTER
Reached out to the patient to schedule an appointment with one of our providers. The patient answered and will call back to set up an appointment, no time or date given.

## 2025-01-14 ENCOUNTER — OFFICE VISIT (OUTPATIENT)
Dept: OPHTHALMOLOGY | Facility: CLINIC | Age: 70
End: 2025-01-14
Payer: MEDICARE

## 2025-01-14 DIAGNOSIS — H40.1211 LOW TENSION GLAUCOMA OF RIGHT EYE, MILD STAGE: ICD-10-CM

## 2025-01-14 DIAGNOSIS — H40.1223 LOW TENSION GLAUCOMA OF LEFT EYE, SEVERE STAGE: Primary | ICD-10-CM

## 2025-01-14 DIAGNOSIS — N18.4 STAGE 4 CHRONIC KIDNEY DISEASE: Primary | Chronic | ICD-10-CM

## 2025-01-14 PROCEDURE — 1160F RVW MEDS BY RX/DR IN RCRD: CPT | Mod: CPTII,S$GLB,, | Performed by: OPTOMETRIST

## 2025-01-14 PROCEDURE — 99999 PR PBB SHADOW E&M-EST. PATIENT-LVL III: CPT | Mod: PBBFAC,,, | Performed by: OPTOMETRIST

## 2025-01-14 PROCEDURE — 1159F MED LIST DOCD IN RCRD: CPT | Mod: CPTII,S$GLB,, | Performed by: OPTOMETRIST

## 2025-01-14 PROCEDURE — 99213 OFFICE O/P EST LOW 20 MIN: CPT | Mod: S$GLB,,, | Performed by: OPTOMETRIST

## 2025-01-14 PROCEDURE — G2211 COMPLEX E/M VISIT ADD ON: HCPCS | Mod: S$GLB,,, | Performed by: OPTOMETRIST

## 2025-01-14 NOTE — PROGRESS NOTES
HPI     Glaucoma            Comments: Pt presents for an IOP check (arrived late and unable to have   VF 24-2 done today.)    Using Latanoprost OU QHS-- last used yesterday am---    VA may be a little blurry at times. No other concerns at this time.           Comments    DM  LTG    PCIOL OU more than about 20 or more years ago with Ebrg    Latanoprost qhs OU (resumed 12/12/24)  Target 13             Last edited by Gina Morris on 1/14/2025 10:10 AM.            Assessment /Plan     For exam results, see Encounter Report.    Low tension glaucoma of left eye, severe stage    Low tension glaucoma of right eye, mild stage    IOP elevated today above target OD>OS  Last dose of Latanoprost was yesterday morning  Will consider adding second drop at next visit if IOP remains elevated with good compliance  Continue current treatment  Monitor 1 month    Continue Latanoprost qd OU      RTC 1 month for 24-2VF and IOP check or PRN  Discussed above and all questions were answered.

## 2025-01-15 ENCOUNTER — OFFICE VISIT (OUTPATIENT)
Dept: NEPHROLOGY | Facility: CLINIC | Age: 70
End: 2025-01-15
Payer: MEDICARE

## 2025-01-15 ENCOUNTER — LAB VISIT (OUTPATIENT)
Dept: LAB | Facility: HOSPITAL | Age: 70
End: 2025-01-15
Attending: INTERNAL MEDICINE
Payer: MEDICARE

## 2025-01-15 VITALS
HEIGHT: 66 IN | SYSTOLIC BLOOD PRESSURE: 130 MMHG | HEART RATE: 102 BPM | DIASTOLIC BLOOD PRESSURE: 90 MMHG | WEIGHT: 154.56 LBS | RESPIRATION RATE: 18 BRPM | BODY MASS INDEX: 24.84 KG/M2

## 2025-01-15 DIAGNOSIS — N18.4 TYPE 2 DIABETES MELLITUS WITH STAGE 4 CHRONIC KIDNEY DISEASE, WITHOUT LONG-TERM CURRENT USE OF INSULIN: ICD-10-CM

## 2025-01-15 DIAGNOSIS — N18.4 STAGE 4 CHRONIC KIDNEY DISEASE: Chronic | ICD-10-CM

## 2025-01-15 DIAGNOSIS — N18.30 STAGE 3 CHRONIC KIDNEY DISEASE, UNSPECIFIED WHETHER STAGE 3A OR 3B CKD: Primary | ICD-10-CM

## 2025-01-15 DIAGNOSIS — E11.22 TYPE 2 DIABETES MELLITUS WITH STAGE 4 CHRONIC KIDNEY DISEASE, WITHOUT LONG-TERM CURRENT USE OF INSULIN: ICD-10-CM

## 2025-01-15 LAB
CREAT UR-MCNC: 96 MG/DL (ref 15–325)
PROT UR-MCNC: 13 MG/DL (ref 0–15)
PROT/CREAT UR: 0.14 MG/G{CREAT} (ref 0–0.2)

## 2025-01-15 PROCEDURE — 99999 PR PBB SHADOW E&M-EST. PATIENT-LVL IV: CPT | Mod: PBBFAC,,, | Performed by: INTERNAL MEDICINE

## 2025-01-15 PROCEDURE — 1159F MED LIST DOCD IN RCRD: CPT | Mod: CPTII,S$GLB,, | Performed by: INTERNAL MEDICINE

## 2025-01-15 PROCEDURE — 1125F AMNT PAIN NOTED PAIN PRSNT: CPT | Mod: CPTII,S$GLB,, | Performed by: INTERNAL MEDICINE

## 2025-01-15 PROCEDURE — 82570 ASSAY OF URINE CREATININE: CPT | Performed by: INTERNAL MEDICINE

## 2025-01-15 PROCEDURE — 99215 OFFICE O/P EST HI 40 MIN: CPT | Mod: S$GLB,,, | Performed by: INTERNAL MEDICINE

## 2025-01-15 PROCEDURE — 1101F PT FALLS ASSESS-DOCD LE1/YR: CPT | Mod: CPTII,S$GLB,, | Performed by: INTERNAL MEDICINE

## 2025-01-15 PROCEDURE — 3066F NEPHROPATHY DOC TX: CPT | Mod: CPTII,S$GLB,, | Performed by: INTERNAL MEDICINE

## 2025-01-15 PROCEDURE — 3288F FALL RISK ASSESSMENT DOCD: CPT | Mod: CPTII,S$GLB,, | Performed by: INTERNAL MEDICINE

## 2025-01-15 PROCEDURE — 3008F BODY MASS INDEX DOCD: CPT | Mod: CPTII,S$GLB,, | Performed by: INTERNAL MEDICINE

## 2025-01-15 PROCEDURE — 3075F SYST BP GE 130 - 139MM HG: CPT | Mod: CPTII,S$GLB,, | Performed by: INTERNAL MEDICINE

## 2025-01-15 PROCEDURE — 3072F LOW RISK FOR RETINOPATHY: CPT | Mod: CPTII,S$GLB,, | Performed by: INTERNAL MEDICINE

## 2025-01-15 PROCEDURE — 3080F DIAST BP >= 90 MM HG: CPT | Mod: CPTII,S$GLB,, | Performed by: INTERNAL MEDICINE

## 2025-01-15 RX ORDER — HYDROCHLOROTHIAZIDE 12.5 MG/1
12.5 TABLET ORAL DAILY
Qty: 90 TABLET | Refills: 3 | Status: SHIPPED | OUTPATIENT
Start: 2025-01-15

## 2025-01-16 NOTE — PROGRESS NOTES
Renal clinic consult f/u note:  Date of consult: 1/15/25  Reason for f/u and chief c/o: CKD  PCP: Dr. May     HPI: Pt is a 70 y/o female with h/o of CKD stage 3 and HTN who presents for f/u. Pt was last seen in renal clinic in April 2024. Pt has a h/o of extensive NSAIds use and hyperuricemia. Pt said that she used ibuprofen almost daily, on some days none, but sometimes as many as 3 per day. Pt has frequent pains and aches and what appeared to be trigger points for these aches. Pt had no acute or new c/o's today.     Also, received a note from pt's dentist, Dr. Varghese, for renal clearance for tooth extraction. The clearance was provided.     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     SOCIAL HISTORY:  She  reports that she quit smoking about 31 years ago. Her smoking use included cigarettes. She started smoking about 50 years ago. She has a 19 pack-year smoking history. She has quit using smokeless tobacco. She reports that she does not drink alcohol and does not use drugs.     FAMILY MEDICAL HISTORY:  Her family history includes Heart disease in her father and mother; Hypertension in her father and mother.          Review of patient's allergies indicates:   Allergen Reactions    Iodine      Codeine Anxiety    Iodinated contrast media Nausea And Vomiting         Meds reviewed    Current Outpatient Medications:     albuterol (PROVENTIL HFA) 90 mcg/actuation inhaler, Inhale 2 puffs into the lungs every  Faye Leigh PA-C   6 (six) hours as needed for Wheezing. (Rescue inhaler), Disp: 18 g, Rfl: 11    albuterol-ipratropium (DUO-NEB) 2.5 mg-0.5 mg/3 mL nebulizer solution, USE 3 ML VIA NEBULIZER EVERY 6 HOURS WHILE AWAKE FOR RESCUE, Disp: 360 mL, Rfl: 0    amLODIPine (NORVASC) 10 MG tablet, Take 1 tablet (10 mg total) by mouth once daily., Disp: 90 tablet, Rfl: 1    aspirin (ECOTRIN) 81 MG EC tablet, Take 1 tablet (81 mg total) by mouth once daily., Disp: 90 tablet, Rfl: 8    atorvastatin (LIPITOR) 20 MG tablet, Take 1 tablet (20 mg total) by mouth every evening., Disp: 90 tablet, Rfl: 3    blood sugar diagnostic (ONETOUCH VERIO TEST STRIPS MISC), USE TO TEST BLOOD GLUCOSE THREE TIMES DAILY, Disp: , Rfl:     blood-glucose meter (ONETOUCH VERIO REFLECT METER MISC), USE TO TEST BLOOD GLUCOSE, Disp: , Rfl:     chlorhexidine (PERIDEX) 0.12 % solution, Swish for 30 seconds with 15 mL (one capful) of undiluted oral rinse after toothbrushing, then expectorate; repeat twice daily (morning and evening), Disp: 473 mL, Rfl: 0    clonazePAM (KLONOPIN) 0.5 MG tablet, Take 0.5 mg by mouth daily as needed., Disp: , Rfl:     clotrimazole (LOTRIMIN) 1 % cream, Apply topically 2 (two) times daily. AAA, Disp: 45 g, Rfl: 0    cyclobenzaprine (FLEXERIL) 10 MG tablet, Take 10 mg by mouth 3 (three) times daily., Disp: , Rfl:     empagliflozin (JARDIANCE) 10 mg tablet, Take 1 tablet (10 mg total) by mouth once daily., Disp: 90 tablet, Rfl: 0    HYDROcodone-acetaminophen (NORCO) 5-325 mg per tablet, Take 1 tablet by mouth every 8 (eight) hours as needed for Pain., Disp: 12 tablet, Rfl: 0    hydrOXYzine HCL (ATARAX) 25 MG tablet, Take 25 mg by mouth nightly as needed., Disp: , Rfl:     inhalation spacing device, Use as directed when taking inhaled medicine, Disp: 1 each, Rfl: 0    lancets (ONETOUCH DELICA PLUS LANCET) 30 gauge Misc, USE TO TEST BLOOD GLUCOSE EVERY DAY AND AS NEEDED, Disp: , Rfl:     latanoprost 0.005 % ophthalmic solution, Place 1 drop into both  "eyes every evening., Disp: 2.5 mL, Rfl: 11    levothyroxine (SYNTHROID) 25 MCG tablet, Take 1 tablet (25 mcg total) by mouth before breakfast., Disp: 90 tablet, Rfl: 0    LIDOcaine (LIDODERM) 5 %, APPLY 1 PATCH TOPICALLY TO THE SKIN DAILY AS NEEDED, Disp: , Rfl:     naloxone (NARCAN) 4 mg/actuation [place], , Disp: , Rfl:     nebulizer accessories Kit, Use to administer nebulized medicine as directed., Disp: 1 kit, Rfl: 5    omeprazole (PRILOSEC) 40 MG capsule, Take 1 capsule (40 mg total) by mouth every morning., Disp: 90 capsule, Rfl: 3    oxyCODONE-acetaminophen (PERCOCET) 7.5-325 mg per tablet, Take 1 tablet by mouth every 8 (eight) hours as needed., Disp: , Rfl:     RSVPreF3 antigen-AS01E, PF, (AREXVY, PF,) 120 mcg/0.5 mL SusR vaccine, Inject into the muscle., Disp: 1 mL, Rfl: 0    hydroCHLOROthiazide (HYDRODIURIL) 12.5 MG Tab, Take 1 tablet (12.5 mg total) by mouth once daily., Disp: 90 tablet, Rfl: 3    tiotropium-olodateroL (STIOLTO RESPIMAT) 2.5-2.5 mcg/actuation Mist, Inhale 1 puff into the lungs once daily. Controller (Patient not taking: Reported on 1/15/2025), Disp: 4 g, Rfl: 6           REVIEW OF SYSTEMS:  Patient has no fever, fatigue, visual changes, chest pain, edema, cough, dyspnea, nausea, vomiting, constipation, diarrhea, arthralgias, pruritis, dizziness, weakness, depression, confusion.        PHYSICAL EXAM:  Blood pressure (!) 130/90, pulse 102, resp. rate 18, height 5' 6" (1.676 m), weight 70.1 kg (154 lb 8.7 oz).  Gen:WDWN female in no apparent distress  Psych:Normal mood and affect  Skin:No rashes or ulcers  Neck:No JVD  Chest:Clear with no rales, rhonchi, wheezing with normal effort  CV:Regular with no murmurs, gallops or rubs  Abd:Soft, nontender, no distension  Ext:No edema     Labs reviewed  BMP  Lab Results   Component Value Date     01/15/2025    K 3.3 (L) 01/15/2025     01/15/2025    CO2 26 01/15/2025    BUN 14 01/15/2025    CREATININE 1.6 (H) 01/15/2025    CALCIUM 9.7 " 01/15/2025    ANIONGAP 12 01/15/2025    EGFRNORACEVR 34 (A) 01/15/2025     Lab Results   Component Value Date    WBC 6.23 01/15/2025    HGB 15.4 01/15/2025    HCT 47.4 01/15/2025    MCV 88 01/15/2025     01/15/2025       Lab Results   Component Value Date    .3 (H) 04/23/2024    CALCIUM 9.7 01/15/2025    PHOS 4.3 03/08/2024     Lab Results   Component Value Date    URICACID 7.2 (H) 01/15/2025          Urine p/cr 90 mg        IMPRESSION AND RECOMMENDATIONS: 68 y/o female with multiple risks factors for CKD presented for f/u after being lost to f/u:     Renal: s Cr stable, at baseline.  Stable CKD stage 4. Stable renal function  Multiple risks factors for CKD: NSAIds (analgesic nephropathy) taking almost daily, HTN h/o, hyperuricemia (uric acid nephropathy), DM-2 (diabetoc nephropathy), sarcoidosis  Also borderline hypercalcemia in the past. Ca normal after reducing vit D dose.  K normal to mild hypokalemia, will lower HCTZ from 25 to 12.5 mg po qd  Na normal  Acid base stable  Mild borderline hypercalcemia, corrected for s lb 3.6     Advised pt to stop using NSAIds, examples mentioned  Uric acid level, higher, but only mildly. Will monitor.     2. HTN: BP controlled  Meds reviewed     3. DM-2: has microalbuminuria, continue jardiance  Should be on an ACE-I or ARB: will consider upon return     4. Sarcoidosis: per chart.  Chart was reviewed        Plans and recommendations:  As discussed above  Total time spent 40 minutes including time needed to review the records, the   patient evaluation, documentation, face-to-face discussion with the patient,   more than 50% of the time was spent on coordination of care and counseling.    Level V visit.  RTC 6 months     Reymundo Ruby MD

## 2025-01-17 ENCOUNTER — TELEPHONE (OUTPATIENT)
Dept: PAIN MEDICINE | Facility: CLINIC | Age: 70
End: 2025-01-17
Payer: MEDICARE

## 2025-01-17 NOTE — TELEPHONE ENCOUNTER
Reach out to pt to inform  her that she has the soonest appt and I will add her on the waiting list. Pt understood.

## 2025-01-27 ENCOUNTER — TELEPHONE (OUTPATIENT)
Dept: PULMONOLOGY | Facility: CLINIC | Age: 70
End: 2025-01-27
Payer: MEDICARE

## 2025-01-27 NOTE — TELEPHONE ENCOUNTER
Returned call to [facility] left message for Nata and faxed signed paper work from Dr London.

## 2025-01-27 NOTE — TELEPHONE ENCOUNTER
----- Message from Wilfrido sent at 1/27/2025 12:22 PM CST -----  Contact: mehdi/ [facility]  Type:  Needs Medical Advice    Who Called: mehdi  Symptoms (please be specific):    How long has patient had these symptoms:    Pharmacy name and phone #:    Would the patient rather a call back or a response via [facility]? Call back  Best Call Back Number: [phone] fax [phone]  Additional Information: requesting medical clearance for patients surgery scheduled 1/30

## 2025-02-03 DIAGNOSIS — D44.6 CAROTID BODY TUMOR: Chronic | ICD-10-CM

## 2025-02-03 DIAGNOSIS — E11.22 TYPE 2 DIABETES MELLITUS WITH STAGE 3B CHRONIC KIDNEY DISEASE, WITHOUT LONG-TERM CURRENT USE OF INSULIN: ICD-10-CM

## 2025-02-03 DIAGNOSIS — N18.32 TYPE 2 DIABETES MELLITUS WITH STAGE 3B CHRONIC KIDNEY DISEASE, WITHOUT LONG-TERM CURRENT USE OF INSULIN: ICD-10-CM

## 2025-02-03 DIAGNOSIS — R22.1 NECK MASS: ICD-10-CM

## 2025-02-03 DIAGNOSIS — E05.90 HYPERTHYROIDISM: Primary | ICD-10-CM

## 2025-02-03 NOTE — PROGRESS NOTES
TO MY TEAM:   Schedule her for labs and imaging and F/U OV with me.  Orders Placed This Encounter   Procedures    NM Thyroid Uptake and Scan    CT Soft Tissue Neck WO Contrast    TSH    T4, Free    T3, Free    Thyroid Stimulating Immunoglobulin    Thyroid Peroxidase Antibody    Basic Metabolic Panel     DIAGNOSES:  1. Hyperthyroidism    2. 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 Type 2 diabetes mellitus with stage 3b chronic kidney disease, without long-term current use of insulin

## 2025-02-05 ENCOUNTER — HOSPITAL ENCOUNTER (OUTPATIENT)
Dept: PREADMISSION TESTING | Facility: HOSPITAL | Age: 70
Discharge: HOME | End: 2025-02-05
Attending: FAMILY MEDICINE
Payer: MEDICARE

## 2025-02-05 DIAGNOSIS — R19.5 POSITIVE COLORECTAL CANCER SCREENING USING COLOGUARD TEST: Chronic | ICD-10-CM

## 2025-02-07 ENCOUNTER — TELEPHONE (OUTPATIENT)
Dept: PAIN MEDICINE | Facility: CLINIC | Age: 70
End: 2025-02-07
Payer: MEDICARE
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
## 2025-02-12 ENCOUNTER — TELEPHONE (OUTPATIENT)
Dept: PAIN MEDICINE | Facility: CLINIC | Age: 70
End: 2025-02-12
Payer: MEDICARE

## 2025-02-12 NOTE — TELEPHONE ENCOUNTER
Patient called and confirmed time and location for appointment. Patient given instructions about how our Interventional Pain Department practices. Patient advised to bring any prior images to the appointment.   spoke with patient and she still wants to come to discuss option. Told her no pain medication, she haven't see anyone in 2-3 years

## 2025-02-13 ENCOUNTER — HOSPITAL ENCOUNTER (OUTPATIENT)
Dept: RADIOLOGY | Facility: HOSPITAL | Age: 70
Discharge: HOME | End: 2025-02-13
Attending: ANESTHESIOLOGY
Payer: MEDICARE

## 2025-02-13 ENCOUNTER — OFFICE VISIT (OUTPATIENT)
Dept: PAIN MEDICINE | Facility: CLINIC | Age: 70
End: 2025-02-13
Payer: MEDICARE

## 2025-02-13 VITALS
HEIGHT: 66 IN | BODY MASS INDEX: 24.73 KG/M2 | DIASTOLIC BLOOD PRESSURE: 50 MMHG | WEIGHT: 153.88 LBS | SYSTOLIC BLOOD PRESSURE: 139 MMHG | HEART RATE: 90 BPM

## 2025-02-13 DIAGNOSIS — M54.2 CHRONIC CERVICAL PAIN: ICD-10-CM

## 2025-02-13 DIAGNOSIS — M54.16 LUMBAR RADICULOPATHY: ICD-10-CM

## 2025-02-13 DIAGNOSIS — M47.812 CERVICAL SPONDYLOSIS: ICD-10-CM

## 2025-02-13 DIAGNOSIS — M54.12 CERVICAL RADICULOPATHY: Primary | ICD-10-CM

## 2025-02-13 DIAGNOSIS — M47.816 LUMBAR SPONDYLOSIS: ICD-10-CM

## 2025-02-13 DIAGNOSIS — E11.40 DIABETIC NEUROPATHY, PAINFUL: ICD-10-CM

## 2025-02-13 DIAGNOSIS — G89.29 CHRONIC CERVICAL PAIN: ICD-10-CM

## 2025-02-13 DIAGNOSIS — M50.30 DDD (DEGENERATIVE DISC DISEASE), CERVICAL: ICD-10-CM

## 2025-02-13 DIAGNOSIS — M54.2 CERVICALGIA: ICD-10-CM

## 2025-02-13 PROCEDURE — 72114 X-RAY EXAM L-S SPINE BENDING: CPT | Mod: 26,,, | Performed by: RADIOLOGY

## 2025-02-13 PROCEDURE — 99999 PR PBB SHADOW E&M-EST. PATIENT-LVL V: CPT | Mod: PBBFAC,,, | Performed by: ANESTHESIOLOGY

## 2025-02-13 PROCEDURE — 72114 X-RAY EXAM L-S SPINE BENDING: CPT | Mod: TC

## 2025-02-13 NOTE — PROGRESS NOTES
New Patient Interventional Pain Note (Initial Visit)    Referring Physician: Jose Balderas    PCP: KARAN May MD    Chief Complaint:  Neck and lower back pain       SUBJECTIVE:    Lizz Crockett is a 69 y.o. female who presents to the clinic for the evaluation of neck and lower back pain.   Patient reports 3 year history of neck and lower back pain.  Patient does report neck pain significantly increased after patient landed on her at a  2 years ago.  Patient denies any previous surgeries in his cervical or lumbar spine.  Patient denies any previous surgeries on her major joints.  If pain is described as a aching stabbing pain that starts in the right side of the neck and radiates to her right trapezius and right shoulder area.  Patient reports occasional radiation to her right biceps.  Patient denies any significant radiation to her left upper extremity.  Pain is worse with prolonged sitting and extension, better with ice and heat and anti-inflammatories.  Lower back pain described as a aching pulling pain that starts in the midline.  This pain then radiates down her bilateral lower extremities on the posterior aspect to her feet.  Patient reports that her 2nd and 3rd toe on her bilateral feet remain numb.  Patient has a previous diagnosis of diabetic neuropathy.  Pain is worse with extension and prolonged standing, better with flexion and sitting down.  Patient denies any fevers, chills, saddle anesthesia, or bowel and bladder incontinence.      Non-Pharmacologic Treatments:  Physical Therapy/Home Exercise: yes  Ice/Heat:yes  TENS: no  Acupuncture: no  Massage: yes  Chiropractic: yes        Previous Pain Medications:  NSAIDs, Tylenol, muscle relaxers, neuropathics, opioids, topicals       report:  Reviewed and consistent with medication use as prescribed.    Pain Procedures:    remote history of previous injections        Imaging:   Results for orders placed during the hospital encounter of  11/22/24    X-Ray Cervical Spine Complete 5 view    Narrative  EXAM:XR CERVICAL SPINE COMPLETE 5 VIEW    INDICATION:  Pain    COMPARISON: None    TECHNIQUE: Cervical spine 6 views    FINDINGS:  The vertebral body heights are normal.  Retrolisthesis of C4 measuring 4 mm.  Retrolisthesis of C5 measuring 3 mm.  Severe degenerative disc disease at C3-4 through the C5-6.    Posterior elements appear are intact.  No prevertebral or posterior soft tissue swelling.    Impression  No radiographic evidence of fracture or subluxation.  Advanced degenerative disc disease with reversal the normal curvature of the cervical spine.          Finalized on: 11/22/2024 3:10 PM By:  FRANCOIS Espinal MD, MD  [facility]# [id number]      2024-11-22 15:12:30.939    [facility]    No results found for this or any previous visit.      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    Cataract     Chronic obstructive bronchitis 04/18/2023    Diabetes mellitus, type 2     GERD (gastroesophageal reflux disease)     Glaucoma     Hyperlipidemia     Hypertension     PONV (postoperative nausea and vomiting)     Sarcoidosis, unspecified     Schizophreniform disorder 12/21/2022    Thyroid disease     Type 2 diabetes mellitus with stage 4 chronic kidney disease, without long-term current use of insulin 09/02/2022     Past Surgical History:   Procedure Laterality Date    BREAST SURGERY      CATARACT EXTRACTION, BILATERAL Bilateral     INCISION AND DRAINAGE, UPPER EXTREMITY Left 1/26/2023    Procedure: INCISION AND DRAINAGE, UPPER EXTREMITY;  Surgeon: Cam Oneill MD;  Location: [facility];  Service: Orthopedics;  Laterality: Left;    THYROIDECTOMY      for multinodular goiter    TUBAL LIGATION       Social History     Socioeconomic History    Marital status: Single   Tobacco Use    Smoking status: Former     Current packs/day: 0.00     Average packs/day: 1 pack/day for 19.0 years  (19.0 ttl pk-yrs)     Types: Cigarettes     Start date:      Quit date:      Years since quittin.1    Smokeless tobacco: Former   Substance and Sexual Activity    Alcohol use: No    Drug use: No    Sexual activity: Yes     Social Drivers of Health     Financial Resource Strain: Patient Declined (2024)    Overall Financial Resource Strain (CARDIA)     Difficulty of Paying Living Expenses: Patient declined   Food Insecurity: No Food Insecurity (2024)    Hunger Vital Sign     Worried About Running Out of Food in the Last Year: Never true     Ran Out of Food in the Last Year: Never true   Physical Activity: Unknown (2024)    Exercise Vital Sign     Days of Exercise per Week: 0 days   Stress: Stress Concern Present (2024)    [place] [employer] - Occupational Stress Questionnaire     Feeling of Stress : To some extent   Housing Stability: Unknown (2024)    Housing Stability Vital Sign     Unable to Pay for Housing in the Last Year: No     Family History   Problem Relation Name Age of Onset    Heart disease Mother      Hypertension Mother      Hypertension Father      Heart disease Father         Review of patient's allergies indicates:   Allergen Reactions    Iodine     Codeine Anxiety    Iodinated contrast media Nausea And Vomiting       Current Outpatient Medications   Medication Sig    albuterol (PROVENTIL HFA) 90 mcg/actuation inhaler Inhale 2 puffs into the lungs every 6 (six) hours as needed for Wheezing. (Rescue inhaler)    albuterol-ipratropium (DUO-NEB) 2.5 mg-0.5 mg/3 mL nebulizer solution USE 3 ML VIA NEBULIZER EVERY 6 HOURS WHILE AWAKE FOR RESCUE    amLODIPine (NORVASC) 10 MG tablet Take 1 tablet (10 mg total) by mouth once daily.    aspirin (ECOTRIN) 81 MG EC tablet Take 1 tablet (81 mg total) by mouth once daily.    atorvastatin (LIPITOR) 20 MG tablet Take 1 tablet (20 mg total) by mouth every evening.    blood sugar diagnostic (ONETOUCH VERIO TEST  STRIPS MISC) USE TO TEST BLOOD GLUCOSE THREE TIMES DAILY    blood-glucose meter (ONETOUCH VERIO REFLECT METER MISC) USE TO TEST BLOOD GLUCOSE    chlorhexidine (PERIDEX) 0.12 % solution Swish for 30 seconds with 15 mL (one capful) of undiluted oral rinse after toothbrushing, then expectorate; repeat twice daily (morning and evening)    clonazePAM (KLONOPIN) 0.5 MG tablet Take 0.5 mg by mouth daily as needed.    clotrimazole (LOTRIMIN) 1 % cream Apply topically 2 (two) times daily. AAA    cyclobenzaprine (FLEXERIL) 10 MG tablet Take 10 mg by mouth 3 (three) times daily.    empagliflozin (JARDIANCE) 10 mg tablet Take 1 tablet (10 mg total) by mouth once daily.    hydroCHLOROthiazide (HYDRODIURIL) 12.5 MG Tab Take 1 tablet (12.5 mg total) by mouth once daily.    HYDROcodone-acetaminophen (NORCO) 5-325 mg per tablet Take 1 tablet by mouth every 8 (eight) hours as needed for Pain.    hydrOXYzine HCL (ATARAX) 25 MG tablet Take 25 mg by mouth nightly as needed.    lancets (ONETOUCH DELICA PLUS LANCET) 30 gauge Misc USE TO TEST BLOOD GLUCOSE EVERY DAY AND AS NEEDED    latanoprost 0.005 % ophthalmic solution Place 1 drop into both eyes every evening.    levothyroxine (SYNTHROID) 25 MCG tablet Take 1 tablet (25 mcg total) by mouth before breakfast.    LIDOcaine (LIDODERM) 5 % APPLY 1 PATCH TOPICALLY TO THE SKIN DAILY AS NEEDED    naloxone (NARCAN) 4 mg/actuation Spry     nebulizer accessories Kit Use to administer nebulized medicine as directed.    omeprazole (PRILOSEC) 40 MG capsule Take 1 capsule (40 mg total) by mouth every morning.    oxyCODONE-acetaminophen (PERCOCET) 7.5-325 mg per tablet Take 1 tablet by mouth every 8 (eight) hours as needed.    RSVPreF3 antigen-AS01E, PF, (AREXVY, PF,) 120 mcg/0.5 mL SusR vaccine Inject into the muscle.    inhalation spacing device Use as directed when taking inhaled medicine    tiotropium-olodateroL (STIOLTO RESPIMAT) 2.5-2.5 mcg/actuation Mist Inhale 1 puff into the lungs once daily.  "Controller (Patient not taking: Reported on 2/13/2025)     Current Facility-Administered Medications   Medication    RSVPreF3 antigen-AS01E (PF) (Arexvy) vaccine 0.5 mL         ROS  Review of Systems   Constitutional:  Negative for chills, diaphoresis, fatigue and fever.   Respiratory:  Negative for chest tightness, shortness of breath, wheezing and stridor.    Cardiovascular:  Negative for chest pain and leg swelling.   Gastrointestinal:  Negative for blood in stool, diarrhea, nausea and vomiting.   Endocrine: Negative for cold intolerance and heat intolerance.   Genitourinary:  Negative for dysuria, hematuria and urgency.   Musculoskeletal:  Positive for arthralgias, back pain, gait problem, joint swelling, myalgias, neck pain and neck stiffness.   Skin:  Negative for rash.   Neurological:  Positive for weakness and numbness. Negative for tremors, seizures, speech difficulty, light-headedness and headaches.   Hematological:  Does not bruise/bleed easily.   Psychiatric/Behavioral:  Negative for agitation, confusion and suicidal ideas.             OBJECTIVE:  BP (!) 139/50 (BP Location: Right arm)   Pulse 90   Ht 5' 6" (1.676 m)   Wt 69.8 kg (153 lb 14.1 oz)   BMI 24.84 kg/m²         Physical Exam  Constitutional:       Appearance: Normal appearance.   HENT:      Head: Normocephalic and atraumatic.   Eyes:      Extraocular Movements: Extraocular movements intact.      Pupils: Pupils are equal, round, and reactive to light.   Cardiovascular:      Pulses: Normal pulses.   Pulmonary:      Effort: Pulmonary effort is normal.   Skin:     General: Skin is warm.      Capillary Refill: Capillary refill takes less than 2 seconds.   Neurological:      Mental Status: She is alert and oriented to person, place, and time.      Sensory: No sensory deficit.      Motor: Weakness present. No abnormal muscle tone.      Gait: Gait abnormal.      Deep Tendon Reflexes:      Reflex Scores:       Tricep reflexes are 2+ on the right " side and 2+ on the left side.       Bicep reflexes are 2+ on the right side and 2+ on the left side.       Brachioradialis reflexes are 1+ on the right side and 2+ on the left side.       Patellar reflexes are 2+ on the right side and 2+ on the left side.       Achilles reflexes are 1+ on the right side and 1+ on the left side.     Comments: Antalgic gait, patient uses rolling walker for most ambulation  4/5 strength in bilateral dorsiflexion   Psychiatric:         Mood and Affect: Mood normal.         Behavior: Behavior normal.         Thought Content: Thought content normal.           Musculoskeletal:    Cervical Exam  Incision: no  Pain with Flexion: no  Pain with Extension: yes  Paraspinous TTP: \positive on the right  Facet TTP:  C6-C7  Spurling:  Positive on the right  ROM:  Decreased      Lumbar Exam  Incision: no  Pain with Flexion: yes  Pain with Extension: yes  ROM:  Decreased  Paraspinous TTP:  Positive bilaterally  Facet TTP:  L5-S1  Facet Loading:  Positive bilaterally  SLR:  Positive on the left  SIJ TTP:  Negative bilaterally  CECELIA:  Negative bilaterally      LABS:  Lab Results   Component Value Date    WBC 6.23 01/15/2025    HGB 15.4 01/15/2025    HCT 47.4 01/15/2025    MCV 88 01/15/2025     01/15/2025       CMP  Sodium   Date Value Ref Range Status   01/15/2025 144 136 - 145 mmol/L Final     Potassium   Date Value Ref Range Status   01/15/2025 3.3 (L) 3.5 - 5.1 mmol/L Final     Chloride   Date Value Ref Range Status   01/15/2025 106 95 - 110 mmol/L Final     CO2   Date Value Ref Range Status   01/15/2025 26 23 - 29 mmol/L Final     Glucose   Date Value Ref Range Status   01/15/2025 93 70 - 110 mg/dL Final     BUN   Date Value Ref Range Status   01/15/2025 14 8 - 23 mg/dL Final     Creatinine   Date Value Ref Range Status   01/15/2025 1.6 (H) 0.5 - 1.4 mg/dL Final     Calcium   Date Value Ref Range Status   01/15/2025 9.7 8.7 - 10.5 mg/dL Final     Total Protein   Date Value Ref Range Status    11/22/2024 8.1 6.0 - 8.4 g/dL Final     Albumin   Date Value Ref Range Status   11/22/2024 3.9 3.5 - 5.2 g/dL Final     Total Bilirubin   Date Value Ref Range Status   11/22/2024 0.4 0.1 - 1.0 mg/dL Final     Comment:     For infants and newborns, interpretation of results should be based  on gestational age, weight and in agreement with clinical  observations.    Premature Infant recommended reference ranges:  Up to 24 hours.............<8.0 mg/dL  Up to 48 hours............<12.0 mg/dL  3-5 days..................<15.0 mg/dL  6-29 days.................<15.0 mg/dL       Alkaline Phosphatase   Date Value Ref Range Status   11/22/2024 175 (H) 40 - 150 U/L Final     AST   Date Value Ref Range Status   11/22/2024 17 10 - 40 U/L Final     ALT   Date Value Ref Range Status   11/22/2024 16 10 - 44 U/L Final     Anion Gap   Date Value Ref Range Status   01/15/2025 12 8 - 16 mmol/L Final     eGFR if    Date Value Ref Range Status   11/28/2014 43.0 (A) >60 mL/min/1.73 m^2 Final     eGFR if non    Date Value Ref Range Status   11/28/2014 37.3 (A) >60 mL/min/1.73 m^2 Final     Comment:     Calculation used to obtain the estimated glomerular filtration  rate (eGFR) is the CKD-EPI equation. Since race is unknown   in our information system, the eGFR values for   -American and Non--American patients are given   for each creatinine result.         Lab Results   Component Value Date    HGBA1C 6.0 (H) 11/22/2024             ASSESSMENT:       69 y.o. year old female with neck and lower back pain, consistent with     1. Cervical radiculopathy  MRI Cervical Spine Without Contrast    Ambulatory Referral/Consult to Physical Therapy/Occupational Therapy      2. Chronic cervical pain  Ambulatory referral/consult to Pain Clinic      3. Cervical spondylosis        4. DDD (degenerative disc disease), cervical  MRI Cervical Spine Without Contrast      5. Lumbar radiculopathy  Ambulatory  Referral/Consult to Physical Therapy/Occupational Therapy      6. Lumbar spondylosis  X-Ray Lumbar Complete Including Flex And Ext      7. Cervicalgia        8. Diabetic neuropathy, painful          Cervical radiculopathy  -     MRI Cervical Spine Without Contrast; Future; Expected date: 02/13/2025  -     Ambulatory Referral/Consult to Physical Therapy/Occupational Therapy; Future; Expected date: 02/20/2025    Chronic cervical pain  -     Ambulatory referral/consult to Pain Clinic    Cervical spondylosis    DDD (degenerative disc disease), cervical  -     MRI Cervical Spine Without Contrast; Future; Expected date: 02/13/2025    Lumbar radiculopathy  -     Ambulatory Referral/Consult to Physical Therapy/Occupational Therapy; Future; Expected date: 02/20/2025    Lumbar spondylosis  -     X-Ray Lumbar Complete Including Flex And Ext; Future; Expected date: 02/13/2025    Cervicalgia    Diabetic neuropathy, painful             PLAN:   - Interventions:   - none at this time.  Neck pain appears to be consistent with right cervical radiculopathy.  Less suspicion for overlapping right shoulder pathology.  Lower back pain appears to be consistent with lumbar radiculopathy, lumbar facet disease, and diabetic neuropathy.  We will obtain updated imaging to further evaluate etiology of the pain.    - Anticoagulation use:   Yes aspirin    - Medications:  - patient currently prescribed Percocet and cyclobenzaprine by Dr. Martinez    - Therapy:   - refer patient to formal physical therapy for neck and lower back pain    - Imaging/Diagnostic:  - x-rays cervical spine reviewed and findings discussed with patient.  There is grade 1 retrolisthesis C4 upon C5 with loss of disc height from C3-C4 to C5-C6  - obtain MRI cervical spine without contrast to further evaluate neck pain with right cervical radiculopathy and loss of disc height on x-ray that has continued despite over 8 weeks of conservative therapy  - we will obtain updated x-rays  of lumbar spine to further evaluate lower back pain    - Consults:   - none at this time        - Patient Questions: Answered all of the patient's questions regarding diagnosis, therapy, and treatment     This condition does not require this patient to take time off of work, and the primary goal of our Pain Management services is to improve the patient's functional capacity.     - Follow up visit: return to clinic in 5-6 weeks        The above plan and management options were discussed at length with patient. Patient is in agreement with the above and verbalized understanding.    I discussed the goals of interventional chronic pain management with the patient on today's visit.  I explained the utility of injections for diagnostic and therapeutic purposes.  We discussed a multimodal approach to pain including treating the patient's given worst pain at any given time.  We will use a systematic approach to addressing pain.  We will also adopt a multimodal approach that includes injections, adjuvant medications, physical therapy, at times psychiatry.  There may be a limited role for opioid use intermittently in the treatment of pain, more particularly for acute pain although no one approach can be used as a sole treatment modality.    I emphasized the importance of regular exercise, core strengthening and stretching, diet and weight loss as a cornerstone of long-term pain management.      Pranav Narvaez MD  Interventional Pain Management  [facility]    Future Appointments   Date Time Provider Department Center   2/13/2025  9:00 AM [facility] XR1 [facility] XRAY [facility]   2/17/2025  2:45 PM LABORATORY, [facility] LAB [facility]   2/17/2025  3:00 PM [facility] CT1 LIMIT 500 LBS [facility] CT SCAN [facility]   2/25/2025  7:30 AM [facility] MRI [facility] MRI [facility]   4/3/2025  8:40 AM Elizabeth Villegas NP [facility] INT ERIN [facility]   5/9/2025 10:30 AM LABORATORY, [facility] LAB [facility]   5/16/2025 10:00 AM Renetta Esquivel, JOYCEP-BC [facility] NEPHRO  [place]   11/26/2025  9:00 AM [facility] CT1 LIMIT 500 LBS [facility] CT SCAN [place]   11/26/2025  9:15 AM Nilson London MD [facility] PULMSVC [place]           Disclaimer:  This note was prepared using voice recognition system and is likely to have sound alike errors that may have been overlooked even after proof reading.  Please call me with any questions    I spent a total of 30 minutes on the day of the visit.  This includes face to face time and non-face to face time preparing to see the patient (eg, review of tests), obtaining and/or reviewing separately obtained history, documenting clinical information in the electronic or other health record, independently interpreting results and communicating results to the patient/family/caregiver, or care coordinator.

## 2025-02-25 ENCOUNTER — TELEPHONE (OUTPATIENT)
Dept: PAIN MEDICINE | Facility: CLINIC | Age: 70
End: 2025-02-25
Payer: MEDICARE

## 2025-02-25 ENCOUNTER — HOSPITAL ENCOUNTER (OUTPATIENT)
Dept: RADIOLOGY | Facility: HOSPITAL | Age: 70
Discharge: HOME | End: 2025-02-25
Attending: ANESTHESIOLOGY
Payer: MEDICARE

## 2025-02-25 DIAGNOSIS — R22.1 NECK MASS: Primary | ICD-10-CM

## 2025-02-25 DIAGNOSIS — M54.12 CERVICAL RADICULOPATHY: ICD-10-CM

## 2025-02-25 DIAGNOSIS — M50.30 DDD (DEGENERATIVE DISC DISEASE), CERVICAL: ICD-10-CM

## 2025-02-25 PROCEDURE — 72141 MRI NECK SPINE W/O DYE: CPT | Mod: TC

## 2025-02-25 PROCEDURE — 72141 MRI NECK SPINE W/O DYE: CPT | Mod: 26,,, | Performed by: RADIOLOGY

## 2025-02-25 RX ORDER — DIPHENHYDRAMINE HCL 50 MG
CAPSULE ORAL
Qty: 1 CAPSULE | Refills: 0 | Status: SHIPPED | OUTPATIENT
Start: 2025-02-25

## 2025-02-25 RX ORDER — PREDNISONE 50 MG/1
TABLET ORAL
Qty: 3 TABLET | Refills: 0 | Status: SHIPPED | OUTPATIENT
Start: 2025-02-25

## 2025-02-25 NOTE — TELEPHONE ENCOUNTER
Spoke with patient, she wanted to get dates for appt for shoulder pain.  Informed patient that she has an appt after the MRI and CT scan to discuss everything.  Patient verbalized understaning

## 2025-02-25 NOTE — TELEPHONE ENCOUNTER
Spoke with patient and informed her that an order for CT was ordered by Dr. Narvaez and gave her the number to schedule

## 2025-02-25 NOTE — TELEPHONE ENCOUNTER
----- Message from Amy sent at 2/25/2025  2:03 PM CST -----  Contact: [phone]  [email or web address] Patient is calling for Medical Advice regarding:shoulder pain and swelling to her neck How long has patient had these symptoms:asking what test the dr can do for this Pharmacy name and phone#:Patient wants a call back or [facility]:call back Comments:Please advise Please advise patient replies from provider may take up to 48 hours.

## 2025-02-25 NOTE — TELEPHONE ENCOUNTER
----- Message from Pranav Narvaez MD sent at 2/25/2025 12:23 PM CST -----  Hey, I called this patient to tell her about the CT of her neck I ordered. Can you try reaching out to her/messaging her about this image and try to get her scheduled? Thanks!

## 2025-03-02 ENCOUNTER — RESULTS FOLLOW-UP (OUTPATIENT)
Dept: INTERNAL MEDICINE | Facility: CLINIC | Age: 70
End: 2025-03-02

## 2025-03-02 NOTE — PROGRESS NOTES
Her TSH is too high. This suggests that her current levothyroxine dose of 88 mcg daily is too low. Has she been taking it consistently? If she is able to do a virtual video visit with me to discuss dose adjustment, schedule her for that. If not, schedule her for office visit with me or Crista to discuss. Thanks.

## 2025-03-10 ENCOUNTER — LAB VISIT (OUTPATIENT)
Dept: LAB | Facility: HOSPITAL | Age: 70
End: 2025-03-10
Attending: FAMILY MEDICINE
Payer: MEDICARE

## 2025-03-10 DIAGNOSIS — E05.80 IATROGENIC HYPERTHYROIDISM: ICD-10-CM

## 2025-03-10 DIAGNOSIS — E11.22 TYPE 2 DIABETES MELLITUS WITH STAGE 3B CHRONIC KIDNEY DISEASE, WITHOUT LONG-TERM CURRENT USE OF INSULIN: ICD-10-CM

## 2025-03-10 DIAGNOSIS — E05.90 HYPERTHYROIDISM: ICD-10-CM

## 2025-03-10 DIAGNOSIS — N18.32 TYPE 2 DIABETES MELLITUS WITH STAGE 3B CHRONIC KIDNEY DISEASE, WITHOUT LONG-TERM CURRENT USE OF INSULIN: ICD-10-CM

## 2025-03-10 DIAGNOSIS — E89.0 POST-SURGICAL HYPOTHYROIDISM: Chronic | ICD-10-CM

## 2025-03-10 PROCEDURE — 80048 BASIC METABOLIC PNL TOTAL CA: CPT | Performed by: FAMILY MEDICINE

## 2025-03-10 PROCEDURE — 36415 COLL VENOUS BLD VENIPUNCTURE: CPT | Mod: PN | Performed by: FAMILY MEDICINE

## 2025-03-10 PROCEDURE — 84481 FREE ASSAY (FT-3): CPT | Performed by: FAMILY MEDICINE

## 2025-03-10 PROCEDURE — 84443 ASSAY THYROID STIM HORMONE: CPT | Performed by: FAMILY MEDICINE

## 2025-03-10 PROCEDURE — 84439 ASSAY OF FREE THYROXINE: CPT | Performed by: FAMILY MEDICINE

## 2025-03-10 PROCEDURE — 86376 MICROSOMAL ANTIBODY EACH: CPT | Performed by: FAMILY MEDICINE

## 2025-03-10 PROCEDURE — 84445 ASSAY OF TSI GLOBULIN: CPT | Performed by: FAMILY MEDICINE

## 2025-03-10 NOTE — TELEPHONE ENCOUNTER
No care due was identified.  [facility] Embedded Care Due Messages. Reference number: [id number].   3/10/2025 7:26:23 AM CDT

## 2025-03-11 LAB
ANION GAP SERPL CALC-SCNC: 11 MMOL/L (ref 8–16)
BUN SERPL-MCNC: 27 MG/DL (ref 8–23)
CALCIUM SERPL-MCNC: 10.1 MG/DL (ref 8.7–10.5)
CHLORIDE SERPL-SCNC: 101 MMOL/L (ref 95–110)
CO2 SERPL-SCNC: 22 MMOL/L (ref 23–29)
CREAT SERPL-MCNC: 1.7 MG/DL (ref 0.5–1.4)
EST. GFR  (NO RACE VARIABLE): 31.7 ML/MIN/1.73 M^2
GLUCOSE SERPL-MCNC: 110 MG/DL (ref 70–110)
POTASSIUM SERPL-SCNC: 4 MMOL/L (ref 3.5–5.1)
SODIUM SERPL-SCNC: 134 MMOL/L (ref 136–145)
T3FREE SERPL-MCNC: 2.7 PG/ML (ref 2.3–4.2)
T4 FREE SERPL-MCNC: 1.03 NG/DL (ref 0.71–1.51)
THYROPEROXIDASE IGG SERPL-ACNC: <6 IU/ML
TSH SERPL DL<=0.005 MIU/L-ACNC: 4.97 UIU/ML (ref 0.4–4)

## 2025-03-11 NOTE — TELEPHONE ENCOUNTER
Refill Routing Note   Medication(s) are not appropriate for processing by [facility] for the following reason(s):        New or recently adjusted medication    [facility] action(s):  Defer      Medication Therapy Plan: T4 WNL 02./05/2025      Appointments  past 12m or future 3m with PCP    Date Provider   Last Visit   10/22/2024 KARAN May MD   Next Visit   3/18/2025 KARAN May MD   ED visits in past 90 days: 0        Note composed:10:33 PM 03/10/2025

## 2025-03-13 LAB — TSI SER-ACNC: <0.1 IU/L

## 2025-03-17 RX ORDER — LEVOTHYROXINE SODIUM 25 UG/1
25 TABLET ORAL
Qty: 90 TABLET | Refills: 0 | Status: SHIPPED | OUTPATIENT
Start: 2025-03-17

## 2025-03-17 NOTE — TELEPHONE ENCOUNTER
REFILL APPROVED. Will address further refills at upcoming appointment with me listed below.  #LMRX   --------------------------------  Future Appointments   Date Time Provider Department Center   3/25/2025  1:30 PM [facility] CT1 LIMIT 500 LBS [facility] CT SCAN [facility]   3/26/2025 10:20 AM KARAN May MD [facility] IM [facility]   4/3/2025  8:40 AM Elizabeth Villegas NP [facility] INT ERIN [facility]   5/9/2025 10:30 AM LABORATORY, [facility] LAB [facility]   5/16/2025 10:00 AM Renetta Esquivel, P-BC [facility] NEPHRO [facility]   11/26/2025  9:00 AM [facility] CT1 LIMIT 500 LBS [facility] CT SCAN [place]   11/26/2025  9:15 AM Nilson London MD [facility] PULMSVC [facility]

## 2025-03-24 DIAGNOSIS — R22.1 NECK MASS: Primary | ICD-10-CM

## 2025-03-24 DIAGNOSIS — E89.0 POST-SURGICAL HYPOTHYROIDISM: Chronic | ICD-10-CM

## 2025-03-25 ENCOUNTER — TELEPHONE (OUTPATIENT)
Dept: INTERNAL MEDICINE | Facility: CLINIC | Age: 70
End: 2025-03-25
Payer: MEDICARE

## 2025-03-26 ENCOUNTER — OFFICE VISIT (OUTPATIENT)
Dept: INTERNAL MEDICINE | Facility: CLINIC | Age: 70
End: 2025-03-26
Payer: MEDICARE

## 2025-03-26 VITALS
WEIGHT: 152.75 LBS | RESPIRATION RATE: 18 BRPM | BODY MASS INDEX: 24.55 KG/M2 | DIASTOLIC BLOOD PRESSURE: 88 MMHG | HEART RATE: 90 BPM | OXYGEN SATURATION: 99 % | SYSTOLIC BLOOD PRESSURE: 138 MMHG | HEIGHT: 66 IN | TEMPERATURE: 98 F

## 2025-03-26 DIAGNOSIS — E89.0 POST-SURGICAL HYPOTHYROIDISM: Primary | Chronic | ICD-10-CM

## 2025-03-26 DIAGNOSIS — E11.59 HYPERTENSION ASSOCIATED WITH DIABETES: Chronic | ICD-10-CM

## 2025-03-26 DIAGNOSIS — E11.69 HYPERLIPIDEMIA ASSOCIATED WITH TYPE 2 DIABETES MELLITUS: Chronic | ICD-10-CM

## 2025-03-26 DIAGNOSIS — D44.6 CAROTID BODY TUMOR: Chronic | ICD-10-CM

## 2025-03-26 DIAGNOSIS — I15.2 HYPERTENSION ASSOCIATED WITH DIABETES: Chronic | ICD-10-CM

## 2025-03-26 DIAGNOSIS — N25.81 HYPERPARATHYROIDISM, SECONDARY RENAL: Chronic | ICD-10-CM

## 2025-03-26 DIAGNOSIS — N18.32 STAGE 3B CHRONIC KIDNEY DISEASE: Chronic | ICD-10-CM

## 2025-03-26 DIAGNOSIS — Z12.31 BREAST CANCER SCREENING BY MAMMOGRAM: ICD-10-CM

## 2025-03-26 DIAGNOSIS — E78.5 HYPERLIPIDEMIA ASSOCIATED WITH TYPE 2 DIABETES MELLITUS: Chronic | ICD-10-CM

## 2025-03-26 DIAGNOSIS — E11.22 TYPE 2 DIABETES MELLITUS WITH STAGE 3B CHRONIC KIDNEY DISEASE, WITHOUT LONG-TERM CURRENT USE OF INSULIN: Chronic | ICD-10-CM

## 2025-03-26 DIAGNOSIS — J41.8 MIXED SIMPLE AND MUCOPURULENT CHRONIC BRONCHITIS: ICD-10-CM

## 2025-03-26 DIAGNOSIS — Z91.89 AT RISK FOR NONCOMPLIANCE: Chronic | ICD-10-CM

## 2025-03-26 DIAGNOSIS — Z91.89 AT RISK FOR KNOWLEDGE DEFICIT: Chronic | ICD-10-CM

## 2025-03-26 DIAGNOSIS — N18.32 TYPE 2 DIABETES MELLITUS WITH STAGE 3B CHRONIC KIDNEY DISEASE, WITHOUT LONG-TERM CURRENT USE OF INSULIN: Chronic | ICD-10-CM

## 2025-03-26 DIAGNOSIS — F11.20 UNCOMPLICATED OPIOID DEPENDENCE: ICD-10-CM

## 2025-03-26 DIAGNOSIS — Z79.899 POLYPHARMACY: Chronic | ICD-10-CM

## 2025-03-26 DIAGNOSIS — F20.81 SCHIZOPHRENIFORM DISORDER: Chronic | ICD-10-CM

## 2025-03-26 PROBLEM — E05.80 IATROGENIC HYPERTHYROIDISM: Status: RESOLVED | Noted: 2025-03-26 | Resolved: 2025-03-26

## 2025-03-26 PROBLEM — E05.80 IATROGENIC HYPERTHYROIDISM: Status: ACTIVE | Noted: 2025-03-26

## 2025-03-26 LAB
ALBUMIN SERPL BCP-MCNC: 3.8 G/DL (ref 3.5–5.2)
ALP SERPL-CCNC: 164 UNIT/L (ref 40–150)
ALT SERPL W/O P-5'-P-CCNC: 14 UNIT/L (ref 10–44)
ANION GAP (OHS): 11 MMOL/L (ref 8–16)
AST SERPL-CCNC: 19 UNIT/L (ref 11–45)
BILIRUB SERPL-MCNC: 0.4 MG/DL (ref 0.1–1)
BUN SERPL-MCNC: 25 MG/DL (ref 8–23)
CALCIUM SERPL-MCNC: 9.5 MG/DL (ref 8.7–10.5)
CHLORIDE SERPL-SCNC: 108 MMOL/L (ref 95–110)
CO2 SERPL-SCNC: 17 MMOL/L (ref 23–29)
CREAT SERPL-MCNC: 1.5 MG/DL (ref 0.5–1.4)
GFR SERPLBLD CREATININE-BSD FMLA CKD-EPI: 37 ML/MIN/1.73/M2
GLUCOSE SERPL-MCNC: 94 MG/DL (ref 70–110)
POTASSIUM SERPL-SCNC: 4.4 MMOL/L (ref 3.5–5.1)
PROT SERPL-MCNC: 7.8 GM/DL (ref 6–8.4)
SODIUM SERPL-SCNC: 136 MMOL/L (ref 136–145)

## 2025-03-26 PROCEDURE — 1101F PT FALLS ASSESS-DOCD LE1/YR: CPT | Mod: CPTII,S$GLB,, | Performed by: FAMILY MEDICINE

## 2025-03-26 PROCEDURE — 1126F AMNT PAIN NOTED NONE PRSNT: CPT | Mod: CPTII,S$GLB,, | Performed by: FAMILY MEDICINE

## 2025-03-26 PROCEDURE — 3288F FALL RISK ASSESSMENT DOCD: CPT | Mod: CPTII,S$GLB,, | Performed by: FAMILY MEDICINE

## 2025-03-26 PROCEDURE — 3072F LOW RISK FOR RETINOPATHY: CPT | Mod: CPTII,S$GLB,, | Performed by: FAMILY MEDICINE

## 2025-03-26 PROCEDURE — G2211 COMPLEX E/M VISIT ADD ON: HCPCS | Mod: S$GLB,,, | Performed by: FAMILY MEDICINE

## 2025-03-26 PROCEDURE — 1160F RVW MEDS BY RX/DR IN RCRD: CPT | Mod: CPTII,S$GLB,, | Performed by: FAMILY MEDICINE

## 2025-03-26 PROCEDURE — 36415 COLL VENOUS BLD VENIPUNCTURE: CPT | Performed by: FAMILY MEDICINE

## 2025-03-26 PROCEDURE — 3008F BODY MASS INDEX DOCD: CPT | Mod: CPTII,S$GLB,, | Performed by: FAMILY MEDICINE

## 2025-03-26 PROCEDURE — 99999 PR PBB SHADOW E&M-EST. PATIENT-LVL V: CPT | Mod: PBBFAC,,, | Performed by: FAMILY MEDICINE

## 2025-03-26 PROCEDURE — 3079F DIAST BP 80-89 MM HG: CPT | Mod: CPTII,S$GLB,, | Performed by: FAMILY MEDICINE

## 2025-03-26 PROCEDURE — 1159F MED LIST DOCD IN RCRD: CPT | Mod: CPTII,S$GLB,, | Performed by: FAMILY MEDICINE

## 2025-03-26 PROCEDURE — 99215 OFFICE O/P EST HI 40 MIN: CPT | Mod: S$GLB,,, | Performed by: FAMILY MEDICINE

## 2025-03-26 PROCEDURE — 3075F SYST BP GE 130 - 139MM HG: CPT | Mod: CPTII,S$GLB,, | Performed by: FAMILY MEDICINE

## 2025-03-26 PROCEDURE — 80053 COMPREHEN METABOLIC PANEL: CPT | Performed by: FAMILY MEDICINE

## 2025-03-26 PROCEDURE — 3066F NEPHROPATHY DOC TX: CPT | Mod: CPTII,S$GLB,, | Performed by: FAMILY MEDICINE

## 2025-03-26 PROCEDURE — 83036 HEMOGLOBIN GLYCOSYLATED A1C: CPT | Performed by: FAMILY MEDICINE

## 2025-03-26 RX ORDER — HYDROCHLOROTHIAZIDE 12.5 MG/1
12.5 TABLET ORAL DAILY
Qty: 90 TABLET | Refills: 3 | Status: SHIPPED | OUTPATIENT
Start: 2025-03-26

## 2025-03-26 RX ORDER — AMLODIPINE BESYLATE 10 MG/1
10 TABLET ORAL DAILY
Qty: 90 TABLET | Refills: 1 | Status: SHIPPED | OUTPATIENT
Start: 2025-03-26

## 2025-03-26 RX ORDER — LEVOTHYROXINE SODIUM 50 UG/1
50 TABLET ORAL
Qty: 90 TABLET | Refills: 1 | Status: SHIPPED | OUTPATIENT
Start: 2025-03-26

## 2025-03-26 NOTE — PROGRESS NOTES
OFFICE VISIT 3/26/25 10:20 AM CDT    CHIEF COMPLAINT: Follow-up    Post-surgical hypothyroidism: The patient's TSH levels indicate recent hypothyroidism after reducing the levothyroxine dose. Current TSH levels remain slightly elevated at 4.970, demonstrating the need for ongoing monitoring. Treatment continues with levothyroxine 50 mcg daily, with regular TSH level evaluations to adjust the dose as needed.    Type 2 diabetes with stage 3b CKD: The patient's diabetes is well-controlled, evidenced by stable hemoglobin A1C levels around 6.0%. However, kidney function shows an eGFR of 31.7, indicating stage 3b chronic kidney disease. Empagliflozin 10 mg daily remains the primary medication, with continued monitoring of kidney function and blood glucose levels.    Hypertension associated with diabetes: Current blood pressure readings reflect mild hypertension concerns at 138/88 mmHg, managed with amlodipine 10 mg and hydrochlorothiazide 12.5 mg. Blood pressure is stable but requires continuous monitoring, alongside lifestyle advice related to diet and exercise.    Hyperlipidemia complicating diabetes: Despite being well-managed with atorvastatin, the patient's lipid panel needs ongoing monitoring. Coordination with diabetes and CKD management remains crucial for comprehensive care.    Hyperparathyroidism secondary to CKD: As hyperparathyroidism is influenced by CKD, investigations via PTH and Vitamin D levels are warranted. Treatment plans remain pending additional lab results to address potential metabolic complications.    Schizophreniform disorder: This condition remains stable under the continued guidance of psychiatric care and clonazepam management. Coordination with mental health services is key for ongoing symptom control.    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 Follow-up with head and neck surgery consultation  remains advocated, should the patient reconsider.    Polypharmacy: The patient's understanding of her medication regimen is limited, with adherence challenges noted.    Opioid dependence (chronic pain management): Managed under the care of a specialist, the patient understands the risks of combining medications. Ongoing monitoring of opioid use (34 MME/day) is crucial to prevent over-sedation and affiliated risks.     Mixed chronic bronchitis: Currently compensated and stable under existing medications, including Stiolto Respimat. Routine evaluation and adjustment of therapies continue as required.      1. Post-surgical hypothyroidism  Assessment & Plan:  Hypothyroid after reducing levothyroxine dose.  Lab Results   Component Value Date    TSH 4.970 (H) 03/10/2025    TSH 11.332 (H) 02/05/2025    TSH 0.109 (L) 12/24/2024    TSH <0.010 (L) 11/22/2024        Orders:  -     levothyroxine (SYNTHROID) 50 MCG tablet; Take 1 tablet (50 mcg total) by mouth before breakfast.  Dispense: 90 tablet; Refill: 1  -     TSH; Future; Expected date: 03/26/2025    2. Type 2 diabetes mellitus with stage 3b chronic kidney disease, without long-term current use of insulin  Assessment & Plan:  Lab Results   Component Value Date    HGBA1C 6.0 (H) 11/22/2024    HGBA1C 6.2 (H) 07/22/2024    HGBA1C 6.2 (H) 03/08/2024    EGFRNORACEVR 31.7 (A) 03/10/2025    MICALBCREAT 14.3 11/22/2024    LDLCALC 73.2 11/22/2024        Orders:  -     empagliflozin (JARDIANCE) 10 mg tablet; Take 1 tablet (10 mg total) by mouth once daily.  Dispense: 90 tablet; Refill: 1  -     Hemoglobin A1C    3. Hypertension associated with diabetes  -     amLODIPine (NORVASC) 10 MG tablet; Take 1 tablet (10 mg total) by mouth once daily.  Dispense: 90 tablet; Refill: 1  -     hydroCHLOROthiazide 12.5 MG Tab; Take 1 tablet (12.5 mg total) by mouth once daily.  Dispense: 90 tablet; Refill: 3  -     Renal Function Panel; Future; Expected date: 03/26/2025    4. Stage 3b chronic  kidney disease  -     empagliflozin (JARDIANCE) 10 mg tablet; Take 1 tablet (10 mg total) by mouth once daily.  Dispense: 90 tablet; Refill: 1  -     Renal Function Panel; Future; Expected date: 03/26/2025    5. Hyperlipidemia copmplicating type 2 diabetes mellitus  -     Comprehensive Metabolic Panel  -     Lipid Panel; Future; Expected date: 03/26/2025    6. Hyperparathyroidism, secondary to chronic kidney disease  -     Renal Function Panel; Future; Expected date: 03/26/2025  -     PTH, Intact; Future; Expected date: 03/26/2025  -     Vitamin D; Future; Expected date: 03/26/2025    7. Schizophreniform disorder  Overview:  [facility]  Psychiatric nurse practitioner: Main Meza  [facility] [place], [place] [address]  Phone: [phone]  Fax: [phone]  [email or web address]    Assessment & Plan:  This condition appears stable on clonazepam prescribed by psychiatric nurse practitioner Main Meza.  PLAN: Continue present treatment plan.      8. 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 She has not kept these appointments. Head and neck surgeon consultation remains my recommendation.  She says that this problem is not bothering her, and she gave informed refusal for further evaluation and treatment. I told her to let me know if she changed her mind.       9. Polypharmacy  Assessment & Plan:  She has limited understanding and recollection of her current medications. Pharmacy records suggest poor medication adherence. We discussed strategies to help her better keep track of and overcome any barriers to adherence to medication regimen.       10. At risk for nonadherence with treatment plan    11. At risk for knowledge deficit    12. Uncomplicated opioid dependence  Overview:  PAIN MANAGEMENT: Shaquille Martinez MD    Assessment & Plan:  She is and has been on chronic opioids for pain  "management. She is physiologically dependent and tolerant and reports perceiving no significant sedation. She understands that combining prescribed pain medications with other sedating medications or alcohol carries risk of potentially life-threatening over-sedation. [facility] Controlled Prescription Drug Monitoring database was queried and showed average 34 MME/day.      13. Mixed simple and mucopurulent chronic bronchitis  Assessment & Plan:  ASSESSMENT: This is a chronic problem that appears compensated/controlled and stable on current meds, including Stiolto Respimat.  PLAN: Continue present treatment plan.       14. Breast cancer screening by mammogram  -     Mammo Digital Screening Bilat w/ Martinez (XPD); Future; Expected date: 05/05/2025         Unless specified otherwise, chronic conditions are represented as and appear to be compensated/controlled and stable.  Today's visit involved the intricate management of episodic problem(s) and the ongoing care for the patient's serious or complex condition(s) listed above, reflecting the inherent complexity of providing longitudinal, comprehensive evaluation and management as the central hub for the patient's primary care services.  [place] [employer] Controlled Prescription Drug Monitoring data reviewed.    Except as noted herein, ROS is otherwise negative.    Vitals:    03/26/25 1025 03/26/25 1123   BP: (!) 138/90 138/88   BP Location: Right arm    Patient Position: Sitting    Pulse: 105 90   Resp: 18    Temp: 97.7 °F (36.5 °C)    TempSrc: Tympanic    SpO2: 99%    Weight: 69.3 kg (152 lb 12.5 oz)    Height: 5' 6" (1.676 m)    Physical Exam  Vitals reviewed.   Constitutional:       General: She is not in acute distress.     Appearance: Normal appearance. She is not ill-appearing, toxic-appearing or diaphoretic.   Cardiovascular:      Rate and Rhythm: Normal rate.   Pulmonary:      Effort: Pulmonary effort is normal.   Neurological:      " General: No focal deficit present.      Mental Status: She is alert and oriented to person, place, and time. Mental status is at baseline.   Psychiatric:         Mood and Affect: Mood normal.         Behavior: Behavior normal.         Judgment: Judgment normal.       I spent a total of 42 minutes today evaluating and managing this patient for this encounter.  This includes face to face time and non-face to face time preparing to see the patient (eg, review of tests), obtaining and/or reviewing separately obtained history, documenting clinical information in the electronic or other health record, independently interpreting results and communicating results to the patient/family/caregiver, or care coordinator. This time was spent personally by me on the following activities: review of nurse's notes, pre-charting, review of patient's past medical history, assessing age-appropriate health maintenance needs, review of any interval history, medication reconciliation, reviewing/reconciling/updating problem list, reviewing/reconciling/updating PMFSH, obtaining history from the patient, examination of the patient, review and interpretation of lab results, answering patient's questions about lab results, review and interpretation of imaging test results, reviewing my previous chart notes, reviewing consulting specialist's chart notes, review of [facility] Controlled Prescription Drug Monitoring database records for the patient, managing and/or ordering prescription medications, explaining rationale for medication change(s) and answering patient's questions, discussing strategies to help overcome any barriers to adherence to medication regimen, ordering labs, discussing differential diagnosis with patient, educating patient and answering their questions about risks and benefits of treatment options, educating patient and answering their questions about treatment plan, goals of treatment, and follow-up,  "discussing strategies to help overcome any barriers to adherence to treatment plan, communicating instructions to my staff about the treatment plan, and final documentation of the visit.  This time was exclusive of any separately billable procedures for this patient and exclusive of time spent treating any other patient.    Documentation entered by me for this encounter may have been done in part using speech-recognition technology. Although I have made an effort to ensure accuracy, "sound like" errors may exist and should be interpreted in context.    WRAP-UP INSTRUCTIONS  LABS TODAY:  -Comprehensive Metabolic Panel  -Hemoglobin A1C    LABS IN 8-10 WEEKS  -Lipid Panel  -Renal Function Panel  -PTH, Intact  -Vitamin D  -TSH    OV me or Crista a few days after those labs.      FOR FOLLOW-UP AT NEXT APPOINTMENT  @Crista @KBFNP: Review labs. Re-assess levothyroxine dosing.  "

## 2025-03-26 NOTE — ASSESSMENT & PLAN NOTE
ENT and I have recommended that she see a head and neck surgeon to discuss surgical treatment. She has not kept these appointments. Head and neck surgeon consultation remains my recommendation.  She says that this problem is not bothering her, and she gave informed refusal for further evaluation and treatment. I told her to let me know if she changed her mind.

## 2025-03-26 NOTE — ASSESSMENT & PLAN NOTE
Lab Results   Component Value Date    HGBA1C 6.0 (H) 11/22/2024    HGBA1C 6.2 (H) 07/22/2024    HGBA1C 6.2 (H) 03/08/2024    EGFRNORACEVR 31.7 (A) 03/10/2025    MICALBCREAT 14.3 11/22/2024    LDLCALC 73.2 11/22/2024

## 2025-03-26 NOTE — ASSESSMENT & PLAN NOTE
This condition appears stable on clonazepam prescribed by psychiatric nurse practitioner Main Meza.  PLAN: Continue present treatment plan.

## 2025-03-26 NOTE — ASSESSMENT & PLAN NOTE
ASSESSMENT: This is a chronic problem that appears compensated/controlled and stable on current meds, including Stiolto Respimat.  PLAN: Continue present treatment plan.

## 2025-03-26 NOTE — ASSESSMENT & PLAN NOTE
She has limited understanding and recollection of her current medications. Pharmacy records suggest poor medication adherence. We discussed strategies to help her better keep track of and overcome any barriers to adherence to medication regimen.

## 2025-03-26 NOTE — ASSESSMENT & PLAN NOTE
Hypothyroid after reducing levothyroxine dose.  Lab Results   Component Value Date    TSH 4.970 (H) 03/10/2025    TSH 11.332 (H) 02/05/2025    TSH 0.109 (L) 12/24/2024    TSH <0.010 (L) 11/22/2024

## 2025-03-26 NOTE — ASSESSMENT & PLAN NOTE
She is and has been on chronic opioids for pain management. She is physiologically dependent and tolerant and reports perceiving no significant sedation. She understands that combining prescribed pain medications with other sedating medications or alcohol carries risk of potentially life-threatening over-sedation. [facility] Controlled Prescription Drug Monitoring database was queried and showed average 34 MME/day.

## 2025-03-27 LAB
EAG (OHS): 128 MG/DL (ref 68–131)
HBA1C MFR BLD: 6.1 % (ref 4–5.6)

## 2025-04-02 ENCOUNTER — TELEPHONE (OUTPATIENT)
Dept: PAIN MEDICINE | Facility: CLINIC | Age: 70
End: 2025-04-02
Payer: MEDICARE

## 2025-04-02 NOTE — TELEPHONE ENCOUNTER
Called patient to confirm her appt with ms. Villegas and patient informed me that she didn't get her CT scans complete. Scheduled her for her CT scans and rescheduled her follow up appt. Pt verbalized understanding.    Sam NARVAEZ

## 2025-04-16 ENCOUNTER — RESULTS FOLLOW-UP (OUTPATIENT)
Dept: INTERNAL MEDICINE | Facility: CLINIC | Age: 70
End: 2025-04-16

## 2025-04-28 DIAGNOSIS — E89.0 POST-SURGICAL HYPOTHYROIDISM: Primary | ICD-10-CM

## 2025-04-29 ENCOUNTER — TELEPHONE (OUTPATIENT)
Dept: PAIN MEDICINE | Facility: CLINIC | Age: 70
End: 2025-04-29
Payer: MEDICARE

## 2025-04-30 ENCOUNTER — HOSPITAL ENCOUNTER (OUTPATIENT)
Dept: RADIOLOGY | Facility: HOSPITAL | Age: 70
Discharge: HOME | End: 2025-04-30
Attending: NURSE PRACTITIONER
Payer: MEDICARE

## 2025-04-30 ENCOUNTER — OFFICE VISIT (OUTPATIENT)
Dept: INTERNAL MEDICINE | Facility: CLINIC | Age: 70
End: 2025-04-30
Payer: MEDICARE

## 2025-04-30 ENCOUNTER — HOSPITAL ENCOUNTER (OUTPATIENT)
Dept: RADIOLOGY | Facility: HOSPITAL | Age: 70
Discharge: HOME | End: 2025-04-30
Attending: ANESTHESIOLOGY
Payer: MEDICARE

## 2025-04-30 VITALS
HEART RATE: 67 BPM | WEIGHT: 152.75 LBS | BODY MASS INDEX: 24.55 KG/M2 | OXYGEN SATURATION: 97 % | SYSTOLIC BLOOD PRESSURE: 104 MMHG | TEMPERATURE: 97 F | HEIGHT: 66 IN | DIASTOLIC BLOOD PRESSURE: 70 MMHG

## 2025-04-30 DIAGNOSIS — M25.511 CHRONIC RIGHT SHOULDER PAIN: ICD-10-CM

## 2025-04-30 DIAGNOSIS — R22.1 NECK MASS: ICD-10-CM

## 2025-04-30 DIAGNOSIS — G89.29 CHRONIC RIGHT SHOULDER PAIN: ICD-10-CM

## 2025-04-30 DIAGNOSIS — H40.1223 LOW TENSION GLAUCOMA OF LEFT EYE, SEVERE STAGE: Primary | ICD-10-CM

## 2025-04-30 DIAGNOSIS — R26.9 UNSPECIFIED ABNORMALITIES OF GAIT AND MOBILITY: ICD-10-CM

## 2025-04-30 DIAGNOSIS — Z12.31 ENCOUNTER FOR MAMMOGRAM TO ESTABLISH BASELINE MAMMOGRAM: ICD-10-CM

## 2025-04-30 DIAGNOSIS — R25.2 CRAMPS, MUSCLE, GENERAL: ICD-10-CM

## 2025-04-30 DIAGNOSIS — G62.9 PERIPHERAL POLYNEUROPATHY: Primary | ICD-10-CM

## 2025-04-30 DIAGNOSIS — E05.90 HYPERTHYROIDISM: ICD-10-CM

## 2025-04-30 PROCEDURE — 82607 VITAMIN B-12: CPT | Performed by: NURSE PRACTITIONER

## 2025-04-30 PROCEDURE — 82565 ASSAY OF CREATININE: CPT | Performed by: NURSE PRACTITIONER

## 2025-04-30 PROCEDURE — 82043 UR ALBUMIN QUANTITATIVE: CPT | Performed by: NURSE PRACTITIONER

## 2025-04-30 PROCEDURE — 73030 X-RAY EXAM OF SHOULDER: CPT | Mod: TC,RT

## 2025-04-30 PROCEDURE — 73030 X-RAY EXAM OF SHOULDER: CPT | Mod: 26,RT,, | Performed by: RADIOLOGY

## 2025-04-30 PROCEDURE — 99999 PR PBB SHADOW E&M-EST. PATIENT-LVL V: CPT | Mod: PBBFAC,,, | Performed by: NURSE PRACTITIONER

## 2025-04-30 PROCEDURE — 83735 ASSAY OF MAGNESIUM: CPT | Performed by: NURSE PRACTITIONER

## 2025-04-30 RX ORDER — LATANOPROST 50 UG/ML
1 SOLUTION/ DROPS OPHTHALMIC NIGHTLY
Qty: 2.5 ML | Refills: 11 | Status: SHIPPED | OUTPATIENT
Start: 2025-04-30

## 2025-05-01 DIAGNOSIS — Z88.8 ALLERGY TO IODINE: Primary | ICD-10-CM

## 2025-05-01 LAB
ALBUMIN SERPL BCP-MCNC: 3.7 G/DL (ref 3.5–5.2)
ALBUMIN/CREAT UR: 7 UG/MG
ALP SERPL-CCNC: 148 UNIT/L (ref 40–150)
ALT SERPL W/O P-5'-P-CCNC: 18 UNIT/L (ref 10–44)
ANION GAP (OHS): 11 MMOL/L (ref 8–16)
AST SERPL-CCNC: 22 UNIT/L (ref 11–45)
BILIRUB SERPL-MCNC: 0.5 MG/DL (ref 0.1–1)
BUN SERPL-MCNC: 27 MG/DL (ref 8–23)
CALCIUM SERPL-MCNC: 9.7 MG/DL (ref 8.7–10.5)
CHLORIDE SERPL-SCNC: 107 MMOL/L (ref 95–110)
CO2 SERPL-SCNC: 20 MMOL/L (ref 23–29)
CREAT SERPL-MCNC: 1.6 MG/DL (ref 0.5–1.4)
CREAT UR-MCNC: 71 MG/DL (ref 15–325)
GFR SERPLBLD CREATININE-BSD FMLA CKD-EPI: 34 ML/MIN/1.73/M2
GLUCOSE SERPL-MCNC: 86 MG/DL (ref 70–110)
MAGNESIUM SERPL-MCNC: 2.1 MG/DL (ref 1.6–2.6)
MICROALBUMIN UR-MCNC: 5 UG/ML (ref ?–5000)
POTASSIUM SERPL-SCNC: 4.9 MMOL/L (ref 3.5–5.1)
PROT SERPL-MCNC: 7.7 GM/DL (ref 6–8.4)
SODIUM SERPL-SCNC: 138 MMOL/L (ref 136–145)
VIT B12 SERPL-MCNC: 489 PG/ML (ref 210–950)

## 2025-05-01 RX ORDER — DIPHENHYDRAMINE HCL 25 MG
25 CAPSULE ORAL
Status: SHIPPED | OUTPATIENT
Start: 2025-05-01

## 2025-05-08 NOTE — PROGRESS NOTES
Interventional Pain Note     Referring Physician: No ref. provider found    PCP: KARAN May MD    Chief Complaint:  Neck and lower back pain       SUBJECTIVE:  Interval History (5/15/2025):  Patient Lizz Crockett presents today for follow-up visit.  Patient is being seen today to go over her cervical MRI and lower back x-ray.  She has neck pain that radiates down the neck and into the right shoulder and behind the right shoulder blade.  At times she has some pain in the right fingers.  She rates her pain 10/10.  She also reports lower back pain which radiates down the back and sides of the lower extremities all the way down to the feet.  Pain is worse with prolonged standing and walking.  She expresses a lot of frustration over the pharmacy not giving her her Flexeril which is prescribed by external doctor because she is also on clonazepam and Percocet.  Patient denies night fever/night sweats, urinary incontinence, bowel incontinence, significant weight loss and significant motor weakness.   Patient denies any other complaints or concerns at this time.      2025  Lizz Crockett is a 70 y.o. female who presents to the clinic for the evaluation of neck and lower back pain.   Patient reports 3 year history of neck and lower back pain.  Patient does report neck pain significantly increased after patient landed on her at a  2 years ago.  Patient denies any previous surgeries in his cervical or lumbar spine.  Patient denies any previous surgeries on her major joints.  If pain is described as a aching stabbing pain that starts in the right side of the neck and radiates to her right trapezius and right shoulder area.  Patient reports occasional radiation to her right biceps.  Patient denies any significant radiation to her left upper extremity.  Pain is worse with prolonged sitting and extension, better with ice and heat and anti-inflammatories.  Lower back pain described as a aching pulling pain that  starts in the midline.  This pain then radiates down her bilateral lower extremities on the posterior aspect to her feet.  Patient reports that her 2nd and 3rd toe on her bilateral feet remain numb.  Patient has a previous diagnosis of diabetic neuropathy.  Pain is worse with extension and prolonged standing, better with flexion and sitting down.  Patient denies any fevers, chills, saddle anesthesia, or bowel and bladder incontinence.      Non-Pharmacologic Treatments:  Physical Therapy/Home Exercise: yes  Ice/Heat:yes  TENS: no  Acupuncture: no  Massage: yes  Chiropractic: yes        Previous Pain Medications:  NSAIDs, Tylenol, muscle relaxers, neuropathics, opioids, topicals       report:  Reviewed and consistent with medication use as prescribed.    Pain Procedures:    remote history of previous injections        Imagin25 mri cervical     FINDINGS:  The cervical cord demonstrates mild ventral impression at the C3-4 disc level.  No cord edema.  No displacement of the cerebellar tonsils.     Straightening of the normal cervical lordosis is present with multilevel cervical degenerative disc disease.     Incidentally there is a large heterogeneous mass in the left neck just inferior to the deep lobe of the parotid gland.  Clinical correlation or follow-up neck CT with contrast suggested.     C2-C3: Unremarkable.     C3-C4: Moderate disc degeneration with broad-based disc osteophyte complex with ventral sac and cord impression.  No significant stenosis.  Uncovertebral joint spurring and minor facet arthrosis with mild left foraminal stenosis.     C4-C5: Moderate disc degeneration with mild disc osteophyte complex with ventral sac impression.  Uncovertebral joint spurring with moderate right and mild left foraminal stenosis.     C5-C6: Moderate disc degeneration with mild disc osteophyte complex with mild ventral sac impression.  Uncovertebral joint spurring with moderate right and mild left foraminal  stenosis.     C6-C7: Again mild to moderate disc degeneration with disc osteophyte complex.  Uncovertebral joint spurring with mild right foraminal stenosis.     C7-T1: Unremarkable.     This report was flagged in Epic as abnormal.     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  Follow-up recommended.     Multilevel high-grade cervical degenerative disc disease with disc osteophyte complex with ventral sac impression.  No significant central canal stenosis.     Multilevel foraminal stenosis as above.     2/13/25 lumbar xray  FINDINGS:  The vertebral bodies demonstrate a normal height.  There is grade 1 spondylolisthesis of L4 on L5.  Mild disc space narrowing seen at the L3-4 level.  Mild-to-moderate bilateral facet arthropathy noted at multiple levels throughout the lumbar spine.  No pars defects.  No significant change in the degree of listhesis on the flexion or extension views.         Results for orders placed during the hospital encounter of 11/22/24    X-Ray Cervical Spine Complete 5 view    Narrative  EXAM:XR CERVICAL SPINE COMPLETE 5 VIEW    INDICATION:  Pain    COMPARISON: None    TECHNIQUE: Cervical spine 6 views    FINDINGS:  The vertebral body heights are normal.  Retrolisthesis of C4 measuring 4 mm.  Retrolisthesis of C5 measuring 3 mm.  Severe degenerative disc disease at C3-4 through the C5-6.    Posterior elements appear are intact.  No prevertebral or posterior soft tissue swelling.    Impression  No radiographic evidence of fracture or subluxation.  Advanced degenerative disc disease with reversal the normal curvature of the cervical spine.          Finalized on: 11/22/2024 3:10 PM By:  FRANCOIS Espinal MD, MD  [facility]# [id number]      2024-11-22 15:12:[id number]    [facility]    No results found for this or any previous visit.      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    Cataract     Chronic  obstructive bronchitis 2023    Diabetes mellitus, type 2     GERD (gastroesophageal reflux disease)     Glaucoma     Hyperlipidemia     Hypertension     PONV (postoperative nausea and vomiting)     Sarcoidosis, unspecified     Schizophreniform disorder 2022    Thyroid disease     Type 2 diabetes mellitus with stage 4 chronic kidney disease, without long-term current use of insulin 2022     Past Surgical History:   Procedure Laterality Date    BREAST SURGERY      CATARACT EXTRACTION, BILATERAL Bilateral     INCISION AND DRAINAGE, UPPER EXTREMITY Left 2023    Procedure: INCISION AND DRAINAGE, UPPER EXTREMITY;  Surgeon: Cam Oneill MD;  Location: [facility];  Service: Orthopedics;  Laterality: Left;    THYROIDECTOMY      for multinodular goiter    TUBAL LIGATION       Social History     Socioeconomic History    Marital status: Single   Tobacco Use    Smoking status: Former     Current packs/day: 0.00     Average packs/day: 1 pack/day for 19.0 years (19.0 ttl pk-yrs)     Types: Cigarettes     Start date:      Quit date:      Years since quittin.3    Smokeless tobacco: Former   Substance and Sexual Activity    Alcohol use: No    Drug use: No    Sexual activity: Yes     Social Drivers of Health     Financial Resource Strain: Patient Declined (2024)    Overall Financial Resource Strain (CARDIA)     Difficulty of Paying Living Expenses: Patient declined   Food Insecurity: No Food Insecurity (2024)    Hunger Vital Sign     Worried About Running Out of Food in the Last Year: Never true     Ran Out of Food in the Last Year: Never true   Physical Activity: Unknown (2024)    Exercise Vital Sign     Days of Exercise per Week: 0 days   Stress: Stress Concern Present (2024)    [employer] - Occupational Stress Questionnaire     Feeling of Stress : To some extent   Housing Stability: Unknown (2024)    Housing Stability Vital Sign      "Unable to Pay for Housing in the Last Year: No     Family History   Problem Relation Name Age of Onset    Heart disease Mother      Hypertension Mother      Hypertension Father      Heart disease Father         Review of patient's allergies indicates:   Allergen Reactions    Prednisone Shortness Of Breath     When I asked her if she was allergic to prednisone, she said that she is NOT allergic to prednisone. She reports that once when she took a medrol dose pack about thee decades ago, it "made her feel crazy." No history of angioedema or IgE-mediated allergy to corticosteroids.    Iodine     Codeine Anxiety    Iodinated contrast media Nausea And Vomiting       Current Outpatient Medications   Medication Sig    albuterol (PROVENTIL HFA) 90 mcg/actuation inhaler Inhale 2 puffs into the lungs every 6 (six) hours as needed for Wheezing. (Rescue inhaler)    albuterol-ipratropium (DUO-NEB) 2.5 mg-0.5 mg/3 mL nebulizer solution USE 3 ML VIA NEBULIZER EVERY 6 HOURS WHILE AWAKE FOR RESCUE    amLODIPine (NORVASC) 10 MG tablet Take 1 tablet (10 mg total) by mouth once daily.    aspirin (ECOTRIN) 81 MG EC tablet Take 1 tablet (81 mg total) by mouth once daily.    atorvastatin (LIPITOR) 20 MG tablet Take 1 tablet (20 mg total) by mouth every evening.    blood sugar diagnostic (ONETOUCH VERIO TEST STRIPS MISC) USE TO TEST BLOOD GLUCOSE THREE TIMES DAILY    blood-glucose meter (ONETOUCH VERIO REFLECT METER MISC) USE TO TEST BLOOD GLUCOSE    chlorhexidine (PERIDEX) 0.12 % solution Swish for 30 seconds with 15 mL (one capful) of undiluted oral rinse after toothbrushing, then expectorate; repeat twice daily (morning and evening)    clonazePAM (KLONOPIN) 0.5 MG tablet Take 0.5 mg by mouth daily as needed.    clotrimazole (LOTRIMIN) 1 % cream Apply topically 2 (two) times daily. AAA    cyclobenzaprine (FLEXERIL) 10 MG tablet Take 10 mg by mouth 3 (three) times daily.    diphenhydrAMINE (BENADRYL) 50 MG capsule Take 50mg by mouth 1 " hour before contrast administration.    empagliflozin (JARDIANCE) 10 mg tablet Take 1 tablet (10 mg total) by mouth once daily.    hydroCHLOROthiazide 12.5 MG Tab Take 1 tablet (12.5 mg total) by mouth once daily.    HYDROcodone-acetaminophen (NORCO) 5-325 mg per tablet Take 1 tablet by mouth every 8 (eight) hours as needed for Pain.    hydrOXYzine HCL (ATARAX) 25 MG tablet Take 25 mg by mouth nightly as needed.    inhalation spacing device Use as directed when taking inhaled medicine    lancets (ONETOUCH DELICA PLUS LANCET) 30 gauge Misc USE TO TEST BLOOD GLUCOSE EVERY DAY AND AS NEEDED    latanoprost 0.005 % ophthalmic solution Place 1 drop into both eyes every evening.    levothyroxine (SYNTHROID) 50 MCG tablet Take 1 tablet (50 mcg total) by mouth before breakfast.    LIDOcaine (LIDODERM) 5 % APPLY 1 PATCH TOPICALLY TO THE SKIN DAILY AS NEEDED    naloxone (NARCAN) 4 mg/actuation Spry     nebulizer accessories Kit Use to administer nebulized medicine as directed.    omeprazole (PRILOSEC) 40 MG capsule Take 1 capsule (40 mg total) by mouth every morning.    oxyCODONE-acetaminophen (PERCOCET) 7.5-325 mg per tablet Take 1 tablet by mouth every 8 (eight) hours as needed.    predniSONE (DELTASONE) 50 MG Tab Take 50mg by mouth at 13 hours, 7 hours, and 1 hour before contrast administration.    tiotropium-olodateroL (STIOLTO RESPIMAT) 2.5-2.5 mcg/actuation Mist Inhale 1 puff into the lungs once daily. Controller    vitamin D3-vitamin K2 137.5-200 mcg Tab Take 1 tablet by mouth Daily.     Current Facility-Administered Medications   Medication    diphenhydrAMINE capsule 25 mg         ROS  Review of Systems   Constitutional:  Negative for chills, diaphoresis, fatigue and fever.   Respiratory:  Negative for chest tightness, shortness of breath, wheezing and stridor.    Cardiovascular:  Negative for chest pain and leg swelling.   Gastrointestinal:  Negative for blood in stool, diarrhea, nausea and vomiting.   Endocrine:  "Negative for cold intolerance and heat intolerance.   Genitourinary:  Negative for dysuria, hematuria and urgency.   Musculoskeletal:  Positive for arthralgias, back pain, gait problem, joint swelling, myalgias, neck pain and neck stiffness.   Skin:  Negative for rash.   Neurological:  Positive for weakness and numbness. Negative for tremors, seizures, speech difficulty, light-headedness and headaches.   Hematological:  Does not bruise/bleed easily.   Psychiatric/Behavioral:  Negative for agitation, confusion and suicidal ideas.             OBJECTIVE:  /80   Pulse (!) 55   Ht 5' 6" (1.676 m)   Wt 71.5 kg (157 lb 10.1 oz)   BMI 25.44 kg/m²         Physical Exam  Constitutional:       Appearance: Normal appearance.   HENT:      Head: Normocephalic and atraumatic.   Eyes:      Extraocular Movements: Extraocular movements intact.      Pupils: Pupils are equal, round, and reactive to light.   Cardiovascular:      Pulses: Normal pulses.   Pulmonary:      Effort: Pulmonary effort is normal.   Skin:     General: Skin is warm.      Capillary Refill: Capillary refill takes less than 2 seconds.   Neurological:      Mental Status: She is alert and oriented to person, place, and time.      Sensory: No sensory deficit.      Motor: Weakness present. No abnormal muscle tone.      Gait: Gait abnormal.      Deep Tendon Reflexes:      Reflex Scores:       Tricep reflexes are 2+ on the right side and 2+ on the left side.       Bicep reflexes are 2+ on the right side and 2+ on the left side.       Brachioradialis reflexes are 1+ on the right side and 2+ on the left side.       Patellar reflexes are 2+ on the right side and 2+ on the left side.       Achilles reflexes are 1+ on the right side and 1+ on the left side.     Comments: Antalgic gait, patient uses rolling walker for most ambulation  4/5 strength in bilateral dorsiflexion   Psychiatric:         Mood and Affect: Mood normal.         Behavior: Behavior normal.         " Thought Content: Thought content normal.           Musculoskeletal:    Cervical Exam  Incision: no  Pain with Flexion: no  Pain with Extension: yes  Paraspinous TTP: \positive on the right  Facet TTP:  C6-C7  Spurling:  Positive on the right  ROM:  Decreased  Visible left neck mass to the left of the trachea      Lumbar Exam  Incision: no  Pain with Flexion: yes  Pain with Extension: yes  ROM:  Decreased  Paraspinous TTP:  Positive bilaterally  Facet TTP:  L5-S1  Facet Loading:  Positive bilaterally  SLR:  Positive on the left  SIJ TTP:  Negative bilaterally  CECELIA:  Negative bilaterally      LABS:  Lab Results   Component Value Date    WBC 6.23 01/15/2025    HGB 15.4 01/15/2025    HCT 47.4 01/15/2025    MCV 88 01/15/2025     01/15/2025       CMP  Sodium   Date Value Ref Range Status   05/12/2025 142 136 - 145 mmol/L Final   03/10/2025 134 (L) 136 - 145 mmol/L Final     Potassium   Date Value Ref Range Status   05/12/2025 4.1 3.5 - 5.1 mmol/L Final   03/10/2025 4.0 3.5 - 5.1 mmol/L Final     Chloride   Date Value Ref Range Status   05/12/2025 108 95 - 110 mmol/L Final   03/10/2025 101 95 - 110 mmol/L Final     CO2   Date Value Ref Range Status   05/12/2025 20 (L) 23 - 29 mmol/L Final   03/10/2025 22 (L) 23 - 29 mmol/L Final     Glucose   Date Value Ref Range Status   05/12/2025 109 70 - 110 mg/dL Final   03/10/2025 110 70 - 110 mg/dL Final     BUN   Date Value Ref Range Status   05/12/2025 17 8 - 23 mg/dL Final     Creatinine   Date Value Ref Range Status   05/12/2025 1.6 (H) 0.5 - 1.4 mg/dL Final     Calcium   Date Value Ref Range Status   05/12/2025 9.8 8.7 - 10.5 mg/dL Final   03/10/2025 10.1 8.7 - 10.5 mg/dL Final     Protein Total   Date Value Ref Range Status   04/30/2025 7.7 6.0 - 8.4 gm/dL Final     Total Protein   Date Value Ref Range Status   11/22/2024 8.1 6.0 - 8.4 g/dL Final     Albumin   Date Value Ref Range Status   05/12/2025 3.7 3.5 - 5.2 g/dL Final   11/22/2024 3.9 3.5 - 5.2 g/dL Final      Total Bilirubin   Date Value Ref Range Status   11/22/2024 0.4 0.1 - 1.0 mg/dL Final     Comment:     For infants and newborns, interpretation of results should be based  on gestational age, weight and in agreement with clinical  observations.    Premature Infant recommended reference ranges:  Up to 24 hours.............<8.0 mg/dL  Up to 48 hours............<12.0 mg/dL  3-5 days..................<15.0 mg/dL  6-29 days.................<15.0 mg/dL       Bilirubin Total   Date Value Ref Range Status   04/30/2025 0.5 0.1 - 1.0 mg/dL Final     Comment:     For infants and newborns, interpretation of results should be based   on gestational age, weight and in agreement with clinical   observations.    Premature Infant recommended reference ranges:   0-24 hours:  <8.0 mg/dL   24-48 hours: <12.0 mg/dL   3-5 days:    <15.0 mg/dL   6-29 days:   <15.0 mg/dL     Alkaline Phosphatase   Date Value Ref Range Status   11/22/2024 175 (H) 40 - 150 U/L Final     ALP   Date Value Ref Range Status   04/30/2025 148 40 - 150 unit/L Final     AST   Date Value Ref Range Status   04/30/2025 22 11 - 45 unit/L Final   11/22/2024 17 10 - 40 U/L Final     ALT   Date Value Ref Range Status   04/30/2025 18 10 - 44 unit/L Final   11/22/2024 16 10 - 44 U/L Final     Anion Gap   Date Value Ref Range Status   05/12/2025 14 8 - 16 mmol/L Final     eGFR if    Date Value Ref Range Status   11/28/2014 43.0 (A) >60 mL/min/1.73 m^2 Final     eGFR if non    Date Value Ref Range Status   11/28/2014 37.3 (A) >60 mL/min/1.73 m^2 Final     Comment:     Calculation used to obtain the estimated glomerular filtration  rate (eGFR) is the CKD-EPI equation. Since race is unknown   in our information system, the eGFR values for   -American and Non--American patients are given   for each creatinine result.         Lab Results   Component Value Date    HGBA1C 6.1 (H) 03/26/2025             ASSESSMENT:       70 y.o. year  old female with neck and lower back pain, consistent with     1. Cervical spondylosis  Ambulatory Referral/Consult to Physical Therapy      2. Lumbar radiculopathy  Ambulatory Referral/Consult to Physical Therapy    MRI Lumbar Spine Without Contrast      3. Dorsalgia, unspecified  MRI Lumbar Spine Without Contrast      4. Neck mass        5. Cervical radiculopathy            Cervical spondylosis  -     Ambulatory Referral/Consult to Physical Therapy; Future; Expected date: 05/22/2025    Lumbar radiculopathy  -     Ambulatory Referral/Consult to Physical Therapy; Future; Expected date: 05/22/2025  -     MRI Lumbar Spine Without Contrast; Future; Expected date: 05/15/2025    Dorsalgia, unspecified  -     MRI Lumbar Spine Without Contrast; Future; Expected date: 05/15/2025    Neck mass    Cervical radiculopathy               PLAN:   - Interventions: consider C6/7 IL HERNANDEZ with right paramedian approach  Explained the risks and benefits of the procedure in detail with the patient today in clinic along with alternative treatment options, and the patient would like to think about it    Will get lumbar HERNANDEZ to eval for radiculopathy and possible HERNANDEZ    Patient was educated about her neck mass.  She states it has been there since the year 2000 and it has gotten slightly larger but that she does not want to seek treatment.  I highly advised her see an ENT for further evaluation and biopsy to rule out cancer.  She reports she does not want it cut on into his not want to seek treatment or another opinion.  She does not want to have it evaluated even after educating her that it could be cancer and that the size of it could cause compression to her trachea.  Even after this education patient still adamantly denies any follow-up or evaluation concerning the neck mass. Also discussed patient decision with Dr. Narvaez      - Anticoagulation use:   Yes aspirin    - Medications:  - patient currently prescribed Percocet and  kaitlynnbenzaprine by Dr. Martinez    - Therapy:   - refer patient to formal physical therapy for neck and lower back pain    - Imaging/Diagnostic:  - x-rays cervical spine reviewed and findings discussed with patient.  There is grade 1 retrolisthesis C4 upon C5 with loss of disc height from C3-C4 to C5-C6  - xray lumbar spine reviewed, will get lumbar MRI   Cervical MRI reviewed with patientLarge left parapharyngeal space soft tissue mass.  Multilevel high-grade cervical degenerative disc disease with disc osteophyte complex with ventral sac impression    - Consults:   - none at this time        - Patient Questions: Answered all of the patient's questions regarding diagnosis, therapy, and treatment     This condition does not require this patient to take time off of work, and the primary goal of our Pain Management services is to improve the patient's functional capacity.     - Follow up visit: return to clinic after lumbar MRI        The above plan and management options were discussed at length with patient. Patient is in agreement with the above and verbalized understanding.    I discussed the goals of interventional chronic pain management with the patient on today's visit.  I explained the utility of injections for diagnostic and therapeutic purposes.  We discussed a multimodal approach to pain including treating the patient's given worst pain at any given time.  We will use a systematic approach to addressing pain.  We will also adopt a multimodal approach that includes injections, adjuvant medications, physical therapy, at times psychiatry.  There may be a limited role for opioid use intermittently in the treatment of pain, more particularly for acute pain although no one approach can be used as a sole treatment modality.    I emphasized the importance of regular exercise, core strengthening and stretching, diet and weight loss as a cornerstone of long-term pain management.      Elizabeth Villegas,  NP  Interventional Pain Management  [facility]    Future Appointments   Date Time Provider Department Center   5/16/2025 10:00 AM Renetta Esquivel, JOYCEP-BC VC NEPHRO [facility]   6/5/2025  7:55 AM LABORATORY, [facility] LAB [facility]   6/13/2025  9:00 AM Siddharth Baxter NP [facility] IM [facility]   11/26/2025  9:00 AM [facility] CT1 LIMIT 500 LBS [facility] CT SCAN [place]   11/26/2025  9:15 AM Nilson London MD [facility] PULMSVC [facility]           Disclaimer:  This note was prepared using voice recognition system and is likely to have sound alike errors that may have been overlooked even after proof reading.  Please call me with any questions    No LOS data to display  This includes face to face time and non-face to face time preparing to see the patient (eg, review of tests), obtaining and/or reviewing separately obtained history, documenting clinical information in the electronic or other health record, independently interpreting results and communicating results to the patient/family/caregiver, or care coordinator.

## 2025-05-09 ENCOUNTER — NURSE TRIAGE (OUTPATIENT)
Dept: ADMINISTRATIVE | Facility: CLINIC | Age: 70
End: 2025-05-09
Payer: MEDICARE

## 2025-05-09 ENCOUNTER — RESULTS FOLLOW-UP (OUTPATIENT)
Dept: INTERNAL MEDICINE | Facility: CLINIC | Age: 70
End: 2025-05-09

## 2025-05-09 NOTE — TELEPHONE ENCOUNTER
Pt is calling and states that she is having horrible pain from her neck all the way down her back and down her legs. Numbness to her left toes but moving to her right side. Pt uses the pain patch and she gets no relief. Pt states that she is sweating due to the pain.   Pt states the pain is over a 10 on a pain scale. Dispo- Go to ED now. Pt aware and VU, states she will get her grandson to drive her to the ED. Advised pt to call back with any further concerns or questions.       Reason for Disposition   SEVERE back pain of sudden onset and age > 60 years    Additional Information   Negative: Passed out (e.g., fainted, lost consciousness, blacked out and was not responding)   Negative: Shock suspected (e.g., cold/pale/clammy skin, too weak to stand, low BP, rapid pulse)   Negative: Sounds like a life-threatening emergency to the triager    Protocols used: Back Pain-A-[place]

## 2025-05-12 ENCOUNTER — HOSPITAL ENCOUNTER (OUTPATIENT)
Dept: RADIOLOGY | Facility: HOSPITAL | Age: 70
Discharge: HOME | End: 2025-05-12
Attending: FAMILY MEDICINE
Payer: MEDICARE

## 2025-05-12 DIAGNOSIS — Z12.31 BREAST CANCER SCREENING BY MAMMOGRAM: ICD-10-CM

## 2025-05-12 PROCEDURE — 77067 SCR MAMMO BI INCL CAD: CPT | Mod: TC

## 2025-05-12 PROCEDURE — 77063 BREAST TOMOSYNTHESIS BI: CPT | Mod: 26,,, | Performed by: RADIOLOGY

## 2025-05-12 PROCEDURE — 77067 SCR MAMMO BI INCL CAD: CPT | Mod: 26,,, | Performed by: RADIOLOGY

## 2025-05-15 ENCOUNTER — OFFICE VISIT (OUTPATIENT)
Dept: PAIN MEDICINE | Facility: CLINIC | Age: 70
End: 2025-05-15
Payer: MEDICARE

## 2025-05-15 VITALS
DIASTOLIC BLOOD PRESSURE: 80 MMHG | HEART RATE: 55 BPM | HEIGHT: 66 IN | WEIGHT: 157.63 LBS | BODY MASS INDEX: 25.33 KG/M2 | SYSTOLIC BLOOD PRESSURE: 131 MMHG

## 2025-05-15 DIAGNOSIS — M54.9 DORSALGIA, UNSPECIFIED: ICD-10-CM

## 2025-05-15 DIAGNOSIS — M47.812 CERVICAL SPONDYLOSIS: Primary | ICD-10-CM

## 2025-05-15 DIAGNOSIS — M54.16 LUMBAR RADICULOPATHY: ICD-10-CM

## 2025-05-15 DIAGNOSIS — R22.1 NECK MASS: ICD-10-CM

## 2025-05-15 DIAGNOSIS — M54.12 CERVICAL RADICULOPATHY: ICD-10-CM

## 2025-05-15 PROCEDURE — 1101F PT FALLS ASSESS-DOCD LE1/YR: CPT | Mod: CPTII,S$GLB,, | Performed by: NURSE PRACTITIONER

## 2025-05-15 PROCEDURE — 3061F NEG MICROALBUMINURIA REV: CPT | Mod: CPTII,S$GLB,, | Performed by: NURSE PRACTITIONER

## 2025-05-15 PROCEDURE — 1159F MED LIST DOCD IN RCRD: CPT | Mod: CPTII,S$GLB,, | Performed by: NURSE PRACTITIONER

## 2025-05-15 PROCEDURE — 3079F DIAST BP 80-89 MM HG: CPT | Mod: CPTII,S$GLB,, | Performed by: NURSE PRACTITIONER

## 2025-05-15 PROCEDURE — 99999 PR PBB SHADOW E&M-EST. PATIENT-LVL III: CPT | Mod: PBBFAC,,, | Performed by: NURSE PRACTITIONER

## 2025-05-15 PROCEDURE — 3044F HG A1C LEVEL LT 7.0%: CPT | Mod: CPTII,S$GLB,, | Performed by: NURSE PRACTITIONER

## 2025-05-15 PROCEDURE — 3008F BODY MASS INDEX DOCD: CPT | Mod: CPTII,S$GLB,, | Performed by: NURSE PRACTITIONER

## 2025-05-15 PROCEDURE — 99213 OFFICE O/P EST LOW 20 MIN: CPT | Mod: S$GLB,,, | Performed by: NURSE PRACTITIONER

## 2025-05-15 PROCEDURE — 3288F FALL RISK ASSESSMENT DOCD: CPT | Mod: CPTII,S$GLB,, | Performed by: NURSE PRACTITIONER

## 2025-05-15 PROCEDURE — 3072F LOW RISK FOR RETINOPATHY: CPT | Mod: CPTII,S$GLB,, | Performed by: NURSE PRACTITIONER

## 2025-05-15 PROCEDURE — 3066F NEPHROPATHY DOC TX: CPT | Mod: CPTII,S$GLB,, | Performed by: NURSE PRACTITIONER

## 2025-05-15 PROCEDURE — 1125F AMNT PAIN NOTED PAIN PRSNT: CPT | Mod: CPTII,S$GLB,, | Performed by: NURSE PRACTITIONER

## 2025-05-15 PROCEDURE — 3075F SYST BP GE 130 - 139MM HG: CPT | Mod: CPTII,S$GLB,, | Performed by: NURSE PRACTITIONER

## 2025-05-15 NOTE — PATIENT INSTRUCTIONS
We could consider a C6/7 intralaminar steroid injection to target your neck and right shoulder pain

## 2025-05-16 ENCOUNTER — TELEPHONE (OUTPATIENT)
Dept: INTERNAL MEDICINE | Facility: CLINIC | Age: 70
End: 2025-05-16
Payer: MEDICARE

## 2025-05-16 ENCOUNTER — OFFICE VISIT (OUTPATIENT)
Dept: NEPHROLOGY | Facility: CLINIC | Age: 70
End: 2025-05-16
Payer: MEDICARE

## 2025-05-16 VITALS
RESPIRATION RATE: 18 BRPM | WEIGHT: 157.44 LBS | SYSTOLIC BLOOD PRESSURE: 138 MMHG | DIASTOLIC BLOOD PRESSURE: 86 MMHG | HEIGHT: 66 IN | BODY MASS INDEX: 25.3 KG/M2 | HEART RATE: 55 BPM

## 2025-05-16 DIAGNOSIS — N18.32 TYPE 2 DIABETES MELLITUS WITH STAGE 3B CHRONIC KIDNEY DISEASE, WITHOUT LONG-TERM CURRENT USE OF INSULIN: Chronic | ICD-10-CM

## 2025-05-16 DIAGNOSIS — N25.81 HYPERPARATHYROIDISM, SECONDARY RENAL: Chronic | ICD-10-CM

## 2025-05-16 DIAGNOSIS — N18.32 STAGE 3B CHRONIC KIDNEY DISEASE: Primary | Chronic | ICD-10-CM

## 2025-05-16 DIAGNOSIS — E11.59 HYPERTENSION ASSOCIATED WITH DIABETES: Chronic | ICD-10-CM

## 2025-05-16 DIAGNOSIS — E11.22 TYPE 2 DIABETES MELLITUS WITH STAGE 3B CHRONIC KIDNEY DISEASE, WITHOUT LONG-TERM CURRENT USE OF INSULIN: Chronic | ICD-10-CM

## 2025-05-16 DIAGNOSIS — E79.0 HYPERURICEMIA: ICD-10-CM

## 2025-05-16 DIAGNOSIS — E55.9 VITAMIN D DEFICIENCY: ICD-10-CM

## 2025-05-16 DIAGNOSIS — I15.2 HYPERTENSION ASSOCIATED WITH DIABETES: Chronic | ICD-10-CM

## 2025-05-16 PROCEDURE — 99999 PR PBB SHADOW E&M-EST. PATIENT-LVL V: CPT | Mod: PBBFAC,,, | Performed by: NURSE PRACTITIONER

## 2025-05-16 RX ORDER — TIZANIDINE 2 MG/1
2 TABLET ORAL
COMMUNITY
Start: 2025-05-09

## 2025-05-16 RX ORDER — DICLOFENAC SODIUM 30 MG/G
GEL TOPICAL
COMMUNITY
Start: 2025-05-12

## 2025-05-16 NOTE — PROGRESS NOTES
Subjective:       Patient ID: Lizz Crockett is a 70 y.o. female.  Date of clinic visit: 05/16/2025   Chief Complaint: Chronic Kidney Disease 3B      HPI  Patient presents to clinic today for routine follow-up. She has CKD 3b due to history of extensive NSAIDS use and hyperuricemia. Significant diagnoses include DM, HTN.  Today patient voiced that she has been worried about her kidneys because a phlebotomist was having a hard time finding her vein. Otherwise, pt has been doing okay, hydrating with water about 4 bottles daily. Avoiding salt in the diet. Pt denies taking NSAIDs, no GI losses, no abx use, no recent infections, no dysuria, no visible blood/foam in the urine, no cardiopulmonary sx's.   No acute illness, hospitalization or exposure to IV radiocontrast. Her laboratory studies and medications were reviewed. All Nephrology related questions were answered to her satisfaction.    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    Cataract     Chronic obstructive bronchitis 04/18/2023    Diabetes mellitus, type 2     GERD (gastroesophageal reflux disease)     Glaucoma     Hyperlipidemia     Hypertension     PONV (postoperative nausea and vomiting)     Sarcoidosis, unspecified     Schizophreniform disorder 12/21/2022    Thyroid disease     Type 2 diabetes mellitus with stage 4 chronic kidney disease, without long-term current use of insulin 09/02/2022       Past Surgical History:   Procedure Laterality Date    BREAST SURGERY      CATARACT EXTRACTION, BILATERAL Bilateral     INCISION AND DRAINAGE, UPPER EXTREMITY Left 1/26/2023    Procedure: INCISION AND DRAINAGE, UPPER EXTREMITY;  Surgeon: Cam Oneill MD;  Location: [facility];  Service: Orthopedics;  Laterality: Left;    THYROIDECTOMY      for multinodular goiter    TUBAL LIGATION         Family History   Problem Relation Name Age of Onset    Heart disease Mother       "Hypertension Mother      Hypertension Father      Heart disease Father         Social History     Socioeconomic History    Marital status: Single   Tobacco Use    Smoking status: Former     Current packs/day: 0.00     Average packs/day: 1 pack/day for 19.0 years (19.0 ttl pk-yrs)     Types: Cigarettes     Start date:      Quit date:      Years since quittin.3    Smokeless tobacco: Former   Substance and Sexual Activity    Alcohol use: No    Drug use: No    Sexual activity: Yes     Social Drivers of Health     Financial Resource Strain: Patient Declined (2024)    Overall Financial Resource Strain (CARDIA)     Difficulty of Paying Living Expenses: Patient declined   Food Insecurity: No Food Insecurity (2024)    Hunger Vital Sign     Worried About Running Out of Food in the Last Year: Never true     Ran Out of Food in the Last Year: Never true   Physical Activity: Unknown (2024)    Exercise Vital Sign     Days of Exercise per Week: 0 days   Stress: Stress Concern Present (2024)    [employer] - Occupational Stress Questionnaire     Feeling of Stress : To some extent   Housing Stability: Unknown (2024)    Housing Stability Vital Sign     Unable to Pay for Housing in the Last Year: No       Review of Systems   Constitutional:  Negative for fatigue and fever.   Respiratory:  Negative for shortness of breath and wheezing.    Cardiovascular:  Negative for chest pain and leg swelling.   Gastrointestinal:  Negative for abdominal pain.   Genitourinary:  Negative for flank pain.   Allergic/Immunologic: Positive for immunocompromised state (due to ckd).   Psychiatric/Behavioral:  Negative for behavioral problems and confusion.          /86   Pulse (!) 55   Resp 18   Ht 5' 6" (1.676 m)   Wt 71.4 kg (157 lb 6.5 oz)   BMI 25.41 kg/m²     BMP  Lab Results   Component Value Date     2025    K 4.1 2025     2025    CO2 20 (L) " 05/12/2025    BUN 17 05/12/2025    CREATININE 1.6 (H) 05/12/2025    CALCIUM 9.8 05/12/2025    ANIONGAP 14 05/12/2025    EGFRNORACEVR 34 (L) 05/12/2025     Lab Results   Component Value Date    WBC 6.23 01/15/2025    HGB 15.4 01/15/2025    HCT 47.4 01/15/2025    MCV 88 01/15/2025     01/15/2025         Medications Ordered Prior to Encounter[1]       Objective:            Physical Exam  Constitutional:       General: She is not in acute distress.  Cardiovascular:      Rate and Rhythm: Normal rate.   Pulmonary:      Effort: Pulmonary effort is normal. No respiratory distress.      Breath sounds: Normal breath sounds. No wheezing.   Musculoskeletal:         General: Normal range of motion.   Skin:     General: Skin is warm and dry.   Neurological:      Mental Status: Mental status is at baseline.         Assessment:       1. Stage 3b chronic kidney disease    2. Type 2 diabetes mellitus with stage 3b chronic kidney disease, without long-term current use of insulin    3. Hyperparathyroidism, secondary to chronic kidney disease    4. Hypertension associated with diabetes    5. Vitamin D deficiency    6. Hyperuricemia secondary to chronic kidney disease        Plan:       CKD 3b  -stable, Cr 1.6 (ranging between 1.5 to 1.8 in the past year) CKD from hx of extensive NSAID use and hyperuricemia   -microalb/Cr ratio- no significant protein   -electrolytes WNL- K, Na, Cl, PO4, Ca  -continue SGLT2i for kidney preservation  -Educated about importance of having good blood pressure and blood sugar control  - Ensure adequate hydration- Water is best, flavored water/diluted juice (drink to thirst) daily as tolerated with no swelling/shortness of breath. Drink water adequately, general rule is when thirsty.  - Avoid NSAIDS: Advil, Ibuprofen, Aleve, Naproxen, Meloxicam, etc. (Aspirin is ok), Tylenol is Best as needed for pain/fever    DM  -A1c 6.1; take meds as ordered  -keep blood sugar controlled  -Low carbohydrate/sugar  diet: avoid/limit sugary drinks, white breads, pasta, rice, etc as discussed      Hyperparathyroidism/MBD/Vitamin D deficiency  -Ca/Alb/PO4 all stable  -iPth no recent value, will check prior to next visit  -Vitamin D - previous was low, no recent value, will check prior to next visit  -currently not taking vit D,  will check prior to next visit    HTN  -stable. Continue current management  - Keep blood pressure controlled  -Low sodium diet: avoid/limit salt, processed foods (boxed or canned), fried foods, fast foods, etc as discussed    Hyperuricemia  -last value mildly elevated, will check prior to next visit  -denies flare ups      RTC 6 months  45 minutes of total time spent on the encounter, which includes face to face time and non-face to face time preparing to see the patient (eg, review of tests), Obtaining and/or reviewing separately obtained history, Documenting clinical information in the electronic or other health record, Independently interpreting results (not separately reported) and communicating results to the patient/family/caregiver, or Care coordination (not separately reported).     ALESIA Gonzalez-BC         [1]   Current Outpatient Medications on File Prior to Visit   Medication Sig Dispense Refill    albuterol (PROVENTIL HFA) 90 mcg/actuation inhaler Inhale 2 puffs into the lungs every 6 (six) hours as needed for Wheezing. (Rescue inhaler) 18 g 11    albuterol-ipratropium (DUO-NEB) 2.5 mg-0.5 mg/3 mL nebulizer solution USE 3 ML VIA NEBULIZER EVERY 6 HOURS WHILE AWAKE FOR RESCUE 360 mL 0    amLODIPine (NORVASC) 10 MG tablet Take 1 tablet (10 mg total) by mouth once daily. 90 tablet 1    aspirin (ECOTRIN) 81 MG EC tablet Take 1 tablet (81 mg total) by mouth once daily. 90 tablet 8    atorvastatin (LIPITOR) 20 MG tablet Take 1 tablet (20 mg total) by mouth every evening. 90 tablet 3    blood sugar diagnostic (ONETOUCH VERIO TEST STRIPS MISC) USE TO TEST BLOOD GLUCOSE THREE TIMES DAILY       blood-glucose meter (ONETOUCH VERIO REFLECT METER MISC) USE TO TEST BLOOD GLUCOSE      chlorhexidine (PERIDEX) 0.12 % solution Swish for 30 seconds with 15 mL (one capful) of undiluted oral rinse after toothbrushing, then expectorate; repeat twice daily (morning and evening) 473 mL 0    clonazePAM (KLONOPIN) 0.5 MG tablet Take 0.5 mg by mouth daily as needed.      clotrimazole (LOTRIMIN) 1 % cream Apply topically 2 (two) times daily. AAA 45 g 0    diclofenac sodium (SOLARAZE) 3 % gel SMARTSI Gram(s) Topical Every 6 Hours PRN      diphenhydrAMINE (BENADRYL) 50 MG capsule Take 50mg by mouth 1 hour before contrast administration. 1 capsule 0    empagliflozin (JARDIANCE) 10 mg tablet Take 1 tablet (10 mg total) by mouth once daily. 90 tablet 1    hydroCHLOROthiazide 12.5 MG Tab Take 1 tablet (12.5 mg total) by mouth once daily. 90 tablet 3    lancets (ONETOUCH DELICA PLUS LANCET) 30 gauge Misc USE TO TEST BLOOD GLUCOSE EVERY DAY AND AS NEEDED      latanoprost 0.005 % ophthalmic solution Place 1 drop into both eyes every evening. 2.5 mL 11    levothyroxine (SYNTHROID) 50 MCG tablet Take 1 tablet (50 mcg total) by mouth before breakfast. 90 tablet 1    LIDOcaine (LIDODERM) 5 % APPLY 1 PATCH TOPICALLY TO THE SKIN DAILY AS NEEDED      naloxone (NARCAN) 4 mg/actuation Spry       nebulizer accessories Kit Use to administer nebulized medicine as directed. 1 kit 5    omeprazole (PRILOSEC) 40 MG capsule Take 1 capsule (40 mg total) by mouth every morning. 90 capsule 3    oxyCODONE-acetaminophen (PERCOCET) 7.5-325 mg per tablet Take 1 tablet by mouth every 8 (eight) hours as needed.      tiotropium-olodateroL (STIOLTO RESPIMAT) 2.5-2.5 mcg/actuation Mist Inhale 1 puff into the lungs once daily. Controller 4 g 6    tiZANidine (ZANAFLEX) 2 MG tablet Take 2 mg by mouth.      [DISCONTINUED] cyclobenzaprine (FLEXERIL) 10 MG tablet Take 10 mg by mouth 3 (three) times daily. (Patient not taking: Reported on 2025)       [DISCONTINUED] HYDROcodone-acetaminophen (NORCO) 5-325 mg per tablet Take 1 tablet by mouth every 8 (eight) hours as needed for Pain. (Patient not taking: Reported on 5/16/2025) 12 tablet 0    [DISCONTINUED] hydrOXYzine HCL (ATARAX) 25 MG tablet Take 25 mg by mouth nightly as needed. (Patient not taking: Reported on 5/16/2025)      [DISCONTINUED] inhalation spacing device Use as directed when taking inhaled medicine (Patient not taking: Reported on 5/16/2025) 1 each 0    [DISCONTINUED] predniSONE (DELTASONE) 50 MG Tab Take 50mg by mouth at 13 hours, 7 hours, and 1 hour before contrast administration. (Patient not taking: Reported on 5/16/2025) 3 tablet 0    [DISCONTINUED] vitamin D3-vitamin K2 137.5-200 mcg Tab Take 1 tablet by mouth Daily. 30 tablet 2     Current Facility-Administered Medications on File Prior to Visit   Medication Dose Route Frequency Provider Last Rate Last Admin    diphenhydrAMINE capsule 25 mg  25 mg Oral 1 time in Clinic/HOD

## 2025-05-16 NOTE — PATIENT INSTRUCTIONS
Kidneys are doing okay. Labs are stable. Follow up in 6 months.         Kidney Health Tips     Low sodium diet: avoid/limit salt, processed foods (boxed or canned), fried foods, fast foods, etc as discussed  Low carbohydrate/sugar diet: avoid/limit sugary drinks, white breads, pasta, rice, etc as discussed  Ensure adequate hydration- Water is best, flavored water/diluted juice (drink to thirst) daily as tolerated with no swelling/shortness of breath  Avoid NSAIDS: Advil, Ibuprofen, Aleve, Naproxen, Meloxicam, etc. (Aspirin is ok), Tylenol is Best as needed for pain/fever  Increase fruit and vegetable consumption: These provide essential nutrients and antioxidants which support kidney health.   Keep blood pressure under control. Take meds as ordered.  Limit alcohol intake: Excessive alcohol consumption can lead to kidney damage.  Exercise as tolerated. Physical activity improves blood flow to the kidneys.  Follow up as scheduled, labs 1 week prior to visit.   -

## 2025-05-16 NOTE — TELEPHONE ENCOUNTER
----- Message from [email or web address] sent at 5/16/2025  2:04 PM CDT -----  Contact: TED MANZANO [[id number]]  ..Type:  Patient Requesting CallWho Called:TED MANZANO [[id number]]Would the patient rather a call back or a response via [facility]? [employer] Call Back Number:.[phone] (home) Additional Information: Patient called to discuss vitamins the patient is taking.

## 2025-05-17 NOTE — PROGRESS NOTES
Lizz Davidson,    I am happy to report that your recent breast imaging did not show any evidence of cancer.    An annual mammogram is the best test available for breast cancer screening, though it is not perfect and can sometimes miss cancers. Even though your mammogram was normal, please schedule an appointment with me for a proper evaluation if you notice any lump or change in one of your breasts.    Thank you for letting me care for you, and thank you for trusting [facility] with your healthcare needs.    All the best,    Dr. HURT

## 2025-05-22 ENCOUNTER — PATIENT MESSAGE (OUTPATIENT)
Dept: PAIN MEDICINE | Facility: CLINIC | Age: 70
End: 2025-05-22
Payer: MEDICARE

## 2025-05-22 ENCOUNTER — TELEPHONE (OUTPATIENT)
Dept: INTERNAL MEDICINE | Facility: CLINIC | Age: 70
End: 2025-05-22
Payer: MEDICARE

## 2025-05-22 NOTE — TELEPHONE ENCOUNTER
Rescheduled pt appointment with Ms. Baxter on 6/13 to 6/13 with Ms. Kidd due to provider not being in clinic.

## 2025-05-30 ENCOUNTER — TELEPHONE (OUTPATIENT)
Dept: REHABILITATION | Facility: HOSPITAL | Age: 70
End: 2025-05-30
Payer: MEDICARE

## 2025-05-30 NOTE — TELEPHONE ENCOUNTER
Called patient about eval, and she states that she has a very bad cold and cannot make it today. She would like to call us back to reschedule once she is feeling better.   Erna Remy, PT, DPT

## 2025-06-23 ENCOUNTER — TELEPHONE (OUTPATIENT)
Dept: PAIN MEDICINE | Facility: CLINIC | Age: 70
End: 2025-06-23
Payer: MEDICARE

## 2025-06-23 NOTE — TELEPHONE ENCOUNTER
I tried calling the patient but no answer I left her a voicemail.    Luzma HURT ([facility])

## 2025-06-23 NOTE — TELEPHONE ENCOUNTER
----- Message from Elizabeth Villegas NP sent at 6/23/2025 10:12 AM CDT -----  Pt scheduled for mri review next week- please help her schedule mri

## 2025-06-24 ENCOUNTER — TELEPHONE (OUTPATIENT)
Dept: INTERNAL MEDICINE | Facility: CLINIC | Age: 70
End: 2025-06-24
Payer: MEDICARE

## 2025-06-24 NOTE — TELEPHONE ENCOUNTER
Copied from CRM #[id number]. Topic: General Inquiry - Patient Advice  >> Jun 24, 2025 10:43 AM Mariel wrote:  Type:  Needs Medical Advice/Symptom-based Call    Who Called: pt    Symptoms (please be specific): charley horse, pt said she can barely walk due to it. She asking for a prescription. Please call pt.    How long has patient had these symptoms:  over 2 weeks    Would the patient rather a call back or a response via [facility]? Call back    Best Call Back Number: Telephone Information:  Mobile          [phone]    Additional Information:   [id number] - [place] - [address] AT [facility]  [address] [address]  Phone: [phone] Fax: [phone]

## 2025-06-25 DIAGNOSIS — Z78.0 MENOPAUSE: ICD-10-CM

## 2025-06-30 ENCOUNTER — TELEPHONE (OUTPATIENT)
Dept: PAIN MEDICINE | Facility: CLINIC | Age: 70
End: 2025-06-30
Payer: MEDICARE

## 2025-06-30 NOTE — TELEPHONE ENCOUNTER
Reach out to pt to schedule MRI apt. Pt will give us a call back to set it up once she is feeling better. She reported that she has COPD and cannot sit still during the test. No time or date given.

## 2025-07-31 ENCOUNTER — OFFICE VISIT (OUTPATIENT)
Dept: INTERNAL MEDICINE | Facility: CLINIC | Age: 70
End: 2025-07-31
Payer: MEDICARE

## 2025-07-31 ENCOUNTER — HOSPITAL ENCOUNTER (OUTPATIENT)
Dept: RADIOLOGY | Facility: HOSPITAL | Age: 70
Discharge: HOME | End: 2025-07-31
Attending: NURSE PRACTITIONER
Payer: MEDICARE

## 2025-07-31 VITALS
DIASTOLIC BLOOD PRESSURE: 90 MMHG | SYSTOLIC BLOOD PRESSURE: 138 MMHG | OXYGEN SATURATION: 95 % | HEART RATE: 105 BPM | WEIGHT: 157.44 LBS | RESPIRATION RATE: 18 BRPM | TEMPERATURE: 99 F | BODY MASS INDEX: 25.41 KG/M2

## 2025-07-31 DIAGNOSIS — E11.22 TYPE 2 DIABETES MELLITUS WITH STAGE 3B CHRONIC KIDNEY DISEASE, WITHOUT LONG-TERM CURRENT USE OF INSULIN: Chronic | ICD-10-CM

## 2025-07-31 DIAGNOSIS — J44.1 COPD WITH ACUTE EXACERBATION: ICD-10-CM

## 2025-07-31 DIAGNOSIS — J44.89 OBSTRUCTIVE CHRONIC BRONCHITIS: Primary | Chronic | ICD-10-CM

## 2025-07-31 DIAGNOSIS — I15.2 HYPERTENSION ASSOCIATED WITH DIABETES: Chronic | ICD-10-CM

## 2025-07-31 DIAGNOSIS — N18.32 TYPE 2 DIABETES MELLITUS WITH STAGE 3B CHRONIC KIDNEY DISEASE, WITHOUT LONG-TERM CURRENT USE OF INSULIN: Chronic | ICD-10-CM

## 2025-07-31 DIAGNOSIS — J41.8 MIXED SIMPLE AND MUCOPURULENT CHRONIC BRONCHITIS: ICD-10-CM

## 2025-07-31 DIAGNOSIS — E11.59 HYPERTENSION ASSOCIATED WITH DIABETES: Chronic | ICD-10-CM

## 2025-07-31 LAB — GLUCOSE SERPL-MCNC: 124 MG/DL (ref 70–110)

## 2025-07-31 PROCEDURE — 71046 X-RAY EXAM CHEST 2 VIEWS: CPT | Mod: TC

## 2025-07-31 PROCEDURE — 99999 PR PBB SHADOW E&M-EST. PATIENT-LVL V: CPT | Mod: PBBFAC,,, | Performed by: NURSE PRACTITIONER

## 2025-07-31 PROCEDURE — 82962 GLUCOSE BLOOD TEST: CPT | Mod: S$GLB,,, | Performed by: NURSE PRACTITIONER

## 2025-07-31 PROCEDURE — 71046 X-RAY EXAM CHEST 2 VIEWS: CPT | Mod: 26,,, | Performed by: RADIOLOGY

## 2025-07-31 RX ORDER — TIOTROPIUM BROMIDE AND OLODATEROL 3.124; 2.736 UG/1; UG/1
1 SPRAY, METERED RESPIRATORY (INHALATION) DAILY
Qty: 4 G | Refills: 6 | Status: SHIPPED | OUTPATIENT
Start: 2025-07-31

## 2025-07-31 RX ORDER — ALBUTEROL SULFATE 0.83 MG/ML
2.5 SOLUTION RESPIRATORY (INHALATION)
Status: COMPLETED | OUTPATIENT
Start: 2025-07-31 | End: 2025-07-31

## 2025-07-31 RX ORDER — AZITHROMYCIN 250 MG/1
TABLET, FILM COATED ORAL
Qty: 6 TABLET | Refills: 0 | Status: SHIPPED | OUTPATIENT
Start: 2025-07-31

## 2025-07-31 RX ORDER — NEBULIZER AND COMPRESSOR
1 EACH MISCELLANEOUS EVERY 6 HOURS PRN
Qty: 1 EACH | Refills: 0 | Status: SHIPPED | OUTPATIENT
Start: 2025-07-31

## 2025-07-31 RX ORDER — TRIAMCINOLONE ACETONIDE 40 MG/ML
40 INJECTION, SUSPENSION INTRA-ARTICULAR; INTRAMUSCULAR
Status: COMPLETED | OUTPATIENT
Start: 2025-07-31 | End: 2025-07-31

## 2025-07-31 RX ORDER — BENZONATATE 200 MG/1
200 CAPSULE ORAL 2 TIMES DAILY PRN
Qty: 20 CAPSULE | Refills: 0 | Status: SHIPPED | OUTPATIENT
Start: 2025-07-31 | End: 2025-08-10

## 2025-07-31 RX ADMIN — ALBUTEROL SULFATE 2.5 MG: 0.83 SOLUTION RESPIRATORY (INHALATION) at 02:07

## 2025-07-31 RX ADMIN — TRIAMCINOLONE ACETONIDE 40 MG: 40 INJECTION, SUSPENSION INTRA-ARTICULAR; INTRAMUSCULAR at 02:07
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
## 2025-08-01 ENCOUNTER — PATIENT MESSAGE (OUTPATIENT)
Dept: INTERNAL MEDICINE | Facility: CLINIC | Age: 70
End: 2025-08-01
Payer: MEDICARE

## 2025-08-12 ENCOUNTER — TELEPHONE (OUTPATIENT)
Dept: PAIN MEDICINE | Facility: CLINIC | Age: 70
End: 2025-08-12
Payer: MEDICARE

## 2025-08-19 ENCOUNTER — PATIENT MESSAGE (OUTPATIENT)
Dept: INTERNAL MEDICINE | Facility: CLINIC | Age: 70
End: 2025-08-19
Payer: MEDICARE

## 2025-08-20 DIAGNOSIS — J44.89 OBSTRUCTIVE CHRONIC BRONCHITIS: Chronic | ICD-10-CM

## 2025-08-21 RX ORDER — IPRATROPIUM BROMIDE AND ALBUTEROL SULFATE 2.5; .5 MG/3ML; MG/3ML
SOLUTION RESPIRATORY (INHALATION)
Qty: 100 EACH | Refills: 0 | Status: SHIPPED | OUTPATIENT
Start: 2025-08-21

## 2025-08-25 ENCOUNTER — OFFICE VISIT (OUTPATIENT)
Dept: INTERNAL MEDICINE | Facility: CLINIC | Age: 70
End: 2025-08-25
Payer: MEDICARE

## 2025-08-25 VITALS
TEMPERATURE: 97 F | BODY MASS INDEX: 25.51 KG/M2 | WEIGHT: 158.75 LBS | HEIGHT: 66 IN | DIASTOLIC BLOOD PRESSURE: 90 MMHG | HEART RATE: 97 BPM | OXYGEN SATURATION: 97 % | SYSTOLIC BLOOD PRESSURE: 132 MMHG

## 2025-08-25 DIAGNOSIS — E11.22 TYPE 2 DIABETES MELLITUS WITH STAGE 3B CHRONIC KIDNEY DISEASE, WITHOUT LONG-TERM CURRENT USE OF INSULIN: Chronic | ICD-10-CM

## 2025-08-25 DIAGNOSIS — N18.32 TYPE 2 DIABETES MELLITUS WITH STAGE 3B CHRONIC KIDNEY DISEASE, WITHOUT LONG-TERM CURRENT USE OF INSULIN: Chronic | ICD-10-CM

## 2025-08-25 DIAGNOSIS — F11.20 UNCOMPLICATED OPIOID DEPENDENCE: Chronic | ICD-10-CM

## 2025-08-25 DIAGNOSIS — M19.91 PRIMARY LOCALIZED OSTEOARTHROSIS OF MULTIPLE SITES: Chronic | ICD-10-CM

## 2025-08-25 DIAGNOSIS — J44.89 OBSTRUCTIVE CHRONIC BRONCHITIS: Primary | Chronic | ICD-10-CM

## 2025-08-25 PROCEDURE — 3080F DIAST BP >= 90 MM HG: CPT | Mod: CPTII,S$GLB,, | Performed by: NURSE PRACTITIONER

## 2025-08-25 PROCEDURE — 3066F NEPHROPATHY DOC TX: CPT | Mod: CPTII,S$GLB,, | Performed by: NURSE PRACTITIONER

## 2025-08-25 PROCEDURE — 3075F SYST BP GE 130 - 139MM HG: CPT | Mod: CPTII,S$GLB,, | Performed by: NURSE PRACTITIONER

## 2025-08-25 PROCEDURE — 99999 PR PBB SHADOW E&M-EST. PATIENT-LVL III: CPT | Mod: PBBFAC,,, | Performed by: NURSE PRACTITIONER

## 2025-08-25 PROCEDURE — 3008F BODY MASS INDEX DOCD: CPT | Mod: CPTII,S$GLB,, | Performed by: NURSE PRACTITIONER

## 2025-08-25 PROCEDURE — 3061F NEG MICROALBUMINURIA REV: CPT | Mod: CPTII,S$GLB,, | Performed by: NURSE PRACTITIONER

## 2025-08-25 PROCEDURE — 99214 OFFICE O/P EST MOD 30 MIN: CPT | Mod: S$GLB,,, | Performed by: NURSE PRACTITIONER

## 2025-08-25 PROCEDURE — 1159F MED LIST DOCD IN RCRD: CPT | Mod: CPTII,S$GLB,, | Performed by: NURSE PRACTITIONER

## 2025-08-25 PROCEDURE — 3072F LOW RISK FOR RETINOPATHY: CPT | Mod: CPTII,S$GLB,, | Performed by: NURSE PRACTITIONER

## 2025-08-25 PROCEDURE — 3044F HG A1C LEVEL LT 7.0%: CPT | Mod: CPTII,S$GLB,, | Performed by: NURSE PRACTITIONER

## 2025-08-25 PROCEDURE — 1125F AMNT PAIN NOTED PAIN PRSNT: CPT | Mod: CPTII,S$GLB,, | Performed by: NURSE PRACTITIONER

## 2025-08-25 RX ORDER — IPRATROPIUM BROMIDE AND ALBUTEROL SULFATE 2.5; .5 MG/3ML; MG/3ML
3 SOLUTION RESPIRATORY (INHALATION) EVERY 6 HOURS PRN
Qty: 100 EACH | Refills: 0 | Status: SHIPPED | OUTPATIENT
Start: 2025-08-25

## 2025-08-25 RX ORDER — CYCLOBENZAPRINE HCL 10 MG
10 TABLET ORAL EVERY 8 HOURS PRN
COMMUNITY
Start: 2025-06-16

## 2025-08-25 RX ORDER — HYDROXYZINE HYDROCHLORIDE 25 MG/1
25 TABLET, FILM COATED ORAL NIGHTLY PRN
COMMUNITY

## (undated) DEVICE — GLOVE BIOGEL SZ 8 1/2

## (undated) DEVICE — DRAPE HAND STERILE

## (undated) DEVICE — PAD ABD 8X10 STERILE

## (undated) DEVICE — SOL NS 1000CC

## (undated) DEVICE — APPLICATOR CHLORAPREP ORN 26ML

## (undated) DEVICE — SYR 10CC LUER LOCK

## (undated) DEVICE — PACK BASIC SETUP SC BR

## (undated) DEVICE — GAUZE SPONGE 4X4 12PLY

## (undated) DEVICE — POSITIONER HEAD DONUT 9IN FOAM

## (undated) DEVICE — SUPPORT ULNA NERVE PROTECTOR

## (undated) DEVICE — COVER LIGHT HANDLE 80/CA

## (undated) DEVICE — GOWN POLY REINF BRTH SLV XL

## (undated) DEVICE — NDL SAFETY 25G X 1.5 ECLIPSE

## (undated) DEVICE — SEE MEDLINE ITEM 157216

## (undated) DEVICE — DRAPE ORTH SPLIT 77X108IN

## (undated) DEVICE — COVER CAMERA OPERATING ROOM

## (undated) DEVICE — BANDAGE ESMARK ELASTIC ST 4X9

## (undated) DEVICE — ALCOHOL 70% ISOP RUBBING 4OZ

## (undated) DEVICE — UNDERGLOVES BIOGEL PI SIZE 7.5

## (undated) DEVICE — BANDAGE CONFORM 3IN STRL

## (undated) DEVICE — TOWEL OR DISP STRL BLUE 4/PK

## (undated) DEVICE — KIT COLLECTION E SWAB REGULAR

## (undated) DEVICE — SCRUB HIBICLENS 4% CHG 4OZ

## (undated) DEVICE — DRAPE U SPLIT SHEET 54X76IN